# Patient Record
Sex: FEMALE | Race: WHITE | NOT HISPANIC OR LATINO | Employment: OTHER | URBAN - METROPOLITAN AREA
[De-identification: names, ages, dates, MRNs, and addresses within clinical notes are randomized per-mention and may not be internally consistent; named-entity substitution may affect disease eponyms.]

---

## 2017-05-26 RX ORDER — LEVOTHYROXINE SODIUM 50 UG/1
50 TABLET ORAL DAILY
Qty: 30 TABLET | Refills: 11 | Status: SHIPPED | OUTPATIENT
Start: 2017-05-26 | End: 2018-05-17 | Stop reason: SDUPTHER

## 2017-06-04 RX ORDER — GABAPENTIN 300 MG/1
CAPSULE ORAL
Qty: 30 CAPSULE | Refills: 0 | Status: SHIPPED | OUTPATIENT
Start: 2017-06-04 | End: 2017-07-07 | Stop reason: SDUPTHER

## 2017-06-29 ENCOUNTER — OFFICE VISIT (OUTPATIENT)
Dept: FAMILY MEDICINE | Facility: CLINIC | Age: 67
End: 2017-06-29
Payer: MEDICARE

## 2017-06-29 VITALS
HEIGHT: 61 IN | HEART RATE: 80 BPM | BODY MASS INDEX: 33.04 KG/M2 | WEIGHT: 175 LBS | DIASTOLIC BLOOD PRESSURE: 70 MMHG | RESPIRATION RATE: 20 BRPM | TEMPERATURE: 98 F | SYSTOLIC BLOOD PRESSURE: 112 MMHG

## 2017-06-29 DIAGNOSIS — E11.43 DIABETIC AUTONOMIC NEUROPATHY ASSOCIATED WITH TYPE 2 DIABETES MELLITUS: Primary | ICD-10-CM

## 2017-06-29 DIAGNOSIS — E11.8 TYPE 2 DIABETES MELLITUS WITH COMPLICATION, WITH LONG-TERM CURRENT USE OF INSULIN: ICD-10-CM

## 2017-06-29 DIAGNOSIS — Z79.4 TYPE 2 DIABETES MELLITUS WITH COMPLICATION, WITH LONG-TERM CURRENT USE OF INSULIN: ICD-10-CM

## 2017-06-29 DIAGNOSIS — B35.6 TINEA CRURIS: ICD-10-CM

## 2017-06-29 PROBLEM — E11.9 DIABETES MELLITUS, TYPE 2: Status: ACTIVE | Noted: 2017-06-29

## 2017-06-29 PROCEDURE — 4010F ACE/ARB THERAPY RXD/TAKEN: CPT | Mod: ,,, | Performed by: FAMILY MEDICINE

## 2017-06-29 PROCEDURE — 1159F MED LIST DOCD IN RCRD: CPT | Mod: ,,, | Performed by: FAMILY MEDICINE

## 2017-06-29 PROCEDURE — 99203 OFFICE O/P NEW LOW 30 MIN: CPT | Mod: ,,, | Performed by: FAMILY MEDICINE

## 2017-06-29 RX ORDER — GABAPENTIN 100 MG/1
CAPSULE ORAL
COMMUNITY
Start: 2017-04-26 | End: 2017-07-07 | Stop reason: DRUGHIGH

## 2017-06-29 RX ORDER — METFORMIN HYDROCHLORIDE 750 MG/1
TABLET, EXTENDED RELEASE ORAL
COMMUNITY
Start: 2017-06-04 | End: 2018-05-17 | Stop reason: SDUPTHER

## 2017-06-29 RX ORDER — FAMOTIDINE 20 MG/1
TABLET, FILM COATED ORAL
COMMUNITY
Start: 2017-05-31 | End: 2017-08-08 | Stop reason: SDUPTHER

## 2017-06-29 RX ORDER — LISINOPRIL 5 MG/1
TABLET ORAL
COMMUNITY
Start: 2017-06-04 | End: 2018-05-07 | Stop reason: SDUPTHER

## 2017-06-29 RX ORDER — MELOXICAM 7.5 MG/1
TABLET ORAL
COMMUNITY
Start: 2017-04-28 | End: 2018-05-17 | Stop reason: SDUPTHER

## 2017-06-29 RX ORDER — METOPROLOL TARTRATE 25 MG/1
TABLET, FILM COATED ORAL
COMMUNITY
Start: 2017-06-01 | End: 2017-08-04 | Stop reason: SDUPTHER

## 2017-06-29 RX ORDER — TRAMADOL HYDROCHLORIDE 50 MG/1
50 TABLET ORAL NIGHTLY
Qty: 20 TABLET | Refills: 0 | Status: SHIPPED | OUTPATIENT
Start: 2017-06-29 | End: 2017-08-15 | Stop reason: SDUPTHER

## 2017-06-29 RX ORDER — NYSTATIN AND TRIAMCINOLONE ACETONIDE 100000; 1 [USP'U]/G; MG/G
CREAM TOPICAL 4 TIMES DAILY
Qty: 60 TUBE | Refills: 1 | Status: SHIPPED | OUTPATIENT
Start: 2017-06-29 | End: 2017-11-16 | Stop reason: ALTCHOICE

## 2017-06-29 NOTE — PROGRESS NOTES
Subjective:       Patient ID: Hoa Wilde is a 67 y.o. female.    Chief Complaint: Rash (under arms x 1 week)    Rash   This is a new problem. The current episode started in the past 7 days. The problem has been gradually worsening since onset. The affected locations include the left axilla and right axilla. The rash is characterized by itchiness, redness and pain. Associated symptoms include diarrhea. (Malodorous) Past treatments include nothing. The treatment provided no relief. Her past medical history is significant for eczema.     Review of Systems   Gastrointestinal: Positive for diarrhea.   Skin: Positive for rash.   Psychiatric/Behavioral: Positive for sleep disturbance (stays up b/c neuropathy).       Objective:      Physical Exam    Assessment:       1. Diabetic autonomic neuropathy associated with type 2 diabetes mellitus    2. Type 2 diabetes mellitus with complication, with long-term current use of insulin    3. Tinea cruris        Plan:       Hoa was seen today for rash.    Diagnoses and all orders for this visit:    Diabetic autonomic neuropathy associated with type 2 diabetes mellitus  -     nystatin-triamcinolone (MYCOLOG II) cream; Apply topically 4 (four) times daily.    Type 2 diabetes mellitus with complication, with long-term current use of insulin    Tinea cruris  -     tramadol (ULTRAM) 50 mg tablet; Take 1 tablet (50 mg total) by mouth every evening.         Return in about 3 months (around 9/29/2017).

## 2017-07-07 RX ORDER — GABAPENTIN 300 MG/1
CAPSULE ORAL
Qty: 30 CAPSULE | Refills: 0 | Status: SHIPPED | OUTPATIENT
Start: 2017-07-07 | End: 2017-09-20 | Stop reason: SDUPTHER

## 2017-07-13 ENCOUNTER — TELEPHONE (OUTPATIENT)
Dept: FAMILY MEDICINE | Facility: CLINIC | Age: 67
End: 2017-07-13

## 2017-07-13 DIAGNOSIS — Z79.4 TYPE 2 DIABETES MELLITUS WITH DIABETIC POLYNEUROPATHY, WITH LONG-TERM CURRENT USE OF INSULIN: Primary | ICD-10-CM

## 2017-07-13 DIAGNOSIS — E11.42 TYPE 2 DIABETES MELLITUS WITH DIABETIC POLYNEUROPATHY, WITH LONG-TERM CURRENT USE OF INSULIN: Primary | ICD-10-CM

## 2017-07-13 NOTE — TELEPHONE ENCOUNTER
Darby at [facility] called in regards to a referral for A dietician consult for Mrs. Wilde.  Please sign order

## 2017-08-04 RX ORDER — METOPROLOL TARTRATE 25 MG/1
25 TABLET, FILM COATED ORAL EVERY 12 HOURS
Qty: 60 TABLET | Refills: 11 | Status: SHIPPED | OUTPATIENT
Start: 2017-08-04 | End: 2017-11-16 | Stop reason: ALTCHOICE

## 2017-08-08 DIAGNOSIS — K21.9 GASTROESOPHAGEAL REFLUX DISEASE WITHOUT ESOPHAGITIS: Primary | ICD-10-CM

## 2017-08-09 RX ORDER — FAMOTIDINE 20 MG/1
20 TABLET, FILM COATED ORAL DAILY
Qty: 30 TABLET | Refills: 11 | Status: SHIPPED | OUTPATIENT
Start: 2017-08-09 | End: 2018-05-17 | Stop reason: SDUPTHER

## 2017-08-15 DIAGNOSIS — B35.6 TINEA CRURIS: ICD-10-CM

## 2017-08-16 RX ORDER — TRAMADOL HYDROCHLORIDE 50 MG/1
TABLET ORAL
Qty: 20 TABLET | Refills: 0 | Status: SHIPPED | OUTPATIENT
Start: 2017-08-16 | End: 2017-11-16 | Stop reason: ALTCHOICE

## 2017-08-17 ENCOUNTER — TELEPHONE (OUTPATIENT)
Dept: FAMILY MEDICINE | Facility: CLINIC | Age: 67
End: 2017-08-17

## 2017-09-20 RX ORDER — GABAPENTIN 300 MG/1
CAPSULE ORAL
Qty: 30 CAPSULE | Refills: 0 | Status: SHIPPED | OUTPATIENT
Start: 2017-09-20 | End: 2017-10-19 | Stop reason: SDUPTHER

## 2017-10-19 RX ORDER — GABAPENTIN 300 MG/1
CAPSULE ORAL
Qty: 30 CAPSULE | Refills: 0 | Status: SHIPPED | OUTPATIENT
Start: 2017-10-19 | End: 2017-11-16 | Stop reason: SDUPTHER

## 2017-11-16 ENCOUNTER — OFFICE VISIT (OUTPATIENT)
Dept: FAMILY MEDICINE | Facility: CLINIC | Age: 67
End: 2017-11-16
Payer: MEDICARE

## 2017-11-16 VITALS
WEIGHT: 176 LBS | DIASTOLIC BLOOD PRESSURE: 86 MMHG | TEMPERATURE: 99 F | RESPIRATION RATE: 20 BRPM | HEART RATE: 72 BPM | SYSTOLIC BLOOD PRESSURE: 132 MMHG | BODY MASS INDEX: 33.25 KG/M2

## 2017-11-16 DIAGNOSIS — E13.40 NEUROPATHY DUE TO SECONDARY DIABETES: ICD-10-CM

## 2017-11-16 DIAGNOSIS — Z00.00 WELL ADULT EXAM: ICD-10-CM

## 2017-11-16 DIAGNOSIS — Z23 IMMUNIZATION DUE: ICD-10-CM

## 2017-11-16 DIAGNOSIS — E11.43 DIABETIC AUTONOMIC NEUROPATHY ASSOCIATED WITH TYPE 2 DIABETES MELLITUS: Primary | ICD-10-CM

## 2017-11-16 PROCEDURE — G0008 ADMIN INFLUENZA VIRUS VAC: HCPCS | Mod: ,,, | Performed by: FAMILY MEDICINE

## 2017-11-16 PROCEDURE — 90686 IIV4 VACC NO PRSV 0.5 ML IM: CPT | Mod: ,,, | Performed by: FAMILY MEDICINE

## 2017-11-16 PROCEDURE — 99213 OFFICE O/P EST LOW 20 MIN: CPT | Mod: 25,,, | Performed by: FAMILY MEDICINE

## 2017-11-16 RX ORDER — GABAPENTIN 100 MG/1
200 CAPSULE ORAL 2 TIMES DAILY
Qty: 120 CAPSULE | Refills: 11 | Status: SHIPPED | OUTPATIENT
Start: 2017-11-16 | End: 2018-05-17 | Stop reason: SDUPTHER

## 2017-11-16 RX ORDER — GABAPENTIN 300 MG/1
300 CAPSULE ORAL NIGHTLY
Qty: 30 CAPSULE | Refills: 11 | Status: SHIPPED | OUTPATIENT
Start: 2017-11-16 | End: 2018-11-20 | Stop reason: SDUPTHER

## 2017-11-16 NOTE — PROGRESS NOTES
Subjective:       Patient ID: Hoa Wilde is a 67 y.o. female.    Chief Complaint: Foot Swelling (neuropathy )    Patient reports bad neuropathy in both feet getting worse she has had this for some time but but getting worse over the last month. Has neuropathic ulcer on the right foot which on the lateral side and the bottom of the great toe followed by Dr Perez and  it is healing. More symptoms on left foot.   on gabapentin 100mg am and noon and 300 hs.      Diabetes   She has type 2 diabetes mellitus. Her disease course has been stable. She is following a diabetic diet. She has not had a previous visit with a dietitian. She rarely participates in exercise. Her breakfast blood glucose range is generally 140-180 mg/dl. Her highest blood glucose is >200 mg/dl. (Eats through the night and does not check fasting sugars) An ACE inhibitor/angiotensin II receptor blocker is being taken. She sees a podiatrist.Eye exam is not current (last year).   No results found for: LABA1C, HGBA1C last Last hemoglobin A1c was in April of last year was 10.6. Patient has diabetic shoes but lost them.    Review of Systems    Objective:      Physical Exam   Constitutional: She appears well-developed and well-nourished. No distress.   HENT:   Head: Normocephalic and atraumatic.   Eyes: Conjunctivae and EOM are normal. Pupils are equal, round, and reactive to light.   Neck: Normal range of motion. Neck supple.   Cardiovascular: Normal rate, regular rhythm, normal heart sounds and intact distal pulses.    Pulses:       Dorsalis pedis pulses are 0 on the right side, and 0 on the left side.        Posterior tibial pulses are 0 on the right side, and 0 on the left side.   Pulmonary/Chest: Effort normal and breath sounds normal.   Musculoskeletal:        Right foot: There is normal range of motion and no deformity.        Left foot: There is normal range of motion and no deformity.   Feet:   Right Foot:   Protective Sensation: 4 sites  tested. 1 site sensed.  Skin Integrity: Positive for ulcer (right great toe healing ulcer on the late), skin breakdown, callus and dry skin. Negative for erythema.   Left Foot:   Protective Sensation: 4 sites tested. 1 site sensed.  Skin Integrity: Positive for callus and dry skin. Negative for ulcer, blister, skin breakdown, erythema or warmth.   Skin: She is not diaphoretic.   Nursing note and vitals reviewed.      Assessment:       1. Diabetic autonomic neuropathy associated with type 2 diabetes mellitus    2. Neuropathy due to secondary diabetes    3. Well adult exam    4. Immunization due        Plan:       Hoa was seen today for foot swelling.    Diagnoses and all orders for this visit:    Diabetic autonomic neuropathy associated with type 2 diabetes mellitus  -     gabapentin (NEURONTIN) 100 MG capsule; Take 2 capsules (200 mg total) by mouth 2 (two) times daily. Am and noon. Coninue 300mg q hs  -     gabapentin (NEURONTIN) 300 MG capsule; Take 1 capsule (300 mg total) by mouth nightly.    Neuropathy due to secondary diabetes  -     gabapentin (NEURONTIN) 100 MG capsule; Take 2 capsules (200 mg total) by mouth 2 (two) times daily. Am and noon. Coninue 300mg q hs  -     gabapentin (NEURONTIN) 300 MG capsule; Take 1 capsule (300 mg total) by mouth nightly.  Follow-up with  for diabetic shoes .  Follow-up Dr Perez for care of foot ulcer  -     Hemoglobin A1c; Future  -     Hepatic function panel; Future  -     Hepatitis C antibody; Future  -     Lipid panel; Future  -     Comprehensive metabolic panel; Future  -     Hemoglobin A1c  -     Hepatic function panel  -     Hepatitis C antibody  -     Lipid panel  -     Comprehensive metabolic panel    Immunization due  -     Influenza - Quadrivalent (3 years & older) (PF); Future         Return for annual.

## 2017-11-20 LAB
ALBUMIN SERPL-MCNC: 4.1 G/DL (ref 3.1–4.7)
ALP SERPL-CCNC: 94 IU/L (ref 40–104)
ALT (SGPT): 19 IU/L (ref 3–33)
AST SERPL-CCNC: 20 IU/L (ref 10–40)
BILIRUB SERPL-MCNC: 0.7 MG/DL (ref 0.3–1)
BILIRUBIN DIRECT+TOT PNL SERPL-MCNC: <0.1 MG/DL (ref 0–0.2)
BUN SERPL-MCNC: 10 MG/DL (ref 8–20)
CALCIUM SERPL-MCNC: 9.1 MG/DL (ref 7.7–10.4)
CHLORIDE: 101 MMOL/L (ref 98–110)
CO2 SERPL-SCNC: 25.6 MMOL/L (ref 22.8–31.6)
CREATININE: 0.49 MG/DL (ref 0.6–1.4)
GLUCOSE: 287 MG/DL (ref 70–99)
HBA1C MFR BLD: 10.5 % (ref 3.1–6.5)
POTASSIUM SERPL-SCNC: 4.1 MMOL/L (ref 3.5–5)
PROT SERPL-MCNC: 7.6 G/DL (ref 6–8.2)
SODIUM: 136 MMOL/L (ref 134–144)

## 2017-11-21 LAB — HCV AB SERPL QL IA: <0.1 S/CO RATIO (ref 0–0.9)

## 2017-11-22 ENCOUNTER — TELEPHONE (OUTPATIENT)
Dept: FAMILY MEDICINE | Facility: CLINIC | Age: 67
End: 2017-11-22

## 2017-12-29 ENCOUNTER — TELEPHONE (OUTPATIENT)
Dept: FAMILY MEDICINE | Facility: CLINIC | Age: 67
End: 2017-12-29

## 2017-12-29 NOTE — TELEPHONE ENCOUNTER
[facility] is calling regarding Statin therapy for Mrs Wilde.  Does Mrs. Wilde have a contraindication for statin therapy.  If no contraindications please consider starting patient on statin. Please advise

## 2018-02-22 DIAGNOSIS — E11.9 DIABETES MELLITUS WITHOUT COMPLICATION: Primary | ICD-10-CM

## 2018-04-09 ENCOUNTER — TELEPHONE (OUTPATIENT)
Dept: FAMILY MEDICINE | Facility: CLINIC | Age: 68
End: 2018-04-09

## 2018-04-09 NOTE — TELEPHONE ENCOUNTER
Left message on voicemail for patient to call and schedule an appointment with Dr. Pope for a hospital follow-up

## 2018-04-09 NOTE — TELEPHONE ENCOUNTER
----- Message from Zehra Morgan sent at 4/9/2018 10:33 AM CDT -----  Patient was discharged from [facility] on 4/7/18 . Please schedule f/u ov with patient before 5/7/18. We need to document discharged medicines with current medicine list and bill the 1111F code on claim.

## 2018-05-07 RX ORDER — LISINOPRIL 5 MG/1
5 TABLET ORAL DAILY
Qty: 30 TABLET | Refills: 6 | Status: SHIPPED | OUTPATIENT
Start: 2018-05-07 | End: 2018-05-09 | Stop reason: SDUPTHER

## 2018-05-09 DIAGNOSIS — I10 ESSENTIAL HYPERTENSION, BENIGN: Primary | ICD-10-CM

## 2018-05-09 RX ORDER — LISINOPRIL 5 MG/1
5 TABLET ORAL DAILY
Qty: 30 TABLET | Refills: 11 | Status: SHIPPED | OUTPATIENT
Start: 2018-05-09 | End: 2018-05-17 | Stop reason: SDUPTHER

## 2018-05-16 ENCOUNTER — OFFICE VISIT (OUTPATIENT)
Dept: FAMILY MEDICINE | Facility: CLINIC | Age: 68
End: 2018-05-16
Payer: MEDICARE

## 2018-05-16 VITALS
TEMPERATURE: 99 F | SYSTOLIC BLOOD PRESSURE: 118 MMHG | DIASTOLIC BLOOD PRESSURE: 60 MMHG | WEIGHT: 173 LBS | OXYGEN SATURATION: 98 % | HEART RATE: 86 BPM | HEIGHT: 61 IN | RESPIRATION RATE: 16 BRPM | BODY MASS INDEX: 32.66 KG/M2

## 2018-05-16 DIAGNOSIS — E11.8 TYPE 2 DIABETES MELLITUS WITH COMPLICATION, WITH LONG-TERM CURRENT USE OF INSULIN: Primary | ICD-10-CM

## 2018-05-16 DIAGNOSIS — E13.40 NEUROPATHY DUE TO SECONDARY DIABETES: ICD-10-CM

## 2018-05-16 DIAGNOSIS — Z79.4 TYPE 2 DIABETES MELLITUS WITH COMPLICATION, WITH LONG-TERM CURRENT USE OF INSULIN: Primary | ICD-10-CM

## 2018-05-16 PROCEDURE — 3074F SYST BP LT 130 MM HG: CPT | Mod: ,,, | Performed by: FAMILY MEDICINE

## 2018-05-16 PROCEDURE — 99214 OFFICE O/P EST MOD 30 MIN: CPT | Mod: ,,, | Performed by: FAMILY MEDICINE

## 2018-05-16 PROCEDURE — 1111F DSCHRG MED/CURRENT MED MERGE: CPT | Mod: ,,, | Performed by: FAMILY MEDICINE

## 2018-05-16 PROCEDURE — 3078F DIAST BP <80 MM HG: CPT | Mod: ,,, | Performed by: FAMILY MEDICINE

## 2018-05-16 PROCEDURE — 3046F HEMOGLOBIN A1C LEVEL >9.0%: CPT | Mod: ,,, | Performed by: FAMILY MEDICINE

## 2018-05-16 RX ORDER — ASPIRIN 81 MG/1
81 TABLET ORAL DAILY
COMMUNITY
End: 2021-06-28 | Stop reason: SDUPTHER

## 2018-05-16 RX ORDER — TIZANIDINE HYDROCHLORIDE 4 MG/1
1 CAPSULE, GELATIN COATED ORAL 4 TIMES DAILY PRN
Qty: 180 CAPSULE | Refills: 3 | Status: SHIPPED | OUTPATIENT
Start: 2018-05-16 | End: 2018-06-15

## 2018-05-16 RX ORDER — ATORVASTATIN CALCIUM 40 MG/1
40 TABLET, FILM COATED ORAL DAILY
Qty: 90 TABLET | Refills: 3 | Status: SHIPPED | OUTPATIENT
Start: 2018-05-16 | End: 2020-04-30

## 2018-05-16 NOTE — PROGRESS NOTES
Subjective:       Patient ID: Hoa Wilde is a 68 y.o. female.    Chief Complaint: Hospital Follow Up (medication reconcile bill [id number]) and Medication Refill      Patient is here as a follow-up from the hospital she was seen because of diabetic ketoacidosis had sugar 501 she was placed in ICU had insulin drip and fluid replacement and was discharged about a month ago. Discharged on 40 units of Levemir daily at bedtime. Has poor diet habits.      Diabetes   She presents for her follow-up diabetic visit. She has type 2 diabetes mellitus. Her disease course has been worsening. She has not had a previous visit with a dietitian. She participates in exercise daily. Her home blood glucose trend is increasing steadily. Her breakfast blood glucose range is generally >200 mg/dl. Her dinner blood glucose range is generally >200 mg/dl. Her highest blood glucose is >200 mg/dl. An ACE inhibitor/angiotensin II receptor blocker is being taken. She does not see a podiatrist.Eye exam is current.       Allergies and Medications:   Review of patient's allergies indicates:  No Known Allergies  Current Outpatient Prescriptions   Medication Sig Dispense Refill    aspirin (ECOTRIN) 81 MG EC tablet Take 81 mg by mouth once daily.      famotidine (PEPCID) 20 MG tablet Take 1 tablet (20 mg total) by mouth once daily. 30 tablet 11    gabapentin (NEURONTIN) 100 MG capsule Take 2 capsules (200 mg total) by mouth 2 (two) times daily. Am and noon. Coninue 300mg q hs 120 capsule 11    gabapentin (NEURONTIN) 300 MG capsule Take 1 capsule (300 mg total) by mouth nightly. 30 capsule 11    insulin detemir (LEVEMIR FLEXTOUCH) 100 unit/mL (3 mL) SubQ InPn pen Inject 50 Units into the skin every evening. 45 mL 3    levothyroxine (SYNTHROID) 50 MCG tablet Take 1 tablet (50 mcg total) by mouth once daily. 30 tablet 11    lisinopril (PRINIVIL,ZESTRIL) 5 MG tablet Take 1 tablet (5 mg total) by mouth once daily. 30 tablet 11    meloxicam (MOBIC) 7.5 MG  "tablet       metformin (GLUCOPHAGE-XR) 750 MG 24 hr tablet       metoprolol tartrate (LOPRESSOR) 25 MG tablet       pen needle, diabetic 30 gauge x 1/3" Ndle 1 pen by Misc.(Non-Drug; Combo Route) route once daily. To use with levimir flex pen 100 each 3    atorvastatin (LIPITOR) 40 MG tablet Take 1 tablet (40 mg total) by mouth once daily. 90 tablet 3    tiZANidine 4 mg Cap Take 1 capsule by mouth 4 (four) times daily as needed. 180 capsule 3     No current facility-administered medications for this visit.        Family History:   Family History   Problem Relation Age of Onset    Hyperlipidemia Mother     Heart disease Mother        Social History:   Social History     Social History    Marital status:      Spouse name: N/A    Number of children: N/A    Years of education: N/A     Occupational History    Not on file.     Social History Main Topics    Smoking status: Never Smoker    Smokeless tobacco: Never Used    Alcohol use No    Drug use: No    Sexual activity: Not on file     Other Topics Concern    Not on file     Social History Narrative    No narrative on file       Review of Systems    Objective:     Vitals:    05/16/18 1426   BP: 118/60   Pulse: 86   Resp: 16   Temp: 99.1 °F (37.3 °C)        Physical Exam   Constitutional: She appears well-developed and well-nourished.   HENT:   Head: Normocephalic.   Eyes: Conjunctivae and EOM are normal. Pupils are equal, round, and reactive to light. Right eye exhibits no discharge. Left eye exhibits no discharge.   Neck: Normal range of motion. Neck supple.   Cardiovascular: Normal rate and regular rhythm.    Psychiatric: She has a normal mood and affect. Her behavior is normal. Judgment and thought content normal.   Nursing note and vitals reviewed.      Assessment:       1. Type 2 diabetes mellitus with complication, with long-term current use of insulin    2. Neuropathy due to secondary diabetes -Patient has significant pain and wanted a " refill on Ultram because of potential side effects and control substance we will do Parafon Forte take at nighttime she was also instructed to take her insulin up to 50 units and secured at 8 PM instead of the wee hours of the morning.        Plan:     The patient is asked to make an attempt to improve diet and exercise patterns to aid in medical management of this problem.  Hoa was seen today for hospital follow up and medication refill.  Discharge and current medications have been reviewed and reconciled with the chart.    Diagnoses and all orders for this visit:    Type 2 diabetes mellitus with complication, with long-term current use of insulin    Neuropathy due to secondary diabetes    Other orders  -     atorvastatin (LIPITOR) 40 MG tablet; Take 1 tablet (40 mg total) by mouth once daily.  -     tiZANidine 4 mg Cap; Take 1 capsule by mouth 4 (four) times daily as needed.         Follow-up in about 3 months (around 8/16/2018), or if symptoms worsen or fail to improve.

## 2018-05-17 DIAGNOSIS — E11.9 DIABETES MELLITUS WITHOUT COMPLICATION: Primary | ICD-10-CM

## 2018-05-17 DIAGNOSIS — E13.40 NEUROPATHY DUE TO SECONDARY DIABETES: ICD-10-CM

## 2018-05-17 DIAGNOSIS — K21.9 GASTROESOPHAGEAL REFLUX DISEASE WITHOUT ESOPHAGITIS: ICD-10-CM

## 2018-05-17 DIAGNOSIS — E11.43 DIABETIC AUTONOMIC NEUROPATHY ASSOCIATED WITH TYPE 2 DIABETES MELLITUS: ICD-10-CM

## 2018-05-17 DIAGNOSIS — I10 ESSENTIAL HYPERTENSION, BENIGN: ICD-10-CM

## 2018-05-17 RX ORDER — METFORMIN HYDROCHLORIDE 750 MG/1
750 TABLET, EXTENDED RELEASE ORAL
Qty: 30 TABLET | Refills: 11 | Status: SHIPPED | OUTPATIENT
Start: 2018-05-17 | End: 2018-05-24 | Stop reason: SDUPTHER

## 2018-05-17 RX ORDER — GABAPENTIN 100 MG/1
200 CAPSULE ORAL 2 TIMES DAILY
Qty: 120 CAPSULE | Refills: 11 | Status: SHIPPED | OUTPATIENT
Start: 2018-05-17 | End: 2019-09-23 | Stop reason: SDUPTHER

## 2018-05-17 RX ORDER — FAMOTIDINE 20 MG/1
20 TABLET, FILM COATED ORAL DAILY
Qty: 30 TABLET | Refills: 11 | Status: SHIPPED | OUTPATIENT
Start: 2018-05-17 | End: 2019-05-18 | Stop reason: SDUPTHER

## 2018-05-17 RX ORDER — METOPROLOL TARTRATE 25 MG/1
25 TABLET, FILM COATED ORAL DAILY
Qty: 30 TABLET | Refills: 11 | Status: SHIPPED | OUTPATIENT
Start: 2018-05-17 | End: 2018-06-07 | Stop reason: SDUPTHER

## 2018-05-17 RX ORDER — MELOXICAM 7.5 MG/1
7.5 TABLET ORAL DAILY
Qty: 30 TABLET | Refills: 11 | Status: SHIPPED | OUTPATIENT
Start: 2018-05-17 | End: 2019-06-02 | Stop reason: SDUPTHER

## 2018-05-17 RX ORDER — LISINOPRIL 5 MG/1
5 TABLET ORAL DAILY
Qty: 30 TABLET | Refills: 11 | Status: SHIPPED | OUTPATIENT
Start: 2018-05-17 | End: 2019-05-17

## 2018-05-17 RX ORDER — LEVOTHYROXINE SODIUM 50 UG/1
50 TABLET ORAL DAILY
Qty: 30 TABLET | Refills: 11 | Status: SHIPPED | OUTPATIENT
Start: 2018-05-17 | End: 2019-09-23 | Stop reason: SDUPTHER

## 2018-05-24 ENCOUNTER — TELEPHONE (OUTPATIENT)
Dept: FAMILY MEDICINE | Facility: CLINIC | Age: 68
End: 2018-05-24

## 2018-05-24 DIAGNOSIS — E11.9 DIABETES MELLITUS WITHOUT COMPLICATION: ICD-10-CM

## 2018-05-24 DIAGNOSIS — I10 ESSENTIAL HYPERTENSION, BENIGN: ICD-10-CM

## 2018-05-24 RX ORDER — METFORMIN HYDROCHLORIDE 750 MG/1
1500 TABLET, EXTENDED RELEASE ORAL
Qty: 60 TABLET | Refills: 11 | Status: SHIPPED | OUTPATIENT
Start: 2018-05-24 | End: 2019-10-06 | Stop reason: SDUPTHER

## 2018-05-24 NOTE — TELEPHONE ENCOUNTER
Patient called stating she takes Metformin sr 750mg twice a day and the new rx states once a day.  Did you decrease her dosage

## 2018-06-07 DIAGNOSIS — I10 ESSENTIAL HYPERTENSION, BENIGN: ICD-10-CM

## 2018-06-07 RX ORDER — METOPROLOL TARTRATE 25 MG/1
25 TABLET, FILM COATED ORAL 2 TIMES DAILY
Qty: 60 TABLET | Refills: 11 | Status: SHIPPED | OUTPATIENT
Start: 2018-06-07 | End: 2019-06-29 | Stop reason: SDUPTHER

## 2018-06-07 NOTE — TELEPHONE ENCOUNTER
Refill Metoprolol Tart 25 mg and change dosage it was entered wrong at last appointment patient takes med bid

## 2018-11-20 DIAGNOSIS — E13.40 NEUROPATHY DUE TO SECONDARY DIABETES: ICD-10-CM

## 2018-11-20 DIAGNOSIS — E11.43 DIABETIC AUTONOMIC NEUROPATHY ASSOCIATED WITH TYPE 2 DIABETES MELLITUS: ICD-10-CM

## 2018-11-20 RX ORDER — GABAPENTIN 300 MG/1
CAPSULE ORAL
Qty: 30 CAPSULE | Refills: 1 | Status: SHIPPED | OUTPATIENT
Start: 2018-11-20 | End: 2019-09-23 | Stop reason: SDUPTHER

## 2018-12-03 ENCOUNTER — OFFICE VISIT (OUTPATIENT)
Dept: FAMILY MEDICINE | Facility: CLINIC | Age: 68
End: 2018-12-03
Payer: MEDICARE

## 2018-12-03 VITALS
SYSTOLIC BLOOD PRESSURE: 126 MMHG | OXYGEN SATURATION: 97 % | DIASTOLIC BLOOD PRESSURE: 70 MMHG | BODY MASS INDEX: 32.47 KG/M2 | WEIGHT: 172 LBS | HEART RATE: 64 BPM | HEIGHT: 61 IN

## 2018-12-03 DIAGNOSIS — Z23 IMMUNIZATION DUE: ICD-10-CM

## 2018-12-03 DIAGNOSIS — E11.43 DIABETIC AUTONOMIC NEUROPATHY ASSOCIATED WITH TYPE 2 DIABETES MELLITUS: ICD-10-CM

## 2018-12-03 DIAGNOSIS — I10 ESSENTIAL HYPERTENSION: ICD-10-CM

## 2018-12-03 DIAGNOSIS — M25.551 PAIN OF RIGHT HIP JOINT: ICD-10-CM

## 2018-12-03 DIAGNOSIS — E13.40 NEUROPATHY DUE TO SECONDARY DIABETES: Primary | ICD-10-CM

## 2018-12-03 DIAGNOSIS — E78.2 MIXED HYPERLIPIDEMIA: ICD-10-CM

## 2018-12-03 PROBLEM — E78.5 HYPERLIPIDEMIA: Status: ACTIVE | Noted: 2018-12-03

## 2018-12-03 LAB
GLUCOSE SERPL-MCNC: 249 MG/DL (ref 70–110)
HBA1C MFR BLD: 11.4 %
POC CHOLESTEROL, HDL: 40
POC CHOLESTEROL, LDL: 76
POC CHOLESTEROL, TOTAL: 143 MG/DL
POC GLUCOSE FASTING: ABNORMAL
POC TOTAL CHOLESTEROL / HDL RATIO: 3.6
POC TRIGLYCERIDES: 134

## 2018-12-03 PROCEDURE — 3288F FALL RISK ASSESSMENT DOCD: CPT | Mod: ,,, | Performed by: FAMILY MEDICINE

## 2018-12-03 PROCEDURE — 1100F PTFALLS ASSESS-DOCD GE2>/YR: CPT | Mod: ,,, | Performed by: FAMILY MEDICINE

## 2018-12-03 PROCEDURE — 3074F SYST BP LT 130 MM HG: CPT | Mod: ,,, | Performed by: FAMILY MEDICINE

## 2018-12-03 PROCEDURE — 3078F DIAST BP <80 MM HG: CPT | Mod: ,,, | Performed by: FAMILY MEDICINE

## 2018-12-03 PROCEDURE — 99214 OFFICE O/P EST MOD 30 MIN: CPT | Mod: 25,,, | Performed by: FAMILY MEDICINE

## 2018-12-03 PROCEDURE — 82947 ASSAY GLUCOSE BLOOD QUANT: CPT | Mod: QW,,, | Performed by: FAMILY MEDICINE

## 2018-12-03 PROCEDURE — G0009 ADMIN PNEUMOCOCCAL VACCINE: HCPCS | Mod: ,,, | Performed by: FAMILY MEDICINE

## 2018-12-03 PROCEDURE — 90732 PPSV23 VACC 2 YRS+ SUBQ/IM: CPT | Mod: ,,, | Performed by: FAMILY MEDICINE

## 2018-12-03 PROCEDURE — 83036 HEMOGLOBIN GLYCOSYLATED A1C: CPT | Mod: QW,,, | Performed by: FAMILY MEDICINE

## 2018-12-03 PROCEDURE — 3046F HEMOGLOBIN A1C LEVEL >9.0%: CPT | Mod: ,,, | Performed by: FAMILY MEDICINE

## 2018-12-03 PROCEDURE — 80061 LIPID PANEL: CPT | Mod: QW,,, | Performed by: FAMILY MEDICINE

## 2018-12-03 NOTE — PROGRESS NOTES
Subjective:       Patient ID: Hoa Wilde is a 68 y.o. female.    Chief Complaint: Hip Pain; leg paini (right ); and rx for diabetic shoes      He needs an evaluation for diabetic shoes. 3 years because of diabetic neuropathy. She has a history of bad neuropathy in both feet had the ulcerations treated aggressively with wound care.        Hip Pain    Incident onset: 2 weeks. There was no injury mechanism. The pain is present in the right hip, right ankle and right thigh. The quality of the pain is described as aching, burning, cramping, shooting and stabbing. The pain is at a severity of 9/10. The pain is moderate. Pertinent negatives include no muscle weakness. She has tried elevation and rest for the symptoms. The treatment provided mild relief.   Diabetes   She presents for her follow-up diabetic visit. She has type 2 diabetes mellitus. Her disease course has been stable. Pertinent negatives for hypoglycemia include no confusion. There are no hypoglycemic complications. Pertinent negatives for hypoglycemia complications include no blackouts. Symptoms are worsening. Diabetic complications include PVD and retinopathy. She is following a diabetic diet. When asked about meal planning, she reported none. She has not had a previous visit with a dietitian. She rarely participates in exercise. She does not see a podiatrist (As seen  in the past).Eye exam current: Is seeing Dr. Era Peter in January.       Allergies and Medications:   Review of patient's allergies indicates:  No Known Allergies  Current Outpatient Medications   Medication Sig Dispense Refill    aspirin (ECOTRIN) 81 MG EC tablet Take 81 mg by mouth once daily.      atorvastatin (LIPITOR) 40 MG tablet Take 1 tablet (40 mg total) by mouth once daily. 90 tablet 3    famotidine (PEPCID) 20 MG tablet Take 1 tablet (20 mg total) by mouth once daily. 30 tablet 11    gabapentin (NEURONTIN) 300 MG capsule TAKE ONE CAPSULE BY MOUTH NIGHTLY 30  "capsule 1    insulin detemir U-100 (LEVEMIR FLEXTOUCH U-100 INSULN) 100 unit/mL (3 mL) SubQ InPn pen Inject 60 Units into the skin every evening. 54 mL 3    levothyroxine (SYNTHROID) 50 MCG tablet Take 1 tablet (50 mcg total) by mouth once daily. 30 tablet 11    lisinopril (PRINIVIL,ZESTRIL) 5 MG tablet Take 1 tablet (5 mg total) by mouth once daily. 30 tablet 11    meloxicam (MOBIC) 7.5 MG tablet Take 1 tablet (7.5 mg total) by mouth once daily. 30 tablet 11    metFORMIN (GLUCOPHAGE-XR) 750 MG 24 hr tablet Take 2 tablets (1,500 mg total) by mouth daily with breakfast. 60 tablet 11    metoprolol tartrate (LOPRESSOR) 25 MG tablet Take 1 tablet (25 mg total) by mouth 2 (two) times daily. 60 tablet 11    pen needle, diabetic 30 gauge x 1/3" Ndle 1 pen by Misc.(Non-Drug; Combo Route) route once daily. To use with levimir flex pen 100 each 3    dulaglutide (TRULICITY) 0.75 mg/0.5 mL PnIj Inject 0.5 mLs (0.75 mg total) into the skin every 7 days for 28 days, THEN 1 mL (1.5 mg total) every 7 days. 14 mL 0    gabapentin (NEURONTIN) 100 MG capsule Take 2 capsules (200 mg total) by mouth 2 (two) times daily. Am and noon. Coninue 300mg q hs 120 capsule 11     No current facility-administered medications for this visit.        Family History:   Family History   Problem Relation Age of Onset    Hyperlipidemia Mother     Heart disease Mother        Social History:   Social History     Socioeconomic History    Marital status:      Spouse name: Not on file    Number of children: Not on file    Years of education: Not on file    Highest education level: Not on file   Social Needs    Financial resource strain: Not on file    Food insecurity - worry: Not on file    Food insecurity - inability: Not on file    Transportation needs - medical: Not on file    Transportation needs - non-medical: Not on file   Occupational History    Occupation: retired    Tobacco Use    Smoking status: Never Smoker    Smokeless " tobacco: Never Used   Substance and Sexual Activity    Alcohol use: No    Drug use: No    Sexual activity: Not on file   Other Topics Concern    Not on file   Social History Narrative    Not on file       Review of Systems   Psychiatric/Behavioral: Negative for confusion.       Objective:     Vitals:    12/03/18 1457   BP: 126/70   Pulse: 64        Physical Exam   Cardiovascular:   Pulses:       Dorsalis pedis pulses are 1+ on the right side, and 1+ on the left side.        Posterior tibial pulses are 0 on the right side, and 0 on the left side.   Musculoskeletal:        Legs:       Right foot: There is normal range of motion and no deformity.        Left foot: There is normal range of motion and no deformity.   Feet:   Right Foot:   Protective Sensation: 4 sites tested. 0 sites sensed.   Skin Integrity: Positive for skin breakdown, erythema, warmth, callus and dry skin.   Left Foot:   Protective Sensation: 4 sites tested. 0 sites sensed.   Skin Integrity: Positive for skin breakdown, erythema, warmth, callus and dry skin.       Assessment:       1. Neuropathy due to secondary diabetes she definitely needs diabetic shoes, however she is to see her podiatrist before then as she will need a special prescription.    2. Diabetic autonomic neuropathy associated with type 2 diabetes mellitus chronic and uncontrolled    3. Mixed hyperlipidemia chronic and uncontrolled    4. Essential hypertension    5. Pain of right hip joint        Plan:       Hoa was seen today for hip pain, leg paini and rx for diabetic shoes.    Diagnoses and all orders for this visit:    Neuropathy due to secondary diabetes  -     Ambulatory consult to Podiatry    Diabetic autonomic neuropathy associated with type 2 diabetes mellitus  -     dulaglutide (TRULICITY) 0.75 mg/0.5 mL PnIj; Inject 0.5 mLs (0.75 mg total) into the skin every 7 days for 28 days, THEN 1 mL (1.5 mg total) every 7 days.  -     insulin detemir U-100 (LEVEMIR FLEXTOUCH U-100  INSULN) 100 unit/mL (3 mL) SubQ InPn pen; Inject 60 Units into the skin every evening.    Mixed hyperlipidemia  -     Ambulatory consult to Podiatry  -     POCT Lipid Profile with Glucose  -     Hemoglobin A1C, POCT    Essential hypertension  -     Ambulatory consult to Podiatry    Pain of right hip joint  -     Ambulatory consult to Orthopedics    Other orders  -     Influenza - Quadrivalent (3 years & older)         Follow-up in about 2 weeks (around 12/17/2018) for Return to clinic in 2 weeks with blood sugar diary-recheck diabetes.

## 2018-12-05 ENCOUNTER — TELEPHONE (OUTPATIENT)
Dept: FAMILY MEDICINE | Facility: CLINIC | Age: 68
End: 2018-12-05

## 2018-12-05 PROCEDURE — 90662 IIV NO PRSV INCREASED AG IM: CPT | Mod: ,,, | Performed by: FAMILY MEDICINE

## 2018-12-05 PROCEDURE — G0008 ADMIN INFLUENZA VIRUS VAC: HCPCS | Mod: ,,, | Performed by: FAMILY MEDICINE

## 2018-12-06 NOTE — TELEPHONE ENCOUNTER
Patient was recently evaluated for hip pain has a referral to the orthopedist pending patient is taking Neurontin for chronic pain and has mobile for inflammation and breakthrough pain. Opiates are inappropriate secondary to multiple risk factors would recommend she take her myopic daily may increase to twice a day on bad days and Tylenol 5 mg every 6 when necessary breakthrough pain.

## 2018-12-07 DIAGNOSIS — Z79.4 TYPE 2 DIABETES MELLITUS WITH COMPLICATION, WITH LONG-TERM CURRENT USE OF INSULIN: Primary | ICD-10-CM

## 2018-12-07 DIAGNOSIS — E11.8 TYPE 2 DIABETES MELLITUS WITH COMPLICATION, WITH LONG-TERM CURRENT USE OF INSULIN: Primary | ICD-10-CM

## 2019-02-21 ENCOUNTER — TELEPHONE (OUTPATIENT)
Dept: FAMILY MEDICINE | Facility: CLINIC | Age: 69
End: 2019-02-21

## 2019-02-21 NOTE — TELEPHONE ENCOUNTER
I submitted a PA on the patient's Levemir.  I am still awaiting a reply.  In the meantime, the patient reports she is out of insulin.  Is there anything you want to do in the interim for coverage in addition to the Trulicity and Metformin?

## 2019-03-19 ENCOUNTER — TELEPHONE (OUTPATIENT)
Dept: FAMILY MEDICINE | Facility: CLINIC | Age: 69
End: 2019-03-19

## 2019-03-19 NOTE — TELEPHONE ENCOUNTER
Called patient to schedule visit per Dr. Pope.  Per [facility], patient should be evaluated for Obstructive Sleep Apnea.  HTN, Obesity, BMI 33.0-33.9.  LM on VM.

## 2019-05-18 DIAGNOSIS — K21.9 GASTROESOPHAGEAL REFLUX DISEASE WITHOUT ESOPHAGITIS: ICD-10-CM

## 2019-05-20 DIAGNOSIS — K21.9 GASTROESOPHAGEAL REFLUX DISEASE WITHOUT ESOPHAGITIS: ICD-10-CM

## 2019-05-20 RX ORDER — FAMOTIDINE 20 MG/1
TABLET, FILM COATED ORAL
Qty: 30 TABLET | Refills: 11 | Status: SHIPPED | OUTPATIENT
Start: 2019-05-20 | End: 2019-05-20 | Stop reason: SDUPTHER

## 2019-05-20 RX ORDER — FAMOTIDINE 20 MG/1
20 TABLET, FILM COATED ORAL DAILY
Qty: 30 TABLET | Refills: 5 | Status: SHIPPED | OUTPATIENT
Start: 2019-05-20 | End: 2020-07-08

## 2019-06-02 DIAGNOSIS — E13.40 NEUROPATHY DUE TO SECONDARY DIABETES: ICD-10-CM

## 2019-06-03 RX ORDER — MELOXICAM 7.5 MG/1
TABLET ORAL
Qty: 30 TABLET | Refills: 1 | Status: SHIPPED | OUTPATIENT
Start: 2019-06-03 | End: 2019-10-06 | Stop reason: SDUPTHER

## 2019-06-12 ENCOUNTER — TELEPHONE (OUTPATIENT)
Dept: FAMILY MEDICINE | Facility: CLINIC | Age: 69
End: 2019-06-12

## 2019-06-12 DIAGNOSIS — I10 ESSENTIAL HYPERTENSION: ICD-10-CM

## 2019-06-12 DIAGNOSIS — Z79.4 TYPE 2 DIABETES MELLITUS WITH COMPLICATION, WITH LONG-TERM CURRENT USE OF INSULIN: ICD-10-CM

## 2019-06-12 DIAGNOSIS — E11.8 TYPE 2 DIABETES MELLITUS WITH COMPLICATION, WITH LONG-TERM CURRENT USE OF INSULIN: ICD-10-CM

## 2019-06-12 DIAGNOSIS — E78.2 MIXED HYPERLIPIDEMIA: Primary | ICD-10-CM

## 2019-06-14 ENCOUNTER — TELEPHONE (OUTPATIENT)
Dept: FAMILY MEDICINE | Facility: CLINIC | Age: 69
End: 2019-06-14

## 2019-06-14 NOTE — TELEPHONE ENCOUNTER
Spoke with patient she wanted to know how long she should fast for lab.  The tech at the lab told patient 12 hours.   Advised patient to fast for 8 hours before lab.

## 2019-06-25 ENCOUNTER — TELEPHONE (OUTPATIENT)
Dept: FAMILY MEDICINE | Facility: CLINIC | Age: 69
End: 2019-06-25

## 2019-06-25 DIAGNOSIS — I10 ESSENTIAL HYPERTENSION, BENIGN: ICD-10-CM

## 2019-06-25 NOTE — TELEPHONE ENCOUNTER
Left message on voicemail in regards to patient having blood work done before her appointment tomorrow.

## 2019-06-26 RX ORDER — LISINOPRIL 5 MG/1
TABLET ORAL
Qty: 90 TABLET | Refills: 0 | Status: SHIPPED | OUTPATIENT
Start: 2019-06-26 | End: 2020-02-02

## 2019-06-27 DIAGNOSIS — I10 ESSENTIAL HYPERTENSION, BENIGN: ICD-10-CM

## 2019-06-28 RX ORDER — LISINOPRIL 5 MG/1
TABLET ORAL
Qty: 90 TABLET | Refills: 3 | Status: SHIPPED | OUTPATIENT
Start: 2019-06-28 | End: 2019-09-18 | Stop reason: SDUPTHER

## 2019-06-29 DIAGNOSIS — I10 ESSENTIAL HYPERTENSION, BENIGN: ICD-10-CM

## 2019-07-01 DIAGNOSIS — I10 ESSENTIAL HYPERTENSION, BENIGN: ICD-10-CM

## 2019-07-01 RX ORDER — METOPROLOL TARTRATE 25 MG/1
TABLET, FILM COATED ORAL
Qty: 180 TABLET | Refills: 3 | Status: SHIPPED | OUTPATIENT
Start: 2019-07-01 | End: 2020-10-07

## 2019-07-01 RX ORDER — METOPROLOL TARTRATE 25 MG/1
TABLET, FILM COATED ORAL
Qty: 180 TABLET | Refills: 0 | Status: SHIPPED | OUTPATIENT
Start: 2019-07-01 | End: 2019-09-18 | Stop reason: SDUPTHER

## 2019-09-17 ENCOUNTER — LAB VISIT (OUTPATIENT)
Dept: LAB | Facility: HOSPITAL | Age: 69
End: 2019-09-17
Attending: FAMILY MEDICINE
Payer: MEDICARE

## 2019-09-17 DIAGNOSIS — E78.2 MIXED HYPERLIPIDEMIA: ICD-10-CM

## 2019-09-17 DIAGNOSIS — I10 ESSENTIAL HYPERTENSION, MALIGNANT: Primary | ICD-10-CM

## 2019-09-17 DIAGNOSIS — E11.8 DIABETIC COMPLICATION: ICD-10-CM

## 2019-09-17 DIAGNOSIS — Z79.4 ENCOUNTER FOR LONG-TERM (CURRENT) USE OF INSULIN: ICD-10-CM

## 2019-09-17 LAB
ALBUMIN SERPL BCP-MCNC: 3.8 G/DL (ref 3.5–5.2)
ALBUMIN SERPL BCP-MCNC: 3.8 G/DL (ref 3.5–5.2)
ALP SERPL-CCNC: 87 U/L (ref 55–135)
ALP SERPL-CCNC: 87 U/L (ref 55–135)
ALT SERPL W/O P-5'-P-CCNC: 15 U/L (ref 10–44)
ALT SERPL W/O P-5'-P-CCNC: 15 U/L (ref 10–44)
ANION GAP SERPL CALC-SCNC: 8 MMOL/L (ref 8–16)
AST SERPL-CCNC: 14 U/L (ref 10–40)
AST SERPL-CCNC: 14 U/L (ref 10–40)
BILIRUB DIRECT SERPL-MCNC: <0.1 MG/DL (ref 0.1–0.3)
BILIRUB SERPL-MCNC: 0.5 MG/DL (ref 0.1–1)
BILIRUB SERPL-MCNC: 0.5 MG/DL (ref 0.1–1)
BUN SERPL-MCNC: 12 MG/DL (ref 8–23)
CALCIUM SERPL-MCNC: 8.8 MG/DL (ref 8.7–10.5)
CHLORIDE SERPL-SCNC: 101 MMOL/L (ref 95–110)
CHOLEST SERPL-MCNC: 238 MG/DL (ref 120–199)
CHOLEST/HDLC SERPL: 6.8 {RATIO} (ref 2–5)
CO2 SERPL-SCNC: 26 MMOL/L (ref 23–29)
CREAT SERPL-MCNC: 0.6 MG/DL (ref 0.5–1.4)
EST. GFR  (AFRICAN AMERICAN): >60 ML/MIN/1.73 M^2
EST. GFR  (NON AFRICAN AMERICAN): >60 ML/MIN/1.73 M^2
ESTIMATED AVG GLUCOSE: 309 MG/DL (ref 68–131)
GLUCOSE SERPL-MCNC: 295 MG/DL (ref 70–110)
HBA1C MFR BLD HPLC: 12.4 % (ref 4.5–6.2)
HDLC SERPL-MCNC: 35 MG/DL (ref 40–75)
HDLC SERPL: 14.7 % (ref 20–50)
LDLC SERPL CALC-MCNC: ABNORMAL MG/DL (ref 63–159)
NONHDLC SERPL-MCNC: 203 MG/DL
POTASSIUM SERPL-SCNC: 4 MMOL/L (ref 3.5–5.1)
PROT SERPL-MCNC: 7.2 G/DL (ref 6–8.4)
PROT SERPL-MCNC: 7.2 G/DL (ref 6–8.4)
SODIUM SERPL-SCNC: 135 MMOL/L (ref 136–145)
TRIGL SERPL-MCNC: 409 MG/DL (ref 30–150)

## 2019-09-17 PROCEDURE — 80053 COMPREHEN METABOLIC PANEL: CPT

## 2019-09-17 PROCEDURE — 83036 HEMOGLOBIN GLYCOSYLATED A1C: CPT

## 2019-09-17 PROCEDURE — 80061 LIPID PANEL: CPT

## 2019-09-17 PROCEDURE — 36415 COLL VENOUS BLD VENIPUNCTURE: CPT

## 2019-09-18 ENCOUNTER — OFFICE VISIT (OUTPATIENT)
Dept: FAMILY MEDICINE | Facility: CLINIC | Age: 69
End: 2019-09-18
Payer: MEDICARE

## 2019-09-18 VITALS
BODY MASS INDEX: 31.63 KG/M2 | TEMPERATURE: 98 F | SYSTOLIC BLOOD PRESSURE: 134 MMHG | DIASTOLIC BLOOD PRESSURE: 68 MMHG | HEIGHT: 61 IN | OXYGEN SATURATION: 96 % | HEART RATE: 97 BPM | WEIGHT: 167.5 LBS

## 2019-09-18 DIAGNOSIS — E13.40 NEUROPATHY DUE TO SECONDARY DIABETES: ICD-10-CM

## 2019-09-18 DIAGNOSIS — Z23 IMMUNIZATION DUE: ICD-10-CM

## 2019-09-18 DIAGNOSIS — Z12.11 SCREENING FOR COLON CANCER: ICD-10-CM

## 2019-09-18 DIAGNOSIS — E11.43 DIABETIC AUTONOMIC NEUROPATHY ASSOCIATED WITH TYPE 2 DIABETES MELLITUS: Primary | ICD-10-CM

## 2019-09-18 DIAGNOSIS — F41.9 ANXIETY: ICD-10-CM

## 2019-09-18 DIAGNOSIS — M25.551 PAIN OF RIGHT HIP JOINT: ICD-10-CM

## 2019-09-18 DIAGNOSIS — E78.2 MIXED HYPERLIPIDEMIA: ICD-10-CM

## 2019-09-18 DIAGNOSIS — E11.65 UNCONTROLLED TYPE 2 DIABETES MELLITUS WITH HYPERGLYCEMIA: ICD-10-CM

## 2019-09-18 PROCEDURE — 3075F SYST BP GE 130 - 139MM HG: CPT | Mod: S$GLB,,, | Performed by: FAMILY MEDICINE

## 2019-09-18 PROCEDURE — 99214 PR OFFICE/OUTPT VISIT, EST, LEVL IV, 30-39 MIN: ICD-10-PCS | Mod: 25,S$GLB,, | Performed by: FAMILY MEDICINE

## 2019-09-18 PROCEDURE — 99999 PR PBB SHADOW E&M-EST. PATIENT-LVL V: ICD-10-PCS | Mod: PBBFAC,,, | Performed by: FAMILY MEDICINE

## 2019-09-18 PROCEDURE — 3075F PR MOST RECENT SYSTOLIC BLOOD PRESS GE 130-139MM HG: ICD-10-PCS | Mod: S$GLB,,, | Performed by: FAMILY MEDICINE

## 2019-09-18 PROCEDURE — G0008 FLU VACCINE - HIGH DOSE (65+) PRESERVATIVE FREE IM: ICD-10-PCS | Mod: S$GLB,,, | Performed by: FAMILY MEDICINE

## 2019-09-18 PROCEDURE — 3046F HEMOGLOBIN A1C LEVEL >9.0%: CPT | Mod: S$GLB,,, | Performed by: FAMILY MEDICINE

## 2019-09-18 PROCEDURE — 3046F PR MOST RECENT HEMOGLOBIN A1C LEVEL > 9.0%: ICD-10-PCS | Mod: S$GLB,,, | Performed by: FAMILY MEDICINE

## 2019-09-18 PROCEDURE — G0008 ADMIN INFLUENZA VIRUS VAC: HCPCS | Mod: S$GLB,,, | Performed by: FAMILY MEDICINE

## 2019-09-18 PROCEDURE — 99214 OFFICE O/P EST MOD 30 MIN: CPT | Mod: 25,S$GLB,, | Performed by: FAMILY MEDICINE

## 2019-09-18 PROCEDURE — 3078F PR MOST RECENT DIASTOLIC BLOOD PRESSURE < 80 MM HG: ICD-10-PCS | Mod: S$GLB,,, | Performed by: FAMILY MEDICINE

## 2019-09-18 PROCEDURE — 1101F PT FALLS ASSESS-DOCD LE1/YR: CPT | Mod: S$GLB,,, | Performed by: FAMILY MEDICINE

## 2019-09-18 PROCEDURE — 90662 IIV NO PRSV INCREASED AG IM: CPT | Mod: S$GLB,,, | Performed by: FAMILY MEDICINE

## 2019-09-18 PROCEDURE — 99999 PR PBB SHADOW E&M-EST. PATIENT-LVL V: CPT | Mod: PBBFAC,,, | Performed by: FAMILY MEDICINE

## 2019-09-18 PROCEDURE — 1101F PR PT FALLS ASSESS DOC 0-1 FALLS W/OUT INJ PAST YR: ICD-10-PCS | Mod: S$GLB,,, | Performed by: FAMILY MEDICINE

## 2019-09-18 PROCEDURE — 3078F DIAST BP <80 MM HG: CPT | Mod: S$GLB,,, | Performed by: FAMILY MEDICINE

## 2019-09-18 PROCEDURE — 90662 FLU VACCINE - HIGH DOSE (65+) PRESERVATIVE FREE IM: ICD-10-PCS | Mod: S$GLB,,, | Performed by: FAMILY MEDICINE

## 2019-09-18 RX ORDER — ROSUVASTATIN CALCIUM 20 MG/1
20 TABLET, COATED ORAL DAILY
Qty: 90 TABLET | Refills: 3 | Status: SHIPPED | OUTPATIENT
Start: 2019-09-18 | End: 2020-04-30

## 2019-09-18 RX ORDER — GABAPENTIN 100 MG/1
100 CAPSULE ORAL
COMMUNITY
End: 2020-02-02 | Stop reason: SDUPTHER

## 2019-09-18 NOTE — PATIENT INSTRUCTIONS
Began at basal insulin daily dosage as recommended. Increased by 4 units every 7 days until sugars consistently below 200.

## 2019-09-18 NOTE — PROGRESS NOTES
Subjective:       Patient ID: Hoa Wilde is a 69 y.o. female.    Chief Complaint: Diabetes (follow up  and refills )      Patient is here to follow-up on her diabetes.  Patient reports swelling of the right calf started about a week and a half ago.  Not painful. More swelling with activity.  Patient did have coronary bypass surgery with vein stripping from both legs in 2016.  Patient is on 60 units of Levemir at night still running sugars in the 200s.  Lab Results       Component                Value               Date                       HGBA1C                   12.4 (H)            09/17/2019    Lab Results       Component                Value               Date                       CHOL                     238 (H)             09/17/2019                 TRIG                     409 (H)             09/17/2019                 HDL                      35 (L)              09/17/2019                 ALT                      15                  09/17/2019                 ALT                      15                  09/17/2019                 AST                      14                  09/17/2019                 AST                      14                  09/17/2019                 NA                       135 (L)             09/17/2019                 K                        4.0                 09/17/2019                 CL                       101                 09/17/2019                 CREATININE               0.6                 09/17/2019                 BUN                      12                  09/17/2019                 CO2                      26                  09/17/2019                 HGBA1C                   12.4 (H)            09/17/2019            Has been out of her cholesterol medicine for 2 weeks              Diabetes   She presents for her follow-up diabetic visit. She has type 2 diabetes mellitus. Symptoms are stable. Her breakfast blood glucose range is generally >200 mg/dl. Her dinner  "blood glucose range is generally >200 mg/dl. Eye exam is current (Sees Dr. Era Peter for retinopathy changes.).       Allergies and Medications:   Review of patient's allergies indicates:  No Known Allergies  Current Outpatient Medications   Medication Sig Dispense Refill    aspirin (ECOTRIN) 81 MG EC tablet Take 81 mg by mouth once daily.      atorvastatin (LIPITOR) 40 MG tablet Take 1 tablet (40 mg total) by mouth once daily. 90 tablet 3    famotidine (PEPCID) 20 MG tablet Take 1 tablet (20 mg total) by mouth once daily. 30 tablet 5    gabapentin (NEURONTIN) 100 MG capsule Take 100 mg by mouth. 2 in the am and 2 at noon      gabapentin (NEURONTIN) 300 MG capsule TAKE ONE CAPSULE BY MOUTH NIGHTLY 30 capsule 1    insulin detemir U-100 (LEVEMIR FLEXTOUCH U-100 INSULN) 100 unit/mL (3 mL) SubQ InPn pen Inject 60 Units into the skin every evening. 54 mL 3    levothyroxine (SYNTHROID) 50 MCG tablet Take 1 tablet (50 mcg total) by mouth once daily. 30 tablet 11    lisinopril (PRINIVIL,ZESTRIL) 5 MG tablet TAKE 1 TABLET BY MOUTH ONCE DAILY 90 tablet 0    meloxicam (MOBIC) 7.5 MG tablet TAKE 1 TABLET BY MOUTH ONCE DAILY 30 tablet 1    metFORMIN (GLUCOPHAGE-XR) 750 MG 24 hr tablet Take 2 tablets (1,500 mg total) by mouth daily with breakfast. 60 tablet 11    metoprolol tartrate (LOPRESSOR) 25 MG tablet TAKE 1 TABLET BY MOUTH TWICE DAILY 180 tablet 3    pen needle, diabetic 30 gauge x 1/3" Ndle 1 pen by Misc.(Non-Drug; Combo Route) route once daily. To use with levimir flex pen 100 each 3    empagliflozin (JARDIANCE) 10 mg Tab Take 10 mg by mouth once daily. 30 tablet 11    rosuvastatin (CRESTOR) 20 MG tablet Take 1 tablet (20 mg total) by mouth once daily. 90 tablet 3    varicella-zoster gE-AS01B, PF, (SHINGRIX, PF,) 50 mcg/0.5 mL injection Inject 0.5 mLs into the muscle once. for 1 dose 0.5 mL 0     No current facility-administered medications for this visit.        Family History:   Family History "   Problem Relation Age of Onset    Hyperlipidemia Mother     Heart disease Mother        Social History:   Social History     Socioeconomic History    Marital status:      Spouse name: Not on file    Number of children: Not on file    Years of education: Not on file    Highest education level: Not on file   Occupational History    Occupation: retired    Social Needs    Financial resource strain: Not on file    Food insecurity:     Worry: Not on file     Inability: Not on file    Transportation needs:     Medical: Not on file     Non-medical: Not on file   Tobacco Use    Smoking status: Never Smoker    Smokeless tobacco: Never Used   Substance and Sexual Activity    Alcohol use: No    Drug use: No    Sexual activity: Not on file   Lifestyle    Physical activity:     Days per week: Not on file     Minutes per session: Not on file    Stress: Very much   Relationships    Social connections:     Talks on phone: Not on file     Gets together: Not on file     Attends Alevism service: Not on file     Active member of club or organization: Not on file     Attends meetings of clubs or organizations: Not on file     Relationship status: Not on file   Other Topics Concern    Not on file   Social History Narrative    Not on file       Review of Systems    Objective:     Vitals:    09/18/19 1326   BP: 134/68   Pulse: 97   Temp: 97.9 °F (36.6 °C)        Physical Exam   Constitutional: She appears well-developed and well-nourished.   HENT:   Head: Normocephalic and atraumatic.   Cardiovascular: Normal rate, regular rhythm, normal heart sounds and intact distal pulses. Exam reveals no gallop and no friction rub.   No murmur heard.  Musculoskeletal:   Both lower extremities measures 14.5 in at the calf there is trace peripheral edema bilaterally but no gross swelling. Negative straight leg raising negative Iliana sign.       Assessment:       1. Diabetic autonomic neuropathy associated with type 2 diabetes  mellitus    2. Uncontrolled type 2 diabetes mellitus with hyperglycemia    3. Neuropathy due to secondary diabetes    4. Mixed hyperlipidemia    5. Immunization due    6. Screening for colon cancer    7. Pain of right hip joint    8. Anxiety        Plan:       Hoa was seen today for diabetes.    Diagnoses and all orders for this visit:    Diabetic autonomic neuropathy associated with type 2 diabetes mellitus  -     empagliflozin (JARDIANCE) 10 mg Tab; Take 10 mg by mouth once daily.  -     D dimer, quantitative; Future    Uncontrolled type 2 diabetes mellitus with hyperglycemia  -     empagliflozin (JARDIANCE) 10 mg Tab; Take 10 mg by mouth once daily.    Neuropathy due to secondary diabetes    Mixed hyperlipidemia  -     rosuvastatin (CRESTOR) 20 MG tablet; Take 1 tablet (20 mg total) by mouth once daily.    Immunization due  -     Influenza - High Dose (65+) (PF) (IM)  -     varicella-zoster gE-AS01B, PF, (SHINGRIX, PF,) 50 mcg/0.5 mL injection; Inject 0.5 mLs into the muscle once. for 1 dose    Screening for colon cancer  -     Ambulatory referral to Gastroenterology    Pain of right hip joint    Anxiety  -     Ambulatory referral to Psychology         Follow up in about 1 month (around 10/18/2019).

## 2019-09-23 ENCOUNTER — TELEPHONE (OUTPATIENT)
Dept: FAMILY MEDICINE | Facility: CLINIC | Age: 69
End: 2019-09-23

## 2019-09-23 DIAGNOSIS — E13.40 NEUROPATHY DUE TO SECONDARY DIABETES: ICD-10-CM

## 2019-09-23 DIAGNOSIS — E11.43 DIABETIC AUTONOMIC NEUROPATHY ASSOCIATED WITH TYPE 2 DIABETES MELLITUS: ICD-10-CM

## 2019-09-23 RX ORDER — GABAPENTIN 100 MG/1
CAPSULE ORAL
Qty: 120 CAPSULE | Refills: 5 | Status: SHIPPED | OUTPATIENT
Start: 2019-09-23 | End: 2019-10-23

## 2019-09-23 RX ORDER — GABAPENTIN 300 MG/1
CAPSULE ORAL
Qty: 30 CAPSULE | Refills: 5 | Status: SHIPPED | OUTPATIENT
Start: 2019-09-23 | End: 2020-02-02

## 2019-09-23 RX ORDER — LEVOTHYROXINE SODIUM 50 UG/1
TABLET ORAL
Qty: 90 TABLET | Refills: 1 | Status: SHIPPED | OUTPATIENT
Start: 2019-09-23 | End: 2020-02-02

## 2019-09-23 NOTE — TELEPHONE ENCOUNTER
Patient was seen 9/18. Patient states that she was supposed to receive a referral for leg pain, and she did not receive it.

## 2019-09-23 NOTE — TELEPHONE ENCOUNTER
I do not think this is a vascular problem think it is autonomic neuropathy , she is already taking gabapentin she can double up to 600 mg at bedtime if this works also send in a prescription.

## 2019-09-24 ENCOUNTER — TELEPHONE (OUTPATIENT)
Dept: PSYCHIATRY | Facility: CLINIC | Age: 69
End: 2019-09-24

## 2019-09-24 NOTE — TELEPHONE ENCOUNTER
Patient called an left message yesterday @ 1059 am stating she needs to schedule with Dr Mcfarlane she has a referral from her doctor.  Please call when available

## 2019-09-24 NOTE — TELEPHONE ENCOUNTER
Called pt to offer Np appt per referral from Dr Pope, provided office number and extension for pt to call back for scheduling.

## 2019-10-06 DIAGNOSIS — I10 ESSENTIAL HYPERTENSION, BENIGN: ICD-10-CM

## 2019-10-06 DIAGNOSIS — E11.9 DIABETES MELLITUS WITHOUT COMPLICATION: ICD-10-CM

## 2019-10-06 DIAGNOSIS — E13.40 NEUROPATHY DUE TO SECONDARY DIABETES: ICD-10-CM

## 2019-10-07 RX ORDER — METFORMIN HYDROCHLORIDE 750 MG/1
TABLET, EXTENDED RELEASE ORAL
Qty: 180 TABLET | Refills: 3 | Status: SHIPPED | OUTPATIENT
Start: 2019-10-07 | End: 2020-02-02

## 2019-10-07 RX ORDER — MELOXICAM 7.5 MG/1
TABLET ORAL
Qty: 30 TABLET | Refills: 1 | Status: SHIPPED | OUTPATIENT
Start: 2019-10-07 | End: 2020-01-22

## 2019-11-11 RX ORDER — INSULIN DETEMIR 100 [IU]/ML
INJECTION, SOLUTION SUBCUTANEOUS
Qty: 2 BOX | Refills: 5 | Status: SHIPPED | OUTPATIENT
Start: 2019-11-11 | End: 2020-04-14 | Stop reason: SDUPTHER

## 2019-12-13 ENCOUNTER — TELEPHONE (OUTPATIENT)
Dept: FAMILY MEDICINE | Facility: CLINIC | Age: 69
End: 2019-12-13

## 2019-12-13 NOTE — TELEPHONE ENCOUNTER
Pt's insurance called and stated that the pt cannot afford her insulin and is experiencing issues with her blood sugar. The insurance has inquired if there is a different  Insulin that you can order for this pt that is more affordable. Pt is currently on Levemir.

## 2020-01-22 DIAGNOSIS — E13.40 NEUROPATHY DUE TO SECONDARY DIABETES: ICD-10-CM

## 2020-01-22 RX ORDER — MELOXICAM 7.5 MG/1
TABLET ORAL
Qty: 90 TABLET | Refills: 1 | Status: SHIPPED | OUTPATIENT
Start: 2020-01-22 | End: 2020-02-02

## 2020-02-02 ENCOUNTER — HOSPITAL ENCOUNTER (EMERGENCY)
Facility: HOSPITAL | Age: 70
Discharge: HOME | End: 2020-02-02
Attending: EMERGENCY MEDICINE
Payer: MEDICARE

## 2020-02-02 VITALS
WEIGHT: 165 LBS | BODY MASS INDEX: 32.39 KG/M2 | OXYGEN SATURATION: 98 % | DIASTOLIC BLOOD PRESSURE: 73 MMHG | SYSTOLIC BLOOD PRESSURE: 131 MMHG | TEMPERATURE: 98 F | HEART RATE: 87 BPM | RESPIRATION RATE: 18 BRPM | HEIGHT: 60 IN

## 2020-02-02 DIAGNOSIS — L03.116 CELLULITIS OF LEFT LOWER EXTREMITY: Primary | ICD-10-CM

## 2020-02-02 DIAGNOSIS — M79.672 FOOT PAIN, LEFT: ICD-10-CM

## 2020-02-02 LAB
ALBUMIN SERPL BCP-MCNC: 4 G/DL (ref 3.5–5.2)
ALP SERPL-CCNC: 81 U/L (ref 55–135)
ALT SERPL W/O P-5'-P-CCNC: 17 U/L (ref 10–44)
ANION GAP SERPL CALC-SCNC: 11 MMOL/L (ref 8–16)
AST SERPL-CCNC: 21 U/L (ref 10–40)
BASOPHILS # BLD AUTO: 0.06 K/UL (ref 0–0.2)
BASOPHILS NFR BLD: 0.7 % (ref 0–1.9)
BILIRUB SERPL-MCNC: 0.6 MG/DL (ref 0.1–1)
BUN SERPL-MCNC: 14 MG/DL (ref 8–23)
CALCIUM SERPL-MCNC: 8.8 MG/DL (ref 8.7–10.5)
CHLORIDE SERPL-SCNC: 99 MMOL/L (ref 95–110)
CO2 SERPL-SCNC: 23 MMOL/L (ref 23–29)
CREAT SERPL-MCNC: 0.7 MG/DL (ref 0.5–1.4)
CRP SERPL-MCNC: 0.18 MG/DL (ref 0–0.75)
DIFFERENTIAL METHOD: NORMAL
EOSINOPHIL # BLD AUTO: 0.1 K/UL (ref 0–0.5)
EOSINOPHIL NFR BLD: 1.5 % (ref 0–8)
ERYTHROCYTE [DISTWIDTH] IN BLOOD BY AUTOMATED COUNT: 12.7 % (ref 11.5–14.5)
ERYTHROCYTE [SEDIMENTATION RATE] IN BLOOD BY WESTERGREN METHOD: 22 MM/HR (ref 0–20)
EST. GFR  (AFRICAN AMERICAN): >60 ML/MIN/1.73 M^2
EST. GFR  (NON AFRICAN AMERICAN): >60 ML/MIN/1.73 M^2
GLUCOSE SERPL-MCNC: 372 MG/DL (ref 70–110)
HCT VFR BLD AUTO: 38.6 % (ref 37–48.5)
HGB BLD-MCNC: 12.9 G/DL (ref 12–16)
IMM GRANULOCYTES # BLD AUTO: 0.02 K/UL (ref 0–0.04)
IMM GRANULOCYTES NFR BLD AUTO: 0.2 % (ref 0–0.5)
LYMPHOCYTES # BLD AUTO: 3.1 K/UL (ref 1–4.8)
LYMPHOCYTES NFR BLD: 36.6 % (ref 18–48)
MCH RBC QN AUTO: 28.4 PG (ref 27–31)
MCHC RBC AUTO-ENTMCNC: 33.4 G/DL (ref 32–36)
MCV RBC AUTO: 85 FL (ref 82–98)
MONOCYTES # BLD AUTO: 0.4 K/UL (ref 0.3–1)
MONOCYTES NFR BLD: 4.9 % (ref 4–15)
NEUTROPHILS # BLD AUTO: 4.8 K/UL (ref 1.8–7.7)
NEUTROPHILS NFR BLD: 56.1 % (ref 38–73)
NRBC BLD-RTO: 0 /100 WBC
PLATELET # BLD AUTO: 296 K/UL (ref 150–350)
PMV BLD AUTO: 10.1 FL (ref 9.2–12.9)
POTASSIUM SERPL-SCNC: 4.6 MMOL/L (ref 3.5–5.1)
PROT SERPL-MCNC: 7.3 G/DL (ref 6–8.4)
RBC # BLD AUTO: 4.54 M/UL (ref 4–5.4)
SODIUM SERPL-SCNC: 133 MMOL/L (ref 136–145)
TSH SERPL DL<=0.005 MIU/L-ACNC: 2.31 UIU/ML (ref 0.34–5.6)
WBC # BLD AUTO: 8.58 K/UL (ref 3.9–12.7)

## 2020-02-02 PROCEDURE — 25000003 PHARM REV CODE 250: Performed by: EMERGENCY MEDICINE

## 2020-02-02 PROCEDURE — 84443 ASSAY THYROID STIM HORMONE: CPT

## 2020-02-02 PROCEDURE — 80053 COMPREHEN METABOLIC PANEL: CPT

## 2020-02-02 PROCEDURE — 85651 RBC SED RATE NONAUTOMATED: CPT

## 2020-02-02 PROCEDURE — 99284 EMERGENCY DEPT VISIT MOD MDM: CPT | Mod: 25

## 2020-02-02 PROCEDURE — 86140 C-REACTIVE PROTEIN: CPT

## 2020-02-02 PROCEDURE — 85025 COMPLETE CBC W/AUTO DIFF WBC: CPT

## 2020-02-02 RX ORDER — CEPHALEXIN 500 MG/1
500 CAPSULE ORAL EVERY 6 HOURS
Qty: 56 CAPSULE | Refills: 0 | Status: SHIPPED | OUTPATIENT
Start: 2020-02-02 | End: 2020-02-16

## 2020-02-02 RX ORDER — MELOXICAM 7.5 MG/1
7.5 TABLET ORAL DAILY
COMMUNITY
End: 2020-05-27

## 2020-02-02 RX ORDER — CEPHALEXIN 250 MG/1
500 CAPSULE ORAL
Status: COMPLETED | OUTPATIENT
Start: 2020-02-02 | End: 2020-02-02

## 2020-02-02 RX ORDER — LISINOPRIL 5 MG/1
5 TABLET ORAL DAILY
COMMUNITY
End: 2020-07-08

## 2020-02-02 RX ORDER — METFORMIN HYDROCHLORIDE 750 MG/1
750 TABLET, EXTENDED RELEASE ORAL 2 TIMES DAILY WITH MEALS
COMMUNITY
End: 2020-11-30

## 2020-02-02 RX ORDER — GABAPENTIN 300 MG/1
300 CAPSULE ORAL 3 TIMES DAILY
COMMUNITY
End: 2020-04-14

## 2020-02-02 RX ORDER — LEVOTHYROXINE SODIUM 50 UG/1
50 TABLET ORAL
COMMUNITY
End: 2020-07-10 | Stop reason: SDUPTHER

## 2020-02-02 RX ADMIN — CEPHALEXIN 500 MG: 250 CAPSULE ORAL at 10:02

## 2020-02-03 NOTE — ED PROVIDER NOTES
Encounter Date: 2/2/2020       History     Chief Complaint   Patient presents with    Foot Injury     pt c/o left foot pain and swelling x 2 weeks.      70-year-old female with history of CAD status post CABG, DM, HLD, HTN, hypothyroidism presents to the ER with left foot pain.  Pt states that she has severe diabetic neuropathy, and normally her feet feel numb. For the past couple days, she has had generalized pain to her left foot.  Describes it as throbbing.  Has also noticed that it is more swollen, worse when she has been up on her feet.  Denies fever, nausea or vomiting.  Denies any trauma or inciting event.  She has a chronic ulcer to her right foot that she sees Dr. Ramirez for.         Review of patient's allergies indicates:  No Known Allergies  Past Medical History:   Diagnosis Date    Coronary artery disease     Diabetes mellitus, type 2     Hyperlipidemia     Hypertension     Hypothyroidism     Neuropathy      Past Surgical History:   Procedure Laterality Date    CHOLECYSTECTOMY      CORONARY ARTERY BYPASS GRAFT      HYSTERECTOMY       Family History   Problem Relation Age of Onset    Hyperlipidemia Mother     Heart disease Mother      Social History     Tobacco Use    Smoking status: Never Smoker    Smokeless tobacco: Never Used   Substance Use Topics    Alcohol use: No    Drug use: No     Review of Systems   Constitutional: Negative for fever.   HENT: Negative for sore throat.    Respiratory: Negative for shortness of breath.    Cardiovascular: Negative for chest pain.   Gastrointestinal: Negative for abdominal pain, nausea and vomiting.   Genitourinary: Negative for dysuria.   Musculoskeletal: Negative for back pain.   Skin: Negative for rash.   Neurological: Negative for weakness.   Hematological: Does not bruise/bleed easily.   All other systems reviewed and are negative.      Physical Exam     Initial Vitals [02/02/20 1849]   BP Pulse Resp Temp SpO2   (!) 177/80 78 16 98 °F (36.7  °C) 98 %      MAP       --         Physical Exam    Constitutional: She appears well-developed and well-nourished. No distress.   HENT:   Head: Normocephalic and atraumatic.   Mouth/Throat: Oropharynx is clear and moist.   Eyes: Conjunctivae and EOM are normal. Pupils are equal, round, and reactive to light.   Neck: Normal range of motion.   Cardiovascular: Normal rate, regular rhythm, normal heart sounds and intact distal pulses.   No murmur heard.  Pulmonary/Chest: Breath sounds normal. No respiratory distress. She has no wheezes. She has no rhonchi. She has no rales.   Abdominal: Soft. Bowel sounds are normal. She exhibits no distension. There is no tenderness. There is no rebound and no guarding.   Musculoskeletal: Normal range of motion. She exhibits no edema or tenderness.        Feet:    Neurological: She is alert.   Skin: Skin is warm and dry.   Psychiatric: She has a normal mood and affect. Thought content normal.         ED Course   Procedures  Labs Reviewed   COMPREHENSIVE METABOLIC PANEL - Abnormal; Notable for the following components:       Result Value    Sodium 133 (*)     Glucose 372 (*)     All other components within normal limits   SEDIMENTATION RATE - Abnormal; Notable for the following components:    Sed Rate 22 (*)     All other components within normal limits   CBC W/ AUTO DIFFERENTIAL   C-REACTIVE PROTEIN   TSH          Imaging Results          X-Ray Foot Complete Left (In process)    Procedure changed from X-Ray Foot Complete Right                  Medical Decision Making:   Initial Assessment:   70-year-old female with history of CAD status post CABG, DM, HLD, HTN, hypothyroidism presents to the ER with left foot pain.   ED Management:  Plan:  Afebrile, vital signs stable. Labs, ESR and CRP.  X-ray of the foot.  Patient is high risk for diabetic foot ulcer and osteomyelitis.    Reassessed.  Patient lying in bed in no acute distress. No worsening in her erythema or swelling. Lab showed WBC  8.6.  BUN 14, creatinine 0.7.  ESR mildly elevated at 22, CRP normal at 0.18.  X-ray of the left foot shows no evidence of fracture or dislocation.  No evidence of bony erosion.  Hardware present in the 1st metatarsal.  Patient states this was done years ago, but she cannot remember what for.  Patient may have early or mild cellulitis.  She does have a callus, but there is a possibility of underlying ulcer.  Will treat with Keflex.  Recommend follow-up with her podiatrist Dr. Ramirez for further evaluation.  Given strict return precautions.  Patient understands the plan.                                 Clinical Impression:       ICD-10-CM ICD-9-CM   1. Cellulitis of left lower extremity L03.116 682.6   2. Foot pain, left M79.672 729.5                             Castillo Strickland MD  02/02/20 7762

## 2020-04-14 RX ORDER — GABAPENTIN 300 MG/1
CAPSULE ORAL
Qty: 30 CAPSULE | Refills: 0 | Status: SHIPPED | OUTPATIENT
Start: 2020-04-14 | End: 2020-05-27

## 2020-04-16 ENCOUNTER — TELEPHONE (OUTPATIENT)
Dept: FAMILY MEDICINE | Facility: CLINIC | Age: 70
End: 2020-04-16

## 2020-04-20 ENCOUNTER — TELEPHONE (OUTPATIENT)
Dept: FAMILY MEDICINE | Facility: CLINIC | Age: 70
End: 2020-04-20

## 2020-04-20 NOTE — TELEPHONE ENCOUNTER
Advised patient we are working on prior authorization waiting to hear back from insurance.  It can take 48 hours

## 2020-04-20 NOTE — TELEPHONE ENCOUNTER
----- Message from Aisha Villanuevacom sent at 4/20/2020  8:16 AM CDT -----  Patient came by the office stating she has been trying to get in touch with someone at the office stating her Levemir is not covered under her insurance. Her sugar is running in the 400's due to not having medication in a couple of days.    [facility]

## 2020-04-24 ENCOUNTER — TELEPHONE (OUTPATIENT)
Dept: FAMILY MEDICINE | Facility: CLINIC | Age: 70
End: 2020-04-24

## 2020-04-24 NOTE — TELEPHONE ENCOUNTER
Pt came into clinic requesting info on her levemir PA, our office was still waiting for approval. I contacted pt's insurance and was able to get approval. Pt was notified of approval as well.

## 2020-04-30 ENCOUNTER — OFFICE VISIT (OUTPATIENT)
Dept: FAMILY MEDICINE | Facility: CLINIC | Age: 70
End: 2020-04-30
Payer: MEDICARE

## 2020-04-30 ENCOUNTER — TELEPHONE (OUTPATIENT)
Dept: FAMILY MEDICINE | Facility: CLINIC | Age: 70
End: 2020-04-30

## 2020-04-30 VITALS
RESPIRATION RATE: 16 BRPM | WEIGHT: 171.5 LBS | SYSTOLIC BLOOD PRESSURE: 124 MMHG | TEMPERATURE: 98 F | HEART RATE: 72 BPM | BODY MASS INDEX: 33.67 KG/M2 | DIASTOLIC BLOOD PRESSURE: 70 MMHG | OXYGEN SATURATION: 98 % | HEIGHT: 60 IN

## 2020-04-30 DIAGNOSIS — Z78.0 ASYMPTOMATIC MENOPAUSAL STATE: ICD-10-CM

## 2020-04-30 DIAGNOSIS — E11.42 TYPE 2 DIABETES MELLITUS WITH DIABETIC POLYNEUROPATHY, WITH LONG-TERM CURRENT USE OF INSULIN: ICD-10-CM

## 2020-04-30 DIAGNOSIS — E11.65 UNCONTROLLED TYPE 2 DIABETES MELLITUS WITH HYPERGLYCEMIA: ICD-10-CM

## 2020-04-30 DIAGNOSIS — Z23 IMMUNIZATION DUE: ICD-10-CM

## 2020-04-30 DIAGNOSIS — E11.621 DIABETIC ULCER OF LEFT MIDFOOT ASSOCIATED WITH TYPE 2 DIABETES MELLITUS, UNSPECIFIED ULCER STAGE: Primary | ICD-10-CM

## 2020-04-30 DIAGNOSIS — I10 ESSENTIAL HYPERTENSION: ICD-10-CM

## 2020-04-30 DIAGNOSIS — L97.429 DIABETIC ULCER OF LEFT MIDFOOT ASSOCIATED WITH TYPE 2 DIABETES MELLITUS, UNSPECIFIED ULCER STAGE: Primary | ICD-10-CM

## 2020-04-30 DIAGNOSIS — Z12.31 ENCOUNTER FOR SCREENING MAMMOGRAM FOR BREAST CANCER: ICD-10-CM

## 2020-04-30 DIAGNOSIS — I25.10 CORONARY ARTERY DISEASE, ANGINA PRESENCE UNSPECIFIED, UNSPECIFIED VESSEL OR LESION TYPE, UNSPECIFIED WHETHER NATIVE OR TRANSPLANTED HEART: ICD-10-CM

## 2020-04-30 DIAGNOSIS — E78.2 MIXED HYPERLIPIDEMIA: ICD-10-CM

## 2020-04-30 DIAGNOSIS — Z00.00 PREVENTATIVE HEALTH CARE: ICD-10-CM

## 2020-04-30 DIAGNOSIS — Z79.4 TYPE 2 DIABETES MELLITUS WITH DIABETIC POLYNEUROPATHY, WITH LONG-TERM CURRENT USE OF INSULIN: ICD-10-CM

## 2020-04-30 DIAGNOSIS — Z13.820 SCREENING FOR OSTEOPOROSIS: ICD-10-CM

## 2020-04-30 DIAGNOSIS — Z12.11 SCREENING FOR COLON CANCER: ICD-10-CM

## 2020-04-30 PROCEDURE — 3046F HEMOGLOBIN A1C LEVEL >9.0%: CPT | Mod: S$GLB,,, | Performed by: FAMILY MEDICINE

## 2020-04-30 PROCEDURE — 1125F PR PAIN SEVERITY QUANTIFIED, PAIN PRESENT: ICD-10-PCS | Mod: S$GLB,,, | Performed by: FAMILY MEDICINE

## 2020-04-30 PROCEDURE — G0009 ADMIN PNEUMOCOCCAL VACCINE: HCPCS | Mod: S$GLB,,, | Performed by: FAMILY MEDICINE

## 2020-04-30 PROCEDURE — 1159F PR MEDICATION LIST DOCUMENTED IN MEDICAL RECORD: ICD-10-PCS | Mod: S$GLB,,, | Performed by: FAMILY MEDICINE

## 2020-04-30 PROCEDURE — 1159F MED LIST DOCD IN RCRD: CPT | Mod: S$GLB,,, | Performed by: FAMILY MEDICINE

## 2020-04-30 PROCEDURE — 90670 PNEUMOCOCCAL CONJUGATE VACCINE 13-VALENT LESS THAN 5YO & GREATER THAN: ICD-10-PCS | Mod: S$GLB,,, | Performed by: FAMILY MEDICINE

## 2020-04-30 PROCEDURE — 3074F SYST BP LT 130 MM HG: CPT | Mod: S$GLB,,, | Performed by: FAMILY MEDICINE

## 2020-04-30 PROCEDURE — 99214 PR OFFICE/OUTPT VISIT, EST, LEVL IV, 30-39 MIN: ICD-10-PCS | Mod: 25,S$GLB,, | Performed by: FAMILY MEDICINE

## 2020-04-30 PROCEDURE — 3078F PR MOST RECENT DIASTOLIC BLOOD PRESSURE < 80 MM HG: ICD-10-PCS | Mod: S$GLB,,, | Performed by: FAMILY MEDICINE

## 2020-04-30 PROCEDURE — 3046F PR MOST RECENT HEMOGLOBIN A1C LEVEL > 9.0%: ICD-10-PCS | Mod: S$GLB,,, | Performed by: FAMILY MEDICINE

## 2020-04-30 PROCEDURE — 90670 PCV13 VACCINE IM: CPT | Mod: S$GLB,,, | Performed by: FAMILY MEDICINE

## 2020-04-30 PROCEDURE — 99214 OFFICE O/P EST MOD 30 MIN: CPT | Mod: 25,S$GLB,, | Performed by: FAMILY MEDICINE

## 2020-04-30 PROCEDURE — G0009 PNEUMOCOCCAL CONJUGATE VACCINE 13-VALENT LESS THAN 5YO & GREATER THAN: ICD-10-PCS | Mod: S$GLB,,, | Performed by: FAMILY MEDICINE

## 2020-04-30 PROCEDURE — 3074F PR MOST RECENT SYSTOLIC BLOOD PRESSURE < 130 MM HG: ICD-10-PCS | Mod: S$GLB,,, | Performed by: FAMILY MEDICINE

## 2020-04-30 PROCEDURE — 1101F PR PT FALLS ASSESS DOC 0-1 FALLS W/OUT INJ PAST YR: ICD-10-PCS | Mod: S$GLB,,, | Performed by: FAMILY MEDICINE

## 2020-04-30 PROCEDURE — 1101F PT FALLS ASSESS-DOCD LE1/YR: CPT | Mod: S$GLB,,, | Performed by: FAMILY MEDICINE

## 2020-04-30 PROCEDURE — 1125F AMNT PAIN NOTED PAIN PRSNT: CPT | Mod: S$GLB,,, | Performed by: FAMILY MEDICINE

## 2020-04-30 PROCEDURE — 3078F DIAST BP <80 MM HG: CPT | Mod: S$GLB,,, | Performed by: FAMILY MEDICINE

## 2020-04-30 RX ORDER — CEFUROXIME AXETIL 250 MG/1
250 TABLET ORAL 2 TIMES DAILY
Qty: 20 TABLET | Refills: 0 | Status: SHIPPED | OUTPATIENT
Start: 2020-04-30 | End: 2020-12-21 | Stop reason: ALTCHOICE

## 2020-04-30 RX ORDER — ROSUVASTATIN CALCIUM 20 MG/1
20 TABLET, COATED ORAL DAILY
COMMUNITY
End: 2020-11-11

## 2020-04-30 NOTE — TELEPHONE ENCOUNTER
[facility] pharmacy has called and requested the maximum daily dose of this pt's humulin R.

## 2020-04-30 NOTE — PROGRESS NOTES
Subjective:       Patient ID: Hoa Wilde is a 70 y.o. female.    Chief Complaint: Wound Infection (left foot x 1-2 weeks )      Patient comes in because of an infection on the bottom of the left foot she is a diabetic, she does have diabetic neuropathy in both feet she noticed an odor approximately 2 weeks ago and then subsequently found to be infected.  She does not know how sugars have been doing she has been out of her insulin for 2 weeks.  He is on Levemir 66 units per day he had a regular sliding scale as well but she lost her instructions.  Review of the record reveals she had an ER visit on February 20th for cellulitis in the same area    Diabetes   She presents for her follow-up diabetic visit. She has type 2 diabetes mellitus. No MedicAlert identification noted. Pertinent negatives for diabetes include no blurred vision and no chest pain. She is following a diabetic and high fat/cholesterol diet. Meal planning includes avoidance of concentrated sweets. She has not had a previous visit with a dietitian. She participates in exercise intermittently. Her breakfast blood glucose range is generally >200 mg/dl. Her lunch blood glucose range is generally >200 mg/dl. Her bedtime blood glucose range is generally >200 mg/dl. (Is checking blood sugars every other day fasting in the mornings.  Has not been checking postprandial) An ACE inhibitor/angiotensin II receptor blocker is being taken. She sees a podiatrist.Eye exam is current (Has appointment with ophthalmologist in May.).     She also notices that she is forgetful cannot use the gas stove because of this she was unable to come in with her  because of COVID-19 precautions.  Allergies and Medications:   Review of patient's allergies indicates:  No Known Allergies  Current Outpatient Medications   Medication Sig Dispense Refill    aspirin (ECOTRIN) 81 MG EC tablet Take 81 mg by mouth once daily.      famotidine (PEPCID) 20 MG tablet Take 1 tablet (20 mg  "total) by mouth once daily. 30 tablet 5    gabapentin (NEURONTIN) 300 MG capsule TAKE 1 CAPSULE BY MOUTH NIGHTLY 30 capsule 0    insulin detemir U-100 (LEVEMIR FLEXTOUCH U-100 INSULN) 100 unit/mL (3 mL) SubQ InPn pen INJECT 50 UNITS SUBCUTANEOUSLY IN THE EVENING 2 Box 5    levothyroxine (SYNTHROID) 50 MCG tablet Take 50 mcg by mouth before breakfast.      lisinopril (PRINIVIL,ZESTRIL) 5 MG tablet Take 5 mg by mouth once daily.      meloxicam (MOBIC) 7.5 MG tablet Take 7.5 mg by mouth once daily.      metFORMIN (GLUCOPHAGE-XR) 750 MG 24 hr tablet Take 750 mg by mouth 2 (two) times daily with meals.      metoprolol tartrate (LOPRESSOR) 25 MG tablet TAKE 1 TABLET BY MOUTH TWICE DAILY 180 tablet 3    pen needle, diabetic 30 gauge x 1/3" Ndle 1 pen by Misc.(Non-Drug; Combo Route) route once daily. To use with levimir flex pen 100 each 3    rosuvastatin (CRESTOR) 20 MG tablet Take 20 mg by mouth once daily.      cefUROXime (CEFTIN) 250 MG tablet Take 1 tablet (250 mg total) by mouth 2 (two) times daily. 20 tablet 0    empagliflozin (JARDIANCE) 10 mg Tab Take 10 mg by mouth once daily. (Patient not taking: Reported on 4/30/2020) 30 tablet 11    insulin regular (HUMULIN R REGULAR U-100 INSULN) 100 unit/mL Inj injection Follow sliding scale printed out.  Check blood sugars 4 times a day 20 mL 11    tetanus and diphther. tox, PF, (TENIVAC, PF,) 5-2 Lf unit/0.5 mL Syrg Inject 0.5 mLs into the muscle once. for 1 dose 0.5 mL 0     No current facility-administered medications for this visit.        Family History:   Family History   Problem Relation Age of Onset    Hyperlipidemia Mother     Heart disease Mother        Social History:   Social History     Socioeconomic History    Marital status:      Spouse name: Not on file    Number of children: Not on file    Years of education: Not on file    Highest education level: Not on file   Occupational History    Occupation: retired    Social Needs    " Financial resource strain: Not on file    Food insecurity:     Worry: Not on file     Inability: Not on file    Transportation needs:     Medical: Not on file     Non-medical: Not on file   Tobacco Use    Smoking status: Never Smoker    Smokeless tobacco: Never Used   Substance and Sexual Activity    Alcohol use: No    Drug use: No    Sexual activity: Not on file   Lifestyle    Physical activity:     Days per week: Not on file     Minutes per session: Not on file    Stress: Very much   Relationships    Social connections:     Talks on phone: Not on file     Gets together: Not on file     Attends Orthodox service: Not on file     Active member of club or organization: Not on file     Attends meetings of clubs or organizations: Not on file     Relationship status: Not on file   Other Topics Concern    Not on file   Social History Narrative    Not on file       Review of Systems   Eyes: Negative for blurred vision.   Cardiovascular: Negative for chest pain.       Objective:     Vitals:    04/30/20 1404   BP: 124/70   Pulse: 72   Resp: 16   Temp: 97.7 °F (36.5 °C)        Physical Exam   Constitutional: She appears well-developed and well-nourished.   HENT:   Head: Normocephalic and atraumatic.   Eyes: Pupils are equal, round, and reactive to light.   Cardiovascular: Normal rate, regular rhythm, normal heart sounds and intact distal pulses. Exam reveals no gallop and no friction rub.   No murmur heard.  Pulmonary/Chest: Effort normal and breath sounds normal. No stridor. No respiratory distress. She has no wheezes. She has no rales. She exhibits no tenderness.   Musculoskeletal:        Feet:    Feet:   Right Foot:   Protective Sensation: 4 sites tested. 0 sites sensed.   Skin Integrity: Positive for erythema, callus and dry skin.   Left Foot:   Protective Sensation: 4 sites tested. 0 sites sensed.   Skin Integrity: Positive for erythema, callus and dry skin.   Psychiatric: She has a normal mood and affect.  Her behavior is normal. Judgment and thought content normal.   Patient gives a history and is witnessed to be quite forgetful has to avoid using the stove as well.   Nursing note and vitals reviewed.      Assessment:       1. Diabetic ulcer of left midfoot associated with type 2 diabetes mellitus, unspecified ulcer stage    2. Type 2 diabetes mellitus with diabetic polyneuropathy, with long-term current use of insulin    3. Coronary artery disease, angina presence unspecified, unspecified vessel or lesion type, unspecified whether native or transplanted heart    4. Mixed hyperlipidemia    5. Essential hypertension    6. Uncontrolled type 2 diabetes mellitus with hyperglycemia    7. Screening for colon cancer    8. Immunization due    9. Screening for osteoporosis    10. Asymptomatic menopausal state    11. Encounter for screening mammogram for breast cancer    12. Preventative health care        Plan:       Hoa was seen today for wound infection.    Diagnoses and all orders for this visit:    Diabetic ulcer of left midfoot associated with type 2 diabetes mellitus, unspecified ulcer stage  -     Ambulatory referral/consult to Podiatry; Future  -     X-Ray Foot 2 View Left; Future  -     cefUROXime (CEFTIN) 250 MG tablet; Take 1 tablet (250 mg total) by mouth 2 (two) times daily.    Type 2 diabetes mellitus with diabetic polyneuropathy, with long-term current use of insulin  -     insulin regular (HUMULIN R REGULAR U-100 INSULN) 100 unit/mL Inj injection; Follow sliding scale printed out.  Check blood sugars 4 times a day  -     Ambulatory referral/consult to Podiatry; Future  -     Ambulatory referral/consult to Cardiology; Future  -     X-Ray Foot 2 View Left; Future  -     Lipid panel; Future    Coronary artery disease, angina presence unspecified, unspecified vessel or lesion type, unspecified whether native or transplanted heart  -     Ambulatory referral/consult to Cardiology; Future    Mixed hyperlipidemia  -      Ambulatory referral/consult to Cardiology; Future    Essential hypertension    Uncontrolled type 2 diabetes mellitus with hyperglycemia    Screening for colon cancer  -     Cologuard Screening (Multitarget Stool DNA); Future  -     Cologuard Screening (Multitarget Stool DNA)    Immunization due  -     tetanus and diphther. tox, PF, (TENIVAC, PF,) 5-2 Lf unit/0.5 mL Syrg; Inject 0.5 mLs into the muscle once. for 1 dose  -     Pneumococcal Conjugate Vaccine (13 Valent) (IM)    Screening for osteoporosis  -     DXA Bone Density Spine And Hip    Asymptomatic menopausal state  -     DXA Bone Density Spine And Hip    Encounter for screening mammogram for breast cancer  -     Mammo Digital Screening Bilat without CA; Future    Preventative health care  -     Comprehensive metabolic panel; Future  -     Hepatitis C Antibody; Future  -     Lipid panel; Future         Follow up in about 1 month (around 5/30/2020) for annual, follow up DM, follow up HTN, follow up cholesterol.  Her memory issues will be addressed along with her follow-up on her other items as well on her follow-up visit.

## 2020-04-30 NOTE — PATIENT INSTRUCTIONS
Andry sliding scale:  Check blood sugar 4 times a day.  If blood sugar is 0-80 take sugar or glucose tablets.  If blood sugar  take 0 units.  If blood sugar 161-200 take 2 units.  If blood sugar 201-250 take 4 units.  If blood sugar 251-300 take 6 units.  If blood sugar greater than 300 take 8 units.

## 2020-05-01 ENCOUNTER — TELEPHONE (OUTPATIENT)
Dept: FAMILY MEDICINE | Facility: CLINIC | Age: 70
End: 2020-05-01

## 2020-05-01 ENCOUNTER — HOSPITAL ENCOUNTER (OUTPATIENT)
Dept: RADIOLOGY | Facility: HOSPITAL | Age: 70
Discharge: HOME | End: 2020-05-01
Attending: FAMILY MEDICINE
Payer: MEDICARE

## 2020-05-01 DIAGNOSIS — L97.429 DIABETIC ULCER OF LEFT MIDFOOT ASSOCIATED WITH TYPE 2 DIABETES MELLITUS, UNSPECIFIED ULCER STAGE: ICD-10-CM

## 2020-05-01 DIAGNOSIS — E11.621 DIABETIC ULCER OF LEFT MIDFOOT ASSOCIATED WITH TYPE 2 DIABETES MELLITUS, UNSPECIFIED ULCER STAGE: ICD-10-CM

## 2020-05-01 DIAGNOSIS — E11.42 TYPE 2 DIABETES MELLITUS WITH DIABETIC POLYNEUROPATHY, WITH LONG-TERM CURRENT USE OF INSULIN: ICD-10-CM

## 2020-05-01 DIAGNOSIS — Z79.4 TYPE 2 DIABETES MELLITUS WITH DIABETIC POLYNEUROPATHY, WITH LONG-TERM CURRENT USE OF INSULIN: ICD-10-CM

## 2020-05-01 PROCEDURE — 73630 X-RAY EXAM OF FOOT: CPT | Mod: TC,PO,LT

## 2020-05-01 NOTE — TELEPHONE ENCOUNTER
----- Message from Carlos Pope MD sent at 5/1/2020  2:55 PM CDT -----  Ft x-rays negative no erosion into the bone

## 2020-05-05 ENCOUNTER — LAB VISIT (OUTPATIENT)
Dept: LAB | Facility: HOSPITAL | Age: 70
End: 2020-05-05
Attending: FAMILY MEDICINE
Payer: MEDICARE

## 2020-05-05 DIAGNOSIS — E11.42 TYPE 2 DIABETES MELLITUS WITH DIABETIC POLYNEUROPATHY, WITH LONG-TERM CURRENT USE OF INSULIN: ICD-10-CM

## 2020-05-05 DIAGNOSIS — Z00.00 PREVENTATIVE HEALTH CARE: ICD-10-CM

## 2020-05-05 DIAGNOSIS — Z79.4 TYPE 2 DIABETES MELLITUS WITH DIABETIC POLYNEUROPATHY, WITH LONG-TERM CURRENT USE OF INSULIN: ICD-10-CM

## 2020-05-05 LAB
ALBUMIN SERPL BCP-MCNC: 3.8 G/DL (ref 3.5–5.2)
ALP SERPL-CCNC: 90 U/L (ref 55–135)
ALT SERPL W/O P-5'-P-CCNC: 25 U/L (ref 10–44)
ANION GAP SERPL CALC-SCNC: 9 MMOL/L (ref 8–16)
AST SERPL-CCNC: 21 U/L (ref 10–40)
BILIRUB SERPL-MCNC: 0.3 MG/DL (ref 0.1–1)
BUN SERPL-MCNC: 10 MG/DL (ref 8–23)
CALCIUM SERPL-MCNC: 8.9 MG/DL (ref 8.7–10.5)
CHLORIDE SERPL-SCNC: 103 MMOL/L (ref 95–110)
CHOLEST SERPL-MCNC: 138 MG/DL (ref 120–199)
CHOLEST/HDLC SERPL: 3.5 {RATIO} (ref 2–5)
CO2 SERPL-SCNC: 27 MMOL/L (ref 23–29)
CREAT SERPL-MCNC: 0.5 MG/DL (ref 0.5–1.4)
EST. GFR  (AFRICAN AMERICAN): >60 ML/MIN/1.73 M^2
EST. GFR  (NON AFRICAN AMERICAN): >60 ML/MIN/1.73 M^2
GLUCOSE SERPL-MCNC: 245 MG/DL (ref 70–110)
HDLC SERPL-MCNC: 40 MG/DL (ref 40–75)
HDLC SERPL: 29 % (ref 20–50)
LDLC SERPL CALC-MCNC: 47.2 MG/DL (ref 63–159)
NONHDLC SERPL-MCNC: 98 MG/DL
POTASSIUM SERPL-SCNC: 4.1 MMOL/L (ref 3.5–5.1)
PROT SERPL-MCNC: 7.2 G/DL (ref 6–8.4)
SODIUM SERPL-SCNC: 139 MMOL/L (ref 136–145)
TRIGL SERPL-MCNC: 254 MG/DL (ref 30–150)

## 2020-05-05 PROCEDURE — 80061 LIPID PANEL: CPT

## 2020-05-05 PROCEDURE — 86803 HEPATITIS C AB TEST: CPT

## 2020-05-05 PROCEDURE — 80053 COMPREHEN METABOLIC PANEL: CPT

## 2020-05-05 PROCEDURE — 36415 COLL VENOUS BLD VENIPUNCTURE: CPT

## 2020-05-06 ENCOUNTER — TELEPHONE (OUTPATIENT)
Dept: FAMILY MEDICINE | Facility: CLINIC | Age: 70
End: 2020-05-06

## 2020-05-06 LAB — HCV AB S/CO SERPL IA: <0.1 S/CO RATIO (ref 0–0.9)

## 2020-05-06 NOTE — TELEPHONE ENCOUNTER
Discussed lab results with patient and she gave verbal understanding of orders.  She is waiting to hear back from Dr. Ramirez office and has a 2 week follow up here on 05/28/2020

## 2020-05-06 NOTE — TELEPHONE ENCOUNTER
----- Message from Carlos Pope MD sent at 5/6/2020  8:13 AM CDT -----  Fasting blood sugars still high at 245.  Probably contributing to and cause by foot wound continue antibiotics follow-up with Dr. Ramirez and return to clinic in 2 weeks with blood sugar diary.  Increase in insulin as previously recommended

## 2020-05-12 ENCOUNTER — TELEPHONE (OUTPATIENT)
Dept: FAMILY MEDICINE | Facility: CLINIC | Age: 70
End: 2020-05-12

## 2020-05-12 ENCOUNTER — TELEPHONE (OUTPATIENT)
Dept: PODIATRY | Facility: CLINIC | Age: 70
End: 2020-05-12

## 2020-05-12 NOTE — TELEPHONE ENCOUNTER
----- Message from Carlos Pope MD sent at 11/21/2017 12:36 PM CST -----  Hemoglobin A1c was 10.5 indicating poor control of her diabetes. This means the blood sugar is averaging near 300. Increase her bedtime Levemir insulin to 60 units per night and return to clinic in 2 weeks with her blood sugar diary.   No complaints

## 2020-05-12 NOTE — TELEPHONE ENCOUNTER
Attempted to reach pt in regards to message left with . Unable to reach pt at this time, left VM to call back.

## 2020-05-13 DIAGNOSIS — E11.621 DIABETIC ULCER OF LEFT MIDFOOT ASSOCIATED WITH TYPE 2 DIABETES MELLITUS, UNSPECIFIED ULCER STAGE: ICD-10-CM

## 2020-05-13 DIAGNOSIS — L97.429 DIABETIC ULCER OF LEFT MIDFOOT ASSOCIATED WITH TYPE 2 DIABETES MELLITUS, UNSPECIFIED ULCER STAGE: ICD-10-CM

## 2020-05-13 RX ORDER — CEFUROXIME AXETIL 250 MG/1
250 TABLET ORAL 2 TIMES DAILY
Qty: 20 TABLET | Refills: 0 | OUTPATIENT
Start: 2020-05-13

## 2020-05-13 NOTE — TELEPHONE ENCOUNTER
Refill of the prescription will only to back to resistance over time it needs more aggressive wound care locally.  If she seeing the podiatrist as recommended?

## 2020-05-13 NOTE — TELEPHONE ENCOUNTER
Pt requesting another refill of her antibiotic she was prescribed for her wound. Pt will not see you again until 5/28 and is worried about keeping the infection down until then.

## 2020-05-21 ENCOUNTER — OFFICE VISIT (OUTPATIENT)
Dept: PODIATRY | Facility: CLINIC | Age: 70
End: 2020-05-21
Payer: MEDICARE

## 2020-05-21 VITALS
HEART RATE: 78 BPM | TEMPERATURE: 98 F | BODY MASS INDEX: 33.49 KG/M2 | SYSTOLIC BLOOD PRESSURE: 148 MMHG | HEIGHT: 60 IN | DIASTOLIC BLOOD PRESSURE: 89 MMHG

## 2020-05-21 DIAGNOSIS — Z79.4 TYPE 2 DIABETES MELLITUS WITH DIABETIC POLYNEUROPATHY, WITH LONG-TERM CURRENT USE OF INSULIN: ICD-10-CM

## 2020-05-21 DIAGNOSIS — L97.521 SKIN ULCER OF LEFT FOOT, LIMITED TO BREAKDOWN OF SKIN: Primary | ICD-10-CM

## 2020-05-21 DIAGNOSIS — M79.672 LEFT FOOT PAIN: ICD-10-CM

## 2020-05-21 DIAGNOSIS — E13.40 NEUROPATHY DUE TO SECONDARY DIABETES: ICD-10-CM

## 2020-05-21 DIAGNOSIS — E11.42 TYPE 2 DIABETES MELLITUS WITH DIABETIC POLYNEUROPATHY, WITH LONG-TERM CURRENT USE OF INSULIN: ICD-10-CM

## 2020-05-21 PROBLEM — L97.514 SKIN ULCER OF RIGHT FOOT WITH NECROSIS OF BONE: Status: ACTIVE | Noted: 2020-05-21

## 2020-05-21 PROCEDURE — 99213 PR OFFICE/OUTPT VISIT, EST, LEVL III, 20-29 MIN: ICD-10-PCS | Mod: 25,S$GLB,, | Performed by: PODIATRIST

## 2020-05-21 PROCEDURE — 3079F PR MOST RECENT DIASTOLIC BLOOD PRESSURE 80-89 MM HG: ICD-10-PCS | Mod: S$GLB,,, | Performed by: PODIATRIST

## 2020-05-21 PROCEDURE — 1125F AMNT PAIN NOTED PAIN PRSNT: CPT | Mod: S$GLB,,, | Performed by: PODIATRIST

## 2020-05-21 PROCEDURE — 3079F DIAST BP 80-89 MM HG: CPT | Mod: S$GLB,,, | Performed by: PODIATRIST

## 2020-05-21 PROCEDURE — 1159F MED LIST DOCD IN RCRD: CPT | Mod: S$GLB,,, | Performed by: PODIATRIST

## 2020-05-21 PROCEDURE — 3077F SYST BP >= 140 MM HG: CPT | Mod: S$GLB,,, | Performed by: PODIATRIST

## 2020-05-21 PROCEDURE — 1101F PR PT FALLS ASSESS DOC 0-1 FALLS W/OUT INJ PAST YR: ICD-10-PCS | Mod: S$GLB,,, | Performed by: PODIATRIST

## 2020-05-21 PROCEDURE — 1101F PT FALLS ASSESS-DOCD LE1/YR: CPT | Mod: S$GLB,,, | Performed by: PODIATRIST

## 2020-05-21 PROCEDURE — 11042 WOUND DEBRIDEMENT: ICD-10-PCS | Mod: S$GLB,,, | Performed by: PODIATRIST

## 2020-05-21 PROCEDURE — 3077F PR MOST RECENT SYSTOLIC BLOOD PRESSURE >= 140 MM HG: ICD-10-PCS | Mod: S$GLB,,, | Performed by: PODIATRIST

## 2020-05-21 PROCEDURE — 1159F PR MEDICATION LIST DOCUMENTED IN MEDICAL RECORD: ICD-10-PCS | Mod: S$GLB,,, | Performed by: PODIATRIST

## 2020-05-21 PROCEDURE — 1125F PR PAIN SEVERITY QUANTIFIED, PAIN PRESENT: ICD-10-PCS | Mod: S$GLB,,, | Performed by: PODIATRIST

## 2020-05-21 PROCEDURE — 3046F HEMOGLOBIN A1C LEVEL >9.0%: CPT | Mod: S$GLB,,, | Performed by: PODIATRIST

## 2020-05-21 PROCEDURE — 3046F PR MOST RECENT HEMOGLOBIN A1C LEVEL > 9.0%: ICD-10-PCS | Mod: S$GLB,,, | Performed by: PODIATRIST

## 2020-05-21 PROCEDURE — 11042 DBRDMT SUBQ TIS 1ST 20SQCM/<: CPT | Mod: S$GLB,,, | Performed by: PODIATRIST

## 2020-05-21 PROCEDURE — 99213 OFFICE O/P EST LOW 20 MIN: CPT | Mod: 25,S$GLB,, | Performed by: PODIATRIST

## 2020-05-21 NOTE — PROGRESS NOTES
[address] [facility] [address]  [place] [address]  Phone: [phone]   [phone]    Patient's PCP:Carlos Pope MD  Referring Provider: No ref. provider found    Subjective:      Chief Complaint:: Foot Ulcer (left 1st MPJ)    HPI  Hoa Wilde is a 70 y.o. female who presents with a complaint of left foot ulcer 1st MPJ lasting since February. Onset of the symptoms was in February stepped on something and did not know it until a couple of weeks later.  Current symptoms include odor, painful.  Aggravating factors are prolonged walking/standing. Treatment to date have included went to ER and was Rx medicine (Keflex and Ceftin), ER cleaned wound, pt cleaning with saline and applying neosporin every other day, had xray done on 05/01/20.  Patients rates pain 8/10 on pain scale.    Systemic Doctor: Dr. Carlos Pope MD  Date Last Seen: 04/30/20  Blood Sugar: unknown  Hemoglobin A1c: 12.4 (09/17/19)    Vitals:    05/21/20 1508   BP: (!) 148/89   Pulse: 78   Temp: 97.7 °F (36.5 °C)     Shoe Size: 5.5 / 6    Past Surgical History:   Procedure Laterality Date    CHOLECYSTECTOMY      CORONARY ARTERY BYPASS GRAFT      HYSTERECTOMY       Past Medical History:   Diagnosis Date    Coronary artery disease     Diabetes mellitus, type 2     Hyperlipidemia     Hypertension     Hypothyroidism     Neuropathy      Family History   Problem Relation Age of Onset    Hyperlipidemia Mother     Heart disease Mother         Social History:   Marital Status:   Alcohol History:  reports that she does not drink alcohol.  Tobacco History:  reports that she has never smoked. She has never used smokeless tobacco.  Drug History:  reports that she does not use drugs.    Review of patient's allergies indicates:  No Known Allergies    Current Outpatient Medications   Medication Sig Dispense Refill    aspirin (ECOTRIN) 81 MG EC tablet Take 81 mg by mouth once daily.      empagliflozin (JARDIANCE) 10 mg Tab Take 10 mg by mouth  "once daily. 30 tablet 11    famotidine (PEPCID) 20 MG tablet Take 1 tablet (20 mg total) by mouth once daily. 30 tablet 5    insulin detemir U-100 (LEVEMIR FLEXTOUCH U-100 INSULN) 100 unit/mL (3 mL) SubQ InPn pen INJECT 50 UNITS SUBCUTANEOUSLY IN THE EVENING 2 Box 5    insulin regular (HUMULIN R REGULAR U-100 INSULN) 100 unit/mL Inj injection Follow sliding scale printed out.  Check blood sugars 4 times a day 20 mL 11    levothyroxine (SYNTHROID) 50 MCG tablet Take 50 mcg by mouth before breakfast.      lisinopril (PRINIVIL,ZESTRIL) 5 MG tablet Take 5 mg by mouth once daily.      meloxicam (MOBIC) 7.5 MG tablet Take 7.5 mg by mouth once daily.      metFORMIN (GLUCOPHAGE-XR) 750 MG 24 hr tablet Take 750 mg by mouth 2 (two) times daily with meals.      metoprolol tartrate (LOPRESSOR) 25 MG tablet TAKE 1 TABLET BY MOUTH TWICE DAILY 180 tablet 3    pen needle, diabetic 30 gauge x 1/3" Ndle 1 pen by Misc.(Non-Drug; Combo Route) route once daily. To use with levimir flex pen 100 each 3    rosuvastatin (CRESTOR) 20 MG tablet Take 20 mg by mouth once daily.      cefUROXime (CEFTIN) 250 MG tablet Take 1 tablet (250 mg total) by mouth 2 (two) times daily. (Patient not taking: Reported on 5/21/2020) 20 tablet 0    gabapentin (NEURONTIN) 300 MG capsule TAKE 1 CAPSULE BY MOUTH NIGHTLY 30 capsule 0     No current facility-administered medications for this visit.        Review of Systems      Objective:        Physical Exam:   Foot Exam    General  General Appearance: appears stated age and healthy   Orientation: alert and oriented to person, place, and time   Affect: appropriate   Gait: antalgic       Right Foot/Ankle     Inspection and Palpation  Ecchymosis: none  Swelling: (Fourth toe DIPJ)  Arch: pes planus  Hammertoes: fourth toe  Skin Exam: ulcer (Grade 2 ulcer dorsal DIPJ 4th toe approximately 2 mm in diameter probes down to bone.  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        Posterior tibial pulses are 1+ on the right side, and 1+ on the left side.   Musculoskeletal:        Feet:    Feet:   Right Foot:   Skin Integrity: Positive for ulcer (Grade 2 ulcer dorsal DIPJ 4th toe approximately 2 mm in diameter probes down to bone.  Erythema present but no fluctuance or purulent drainage.).   Left Foot:   Skin Integrity: Positive for ulcer (Grade 0 ulcer dorsal IPJ 1st toe with no signs of infection but slough is present).               Imaging:            Assessment:       1. Skin ulcer of left foot, limited to breakdown of skin    2. Type 2 diabetes mellitus with diabetic polyneuropathy, with long-term current use of insulin    3. Left foot pain    4. Neuropathy due to secondary diabetes      Plan:   1.  Today evaluate the patient updated records in did exam value a shin of the circulation neurological status of the patient.  2.  I recommend accommodative padding on her diabetic insert early pressure on the 1st  "metatarsal ulcer plantar left foot.  3.  She will perform local wound care on a daily basis applying topical antibiotic no oral antibiotics needed at this time.  4.  She will return to see me in 4 weeks or as needed.  Wound Debridement  Date/Time: 5/21/2020 3:20 PM  Performed by: Tal Ramirez DPM  Authorized by: Tal Ramirez DPM     Time out: Immediately prior to procedure a "time out" was called to verify the correct patient, procedure, equipment, support staff and site/side marked as required.    Consent Done?:  Yes (Verbal)    Preparation: Patient was prepped and draped in usual sterile fashion    Local anesthesia used?: No      Wound Details:    Location:  Left foot    Location:  Left 1st Metatarsal Head    Type of Debridement:  Excisional       Length (cm):  2       Area (sq cm):  4       Width (cm):  2       Percent Debrided (%):  100       Depth (cm):  0.2       Total Area Debrided (sq cm):  4    Depth of debridement:  Subcutaneous tissue    Tissue debrided:  Dermis, Epidermis and Subcutaneous    Devitalized tissue debrided:  Biofilm, Callus and Slough    Instruments:  Forceps, Blade and Nippers    Bleeding:  Minimal  Hemostasis Achieved: Yes    Method Used:  Pressure and Alginate  Patient tolerance:  Patient tolerated the procedure well with no immediate complications     Pace will dress the area with topical antibiotic and continue accommodative pads were placed and shoe on top of her diabetic orthotic to relieve pressure.     -     Counseling:     I provided patient education verbally regarding:   Patient diagnosis, treatment options, as well as alternatives, risks, and benefits.     This note was created using Dragon voice recognition software that occasionally misinterpreted phrases or words.               "

## 2020-05-27 RX ORDER — MELOXICAM 7.5 MG/1
TABLET ORAL
Qty: 90 TABLET | Refills: 0 | Status: SHIPPED | OUTPATIENT
Start: 2020-05-27 | End: 2020-10-28

## 2020-05-27 RX ORDER — GABAPENTIN 300 MG/1
CAPSULE ORAL
Qty: 30 CAPSULE | Refills: 0 | Status: SHIPPED | OUTPATIENT
Start: 2020-05-27 | End: 2020-06-26

## 2020-06-04 ENCOUNTER — HOSPITAL ENCOUNTER (OUTPATIENT)
Dept: RADIOLOGY | Facility: HOSPITAL | Age: 70
Discharge: HOME | End: 2020-06-04
Attending: FAMILY MEDICINE
Payer: MEDICARE

## 2020-06-04 DIAGNOSIS — M85.89 OSTEOPENIA OF MULTIPLE SITES: Primary | ICD-10-CM

## 2020-06-04 PROCEDURE — 77080 DXA BONE DENSITY AXIAL: CPT | Mod: TC,PO

## 2020-06-04 RX ORDER — ALENDRONATE SODIUM 70 MG/1
70 TABLET ORAL
Qty: 4 TABLET | Refills: 11 | Status: SHIPPED | OUTPATIENT
Start: 2020-06-04 | End: 2021-06-10

## 2020-06-04 NOTE — PROGRESS NOTES
The bone density scan reveals mild osteopenia at the lumbar spine and hip.  We need to initiate calcium vitamin-D and a bisphosphonate I will send these prescriptions in.  Recheck as scheduled the bone density will need to be repeated in 2 years.

## 2020-06-10 ENCOUNTER — TELEPHONE (OUTPATIENT)
Dept: FAMILY MEDICINE | Facility: CLINIC | Age: 70
End: 2020-06-10

## 2020-06-10 NOTE — TELEPHONE ENCOUNTER
Unable to reach pt with results at this time    ----- Message from Carlos Pope MD sent at 6/4/2020  4:25 PM CDT -----  The bone density scan reveals mild osteopenia at the lumbar spine and hip.  We need to initiate calcium vitamin-D and a bisphosphonate I will send these prescriptions in.  Recheck as scheduled the bone density will need to be repeated in 2 years.

## 2020-06-16 ENCOUNTER — TELEPHONE (OUTPATIENT)
Dept: FAMILY MEDICINE | Facility: CLINIC | Age: 70
End: 2020-06-16

## 2020-06-18 ENCOUNTER — OFFICE VISIT (OUTPATIENT)
Dept: PODIATRY | Facility: CLINIC | Age: 70
End: 2020-06-18
Payer: MEDICARE

## 2020-06-18 VITALS
WEIGHT: 165 LBS | HEIGHT: 60 IN | BODY MASS INDEX: 32.39 KG/M2 | HEART RATE: 80 BPM | RESPIRATION RATE: 16 BRPM | SYSTOLIC BLOOD PRESSURE: 138 MMHG | DIASTOLIC BLOOD PRESSURE: 84 MMHG | TEMPERATURE: 98 F

## 2020-06-18 DIAGNOSIS — E11.42 TYPE 2 DIABETES MELLITUS WITH DIABETIC POLYNEUROPATHY, WITH LONG-TERM CURRENT USE OF INSULIN: ICD-10-CM

## 2020-06-18 DIAGNOSIS — Z79.4 TYPE 2 DIABETES MELLITUS WITH DIABETIC POLYNEUROPATHY, WITH LONG-TERM CURRENT USE OF INSULIN: ICD-10-CM

## 2020-06-18 DIAGNOSIS — L97.521 SKIN ULCER OF LEFT FOOT, LIMITED TO BREAKDOWN OF SKIN: Primary | ICD-10-CM

## 2020-06-18 PROCEDURE — 97597 DBRDMT OPN WND 1ST 20 CM/<: CPT | Mod: S$GLB,,, | Performed by: PODIATRIST

## 2020-06-18 PROCEDURE — 97597 WOUND DEBRIDEMENT: ICD-10-PCS | Mod: S$GLB,,, | Performed by: PODIATRIST

## 2020-06-18 RX ORDER — CLOTRIMAZOLE AND BETAMETHASONE DIPROPIONATE 10; .64 MG/G; MG/G
CREAM TOPICAL 2 TIMES DAILY
Qty: 1 TUBE | Refills: 3 | Status: SHIPPED | OUTPATIENT
Start: 2020-06-18 | End: 2020-12-21

## 2020-06-18 NOTE — PROGRESS NOTES
[address] [facility] [address]  [place] [address]  Phone: [phone]   [phone]    Patient's PCP:Carlos Pope MD  Referring Provider: No ref. provider found    Subjective:      Chief Complaint:: Follow-up (diabetic Skin ulcer of left foot)    HPI  Hoa Wilde is a 70 y.o. female who presents for follow up on complaint of diabetic skin ulcer of left foot 1st MPJ lasting since February. Current symptoms include pain, odor, hard skin formation.  Aggravating factors are prolonged walking and standing.Treatment to date have included debridement, antibiotics, dressing changes daily with neosporin  Patients rates pain 6/10 on pain scale.    Systemic Doctor: Carlos Pope MD  Date Last Seen: 04/30/20  Blood Sugar: 189 yesterday  Hemoglobin A1c: 12.4 (09/17/19)     Vitals:    06/18/20 1346   Resp: 16   Temp: 98.2 °F (36.8 °C)     Shoe Size: 5.5-6    Past Surgical History:   Procedure Laterality Date    CHOLECYSTECTOMY      CORONARY ARTERY BYPASS GRAFT      HYSTERECTOMY       Past Medical History:   Diagnosis Date    Coronary artery disease     Diabetes mellitus, type 2     Hyperlipidemia     Hypertension     Hypothyroidism     Neuropathy      Family History   Problem Relation Age of Onset    Hyperlipidemia Mother     Heart disease Mother         Social History:   Marital Status:   Alcohol History:  reports no history of alcohol use.  Tobacco History:  reports that she has never smoked. She has never used smokeless tobacco.  Drug History:  reports no history of drug use.    Review of patient's allergies indicates:  No Known Allergies    Current Outpatient Medications   Medication Sig Dispense Refill    alendronate (FOSAMAX) 70 MG tablet Take 1 tablet (70 mg total) by mouth every 7 days. 4 tablet 11    aspirin (ECOTRIN) 81 MG EC tablet Take 81 mg by mouth once daily.      calcium carbonate-vitamin D3 (LIQUID CALCIUM WITH VITAMIN D) 600 mg calcium- 200 unit Cap Take 1 capsule by mouth 2  "(two) times daily. for 365 doses 60 capsule 11    cefUROXime (CEFTIN) 250 MG tablet Take 1 tablet (250 mg total) by mouth 2 (two) times daily. (Patient not taking: Reported on 5/21/2020) 20 tablet 0    empagliflozin (JARDIANCE) 10 mg Tab Take 10 mg by mouth once daily. 30 tablet 11    famotidine (PEPCID) 20 MG tablet Take 1 tablet (20 mg total) by mouth once daily. 30 tablet 5    gabapentin (NEURONTIN) 300 MG capsule TAKE 1 CAPSULE BY MOUTH NIGHTLY 30 capsule 0    insulin detemir U-100 (LEVEMIR FLEXTOUCH U-100 INSULN) 100 unit/mL (3 mL) SubQ InPn pen INJECT 50 UNITS SUBCUTANEOUSLY IN THE EVENING 2 Box 5    insulin regular (HUMULIN R REGULAR U-100 INSULN) 100 unit/mL Inj injection Follow sliding scale printed out.  Check blood sugars 4 times a day 20 mL 11    levothyroxine (SYNTHROID) 50 MCG tablet Take 50 mcg by mouth before breakfast.      lisinopril (PRINIVIL,ZESTRIL) 5 MG tablet Take 5 mg by mouth once daily.      meloxicam (MOBIC) 7.5 MG tablet Take 1 tablet by mouth once daily 90 tablet 0    metFORMIN (GLUCOPHAGE-XR) 750 MG 24 hr tablet Take 750 mg by mouth 2 (two) times daily with meals.      metoprolol tartrate (LOPRESSOR) 25 MG tablet TAKE 1 TABLET BY MOUTH TWICE DAILY 180 tablet 3    pen needle, diabetic 30 gauge x 1/3" Ndle 1 pen by Misc.(Non-Drug; Combo Route) route once daily. To use with levimir flex pen 100 each 3    rosuvastatin (CRESTOR) 20 MG tablet Take 20 mg by mouth once daily.       No current facility-administered medications for this visit.        Review of Systems      Objective:        Physical Exam:   Foot Exam    General  General Appearance: appears stated age and healthy   Orientation: alert and oriented to person, place, and time   Affect: appropriate   Gait: unimpaired       Left Foot/Ankle      Inspection and Palpation  Skin Exam: ulcer (Grade 2 ulcer plantar 1st MPJ approximately 1 cm diameter with perimeter of callus tissue and slough with no signs of infection.); " "    Neurovascular  Dorsalis pedis: 1+  Posterior tibial: 1+  Saphenous nerve sensation: diminished  Tibial nerve sensation: diminished  Superficial peroneal nerve sensation: diminished  Deep peroneal nerve sensation: diminished  Sural nerve sensation: diminished          Physical Exam   Cardiovascular:   Pulses:       Dorsalis pedis pulses are 1+ on the left side.        Posterior tibial pulses are 1+ on the left side.   Musculoskeletal:        Feet:    Feet:   Left Foot:   Skin Integrity: Positive for ulcer (Grade 2 ulcer plantar 1st MPJ approximately 1 cm diameter with perimeter of callus tissue and slough with no signs of infection.).       Imaging:            Assessment:       1. Skin ulcer of left foot, limited to breakdown of skin    2. Type 2 diabetes mellitus with diabetic polyneuropathy, with long-term current use of insulin      Plan:   Skin ulcer of left foot, limited to breakdown of skin    Type 2 diabetes mellitus with diabetic polyneuropathy, with long-term current use of insulin     1.  today evaluate the patient in evaluated the ulcer on the plantar surface of the left foot.  She will continue using offloading inserts and shoes in treating with topical antibiotics.  2.  Return to see me in 6 weeks or as needed.  Patient is requesting diabetic shoes and I agree that she would benefit from them at we will give her a prescription to get fitted for diabetic shoes.  No follow-ups on file.    Wound Debridement    Date/Time: 6/18/2020 1:40 PM  Performed by: Tal Ramirez DPM  Authorized by: Tal Ramirez DPM     Time out: Immediately prior to procedure a "time out" was called to verify the correct patient, procedure, equipment, support staff and site/side marked as required.    Consent Done?:  Yes (Verbal)    Preparation: Patient was prepped and draped in usual sterile fashion    Local anesthesia used?: No      Wound Details:    Location:  Left foot    Location:  Left 1st Metatarsal Head    Type " of Debridement:  Excisional       Length (cm):  1       Area (sq cm):  1       Width (cm):  1       Percent Debrided (%):  100       Depth (cm):  0.1       Total Area Debrided (sq cm):  1    Depth of debridement:  Epidermis/Dermis    Tissue debrided:  Dermis and Epidermis    Devitalized tissue debrided:  Callus, Biofilm, Necrotic/Eschar and Slough    Instruments:  Forceps, Blade and Nippers    Bleeding:  Minimal  Hemostasis Achieved: Yes    Method Used:  Pressure  Patient tolerance:  Patient tolerated the procedure well with no immediate complications     Patient will continue daily dressing changes applying topical antibiotic use accommodative padding and accommodative inserts in her shoes.  Return in 6 weeks or as needed.     -     Counseling:     I provided patient education verbally regarding:   Patient diagnosis, treatment options, as well as alternatives, risks, and benefits.     This note was created using Dragon voice recognition software that occasionally misinterpreted phrases or words.

## 2020-07-10 RX ORDER — LEVOTHYROXINE SODIUM 50 UG/1
50 TABLET ORAL
Qty: 30 TABLET | Refills: 5 | Status: SHIPPED | OUTPATIENT
Start: 2020-07-10 | End: 2021-03-31

## 2020-08-03 PROBLEM — Z00.00 PREVENTATIVE HEALTH CARE: Status: RESOLVED | Noted: 2019-09-18 | Resolved: 2020-08-03

## 2020-08-14 ENCOUNTER — OFFICE VISIT (OUTPATIENT)
Dept: FAMILY MEDICINE | Facility: CLINIC | Age: 70
End: 2020-08-14
Payer: MEDICARE

## 2020-08-14 VITALS
HEIGHT: 60 IN | SYSTOLIC BLOOD PRESSURE: 134 MMHG | RESPIRATION RATE: 18 BRPM | HEART RATE: 77 BPM | DIASTOLIC BLOOD PRESSURE: 72 MMHG | TEMPERATURE: 97 F | BODY MASS INDEX: 32.79 KG/M2 | OXYGEN SATURATION: 99 % | WEIGHT: 167 LBS

## 2020-08-14 DIAGNOSIS — E78.2 MIXED HYPERLIPIDEMIA: ICD-10-CM

## 2020-08-14 DIAGNOSIS — R26.81 UNSTEADY GAIT: ICD-10-CM

## 2020-08-14 DIAGNOSIS — M85.89 OSTEOPENIA OF MULTIPLE SITES: ICD-10-CM

## 2020-08-14 DIAGNOSIS — E11.43 DIABETIC AUTONOMIC NEUROPATHY ASSOCIATED WITH TYPE 2 DIABETES MELLITUS: ICD-10-CM

## 2020-08-14 DIAGNOSIS — R26.89 NEED FOR ASSISTANCE DUE TO UNSTEADY GAIT: ICD-10-CM

## 2020-08-14 DIAGNOSIS — E13.40 NEUROPATHY DUE TO SECONDARY DIABETES: ICD-10-CM

## 2020-08-14 DIAGNOSIS — E55.9 VITAMIN D DEFICIENCY: Primary | ICD-10-CM

## 2020-08-14 DIAGNOSIS — R27.0 ATAXIA, UNSPECIFIED: ICD-10-CM

## 2020-08-14 PROCEDURE — 3075F SYST BP GE 130 - 139MM HG: CPT | Mod: S$GLB,,, | Performed by: FAMILY MEDICINE

## 2020-08-14 PROCEDURE — 3008F PR BODY MASS INDEX (BMI) DOCUMENTED: ICD-10-PCS | Mod: S$GLB,,, | Performed by: FAMILY MEDICINE

## 2020-08-14 PROCEDURE — 1159F PR MEDICATION LIST DOCUMENTED IN MEDICAL RECORD: ICD-10-PCS | Mod: S$GLB,,, | Performed by: FAMILY MEDICINE

## 2020-08-14 PROCEDURE — 99214 PR OFFICE/OUTPT VISIT, EST, LEVL IV, 30-39 MIN: ICD-10-PCS | Mod: S$GLB,,, | Performed by: FAMILY MEDICINE

## 2020-08-14 PROCEDURE — 3078F PR MOST RECENT DIASTOLIC BLOOD PRESSURE < 80 MM HG: ICD-10-PCS | Mod: S$GLB,,, | Performed by: FAMILY MEDICINE

## 2020-08-14 PROCEDURE — 1101F PT FALLS ASSESS-DOCD LE1/YR: CPT | Mod: S$GLB,,, | Performed by: FAMILY MEDICINE

## 2020-08-14 PROCEDURE — 3078F DIAST BP <80 MM HG: CPT | Mod: S$GLB,,, | Performed by: FAMILY MEDICINE

## 2020-08-14 PROCEDURE — 3046F HEMOGLOBIN A1C LEVEL >9.0%: CPT | Mod: S$GLB,,, | Performed by: FAMILY MEDICINE

## 2020-08-14 PROCEDURE — 3046F PR MOST RECENT HEMOGLOBIN A1C LEVEL > 9.0%: ICD-10-PCS | Mod: S$GLB,,, | Performed by: FAMILY MEDICINE

## 2020-08-14 PROCEDURE — 1159F MED LIST DOCD IN RCRD: CPT | Mod: S$GLB,,, | Performed by: FAMILY MEDICINE

## 2020-08-14 PROCEDURE — 3075F PR MOST RECENT SYSTOLIC BLOOD PRESS GE 130-139MM HG: ICD-10-PCS | Mod: S$GLB,,, | Performed by: FAMILY MEDICINE

## 2020-08-14 PROCEDURE — 3008F BODY MASS INDEX DOCD: CPT | Mod: S$GLB,,, | Performed by: FAMILY MEDICINE

## 2020-08-14 PROCEDURE — 99214 OFFICE O/P EST MOD 30 MIN: CPT | Mod: S$GLB,,, | Performed by: FAMILY MEDICINE

## 2020-08-14 PROCEDURE — 1101F PR PT FALLS ASSESS DOC 0-1 FALLS W/OUT INJ PAST YR: ICD-10-PCS | Mod: S$GLB,,, | Performed by: FAMILY MEDICINE

## 2020-08-14 RX ORDER — CALCIUM CARBONATE 500(1250)
1 TABLET ORAL 2 TIMES DAILY
Qty: 200 TABLET | Refills: 3 | COMMUNITY
Start: 2020-08-14 | End: 2021-06-28 | Stop reason: SDUPTHER

## 2020-08-14 RX ORDER — INSULIN DETEMIR 100 [IU]/ML
70 INJECTION, SOLUTION SUBCUTANEOUS NIGHTLY
Qty: 63 ML | Refills: 1 | Status: SHIPPED | OUTPATIENT
Start: 2020-08-14 | End: 2021-06-13

## 2020-08-14 NOTE — PROGRESS NOTES
Subjective:       Patient ID: Hoa Wilde is a 70 y.o. female.    Chief Complaint: Diabetes (balance issues )      Patient had appointment follow-up on diabetes.  Has some other issues.  Is running out of insulin to early as she went up to 66 units per day.  Lab Results       Component                Value               Date                       WBC                      8.58                02/02/2020                 HGB                      12.9                02/02/2020                 HCT                      38.6                02/02/2020                 PLT                      296                 02/02/2020                 CHOL                     138                 05/05/2020                 TRIG                     254 (H)             05/05/2020                 HDL                      40                  05/05/2020                 ALT                      25                  05/05/2020                 AST                      21                  05/05/2020                 NA                       139                 05/05/2020                 K                        4.1                 05/05/2020                 CL                       103                 05/05/2020                 CREATININE               0.5                 05/05/2020                 BUN                      10                  05/05/2020                 CO2                      27                  05/05/2020                 TSH                      2.310               02/02/2020                 HGBA1C                   12.4 (H)            09/17/2019            Patient has been having some equilibrium issues    Diabetes  She presents for her follow-up diabetic visit. She has type 2 diabetes mellitus. Associated symptoms include blurred vision, foot paresthesias (neuropathy), foot ulcerations and visual change. Pertinent negatives for diabetes include no chest pain and no fatigue. She is following a diabetic diet. Her breakfast blood  "glucose range is generally 140-180 mg/dl. Her dinner blood glucose range is generally >200 mg/dl. (Left her diary at home) An ACE inhibitor/angiotensin II receptor blocker is being taken. She sees a podiatrist (Sees Dr. Ramirez quarterly).Eye exam is current (Has appt in September [facility]).       Allergies and Medications:   Review of patient's allergies indicates:  No Known Allergies  Current Outpatient Medications   Medication Sig Dispense Refill    alendronate (FOSAMAX) 70 MG tablet Take 1 tablet (70 mg total) by mouth every 7 days. 4 tablet 11    aspirin (ECOTRIN) 81 MG EC tablet Take 81 mg by mouth once daily.      clotrimazole-betamethasone 1-0.05% (LOTRISONE) cream Apply topically 2 (two) times daily. 1 Tube 3    famotidine (PEPCID) 20 MG tablet Take 1 tablet by mouth once daily 90 tablet 3    gabapentin (NEURONTIN) 300 MG capsule TAKE 1 CAPSULE BY MOUTH NIGHTLY 30 capsule 5    insulin detemir U-100 (LEVEMIR FLEXTOUCH U-100 INSULN) 100 unit/mL (3 mL) InPn pen Inject 70 Units into the skin every evening. INJECT 50 UNITS SUBCUTANEOUSLY IN THE EVENING 63 mL 1    levothyroxine (SYNTHROID) 50 MCG tablet Take 1 tablet (50 mcg total) by mouth before breakfast. 30 tablet 5    lisinopriL (PRINIVIL,ZESTRIL) 5 MG tablet Take 1 tablet by mouth once daily 90 tablet 1    meloxicam (MOBIC) 7.5 MG tablet Take 1 tablet by mouth once daily 90 tablet 0    metFORMIN (GLUCOPHAGE-XR) 750 MG 24 hr tablet Take 750 mg by mouth 2 (two) times daily with meals.      metoprolol tartrate (LOPRESSOR) 25 MG tablet TAKE 1 TABLET BY MOUTH TWICE DAILY 180 tablet 3    pen needle, diabetic 30 gauge x 1/3" Ndle 1 pen by Misc.(Non-Drug; Combo Route) route once daily. To use with levimir flex pen 100 each 3    rosuvastatin (CRESTOR) 20 MG tablet Take 20 mg by mouth once daily.      calcium carbonate (OS-GEORGINA) 500 mg calcium (1,250 mg) tablet Take 1 tablet (500 mg total) by mouth 2 (two) times daily. 200 tablet 3    calcium " carbonate-vitamin D3 (LIQUID CALCIUM WITH VITAMIN D) 600 mg calcium- 200 unit Cap Take 1 capsule by mouth 2 (two) times daily. for 365 doses (Patient not taking: Reported on 8/14/2020) 60 capsule 11    cefUROXime (CEFTIN) 250 MG tablet Take 1 tablet (250 mg total) by mouth 2 (two) times daily. (Patient not taking: Reported on 5/21/2020) 20 tablet 0    empagliflozin (JARDIANCE) 10 mg Tab Take 10 mg by mouth once daily. (Patient not taking: Reported on 8/14/2020) 30 tablet 11    insulin regular (HUMULIN R REGULAR U-100 INSULN) 100 unit/mL Inj injection Follow sliding scale printed out.  Check blood sugars 4 times a day (Patient not taking: Reported on 8/14/2020) 20 mL 11     No current facility-administered medications for this visit.        Family History:   Family History   Problem Relation Age of Onset    Hyperlipidemia Mother     Heart disease Mother        Social History:   Social History     Socioeconomic History    Marital status:      Spouse name: Not on file    Number of children: Not on file    Years of education: Not on file    Highest education level: Not on file   Occupational History    Occupation: retired    Social Needs    Financial resource strain: Not on file    Food insecurity     Worry: Not on file     Inability: Not on file    Transportation needs     Medical: Not on file     Non-medical: Not on file   Tobacco Use    Smoking status: Never Smoker    Smokeless tobacco: Never Used   Substance and Sexual Activity    Alcohol use: No    Drug use: No    Sexual activity: Not on file   Lifestyle    Physical activity     Days per week: Not on file     Minutes per session: Not on file    Stress: Very much   Relationships    Social connections     Talks on phone: Not on file     Gets together: Not on file     Attends Mu-ism service: Not on file     Active member of club or organization: Not on file     Attends meetings of clubs or organizations: Not on file     Relationship  status: Not on file   Other Topics Concern    Not on file   Social History Narrative    Not on file       Review of Systems   Constitutional: Negative for fatigue.   Eyes: Positive for blurred vision.   Cardiovascular: Negative for chest pain.       Objective:     Vitals:    08/14/20 1428   BP: 134/72   Pulse: 77   Resp: 18   Temp: 97.2 °F (36.2 °C)        Physical Exam  Vitals signs and nursing note reviewed.   Constitutional:       Appearance: She is well-developed.   HENT:      Head: Normocephalic and atraumatic.   Eyes:      General: No visual field deficit.     Pupils: Pupils are equal, round, and reactive to light.   Cardiovascular:      Rate and Rhythm: Normal rate and regular rhythm.      Heart sounds: Normal heart sounds. No murmur. No friction rub. No gallop.    Pulmonary:      Effort: Pulmonary effort is normal. No respiratory distress.      Breath sounds: Normal breath sounds. No stridor. No wheezing or rales.   Chest:      Chest wall: No tenderness.   Neurological:      Mental Status: She is alert and oriented to person, place, and time.      GCS: GCS eye subscore is 4. GCS verbal subscore is 5. GCS motor subscore is 6.      Cranial Nerves: Cranial nerves are intact. No cranial nerve deficit, dysarthria or facial asymmetry.      Sensory: Sensation is intact. No sensory deficit.      Motor: Weakness and tremor present.      Coordination: Romberg sign negative. Coordination normal. Finger-Nose-Finger Test and Heel to Shin Test normal. Rapid alternating movements normal.      Gait: Gait abnormal and tandem walk abnormal.      Deep Tendon Reflexes:      Reflex Scores:       Tricep reflexes are 2+ on the right side and 2+ on the left side.       Bicep reflexes are 2+ on the right side and 2+ on the left side.       Brachioradialis reflexes are 2+ on the right side and 2+ on the left side.       Patellar reflexes are 2+ on the right side and 2+ on the left side.       Achilles reflexes are 2+ on the right  side and 2+ on the left side.     Comments: Patient is a very unsteady, negative Romberg but unsteady to walk.   Psychiatric:         Behavior: Behavior normal.         Thought Content: Thought content normal.         Judgment: Judgment normal.         Assessment:       1. Vitamin D deficiency    2. Diabetic autonomic neuropathy associated with type 2 diabetes mellitus    3. Mixed hyperlipidemia    4. Osteopenia of multiple sites    5. Need for assistance due to unsteady gait    6. Neuropathy due to secondary diabetes    7. Unsteady gait        Plan:       Hoa was seen today for diabetes.    Diagnoses and all orders for this visit:    Vitamin D deficiency  -     Vitamin D; Future    Diabetic autonomic neuropathy associated with type 2 diabetes mellitus  -     insulin detemir U-100 (LEVEMIR FLEXTOUCH U-100 INSULN) 100 unit/mL (3 mL) InPn pen; Inject 70 Units into the skin every evening. INJECT 50 UNITS SUBCUTANEOUSLY IN THE EVENING  -     Lipid Panel; Future  -     Comprehensive metabolic panel; Future  -     Hemoglobin A1C; Future    Mixed hyperlipidemia  -     Lipid Panel; Future    Osteopenia of multiple sites  -     Vitamin D; Future  -     calcium carbonate (OS-GEORGINA) 500 mg calcium (1,250 mg) tablet; Take 1 tablet (500 mg total) by mouth 2 (two) times daily.    Need for assistance due to unsteady gait  -     Ambulatory referral/consult to Physical/Occupational Therapy; Future    Neuropathy due to secondary diabetes  -     Ambulatory referral/consult to Physical/Occupational Therapy; Future    Unsteady gait  -     Ambulatory referral/consult to Physical/Occupational Therapy; Future         Follow up in about 3 months (around 11/14/2020) for follow up DM, follow up HTN.

## 2020-09-21 ENCOUNTER — OFFICE VISIT (OUTPATIENT)
Dept: PODIATRY | Facility: CLINIC | Age: 70
End: 2020-09-21
Payer: MEDICARE

## 2020-09-21 VITALS
SYSTOLIC BLOOD PRESSURE: 138 MMHG | HEART RATE: 78 BPM | HEIGHT: 60 IN | DIASTOLIC BLOOD PRESSURE: 86 MMHG | BODY MASS INDEX: 32.79 KG/M2 | WEIGHT: 167 LBS | TEMPERATURE: 97 F

## 2020-09-21 DIAGNOSIS — Z79.4 TYPE 2 DIABETES MELLITUS WITH DIABETIC POLYNEUROPATHY, WITH LONG-TERM CURRENT USE OF INSULIN: ICD-10-CM

## 2020-09-21 DIAGNOSIS — D23.71 BENIGN NEOPLASM OF SKIN OF RIGHT LOWER EXTREMITY, INCLUDING HIP: ICD-10-CM

## 2020-09-21 DIAGNOSIS — E11.42 TYPE 2 DIABETES MELLITUS WITH DIABETIC POLYNEUROPATHY, WITH LONG-TERM CURRENT USE OF INSULIN: ICD-10-CM

## 2020-09-21 DIAGNOSIS — L60.2 ONYCHOGRYPHOSIS: ICD-10-CM

## 2020-09-21 DIAGNOSIS — L97.521 SKIN ULCER OF LEFT FOOT, LIMITED TO BREAKDOWN OF SKIN: Primary | ICD-10-CM

## 2020-09-21 PROCEDURE — 11721 DEBRIDE NAIL 6 OR MORE: CPT | Mod: 59,Q8,S$GLB, | Performed by: PODIATRIST

## 2020-09-21 PROCEDURE — 97597 DBRDMT OPN WND 1ST 20 CM/<: CPT | Mod: 59,S$GLB,, | Performed by: PODIATRIST

## 2020-09-21 PROCEDURE — 11055 PARING/CUTG B9 HYPRKER LES 1: CPT | Mod: Q8,S$GLB,, | Performed by: PODIATRIST

## 2020-09-21 PROCEDURE — 11055 PR TRIM HYPERKERATOTIC SKIN LESION, ONE: ICD-10-PCS | Mod: Q8,S$GLB,, | Performed by: PODIATRIST

## 2020-09-21 PROCEDURE — 97597 WOUND DEBRIDEMENT: ICD-10-PCS | Mod: 59,S$GLB,, | Performed by: PODIATRIST

## 2020-09-21 PROCEDURE — 11721 PR DEBRIDEMENT OF NAILS, 6 OR MORE: ICD-10-PCS | Mod: 59,Q8,S$GLB, | Performed by: PODIATRIST

## 2020-09-21 NOTE — PROGRESS NOTES
[address] [facility] [address]  [place] [address]  Phone: [phone]   [phone]    Patient's PCP:Carlos Pope MD  Referring Provider: No ref. provider found    Subjective:      Chief Complaint:: No chief complaint on file.    HPI  Hoa Wilde is a 70 y.o. female who presents with a follow up ulcer first [facility], been doing dressings every other day. The bunion pads were taking my skin of so I stopped using them. When I took the dressing off the patient, noticed area was blistered looking and patient wa unaware.  Systemic Doctor:Carlos Pope Md  Date Last Seen: 8-14-20  Blood Sugar: didn't check today  Hemoglobin A1c: 10    Vitals:    09/21/20 1452   BP: 138/86   Pulse: 78   Temp: 97.3 °F (36.3 °C)     Shoe Size:     Past Surgical History:   Procedure Laterality Date    CHOLECYSTECTOMY      CORONARY ARTERY BYPASS GRAFT      HYSTERECTOMY       Past Medical History:   Diagnosis Date    Coronary artery disease     Diabetes mellitus, type 2     Hyperlipidemia     Hypertension     Hypothyroidism     Neuropathy      Family History   Problem Relation Age of Onset    Hyperlipidemia Mother     Heart disease Mother         Social History:   Marital Status:   Alcohol History:  reports no history of alcohol use.  Tobacco History:  reports that she has never smoked. She has never used smokeless tobacco.  Drug History:  reports no history of drug use.    Review of patient's allergies indicates:  No Known Allergies    Current Outpatient Medications   Medication Sig Dispense Refill    alendronate (FOSAMAX) 70 MG tablet Take 1 tablet (70 mg total) by mouth every 7 days. 4 tablet 11    aspirin (ECOTRIN) 81 MG EC tablet Take 81 mg by mouth once daily.      calcium carbonate (OS-GEORGINA) 500 mg calcium (1,250 mg) tablet Take 1 tablet (500 mg total) by mouth 2 (two) times daily. 200 tablet 3    calcium carbonate-vitamin D3 (LIQUID CALCIUM WITH VITAMIN D) 600 mg calcium- 200 unit Cap Take 1 capsule by mouth  "2 (two) times daily. for 365 doses (Patient not taking: Reported on 8/14/2020) 60 capsule 11    cefUROXime (CEFTIN) 250 MG tablet Take 1 tablet (250 mg total) by mouth 2 (two) times daily. (Patient not taking: Reported on 5/21/2020) 20 tablet 0    clotrimazole-betamethasone 1-0.05% (LOTRISONE) cream Apply topically 2 (two) times daily. 1 Tube 3    empagliflozin (JARDIANCE) 10 mg Tab Take 10 mg by mouth once daily. (Patient not taking: Reported on 8/14/2020) 30 tablet 11    famotidine (PEPCID) 20 MG tablet Take 1 tablet by mouth once daily 90 tablet 3    gabapentin (NEURONTIN) 300 MG capsule TAKE 1 CAPSULE BY MOUTH NIGHTLY 30 capsule 5    insulin detemir U-100 (LEVEMIR FLEXTOUCH U-100 INSULN) 100 unit/mL (3 mL) InPn pen Inject 70 Units into the skin every evening. INJECT 50 UNITS SUBCUTANEOUSLY IN THE EVENING 63 mL 1    insulin regular (HUMULIN R REGULAR U-100 INSULN) 100 unit/mL Inj injection Follow sliding scale printed out.  Check blood sugars 4 times a day (Patient not taking: Reported on 8/14/2020) 20 mL 11    levothyroxine (SYNTHROID) 50 MCG tablet Take 1 tablet (50 mcg total) by mouth before breakfast. 30 tablet 5    lisinopriL (PRINIVIL,ZESTRIL) 5 MG tablet Take 1 tablet by mouth once daily 90 tablet 1    meloxicam (MOBIC) 7.5 MG tablet Take 1 tablet by mouth once daily 90 tablet 0    metFORMIN (GLUCOPHAGE-XR) 750 MG 24 hr tablet Take 750 mg by mouth 2 (two) times daily with meals.      metoprolol tartrate (LOPRESSOR) 25 MG tablet TAKE 1 TABLET BY MOUTH TWICE DAILY 180 tablet 3    pen needle, diabetic 30 gauge x 1/3" Ndle 1 pen by Misc.(Non-Drug; Combo Route) route once daily. To use with levimir flex pen 100 each 3    rosuvastatin (CRESTOR) 20 MG tablet Take 20 mg by mouth once daily.       No current facility-administered medications for this visit.        Review of Systems      Objective:        Physical Exam:   Foot Exam    General  General Appearance: appears stated age and healthy "   Orientation: alert and oriented to person, place, and time   Affect: appropriate   Gait: antalgic       Right Foot/Ankle     Inspection and Palpation  Skin Exam: (Grade 0 ulcer plantar IPJ 1st toe.  Fungal toenails all toes)    Neurovascular  Dorsalis pedis: 1+  Posterior tibial: 1+  Saphenous nerve sensation: absent  Tibial nerve sensation: absent  Superficial peroneal nerve sensation: absent  Deep peroneal nerve sensation: absent  Sural nerve sensation: absent      Left Foot/Ankle      Inspection and Palpation  Skin Exam: ulcer (Grade 2 ulcer plantar 1st MPJ with bulla formation extending distal between 1st and 2nd toes that is superficial with hemorrhagic fluid.  No signs of infection.); (Fungal toenails all toes)    Neurovascular  Dorsalis pedis: 1+  Posterior tibial: 1+  Saphenous nerve sensation: absent  Tibial nerve sensation: absent  Superficial peroneal nerve sensation: absent  Deep peroneal nerve sensation: absent  Sural nerve sensation: absent          Physical Exam   Cardiovascular:   Pulses:       Dorsalis pedis pulses are 1+ on the right side and 1+ on the left side.        Posterior tibial pulses are 1+ on the right side and 1+ on the left side.   Musculoskeletal:        Feet:    Feet:   Left Foot:   Skin Integrity: Positive for ulcer (Grade 2 ulcer plantar 1st MPJ with bulla formation extending distal between 1st and 2nd toes that is superficial with hemorrhagic fluid.  No signs of infection.).                       Imaging:            Assessment:       1. Type 2 diabetes mellitus with diabetic polyneuropathy, with long-term current use of insulin    2. Skin ulcer of left foot, limited to breakdown of skin    3. Benign neoplasm of skin of right lower extremity, including hip    4. Onychogryphosis      Plan:   Type 2 diabetes mellitus with diabetic polyneuropathy, with long-term current use of insulin    Skin ulcer of left foot, limited to breakdown of skin    Benign neoplasm of skin of right lower  "extremity, including hip    Onychogryphosis     1.  Today evaluate the patient discussed my clinical findings of the callus formation and sub keratotic ulceration with bulla formation plantar 2nd MPJ.  She has had problems with this ulcer off and on for years.  On the foot she has a pre ulcerative keratotic lesion plantar IPJ.  She also has thick deformed fungal toenails.  She is considered to be high risk because of ulcerations and neuropathy.  2.Utilizing sterile nail nippers and electric , I aggressively debrided nails 1-5 bilaterally down to nail beds to thin the nail plates. Pt. tolerated well. No blood was drawn.  3.  With patient's verbal consent the hyperkeratotic lesions  plantar IPJ 1st toe right foot  were debrided to healthy appearing skin using a scalpel. Patient relates relief of pain following the procedure. Patient tolerated the procedure well without complications. No anesthesia or hemostasis required. No blood loss.  4.  Daily dressing changes to ulcers on left foot and return to see me in 4 weeks or as needed.  No follow-ups on file.    Wound Debridement    Date/Time: 9/21/2020 2:40 PM  Performed by: Tal Ramirez DPM  Authorized by: Tal Ramirez DPM     Time out: Immediately prior to procedure a "time out" was called to verify the correct patient, procedure, equipment, support staff and site/side marked as required.    Consent Done?:  Yes (Verbal)    Preparation: Patient was prepped and draped in usual sterile fashion    Local anesthesia used?: No      Wound Details:    Location:  Left foot    Location:  Left 1st Metatarsal Head    Type of Debridement:  Excisional       Length (cm):  2       Area (sq cm):  4       Width (cm):  2       Percent Debrided (%):  100       Depth (cm):  0.2       Total Area Debrided (sq cm):  4    Depth of debridement:  Epidermis/Dermis    Tissue debrided:  Dermis, Epidermis and Subcutaneous    Devitalized tissue debrided:  Callus, Biofilm, Slough " and Fibrin    Instruments:  Forceps, Blade and Nippers    Bleeding:  Minimal  Hemostasis Achieved: Yes    Method Used:  Pressure  Patient tolerance:  Patient tolerated the procedure well with no immediate complications     Daily dressing changes to all ulcers the topical antibiotic continue accommodative shoe gear to relieve pressure on ulcers return in 4 weeks.     -     Counseling:     I provided patient education verbally regarding:   Patient diagnosis, treatment options, as well as alternatives, risks, and benefits.     This note was created using Dragon voice recognition software that occasionally misinterpreted phrases or words.

## 2020-09-30 ENCOUNTER — OFFICE VISIT (OUTPATIENT)
Dept: FAMILY MEDICINE | Facility: CLINIC | Age: 70
End: 2020-09-30
Payer: MEDICARE

## 2020-09-30 VITALS
RESPIRATION RATE: 16 BRPM | BODY MASS INDEX: 32.47 KG/M2 | DIASTOLIC BLOOD PRESSURE: 60 MMHG | HEIGHT: 60 IN | SYSTOLIC BLOOD PRESSURE: 128 MMHG | OXYGEN SATURATION: 99 % | TEMPERATURE: 97 F | HEART RATE: 93 BPM | WEIGHT: 165.38 LBS

## 2020-09-30 DIAGNOSIS — I70.90 ARTERIAL VASCULAR DISEASE: ICD-10-CM

## 2020-09-30 DIAGNOSIS — E11.42 TYPE 2 DIABETES MELLITUS WITH DIABETIC POLYNEUROPATHY, WITH LONG-TERM CURRENT USE OF INSULIN: Primary | ICD-10-CM

## 2020-09-30 DIAGNOSIS — Z79.4 TYPE 2 DIABETES MELLITUS WITH DIABETIC POLYNEUROPATHY, WITH LONG-TERM CURRENT USE OF INSULIN: Primary | ICD-10-CM

## 2020-09-30 DIAGNOSIS — E13.621: ICD-10-CM

## 2020-09-30 DIAGNOSIS — Z23 IMMUNIZATION DUE: ICD-10-CM

## 2020-09-30 DIAGNOSIS — L97.528: ICD-10-CM

## 2020-09-30 PROCEDURE — G0008 ADMIN INFLUENZA VIRUS VAC: HCPCS | Mod: S$GLB,,, | Performed by: FAMILY MEDICINE

## 2020-09-30 PROCEDURE — 90662 FLU VACCINE - QUADRIVALENT - HIGH DOSE (65+) PRESERVATIVE FREE IM: ICD-10-PCS | Mod: S$GLB,,, | Performed by: FAMILY MEDICINE

## 2020-09-30 PROCEDURE — G0008 FLU VACCINE - QUADRIVALENT - HIGH DOSE (65+) PRESERVATIVE FREE IM: ICD-10-PCS | Mod: S$GLB,,, | Performed by: FAMILY MEDICINE

## 2020-09-30 PROCEDURE — 99213 OFFICE O/P EST LOW 20 MIN: CPT | Mod: 25,S$GLB,, | Performed by: FAMILY MEDICINE

## 2020-09-30 PROCEDURE — 90662 IIV NO PRSV INCREASED AG IM: CPT | Mod: S$GLB,,, | Performed by: FAMILY MEDICINE

## 2020-09-30 PROCEDURE — 99213 PR OFFICE/OUTPT VISIT, EST, LEVL III, 20-29 MIN: ICD-10-PCS | Mod: 25,S$GLB,, | Performed by: FAMILY MEDICINE

## 2020-09-30 RX ORDER — GABAPENTIN 300 MG/1
300 CAPSULE ORAL 3 TIMES DAILY
Qty: 90 CAPSULE | Refills: 11 | Status: SHIPPED | OUTPATIENT
Start: 2020-09-30 | End: 2021-06-28 | Stop reason: SDUPTHER

## 2020-09-30 RX ORDER — SEMAGLUTIDE 1.34 MG/ML
0.5 INJECTION, SOLUTION SUBCUTANEOUS
Qty: 1 SYRINGE | Refills: 11 | Status: SHIPPED | OUTPATIENT
Start: 2020-09-30 | End: 2020-12-21

## 2020-09-30 NOTE — PROGRESS NOTES
Subjective:       Patient ID: Hoa Wilde is a 70 y.o. female.    Chief Complaint: Foot Pain (L foot pain x 6 months )      Patient is here because of having worsening problems with her left foot she has had trouble with diabetic polyneuropathy- she did see Dr. Ramirez for a ulcer on that foot and he did treated with curettage.  He is continuing to have increasing pain in the left foot, seemingly worse since last visit.  Dr. Ramirez had recommended that I evaluate the patient .  Dr. Ramirez notes reviewed including pictures done before debridement.  Review of her medicine showed the she is not taking Jardiance because insurance would not pay for it.  Patient has not checked her sugar and we because of a lot of other things but she said last week it was running in the mid 100s  Lab Results       Component                Value               Date                       WBC                      8.58                02/02/2020                 HGB                      12.9                02/02/2020                 HCT                      38.6                02/02/2020                 PLT                      296                 02/02/2020                 CHOL                     138                 05/05/2020                 TRIG                     254 (H)             05/05/2020                 HDL                      40                  05/05/2020                 ALT                      25                  05/05/2020                 AST                      21                  05/05/2020                 NA                       139                 05/05/2020                 K                        4.1                 05/05/2020                 CL                       103                 05/05/2020                 CREATININE               0.5                 05/05/2020                 BUN                      10                  05/05/2020                 CO2                      27                  05/05/2020                  TSH                      2.310               02/02/2020                 HGBA1C                   12.4 (H)            09/17/2019                Foot Pain  This is a chronic problem. The problem occurs constantly. The problem has been gradually worsening. Pertinent negatives include no chest pain, fatigue, headaches, visual change or weakness.   Diabetes  She presents for her follow-up diabetic visit. She has type 2 diabetes mellitus. Pertinent negatives for hypoglycemia include no confusion, dizziness, headaches, hunger, mood changes or nervousness/anxiousness. Associated symptoms include foot paresthesias. Pertinent negatives for diabetes include no blurred vision, no chest pain, no fatigue, no foot ulcerations, no polydipsia, no polyphagia, no polyuria, no visual change, no weakness and no weight loss. Symptoms are stable. Diabetic complications include PVD. Pertinent negatives for diabetic complications include no autonomic neuropathy.       Allergies and Medications:   Review of patient's allergies indicates:  No Known Allergies  Current Outpatient Medications   Medication Sig Dispense Refill    alendronate (FOSAMAX) 70 MG tablet Take 1 tablet (70 mg total) by mouth every 7 days. 4 tablet 11    aspirin (ECOTRIN) 81 MG EC tablet Take 81 mg by mouth once daily.      calcium carbonate (OS-GEORGINA) 500 mg calcium (1,250 mg) tablet Take 1 tablet (500 mg total) by mouth 2 (two) times daily. 200 tablet 3    calcium carbonate-vitamin D3 (LIQUID CALCIUM WITH VITAMIN D) 600 mg calcium- 200 unit Cap Take 1 capsule by mouth 2 (two) times daily. for 365 doses 60 capsule 11    clotrimazole-betamethasone 1-0.05% (LOTRISONE) cream Apply topically 2 (two) times daily. 1 Tube 3    famotidine (PEPCID) 20 MG tablet Take 1 tablet by mouth once daily 90 tablet 3    insulin detemir U-100 (LEVEMIR FLEXTOUCH U-100 INSULN) 100 unit/mL (3 mL) InPn pen Inject 70 Units into the skin every evening. INJECT 50 UNITS SUBCUTANEOUSLY IN  "THE EVENING 63 mL 1    insulin regular (HUMULIN R REGULAR U-100 INSULN) 100 unit/mL Inj injection Follow sliding scale printed out.  Check blood sugars 4 times a day 20 mL 11    levothyroxine (SYNTHROID) 50 MCG tablet Take 1 tablet (50 mcg total) by mouth before breakfast. 30 tablet 5    lisinopriL (PRINIVIL,ZESTRIL) 5 MG tablet Take 1 tablet by mouth once daily 90 tablet 1    meloxicam (MOBIC) 7.5 MG tablet Take 1 tablet by mouth once daily 90 tablet 0    metFORMIN (GLUCOPHAGE-XR) 750 MG 24 hr tablet Take 750 mg by mouth 2 (two) times daily with meals.      metoprolol tartrate (LOPRESSOR) 25 MG tablet TAKE 1 TABLET BY MOUTH TWICE DAILY 180 tablet 3    pen needle, diabetic 30 gauge x 1/3" Ndle 1 pen by Misc.(Non-Drug; Combo Route) route once daily. To use with levimir flex pen 100 each 3    rosuvastatin (CRESTOR) 20 MG tablet Take 20 mg by mouth once daily.      cefUROXime (CEFTIN) 250 MG tablet Take 1 tablet (250 mg total) by mouth 2 (two) times daily. (Patient not taking: Reported on 5/21/2020) 20 tablet 0    empagliflozin (JARDIANCE) 10 mg Tab Take 10 mg by mouth once daily. (Patient not taking: Reported on 8/14/2020) 30 tablet 11    gabapentin (NEURONTIN) 300 MG capsule Take 1 capsule (300 mg total) by mouth 3 (three) times daily. Take 1 in the morning and 2 at night. 90 capsule 11    semaglutide (OZEMPIC) 0.25 mg or 0.5 mg(2 mg/1.5 mL) PnIj Inject 0.5 mg into the skin every 7 days. 1 Syringe 11     No current facility-administered medications for this visit.        Family History:   Family History   Problem Relation Age of Onset    Hyperlipidemia Mother     Heart disease Mother        Social History:   Social History     Socioeconomic History    Marital status:      Spouse name: Not on file    Number of children: Not on file    Years of education: Not on file    Highest education level: Not on file   Occupational History    Occupation: retired    Social Needs    Financial resource " strain: Not on file    Food insecurity     Worry: Not on file     Inability: Not on file    Transportation needs     Medical: Not on file     Non-medical: Not on file   Tobacco Use    Smoking status: Never Smoker    Smokeless tobacco: Never Used   Substance and Sexual Activity    Alcohol use: No    Drug use: No    Sexual activity: Not on file   Lifestyle    Physical activity     Days per week: Not on file     Minutes per session: Not on file    Stress: Very much   Relationships    Social connections     Talks on phone: Not on file     Gets together: Not on file     Attends Congregation service: Not on file     Active member of club or organization: Not on file     Attends meetings of clubs or organizations: Not on file     Relationship status: Not on file   Other Topics Concern    Not on file   Social History Narrative    Not on file       Review of Systems   Constitutional: Negative for fatigue and weight loss.   Eyes: Negative for blurred vision.   Cardiovascular: Negative for chest pain.   Endocrine: Negative for polydipsia, polyphagia and polyuria.   Neurological: Negative for dizziness, weakness and headaches.   Psychiatric/Behavioral: Negative for confusion. The patient is not nervous/anxious.        Objective:     Vitals:    09/30/20 1431   BP: 128/60   Pulse: 93   Resp: 16   Temp: 96.6 °F (35.9 °C)        Physical Exam  Musculoskeletal:        Feet:          Assessment:       1. Type 2 diabetes mellitus with diabetic polyneuropathy, with long-term current use of insulin    2. Arterial vascular disease    3. Diabetic ulcer of left foot associated with diabetes mellitus of other type, with other ulcer severity, unspecified part of foot    4. Immunization due        Plan:       Hoa was seen today for foot pain.    Diagnoses and all orders for this visit:    Type 2 diabetes mellitus with diabetic polyneuropathy, with long-term current use of insulin  -     VAS US Arterial Legs Bilateral; Future  -      gabapentin (NEURONTIN) 300 MG capsule; Take 1 capsule (300 mg total) by mouth 3 (three) times daily. Take 1 in the morning and 2 at night.  -     semaglutide (OZEMPIC) 0.25 mg or 0.5 mg(2 mg/1.5 mL) PnIj; Inject 0.5 mg into the skin every 7 days.    Arterial vascular disease  -     VAS US Arterial Legs Bilateral; Future    Diabetic ulcer of left foot associated with diabetes mellitus of other type, with other ulcer severity, unspecified part of foot  -     VAS US Arterial Legs Bilateral; Future  -     gabapentin (NEURONTIN) 300 MG capsule; Take 1 capsule (300 mg total) by mouth 3 (three) times daily. Take 1 in the morning and 2 at night.  -     semaglutide (OZEMPIC) 0.25 mg or 0.5 mg(2 mg/1.5 mL) PnIj; Inject 0.5 mg into the skin every 7 days.    Immunization due  -     Influenza - Quadrivalent - High Dose (65+) (PF) (IM)         Follow up in about 1 month (around 10/30/2020) for follow up DM return with blood sugar diary.

## 2020-10-05 ENCOUNTER — TELEPHONE (OUTPATIENT)
Dept: FAMILY MEDICINE | Facility: CLINIC | Age: 70
End: 2020-10-05

## 2020-10-05 DIAGNOSIS — I70.90 ARTERIAL VASCULAR DISEASE: Primary | ICD-10-CM

## 2020-10-05 DIAGNOSIS — E11.42 TYPE 2 DIABETES MELLITUS WITH DIABETIC POLYNEUROPATHY, WITH LONG-TERM CURRENT USE OF INSULIN: ICD-10-CM

## 2020-10-05 DIAGNOSIS — Z79.4 TYPE 2 DIABETES MELLITUS WITH DIABETIC POLYNEUROPATHY, WITH LONG-TERM CURRENT USE OF INSULIN: ICD-10-CM

## 2020-10-07 DIAGNOSIS — I10 ESSENTIAL HYPERTENSION, BENIGN: ICD-10-CM

## 2020-10-07 RX ORDER — METOPROLOL TARTRATE 25 MG/1
TABLET, FILM COATED ORAL
Qty: 180 TABLET | Refills: 1 | Status: SHIPPED | OUTPATIENT
Start: 2020-10-07 | End: 2021-06-10

## 2020-10-12 ENCOUNTER — HOSPITAL ENCOUNTER (OUTPATIENT)
Dept: RADIOLOGY | Facility: HOSPITAL | Age: 70
Discharge: HOME | End: 2020-10-12
Attending: FAMILY MEDICINE
Payer: MEDICARE

## 2020-10-12 DIAGNOSIS — Z79.4 TYPE 2 DIABETES MELLITUS WITH DIABETIC POLYNEUROPATHY, WITH LONG-TERM CURRENT USE OF INSULIN: ICD-10-CM

## 2020-10-12 DIAGNOSIS — E11.42 TYPE 2 DIABETES MELLITUS WITH DIABETIC POLYNEUROPATHY, WITH LONG-TERM CURRENT USE OF INSULIN: ICD-10-CM

## 2020-10-12 DIAGNOSIS — I70.90 ARTERIAL VASCULAR DISEASE: ICD-10-CM

## 2020-10-12 DIAGNOSIS — I70.90 ARTERIAL VASCULAR DISEASE: Primary | ICD-10-CM

## 2020-10-12 PROCEDURE — 93925 LOWER EXTREMITY STUDY: CPT | Mod: TC,PO

## 2020-10-12 NOTE — PROGRESS NOTES
Left a message on patient's answering machine regarding this test and the results will need set up a referral with Dr. White for evaluation.  He does have capillary refill in the leg is not ischemic at this time.

## 2020-10-14 ENCOUNTER — TELEPHONE (OUTPATIENT)
Dept: FAMILY MEDICINE | Facility: CLINIC | Age: 70
End: 2020-10-14

## 2020-10-14 NOTE — TELEPHONE ENCOUNTER
Spoke with patient in regards to USs lower Extremity bilateral advised will need set up a referral with Dr. White for evaluation. She does have capillary refill in the leg is not ischemic at this time.  Gave patient phone number for Dr. White and referral information with notes sent to Dr. White 10/13/20.  Patient gave verbal understanding of recommendations .

## 2020-10-14 NOTE — TELEPHONE ENCOUNTER
----- Message from Carlos Pope MD sent at 10/12/2020  5:19 PM CDT -----  Left a message on patient's answering machine regarding this test and the results will need set up a referral with Dr. White for evaluation.  He does have capillary refill in the leg is not ischemic at this time.

## 2020-10-30 RX ORDER — MELOXICAM 7.5 MG/1
7.5 TABLET ORAL DAILY
Qty: 30 TABLET | Refills: 0 | Status: SHIPPED | OUTPATIENT
Start: 2020-10-30 | End: 2021-06-28 | Stop reason: SDUPTHER

## 2020-12-07 DIAGNOSIS — I70.222 ATHEROSCLEROSIS OF NATIVE ARTERY OF LEFT LOWER EXTREMITY WITH REST PAIN: Primary | ICD-10-CM

## 2020-12-09 ENCOUNTER — HOSPITAL ENCOUNTER (OUTPATIENT)
Dept: RADIOLOGY | Facility: HOSPITAL | Age: 70
Discharge: HOME | End: 2020-12-09
Attending: SURGERY
Payer: MEDICARE

## 2020-12-09 ENCOUNTER — HOSPITAL ENCOUNTER (OUTPATIENT)
Dept: PREADMISSION TESTING | Facility: HOSPITAL | Age: 70
Discharge: HOME | End: 2020-12-09
Attending: SURGERY
Payer: MEDICARE

## 2020-12-09 VITALS — HEIGHT: 60 IN | WEIGHT: 168 LBS | BODY MASS INDEX: 32.98 KG/M2

## 2020-12-09 DIAGNOSIS — I70.222 ATHEROSCLEROSIS OF NATIVE ARTERY OF LEFT LOWER EXTREMITY WITH REST PAIN: ICD-10-CM

## 2020-12-09 DIAGNOSIS — Z01.818 PRE-PROCEDURAL EXAMINATION: Primary | ICD-10-CM

## 2020-12-09 LAB
ANION GAP SERPL CALC-SCNC: 10 MMOL/L (ref 8–16)
APTT PPP: 26.3 SEC (ref 23.6–33.3)
BASOPHILS # BLD AUTO: 0.03 K/UL (ref 0–0.2)
BASOPHILS NFR BLD: 0.4 % (ref 0–1.9)
BUN SERPL-MCNC: 10 MG/DL (ref 8–23)
CALCIUM SERPL-MCNC: 8.5 MG/DL (ref 8.7–10.5)
CHLORIDE SERPL-SCNC: 101 MMOL/L (ref 95–110)
CO2 SERPL-SCNC: 24 MMOL/L (ref 23–29)
CREAT SERPL-MCNC: 0.5 MG/DL (ref 0.5–1.4)
DIFFERENTIAL METHOD: ABNORMAL
EOSINOPHIL # BLD AUTO: 0.1 K/UL (ref 0–0.5)
EOSINOPHIL NFR BLD: 1.5 % (ref 0–8)
ERYTHROCYTE [DISTWIDTH] IN BLOOD BY AUTOMATED COUNT: 13.2 % (ref 11.5–14.5)
EST. GFR  (AFRICAN AMERICAN): >60 ML/MIN/1.73 M^2
EST. GFR  (NON AFRICAN AMERICAN): >60 ML/MIN/1.73 M^2
GLUCOSE SERPL-MCNC: 333 MG/DL (ref 70–110)
HCT VFR BLD AUTO: 39.3 % (ref 37–48.5)
HGB BLD-MCNC: 12.9 G/DL (ref 12–16)
IMM GRANULOCYTES # BLD AUTO: 0.02 K/UL (ref 0–0.04)
IMM GRANULOCYTES NFR BLD AUTO: 0.2 % (ref 0–0.5)
INR PPP: 1.1
LYMPHOCYTES # BLD AUTO: 4.1 K/UL (ref 1–4.8)
LYMPHOCYTES NFR BLD: 48.1 % (ref 18–48)
MCH RBC QN AUTO: 27.5 PG (ref 27–31)
MCHC RBC AUTO-ENTMCNC: 32.8 G/DL (ref 32–36)
MCV RBC AUTO: 84 FL (ref 82–98)
MONOCYTES # BLD AUTO: 0.5 K/UL (ref 0.3–1)
MONOCYTES NFR BLD: 5.3 % (ref 4–15)
NEUTROPHILS # BLD AUTO: 3.7 K/UL (ref 1.8–7.7)
NEUTROPHILS NFR BLD: 44.5 % (ref 38–73)
NRBC BLD-RTO: 0 /100 WBC
PLATELET # BLD AUTO: 306 K/UL (ref 150–350)
PMV BLD AUTO: 9.7 FL (ref 9.2–12.9)
POTASSIUM SERPL-SCNC: 4.1 MMOL/L (ref 3.5–5.1)
PROTHROMBIN TIME: 13.5 SEC (ref 10.6–14.8)
RBC # BLD AUTO: 4.69 M/UL (ref 4–5.4)
SODIUM SERPL-SCNC: 135 MMOL/L (ref 136–145)
WBC # BLD AUTO: 8.42 K/UL (ref 3.9–12.7)

## 2020-12-09 PROCEDURE — 93005 ELECTROCARDIOGRAM TRACING: CPT | Performed by: INTERNAL MEDICINE

## 2020-12-09 PROCEDURE — 93010 EKG 12-LEAD: ICD-10-PCS | Mod: ,,, | Performed by: INTERNAL MEDICINE

## 2020-12-09 PROCEDURE — 85610 PROTHROMBIN TIME: CPT

## 2020-12-09 PROCEDURE — 71046 X-RAY EXAM CHEST 2 VIEWS: CPT | Mod: TC

## 2020-12-09 PROCEDURE — 36415 COLL VENOUS BLD VENIPUNCTURE: CPT

## 2020-12-09 PROCEDURE — 85025 COMPLETE CBC W/AUTO DIFF WBC: CPT

## 2020-12-09 PROCEDURE — 93010 ELECTROCARDIOGRAM REPORT: CPT | Mod: ,,, | Performed by: INTERNAL MEDICINE

## 2020-12-09 PROCEDURE — 85730 THROMBOPLASTIN TIME PARTIAL: CPT

## 2020-12-09 PROCEDURE — 80048 BASIC METABOLIC PNL TOTAL CA: CPT

## 2020-12-09 NOTE — DISCHARGE INSTRUCTIONS
 Nothing to eat or drink after midnight the night before your procedure.   Do not take any medications the morning of your procedure   Bring all your medications with you in the original pill bottles from pharmacy.   If you take blood thinners, ask your doctor if you should stop taking them.   Do not take metformin 24 hours prior to your procedure.   Do your antibacterial wash the night before and morning of your procedure.   Make arrangements for someone you know to drive you home after your procedure. Taxi and Uber are not acceptable.    Only one visitor allowed with you day of procedure.   Arrive to heart center 12/16/2020 at 800 am.

## 2020-12-13 ENCOUNTER — LAB VISIT (OUTPATIENT)
Dept: PRIMARY CARE CLINIC | Facility: CLINIC | Age: 70
End: 2020-12-13
Payer: MEDICARE

## 2020-12-13 DIAGNOSIS — I70.222 ATHEROSCLEROSIS OF NATIVE ARTERY OF LEFT LOWER EXTREMITY WITH REST PAIN: ICD-10-CM

## 2020-12-13 PROCEDURE — U0003 INFECTIOUS AGENT DETECTION BY NUCLEIC ACID (DNA OR RNA); SEVERE ACUTE RESPIRATORY SYNDROME CORONAVIRUS 2 (SARS-COV-2) (CORONAVIRUS DISEASE [COVID-19]), AMPLIFIED PROBE TECHNIQUE, MAKING USE OF HIGH THROUGHPUT TECHNOLOGIES AS DESCRIBED BY CMS-2020-01-R: HCPCS

## 2020-12-14 LAB — SARS-COV-2 RNA RESP QL NAA+PROBE: NOT DETECTED

## 2020-12-16 ENCOUNTER — HOSPITAL ENCOUNTER (OUTPATIENT)
Facility: HOSPITAL | Age: 70
Discharge: HOME | End: 2020-12-16
Attending: SURGERY | Admitting: SURGERY
Payer: MEDICARE

## 2020-12-16 DIAGNOSIS — I70.222 ATHEROSCLEROSIS OF NATIVE ARTERY OF LEFT LOWER EXTREMITY WITH REST PAIN: Primary | ICD-10-CM

## 2020-12-16 PROCEDURE — 25500020 PHARM REV CODE 255: Performed by: SURGERY

## 2020-12-16 PROCEDURE — 27201423 OPTIME MED/SURG SUP & DEVICES STERILE SUPPLY: Performed by: SURGERY

## 2020-12-16 PROCEDURE — 36200 PLACE CATHETER IN AORTA: CPT | Performed by: SURGERY

## 2020-12-16 PROCEDURE — 25000003 PHARM REV CODE 250: Performed by: SURGERY

## 2020-12-16 PROCEDURE — C1769 GUIDE WIRE: HCPCS | Performed by: SURGERY

## 2020-12-16 PROCEDURE — 75716 ARTERY X-RAYS ARMS/LEGS: CPT | Performed by: SURGERY

## 2020-12-16 PROCEDURE — 63600175 PHARM REV CODE 636 W HCPCS: Performed by: SURGERY

## 2020-12-16 PROCEDURE — C1894 INTRO/SHEATH, NON-LASER: HCPCS | Performed by: SURGERY

## 2020-12-16 PROCEDURE — 99152 MOD SED SAME PHYS/QHP 5/>YRS: CPT | Performed by: SURGERY

## 2020-12-16 PROCEDURE — 75625 CONTRAST EXAM ABDOMINL AORTA: CPT | Performed by: SURGERY

## 2020-12-16 RX ORDER — MIDAZOLAM HYDROCHLORIDE 1 MG/ML
INJECTION INTRAMUSCULAR; INTRAVENOUS
Status: DISCONTINUED | OUTPATIENT
Start: 2020-12-16 | End: 2020-12-16 | Stop reason: HOSPADM

## 2020-12-16 RX ORDER — ONDANSETRON 2 MG/ML
INJECTION INTRAMUSCULAR; INTRAVENOUS
Status: DISCONTINUED | OUTPATIENT
Start: 2020-12-16 | End: 2020-12-16 | Stop reason: HOSPADM

## 2020-12-16 RX ORDER — ACETAMINOPHEN 325 MG/1
650 TABLET ORAL EVERY 4 HOURS PRN
Status: DISCONTINUED | OUTPATIENT
Start: 2020-12-16 | End: 2020-12-16 | Stop reason: HOSPADM

## 2020-12-16 RX ORDER — FENTANYL CITRATE 50 UG/ML
INJECTION, SOLUTION INTRAMUSCULAR; INTRAVENOUS
Status: DISCONTINUED | OUTPATIENT
Start: 2020-12-16 | End: 2020-12-16 | Stop reason: HOSPADM

## 2020-12-16 RX ORDER — SODIUM CHLORIDE 9 MG/ML
INJECTION, SOLUTION INTRAVENOUS CONTINUOUS
Status: DISCONTINUED | OUTPATIENT
Start: 2020-12-16 | End: 2020-12-16 | Stop reason: HOSPADM

## 2020-12-16 RX ORDER — ONDANSETRON 4 MG/1
8 TABLET, ORALLY DISINTEGRATING ORAL EVERY 8 HOURS PRN
Status: DISCONTINUED | OUTPATIENT
Start: 2020-12-16 | End: 2020-12-16 | Stop reason: HOSPADM

## 2020-12-16 RX ORDER — IODIXANOL 320 MG/ML
INJECTION, SOLUTION INTRAVASCULAR
Status: DISCONTINUED | OUTPATIENT
Start: 2020-12-16 | End: 2020-12-16 | Stop reason: HOSPADM

## 2020-12-16 RX ORDER — LIDOCAINE HYDROCHLORIDE 10 MG/ML
INJECTION, SOLUTION EPIDURAL; INFILTRATION; INTRACAUDAL; PERINEURAL
Status: DISCONTINUED | OUTPATIENT
Start: 2020-12-16 | End: 2020-12-16 | Stop reason: HOSPADM

## 2020-12-16 RX ORDER — SODIUM CHLORIDE 9 MG/ML
INJECTION, SOLUTION INTRAVENOUS CONTINUOUS
Status: ACTIVE | OUTPATIENT
Start: 2020-12-16

## 2020-12-16 RX ADMIN — SODIUM CHLORIDE 50 ML/HR: 0.9 INJECTION, SOLUTION INTRAVENOUS at 08:12

## 2020-12-16 NOTE — INTERVAL H&P NOTE
The patient has been examined and the H&P has been reviewed:    I concur with the findings and changes have been noted since the H&P was written: no changes    Surgery risks, benefits and alternative options discussed and understood by patient/family.          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

## 2020-12-16 NOTE — DISCHARGE INSTRUCTIONS
Post angiogram discharge care   You have a puncture site in your right groin   You can remove your current dressing in 24 hours and take a shower. Showers only for the next week. Do not sit in a bathtub or pool of water 7 days until the puncture site is healed.    Gently clean the puncture site daily using soap and water while showering, dry thoroughly and cover with a Band-Aid. Make sure the Band-Aid and puncture site stay clean and dry.   Inspect the site daily for tenderness, discharge or signs of infection.    Observe the puncture site for signs of bleeding, obvious oozing, formation on knot under skin, or bruising. If any of these were to occur you should immediately lie down flat and apply firm pressure at the insertion site for 10 minutes. If bleeding or swelling does not stop, call 911! Do NOT try to drive yourself to the hospital.    Drink at least 6-8 glasses of clear liquids durin the next 24 hours to flush out the dye.   You must not be alone for the next 24 hours.    You may experience soreness or tenderness that my last for 1 week.    You may have mild oozing from the puncture site and/or possible bruising that could last up to 2 weeks.   You may also have a small lump form that may last up to 6 weeks.        Activity   Do not drive or operate an automobile or hazardous machinery for 24 hours   Do note engage in sports for 1 week.   Limit lifting over 10 pounds for the nest week, or until puncture site heals.   Limit you activities for the next 48 hours. Avoid excessive bending or squatting.   You may resume normal activity in 2-3 days, let pain be your guide.    Call your Doctor immediatly if you experience any of the following:   Chest pain, palpitations, shortness of breath, excessive dizziness, fainting, nausea or vomiting.   Dariel signs of infection: redness, warmth, swelling, pain, drainage (other than a little blood on bandaide), chills, poorly healing puncture site, fever  greater than 101.0 F   Change in color, coolness, swelling, numbness, or pain to the involved extremity   Significant bleeding from the puncture site.

## 2020-12-16 NOTE — OP NOTE
[facility]  Surgery Department  Operative Note    SUMMARY     Date of Procedure: 12/16/2020     Procedure: Procedure(s) (LRB):  ANGIOGRAM, LOWER EXTREMITY (Left)     Surgeon(s) and Role:     * Juancho White MD - Primary    Assisting Surgeon: None    Pre-Operative Diagnosis: Atherosclerosis of native artery of left lower extremity with rest pain [I70.222]    Post-Operative Diagnosis: Post-Op Diagnosis Codes:     * Atherosclerosis of native artery of left lower extremity with rest pain [I70.222]    Anesthesia: RN IV Sedation    Technical Procedures Used:  Aortogram with left lower extremity runoff and selective catheterization of the left SFA from the right common femoral.  Right femoral artery    Description of the Findings of the Procedure:  Right femoral artery accessed over the femoral head.  Guidewire was passed up to the abdominal aorta abdominal aortogram was performed the catheter was pulled on the bifurcation and selected the left iliac and catheter was advanced down of the left SFA left lower extremity angiogram was performed findings are patent abdominal aorta common internal external iliac arteries are patent bilaterally.  The right common femoral is patent profunda is patent the right SFA is patent the right popliteal is patent the anterior tibial arteries patent all the way down into the foot with no occlusive disease the posterior tibial artery is diminutive and occludes just above the ankle and goes off collateralization into the foot.  The peroneal artery occludes just distal to its origin.  Catheter was removed and sheath was pulled and pressure was held    Significant Surgical Tasks Conducted by the Assistant(s), if Applicable:     Complications: No    Estimated Blood Loss (EBL): * No values recorded between 12/16/2020 10:31 AM and 12/16/2020 10:51 AM *           Implants: * No implants in log *    Specimens:   Specimen (12h ago, onward)    None                  Condition:  Good    Disposition: PACU - hemodynamically stable.    Attestation: I was present and scrubbed for the entire procedure.

## 2020-12-16 NOTE — Clinical Note
40 ml injected throughout the case. 40 mL total wasted during the case. 80 mL total used in the case.

## 2020-12-17 VITALS
SYSTOLIC BLOOD PRESSURE: 130 MMHG | OXYGEN SATURATION: 98 % | RESPIRATION RATE: 20 BRPM | HEART RATE: 91 BPM | DIASTOLIC BLOOD PRESSURE: 61 MMHG

## 2020-12-21 ENCOUNTER — OFFICE VISIT (OUTPATIENT)
Dept: FAMILY MEDICINE | Facility: CLINIC | Age: 70
End: 2020-12-21
Payer: MEDICARE

## 2020-12-21 VITALS
BODY MASS INDEX: 33.47 KG/M2 | HEART RATE: 86 BPM | HEIGHT: 60 IN | TEMPERATURE: 98 F | DIASTOLIC BLOOD PRESSURE: 73 MMHG | OXYGEN SATURATION: 98 % | WEIGHT: 170.5 LBS | SYSTOLIC BLOOD PRESSURE: 135 MMHG

## 2020-12-21 DIAGNOSIS — Z79.4 TYPE 2 DIABETES MELLITUS WITH DIABETIC POLYNEUROPATHY, WITH LONG-TERM CURRENT USE OF INSULIN: Primary | ICD-10-CM

## 2020-12-21 DIAGNOSIS — E78.2 MIXED HYPERLIPIDEMIA: ICD-10-CM

## 2020-12-21 DIAGNOSIS — I10 ESSENTIAL HYPERTENSION: ICD-10-CM

## 2020-12-21 DIAGNOSIS — E08.621 DIABETIC ULCER OF MIDFOOT ASSOCIATED WITH DIABETES MELLITUS DUE TO UNDERLYING CONDITION, LIMITED TO BREAKDOWN OF SKIN, UNSPECIFIED LATERALITY: ICD-10-CM

## 2020-12-21 DIAGNOSIS — I25.10 CORONARY ARTERY DISEASE, ANGINA PRESENCE UNSPECIFIED, UNSPECIFIED VESSEL OR LESION TYPE, UNSPECIFIED WHETHER NATIVE OR TRANSPLANTED HEART: ICD-10-CM

## 2020-12-21 DIAGNOSIS — L97.401 DIABETIC ULCER OF MIDFOOT ASSOCIATED WITH DIABETES MELLITUS DUE TO UNDERLYING CONDITION, LIMITED TO BREAKDOWN OF SKIN, UNSPECIFIED LATERALITY: ICD-10-CM

## 2020-12-21 DIAGNOSIS — E11.65 UNCONTROLLED TYPE 2 DIABETES MELLITUS WITH HYPERGLYCEMIA: ICD-10-CM

## 2020-12-21 DIAGNOSIS — E11.42 TYPE 2 DIABETES MELLITUS WITH DIABETIC POLYNEUROPATHY, WITH LONG-TERM CURRENT USE OF INSULIN: Primary | ICD-10-CM

## 2020-12-21 PROCEDURE — 99214 PR OFFICE/OUTPT VISIT, EST, LEVL IV, 30-39 MIN: ICD-10-PCS | Mod: S$GLB,,, | Performed by: FAMILY MEDICINE

## 2020-12-21 PROCEDURE — 3078F PR MOST RECENT DIASTOLIC BLOOD PRESSURE < 80 MM HG: ICD-10-PCS | Mod: S$GLB,,, | Performed by: FAMILY MEDICINE

## 2020-12-21 PROCEDURE — 3078F DIAST BP <80 MM HG: CPT | Mod: S$GLB,,, | Performed by: FAMILY MEDICINE

## 2020-12-21 PROCEDURE — 3008F PR BODY MASS INDEX (BMI) DOCUMENTED: ICD-10-PCS | Mod: S$GLB,,, | Performed by: FAMILY MEDICINE

## 2020-12-21 PROCEDURE — 1126F PR PAIN SEVERITY QUANTIFIED, NO PAIN PRESENT: ICD-10-PCS | Mod: S$GLB,,, | Performed by: FAMILY MEDICINE

## 2020-12-21 PROCEDURE — 1126F AMNT PAIN NOTED NONE PRSNT: CPT | Mod: S$GLB,,, | Performed by: FAMILY MEDICINE

## 2020-12-21 PROCEDURE — 99214 OFFICE O/P EST MOD 30 MIN: CPT | Mod: S$GLB,,, | Performed by: FAMILY MEDICINE

## 2020-12-21 PROCEDURE — 3008F BODY MASS INDEX DOCD: CPT | Mod: S$GLB,,, | Performed by: FAMILY MEDICINE

## 2020-12-21 PROCEDURE — 1159F PR MEDICATION LIST DOCUMENTED IN MEDICAL RECORD: ICD-10-PCS | Mod: S$GLB,,, | Performed by: FAMILY MEDICINE

## 2020-12-21 PROCEDURE — 1159F MED LIST DOCD IN RCRD: CPT | Mod: S$GLB,,, | Performed by: FAMILY MEDICINE

## 2020-12-21 PROCEDURE — 3075F PR MOST RECENT SYSTOLIC BLOOD PRESS GE 130-139MM HG: ICD-10-PCS | Mod: S$GLB,,, | Performed by: FAMILY MEDICINE

## 2020-12-21 PROCEDURE — 3288F FALL RISK ASSESSMENT DOCD: CPT | Mod: S$GLB,,, | Performed by: FAMILY MEDICINE

## 2020-12-21 PROCEDURE — 1101F PT FALLS ASSESS-DOCD LE1/YR: CPT | Mod: S$GLB,,, | Performed by: FAMILY MEDICINE

## 2020-12-21 PROCEDURE — 1101F PR PT FALLS ASSESS DOC 0-1 FALLS W/OUT INJ PAST YR: ICD-10-PCS | Mod: S$GLB,,, | Performed by: FAMILY MEDICINE

## 2020-12-21 PROCEDURE — 3288F PR FALLS RISK ASSESSMENT DOCUMENTED: ICD-10-PCS | Mod: S$GLB,,, | Performed by: FAMILY MEDICINE

## 2020-12-21 PROCEDURE — 3075F SYST BP GE 130 - 139MM HG: CPT | Mod: S$GLB,,, | Performed by: FAMILY MEDICINE

## 2020-12-21 NOTE — PATIENT INSTRUCTIONS
Blood sugar testing:  Test BS daily 11am and 4pm, for 2 weeks, and then on Monday at 11:00 a.m. and 4:00 p.m. and Thursday 11am and 4pm. Keep in a blood sugar log or calendar book.

## 2020-12-21 NOTE — PROGRESS NOTES
Subjective:       Patient ID: Hoa Wilde is a 70 y.o. female.    Chief Complaint: Diabetes      Patient is here to follow-up on diabetes and needs refills on medication..    Levemir in the evening once a day.  Uses the sliding scale periodiccally    Lab Results       Component                Value               Date                       WBC                      8.42                12/09/2020                 HGB                      12.9                12/09/2020                 HCT                      39.3                12/09/2020                 PLT                      306                 12/09/2020                 CHOL                     138                 05/05/2020                 TRIG                     254 (H)             05/05/2020                 HDL                      40                  05/05/2020                 ALT                      25                  05/05/2020                 AST                      21                  05/05/2020                 NA                       135 (L)             12/09/2020                 K                        4.1                 12/09/2020                 CL                       101                 12/09/2020                 CREATININE               0.5                 12/09/2020                 BUN                      10                  12/09/2020                 CO2                      24                  12/09/2020                 TSH                      2.310               02/02/2020                 INR                      1.1                 12/09/2020                 HGBA1C                   12.4 (H)            09/17/2019            Wt Readings from Last 3 Encounters:  12/21/20 : 77.3 kg (170 lb 8 oz)  12/09/20 : 76.2 kg (168 lb)  09/30/20 : 75 kg (165 lb 6.4 oz)        Diabetes  She presents for her follow-up diabetic visit. She has type 2 diabetes mellitus. Her disease course has been stable (not known). Hypoglycemia symptoms include  dizziness. Associated symptoms include blurred vision, foot paresthesias, foot ulcerations and visual change. Diabetic complications include peripheral neuropathy and retinopathy. Risk factors for coronary artery disease include hypertension, dyslipidemia, diabetes mellitus and obesity. She has had a previous visit with a dietitian. She rarely participates in exercise. Her lunch blood glucose range is generally 140-180 mg/dl. Her bedtime blood glucose range is generally 140-180 mg/dl. (Does not check fasting blood sugar) An ACE inhibitor/angiotensin II receptor blocker is being taken. She does not see a podiatrist.Eye exam is not current.       Allergies and Medications:   Review of patient's allergies indicates:  No Known Allergies  Current Outpatient Medications   Medication Sig Dispense Refill    alendronate (FOSAMAX) 70 MG tablet Take 1 tablet (70 mg total) by mouth every 7 days. 4 tablet 11    aspirin (ECOTRIN) 81 MG EC tablet Take 81 mg by mouth once daily.      calcium carbonate (OS-GEORGINA) 500 mg calcium (1,250 mg) tablet Take 1 tablet (500 mg total) by mouth 2 (two) times daily. 200 tablet 3    famotidine (PEPCID) 20 MG tablet Take 1 tablet by mouth once daily 90 tablet 3    gabapentin (NEURONTIN) 300 MG capsule Take 1 capsule (300 mg total) by mouth 3 (three) times daily. Take 1 in the morning and 2 at night. (Patient taking differently: Take 300 mg by mouth 2 (two) times daily. Take 1 in the morning and 1 at night.) 90 capsule 11    insulin detemir U-100 (LEVEMIR FLEXTOUCH U-100 INSULN) 100 unit/mL (3 mL) InPn pen Inject 70 Units into the skin every evening. INJECT 50 UNITS SUBCUTANEOUSLY IN THE EVENING (Patient taking differently: Inject 66 Units into the skin every evening. INJECT 50 UNITS SUBCUTANEOUSLY IN THE EVENING) 63 mL 1    insulin regular (HUMULIN R REGULAR U-100 INSULN) 100 unit/mL Inj injection Follow sliding scale printed out.  Check blood sugars 4 times a day 20 mL 11    levothyroxine  "(SYNTHROID) 50 MCG tablet Take 1 tablet (50 mcg total) by mouth before breakfast. 30 tablet 5    lisinopriL (PRINIVIL,ZESTRIL) 5 MG tablet Take 1 tablet by mouth once daily 90 tablet 0    meloxicam (MOBIC) 7.5 MG tablet Take 1 tablet (7.5 mg total) by mouth once daily. 30 tablet 0    metFORMIN (GLUCOPHAGE-XR) 750 MG ER 24hr tablet TAKE 2 TABLETS BY MOUTH ONCE DAILY WITH  BREAKFAST 180 tablet 0    metoprolol tartrate (LOPRESSOR) 25 MG tablet Take 1 tablet by mouth twice daily 180 tablet 1    pen needle, diabetic 30 gauge x 1/3" Ndle 1 pen by Misc.(Non-Drug; Combo Route) route once daily. To use with levimir flex pen 100 each 3    rosuvastatin (CRESTOR) 20 MG tablet Take 1 tablet by mouth once daily 90 tablet 1    calcium carbonate-vitamin D3 (LIQUID CALCIUM WITH VITAMIN D) 600 mg calcium- 200 unit Cap Take 1 capsule by mouth 2 (two) times daily. for 365 doses (Patient not taking: Reported on 12/21/2020) 60 capsule 11     No current facility-administered medications for this visit.      Facility-Administered Medications Ordered in Other Visits   Medication Dose Route Frequency Provider Last Rate Last Dose    0.9%  NaCl infusion   Intravenous Continuous Juancho White MD   Stopped at 12/16/20 1500       Family History:   Family History   Problem Relation Age of Onset    Hyperlipidemia Mother     Heart disease Mother        Social History:   Social History     Socioeconomic History    Marital status:      Spouse name: Not on file    Number of children: Not on file    Years of education: Not on file    Highest education level: Not on file   Occupational History    Occupation: retired    Social Needs    Financial resource strain: Not on file    Food insecurity     Worry: Not on file     Inability: Not on file    Transportation needs     Medical: Not on file     Non-medical: Not on file   Tobacco Use    Smoking status: Never Smoker    Smokeless tobacco: Never Used   Substance and Sexual " Activity    Alcohol use: No    Drug use: No    Sexual activity: Not on file   Lifestyle    Physical activity     Days per week: Not on file     Minutes per session: Not on file    Stress: Only a little   Relationships    Social connections     Talks on phone: Not on file     Gets together: Not on file     Attends Rastafarian service: Not on file     Active member of club or organization: Not on file     Attends meetings of clubs or organizations: Not on file     Relationship status: Not on file   Other Topics Concern    Not on file   Social History Narrative    Not on file       Review of Systems   Eyes: Positive for blurred vision.   Neurological: Positive for dizziness.       Objective:     Vitals:    12/21/20 1426   BP: 135/73   Pulse: 86   Temp: 98.1 °F (36.7 °C)        Physical Exam  Vitals signs and nursing note reviewed.   Constitutional:       Appearance: She is well-developed.   HENT:      Head: Normocephalic and atraumatic.   Eyes:      Pupils: Pupils are equal, round, and reactive to light.   Neck:      Musculoskeletal: No neck rigidity or muscular tenderness.      Vascular: No carotid bruit.   Cardiovascular:      Rate and Rhythm: Normal rate and regular rhythm.      Pulses:           Dorsalis pedis pulses are 2+ on the right side and 2+ on the left side.        Posterior tibial pulses are 2+ on the right side and 2+ on the left side.      Heart sounds: Normal heart sounds. No murmur. No friction rub. No gallop.    Pulmonary:      Effort: Pulmonary effort is normal. No respiratory distress.      Breath sounds: Normal breath sounds. No stridor. No wheezing, rhonchi or rales.   Chest:      Chest wall: No tenderness.   Musculoskeletal:      Right foot: Normal range of motion. Deformity present. No bunion, Charcot foot, foot drop or prominent metatarsal heads.      Left foot: Normal range of motion. Deformity present. No bunion, Charcot foot, foot drop or prominent metatarsal heads.         Feet:    Feet:      Right foot:      Protective Sensation: 3 sites tested. 0 sites sensed.      Skin integrity: Ulcer, skin breakdown, erythema, warmth, dry skin and fissure present. No blister or callus.      Left foot:      Protective Sensation: 3 sites tested. 0 sites sensed.      Skin integrity: Ulcer, skin breakdown, erythema, warmth, dry skin and fissure present. No blister or callus.   Lymphadenopathy:      Cervical: No cervical adenopathy.   Psychiatric:         Behavior: Behavior normal.         Thought Content: Thought content normal.         Judgment: Judgment normal.         Assessment:       1. Type 2 diabetes mellitus with diabetic polyneuropathy, with long-term current use of insulin patient follows up with Dr. White on Monday for vascular evaluation of the lower extremities   2. Uncontrolled type 2 diabetes mellitus with hyperglycemia    3. Mixed hyperlipidemia    4. Essential hypertension    5. Coronary artery disease, angina presence unspecified, unspecified vessel or lesion type, unspecified whether native or transplanted heart    6. Diabetic ulcer of midfoot associated with diabetes mellitus due to underlying condition, limited to breakdown of skin, unspecified laterality        Plan:       Hoa was seen today for diabetes.    Diagnoses and all orders for this visit:    Type 2 diabetes mellitus with diabetic polyneuropathy, with long-term current use of insulin  -     Lipid Panel; Future  -     Comprehensive Metabolic Panel; Future  -     Hemoglobin A1C; Future  -     Microalbumin/Creatinine Ratio, Urine; Future  -     Ambulatory referral/consult to Podiatry; Future    Uncontrolled type 2 diabetes mellitus with hyperglycemia    Mixed hyperlipidemia  -     Lipid Panel; Future    Essential hypertension  -     Ambulatory referral/consult to Cardiology; Future    Coronary artery disease, angina presence unspecified, unspecified vessel or lesion type, unspecified whether native or transplanted  heart  -     Ambulatory referral/consult to Cardiology; Future    Diabetic ulcer of midfoot associated with diabetes mellitus due to underlying condition, limited to breakdown of skin, unspecified laterality         Follow up in about 1 month (around 1/21/2021) for  , follow up DM.

## 2021-01-22 ENCOUNTER — OFFICE VISIT (OUTPATIENT)
Dept: PODIATRY | Facility: CLINIC | Age: 71
End: 2021-01-22
Payer: MEDICARE

## 2021-01-22 VITALS — WEIGHT: 170 LBS | HEIGHT: 60 IN | BODY MASS INDEX: 33.38 KG/M2

## 2021-01-22 DIAGNOSIS — Z79.4 TYPE 2 DIABETES MELLITUS WITH DIABETIC POLYNEUROPATHY, WITH LONG-TERM CURRENT USE OF INSULIN: ICD-10-CM

## 2021-01-22 DIAGNOSIS — L84 PRE-ULCERATIVE CALLUSES: ICD-10-CM

## 2021-01-22 DIAGNOSIS — E11.42 TYPE 2 DIABETES MELLITUS WITH DIABETIC POLYNEUROPATHY, WITH LONG-TERM CURRENT USE OF INSULIN: ICD-10-CM

## 2021-01-22 DIAGNOSIS — M20.42 HAMMER TOES, BILATERAL: ICD-10-CM

## 2021-01-22 DIAGNOSIS — L97.521 SKIN ULCER OF LEFT FOOT, LIMITED TO BREAKDOWN OF SKIN: Primary | ICD-10-CM

## 2021-01-22 DIAGNOSIS — D23.71 BENIGN NEOPLASM OF SKIN OF RIGHT LOWER EXTREMITY, INCLUDING HIP: ICD-10-CM

## 2021-01-22 DIAGNOSIS — M20.41 HAMMER TOES, BILATERAL: ICD-10-CM

## 2021-01-22 PROCEDURE — 97597 DBRDMT OPN WND 1ST 20 CM/<: CPT | Mod: S$GLB,,, | Performed by: PODIATRIST

## 2021-01-22 PROCEDURE — 1126F AMNT PAIN NOTED NONE PRSNT: CPT | Mod: S$GLB,,, | Performed by: PODIATRIST

## 2021-01-22 PROCEDURE — 99499 NO LOS: ICD-10-PCS | Mod: S$GLB,,, | Performed by: PODIATRIST

## 2021-01-22 PROCEDURE — 3008F BODY MASS INDEX DOCD: CPT | Mod: S$GLB,,, | Performed by: PODIATRIST

## 2021-01-22 PROCEDURE — 97597 WOUND DEBRIDEMENT: ICD-10-PCS | Mod: S$GLB,,, | Performed by: PODIATRIST

## 2021-01-22 PROCEDURE — 3008F PR BODY MASS INDEX (BMI) DOCUMENTED: ICD-10-PCS | Mod: S$GLB,,, | Performed by: PODIATRIST

## 2021-01-22 PROCEDURE — 1126F PR PAIN SEVERITY QUANTIFIED, NO PAIN PRESENT: ICD-10-PCS | Mod: S$GLB,,, | Performed by: PODIATRIST

## 2021-01-22 PROCEDURE — 99499 UNLISTED E&M SERVICE: CPT | Mod: S$GLB,,, | Performed by: PODIATRIST

## 2021-01-27 ENCOUNTER — TELEPHONE (OUTPATIENT)
Dept: FAMILY MEDICINE | Facility: CLINIC | Age: 71
End: 2021-01-27

## 2021-01-27 DIAGNOSIS — Z79.4 TYPE 2 DIABETES MELLITUS WITH DIABETIC POLYNEUROPATHY, WITH LONG-TERM CURRENT USE OF INSULIN: Primary | ICD-10-CM

## 2021-01-27 DIAGNOSIS — E11.42 TYPE 2 DIABETES MELLITUS WITH DIABETIC POLYNEUROPATHY, WITH LONG-TERM CURRENT USE OF INSULIN: Primary | ICD-10-CM

## 2021-01-27 RX ORDER — METFORMIN HYDROCHLORIDE 750 MG/1
750 TABLET, EXTENDED RELEASE ORAL
Qty: 180 TABLET | Refills: 1 | Status: SHIPPED | OUTPATIENT
Start: 2021-01-27 | End: 2021-06-28 | Stop reason: SDUPTHER

## 2021-01-27 RX ORDER — METFORMIN HYDROCHLORIDE 750 MG/1
TABLET, EXTENDED RELEASE ORAL
Qty: 180 TABLET | Refills: 0 | Status: SHIPPED | OUTPATIENT
Start: 2021-01-27 | End: 2021-01-27 | Stop reason: SDUPTHER

## 2021-06-15 ENCOUNTER — TELEPHONE (OUTPATIENT)
Dept: FAMILY MEDICINE | Facility: CLINIC | Age: 71
End: 2021-06-15

## 2021-06-17 RX ORDER — LISINOPRIL 5 MG/1
TABLET ORAL
Qty: 90 TABLET | Refills: 0 | Status: SHIPPED | OUTPATIENT
Start: 2021-06-17 | End: 2021-06-28 | Stop reason: SDUPTHER

## 2021-06-22 ENCOUNTER — OFFICE VISIT (OUTPATIENT)
Dept: PODIATRY | Facility: CLINIC | Age: 71
End: 2021-06-22
Payer: MEDICARE

## 2021-06-22 VITALS
SYSTOLIC BLOOD PRESSURE: 127 MMHG | DIASTOLIC BLOOD PRESSURE: 77 MMHG | HEART RATE: 80 BPM | HEIGHT: 60 IN | BODY MASS INDEX: 33.38 KG/M2 | WEIGHT: 170 LBS

## 2021-06-22 DIAGNOSIS — Z79.4 TYPE 2 DIABETES MELLITUS WITH DIABETIC POLYNEUROPATHY, WITH LONG-TERM CURRENT USE OF INSULIN: ICD-10-CM

## 2021-06-22 DIAGNOSIS — L84 PRE-ULCERATIVE CALLUSES: Primary | ICD-10-CM

## 2021-06-22 DIAGNOSIS — E11.42 TYPE 2 DIABETES MELLITUS WITH DIABETIC POLYNEUROPATHY, WITH LONG-TERM CURRENT USE OF INSULIN: ICD-10-CM

## 2021-06-22 PROCEDURE — 3008F PR BODY MASS INDEX (BMI) DOCUMENTED: ICD-10-PCS | Mod: S$GLB,,, | Performed by: PODIATRIST

## 2021-06-22 PROCEDURE — 3008F BODY MASS INDEX DOCD: CPT | Mod: S$GLB,,, | Performed by: PODIATRIST

## 2021-06-22 PROCEDURE — 1125F AMNT PAIN NOTED PAIN PRSNT: CPT | Mod: S$GLB,,, | Performed by: PODIATRIST

## 2021-06-22 PROCEDURE — 99499 NO LOS: ICD-10-PCS | Mod: S$GLB,,, | Performed by: PODIATRIST

## 2021-06-22 PROCEDURE — 97597 WOUND DEBRIDEMENT: ICD-10-PCS | Mod: S$GLB,,, | Performed by: PODIATRIST

## 2021-06-22 PROCEDURE — 3288F FALL RISK ASSESSMENT DOCD: CPT | Mod: S$GLB,,, | Performed by: PODIATRIST

## 2021-06-22 PROCEDURE — 1101F PT FALLS ASSESS-DOCD LE1/YR: CPT | Mod: S$GLB,,, | Performed by: PODIATRIST

## 2021-06-22 PROCEDURE — 97597 DBRDMT OPN WND 1ST 20 CM/<: CPT | Mod: S$GLB,,, | Performed by: PODIATRIST

## 2021-06-22 PROCEDURE — 99499 UNLISTED E&M SERVICE: CPT | Mod: S$GLB,,, | Performed by: PODIATRIST

## 2021-06-22 PROCEDURE — 1101F PR PT FALLS ASSESS DOC 0-1 FALLS W/OUT INJ PAST YR: ICD-10-PCS | Mod: S$GLB,,, | Performed by: PODIATRIST

## 2021-06-22 PROCEDURE — 3288F PR FALLS RISK ASSESSMENT DOCUMENTED: ICD-10-PCS | Mod: S$GLB,,, | Performed by: PODIATRIST

## 2021-06-22 PROCEDURE — 1125F PR PAIN SEVERITY QUANTIFIED, PAIN PRESENT: ICD-10-PCS | Mod: S$GLB,,, | Performed by: PODIATRIST

## 2021-06-22 RX ORDER — CLOTRIMAZOLE AND BETAMETHASONE DIPROPIONATE 10; .64 MG/G; MG/G
CREAM TOPICAL 2 TIMES DAILY
Qty: 45 G | Refills: 5 | Status: SHIPPED | OUTPATIENT
Start: 2021-06-22 | End: 2021-06-28 | Stop reason: SDUPTHER

## 2021-06-28 ENCOUNTER — OFFICE VISIT (OUTPATIENT)
Dept: FAMILY MEDICINE | Facility: CLINIC | Age: 71
End: 2021-06-28
Payer: MEDICARE

## 2021-06-28 VITALS
SYSTOLIC BLOOD PRESSURE: 108 MMHG | WEIGHT: 166.19 LBS | BODY MASS INDEX: 32.63 KG/M2 | OXYGEN SATURATION: 97 % | HEART RATE: 83 BPM | TEMPERATURE: 98 F | RESPIRATION RATE: 18 BRPM | DIASTOLIC BLOOD PRESSURE: 60 MMHG | HEIGHT: 60 IN

## 2021-06-28 DIAGNOSIS — M85.89 OSTEOPENIA OF MULTIPLE SITES: ICD-10-CM

## 2021-06-28 DIAGNOSIS — Z12.11 COLON CANCER SCREENING: ICD-10-CM

## 2021-06-28 DIAGNOSIS — E11.42 TYPE 2 DIABETES MELLITUS WITH DIABETIC POLYNEUROPATHY, WITH LONG-TERM CURRENT USE OF INSULIN: ICD-10-CM

## 2021-06-28 DIAGNOSIS — E66.9 OBESITY (BMI 30.0-34.9): Primary | ICD-10-CM

## 2021-06-28 DIAGNOSIS — B35.3 TINEA PEDIS, UNSPECIFIED LATERALITY: ICD-10-CM

## 2021-06-28 DIAGNOSIS — E03.9 HYPOTHYROIDISM, UNSPECIFIED TYPE: ICD-10-CM

## 2021-06-28 DIAGNOSIS — I10 ESSENTIAL HYPERTENSION: ICD-10-CM

## 2021-06-28 DIAGNOSIS — L97.528: ICD-10-CM

## 2021-06-28 DIAGNOSIS — K21.9 GASTROESOPHAGEAL REFLUX DISEASE WITHOUT ESOPHAGITIS: ICD-10-CM

## 2021-06-28 DIAGNOSIS — Z79.4 TYPE 2 DIABETES MELLITUS WITH DIABETIC POLYNEUROPATHY, WITH LONG-TERM CURRENT USE OF INSULIN: ICD-10-CM

## 2021-06-28 DIAGNOSIS — M25.551 PAIN OF RIGHT HIP JOINT: ICD-10-CM

## 2021-06-28 DIAGNOSIS — Z12.31 ENCOUNTER FOR SCREENING MAMMOGRAM FOR BREAST CANCER: ICD-10-CM

## 2021-06-28 DIAGNOSIS — E13.621: ICD-10-CM

## 2021-06-28 DIAGNOSIS — E78.2 MIXED HYPERLIPIDEMIA: ICD-10-CM

## 2021-06-28 PROCEDURE — 1159F MED LIST DOCD IN RCRD: CPT | Mod: S$GLB,,, | Performed by: FAMILY MEDICINE

## 2021-06-28 PROCEDURE — 99214 OFFICE O/P EST MOD 30 MIN: CPT | Mod: S$GLB,,, | Performed by: FAMILY MEDICINE

## 2021-06-28 PROCEDURE — 3008F BODY MASS INDEX DOCD: CPT | Mod: S$GLB,,, | Performed by: FAMILY MEDICINE

## 2021-06-28 PROCEDURE — 3008F PR BODY MASS INDEX (BMI) DOCUMENTED: ICD-10-PCS | Mod: S$GLB,,, | Performed by: FAMILY MEDICINE

## 2021-06-28 PROCEDURE — 1101F PT FALLS ASSESS-DOCD LE1/YR: CPT | Mod: S$GLB,,, | Performed by: FAMILY MEDICINE

## 2021-06-28 PROCEDURE — 1126F PR PAIN SEVERITY QUANTIFIED, NO PAIN PRESENT: ICD-10-PCS | Mod: S$GLB,,, | Performed by: FAMILY MEDICINE

## 2021-06-28 PROCEDURE — 1126F AMNT PAIN NOTED NONE PRSNT: CPT | Mod: S$GLB,,, | Performed by: FAMILY MEDICINE

## 2021-06-28 PROCEDURE — 3078F DIAST BP <80 MM HG: CPT | Mod: S$GLB,,, | Performed by: FAMILY MEDICINE

## 2021-06-28 PROCEDURE — 3078F PR MOST RECENT DIASTOLIC BLOOD PRESSURE < 80 MM HG: ICD-10-PCS | Mod: S$GLB,,, | Performed by: FAMILY MEDICINE

## 2021-06-28 PROCEDURE — 3288F FALL RISK ASSESSMENT DOCD: CPT | Mod: S$GLB,,, | Performed by: FAMILY MEDICINE

## 2021-06-28 PROCEDURE — 99214 PR OFFICE/OUTPT VISIT, EST, LEVL IV, 30-39 MIN: ICD-10-PCS | Mod: S$GLB,,, | Performed by: FAMILY MEDICINE

## 2021-06-28 PROCEDURE — 3288F PR FALLS RISK ASSESSMENT DOCUMENTED: ICD-10-PCS | Mod: S$GLB,,, | Performed by: FAMILY MEDICINE

## 2021-06-28 PROCEDURE — 3074F SYST BP LT 130 MM HG: CPT | Mod: S$GLB,,, | Performed by: FAMILY MEDICINE

## 2021-06-28 PROCEDURE — 1101F PR PT FALLS ASSESS DOC 0-1 FALLS W/OUT INJ PAST YR: ICD-10-PCS | Mod: S$GLB,,, | Performed by: FAMILY MEDICINE

## 2021-06-28 PROCEDURE — 1159F PR MEDICATION LIST DOCUMENTED IN MEDICAL RECORD: ICD-10-PCS | Mod: S$GLB,,, | Performed by: FAMILY MEDICINE

## 2021-06-28 PROCEDURE — 3074F PR MOST RECENT SYSTOLIC BLOOD PRESSURE < 130 MM HG: ICD-10-PCS | Mod: S$GLB,,, | Performed by: FAMILY MEDICINE

## 2021-06-28 RX ORDER — ALENDRONATE SODIUM 70 MG/1
70 TABLET ORAL
Qty: 8 TABLET | Refills: 0 | Status: SHIPPED | OUTPATIENT
Start: 2021-06-28 | End: 2021-11-17

## 2021-06-28 RX ORDER — ROSUVASTATIN CALCIUM 20 MG/1
20 TABLET, COATED ORAL DAILY
Qty: 90 TABLET | Refills: 1 | Status: SHIPPED | OUTPATIENT
Start: 2021-06-28 | End: 2022-05-09

## 2021-06-28 RX ORDER — FAMOTIDINE 20 MG/1
20 TABLET, FILM COATED ORAL DAILY
Qty: 90 TABLET | Refills: 3 | Status: SHIPPED | OUTPATIENT
Start: 2021-06-28 | End: 2022-08-18

## 2021-06-28 RX ORDER — METFORMIN HYDROCHLORIDE 750 MG/1
750 TABLET, EXTENDED RELEASE ORAL
Qty: 180 TABLET | Refills: 1 | Status: SHIPPED | OUTPATIENT
Start: 2021-06-28 | End: 2022-07-06

## 2021-06-28 RX ORDER — GABAPENTIN 300 MG/1
300 CAPSULE ORAL 3 TIMES DAILY
Qty: 90 CAPSULE | Refills: 11 | Status: SHIPPED | OUTPATIENT
Start: 2021-06-28 | End: 2022-08-18

## 2021-06-28 RX ORDER — CALCIUM CARBONATE 500(1250)
1 TABLET ORAL 2 TIMES DAILY
Qty: 200 TABLET | Refills: 3 | COMMUNITY
Start: 2021-06-28 | End: 2022-06-28

## 2021-06-28 RX ORDER — LISINOPRIL 5 MG/1
5 TABLET ORAL DAILY
Qty: 90 TABLET | Refills: 0 | Status: SHIPPED | OUTPATIENT
Start: 2021-06-28 | End: 2021-12-21

## 2021-06-28 RX ORDER — CLOTRIMAZOLE AND BETAMETHASONE DIPROPIONATE 10; .64 MG/G; MG/G
CREAM TOPICAL 2 TIMES DAILY
Qty: 45 G | Refills: 5 | Status: SHIPPED | OUTPATIENT
Start: 2021-06-28 | End: 2021-07-28

## 2021-06-28 RX ORDER — LEVOTHYROXINE SODIUM 50 UG/1
50 TABLET ORAL
Qty: 90 TABLET | Refills: 0 | Status: SHIPPED | OUTPATIENT
Start: 2021-06-28 | End: 2021-09-07

## 2021-06-28 RX ORDER — ASPIRIN 81 MG/1
81 TABLET ORAL DAILY
Qty: 100 TABLET | Refills: 3 | Status: SHIPPED | OUTPATIENT
Start: 2021-06-28 | End: 2024-02-06

## 2021-06-28 RX ORDER — MELOXICAM 7.5 MG/1
7.5 TABLET ORAL DAILY
Qty: 30 TABLET | Refills: 0 | Status: SHIPPED | OUTPATIENT
Start: 2021-06-28 | End: 2022-08-18

## 2021-06-30 ENCOUNTER — TELEPHONE (OUTPATIENT)
Dept: FAMILY MEDICINE | Facility: CLINIC | Age: 71
End: 2021-06-30

## 2021-06-30 DIAGNOSIS — Z79.4 TYPE 2 DIABETES MELLITUS WITH DIABETIC POLYNEUROPATHY, WITH LONG-TERM CURRENT USE OF INSULIN: Primary | ICD-10-CM

## 2021-06-30 DIAGNOSIS — E11.9 DIABETES MELLITUS WITHOUT COMPLICATION: ICD-10-CM

## 2021-06-30 DIAGNOSIS — E11.42 TYPE 2 DIABETES MELLITUS WITH DIABETIC POLYNEUROPATHY, WITH LONG-TERM CURRENT USE OF INSULIN: Primary | ICD-10-CM

## 2021-06-30 RX ORDER — INSULIN PUMP SYRINGE, 3 ML
EACH MISCELLANEOUS
Qty: 1 EACH | Refills: 0 | Status: SHIPPED | OUTPATIENT
Start: 2021-06-30

## 2021-09-07 DIAGNOSIS — E03.9 HYPOTHYROIDISM, UNSPECIFIED TYPE: ICD-10-CM

## 2021-09-07 DIAGNOSIS — E11.42 TYPE 2 DIABETES MELLITUS WITH DIABETIC POLYNEUROPATHY, WITH LONG-TERM CURRENT USE OF INSULIN: Primary | ICD-10-CM

## 2021-09-07 DIAGNOSIS — Z79.4 TYPE 2 DIABETES MELLITUS WITH DIABETIC POLYNEUROPATHY, WITH LONG-TERM CURRENT USE OF INSULIN: Primary | ICD-10-CM

## 2021-09-07 RX ORDER — LANCETS 33 GAUGE
EACH MISCELLANEOUS
Qty: 100 EACH | Refills: 3 | Status: SHIPPED | OUTPATIENT
Start: 2021-09-07

## 2021-09-07 RX ORDER — LEVOTHYROXINE SODIUM 50 UG/1
TABLET ORAL
Qty: 90 TABLET | Refills: 3 | Status: SHIPPED | OUTPATIENT
Start: 2021-09-07

## 2021-09-07 RX ORDER — BLOOD SUGAR DIAGNOSTIC
STRIP MISCELLANEOUS
Qty: 100 EACH | Refills: 3 | Status: SHIPPED | OUTPATIENT
Start: 2021-09-07

## 2021-12-31 NOTE — TELEPHONE ENCOUNTER
The following medication needs a prior authorization:     Medication Name:   Levemir FlexTouch 100unit/mL Pen injectors  Dosage:   15 per 30 days    Frequency:   15 per 30 days  Directions for use:   Inject subcutaneously    Diagnosis:   Type 2 diabetes    Is the request for a reauthorization?   No    Is the patient currently stable on therapy?     yes  Please list all therapeutic alternatives previously used with start/end dates and outcome:    Trulicity current therapy  Levemir current therapy  Metformin (current therapy)               DC instructions

## 2022-01-12 ENCOUNTER — TELEPHONE (OUTPATIENT)
Dept: FAMILY MEDICINE | Facility: CLINIC | Age: 72
End: 2022-01-12
Payer: MEDICARE

## 2022-01-12 ENCOUNTER — OFFICE VISIT (OUTPATIENT)
Dept: FAMILY MEDICINE | Facility: CLINIC | Age: 72
End: 2022-01-12
Payer: MEDICARE

## 2022-01-12 VITALS
SYSTOLIC BLOOD PRESSURE: 136 MMHG | TEMPERATURE: 98 F | DIASTOLIC BLOOD PRESSURE: 76 MMHG | OXYGEN SATURATION: 99 % | WEIGHT: 154.81 LBS | HEART RATE: 92 BPM | HEIGHT: 60 IN | RESPIRATION RATE: 17 BRPM | BODY MASS INDEX: 30.39 KG/M2

## 2022-01-12 DIAGNOSIS — E11.65 TYPE 2 DIABETES MELLITUS WITH HYPERGLYCEMIA, WITH LONG-TERM CURRENT USE OF INSULIN: ICD-10-CM

## 2022-01-12 DIAGNOSIS — H43.11 VITREOUS HEMORRHAGE, RIGHT EYE: ICD-10-CM

## 2022-01-12 DIAGNOSIS — Z79.4 TYPE 2 DIABETES MELLITUS WITH DIABETIC POLYNEUROPATHY, WITH LONG-TERM CURRENT USE OF INSULIN: Primary | ICD-10-CM

## 2022-01-12 DIAGNOSIS — S91.312A LACERATION WITHOUT FOREIGN BODY, LEFT FOOT, INITIAL ENCOUNTER: ICD-10-CM

## 2022-01-12 DIAGNOSIS — Z79.4 TYPE 2 DIABETES MELLITUS WITH HYPERGLYCEMIA, WITH LONG-TERM CURRENT USE OF INSULIN: ICD-10-CM

## 2022-01-12 DIAGNOSIS — E11.43 DIABETIC AUTONOMIC NEUROPATHY ASSOCIATED WITH TYPE 2 DIABETES MELLITUS: ICD-10-CM

## 2022-01-12 DIAGNOSIS — E78.2 MIXED HYPERLIPIDEMIA: ICD-10-CM

## 2022-01-12 DIAGNOSIS — I10 PRIMARY HYPERTENSION: ICD-10-CM

## 2022-01-12 DIAGNOSIS — E66.9 OBESITY, UNSPECIFIED CLASSIFICATION, UNSPECIFIED OBESITY TYPE, UNSPECIFIED WHETHER SERIOUS COMORBIDITY PRESENT: ICD-10-CM

## 2022-01-12 DIAGNOSIS — I70.222 ATHEROSCLEROSIS OF NATIVE ARTERIES OF EXTREMITIES WITH REST PAIN, LEFT LEG: ICD-10-CM

## 2022-01-12 DIAGNOSIS — E11.42 TYPE 2 DIABETES MELLITUS WITH DIABETIC POLYNEUROPATHY, WITH LONG-TERM CURRENT USE OF INSULIN: Primary | ICD-10-CM

## 2022-01-12 DIAGNOSIS — Z23 NEEDS FLU SHOT: ICD-10-CM

## 2022-01-12 DIAGNOSIS — L97.529 ULCER OF LEFT FOOT, UNSPECIFIED ULCER STAGE: ICD-10-CM

## 2022-01-12 DIAGNOSIS — F32.9 REACTIVE DEPRESSION: ICD-10-CM

## 2022-01-12 DIAGNOSIS — Z23 IMMUNIZATION DUE: ICD-10-CM

## 2022-01-12 PROCEDURE — G0008 FLU VACCINE - QUADRIVALENT - HIGH DOSE (65+) PRESERVATIVE FREE IM: ICD-10-PCS | Mod: 59,S$GLB,, | Performed by: FAMILY MEDICINE

## 2022-01-12 PROCEDURE — 99214 OFFICE O/P EST MOD 30 MIN: CPT | Mod: 25,S$GLB,, | Performed by: FAMILY MEDICINE

## 2022-01-12 PROCEDURE — 3008F PR BODY MASS INDEX (BMI) DOCUMENTED: ICD-10-PCS | Mod: S$GLB,,, | Performed by: FAMILY MEDICINE

## 2022-01-12 PROCEDURE — 3078F PR MOST RECENT DIASTOLIC BLOOD PRESSURE < 80 MM HG: ICD-10-PCS | Mod: S$GLB,,, | Performed by: FAMILY MEDICINE

## 2022-01-12 PROCEDURE — 90662 IIV NO PRSV INCREASED AG IM: CPT | Mod: S$GLB,,, | Performed by: FAMILY MEDICINE

## 2022-01-12 PROCEDURE — 1101F PT FALLS ASSESS-DOCD LE1/YR: CPT | Mod: S$GLB,,, | Performed by: FAMILY MEDICINE

## 2022-01-12 PROCEDURE — 1159F PR MEDICATION LIST DOCUMENTED IN MEDICAL RECORD: ICD-10-PCS | Mod: S$GLB,,, | Performed by: FAMILY MEDICINE

## 2022-01-12 PROCEDURE — 90471 IMMUNIZATION ADMIN: CPT | Mod: S$GLB,,, | Performed by: FAMILY MEDICINE

## 2022-01-12 PROCEDURE — 3288F FALL RISK ASSESSMENT DOCD: CPT | Mod: S$GLB,,, | Performed by: FAMILY MEDICINE

## 2022-01-12 PROCEDURE — 3288F PR FALLS RISK ASSESSMENT DOCUMENTED: ICD-10-PCS | Mod: S$GLB,,, | Performed by: FAMILY MEDICINE

## 2022-01-12 PROCEDURE — 3008F BODY MASS INDEX DOCD: CPT | Mod: S$GLB,,, | Performed by: FAMILY MEDICINE

## 2022-01-12 PROCEDURE — 3075F PR MOST RECENT SYSTOLIC BLOOD PRESS GE 130-139MM HG: ICD-10-PCS | Mod: S$GLB,,, | Performed by: FAMILY MEDICINE

## 2022-01-12 PROCEDURE — 1101F PR PT FALLS ASSESS DOC 0-1 FALLS W/OUT INJ PAST YR: ICD-10-PCS | Mod: S$GLB,,, | Performed by: FAMILY MEDICINE

## 2022-01-12 PROCEDURE — 90714 TD VACC NO PRESV 7 YRS+ IM: CPT | Mod: S$GLB,,, | Performed by: FAMILY MEDICINE

## 2022-01-12 PROCEDURE — 3078F DIAST BP <80 MM HG: CPT | Mod: S$GLB,,, | Performed by: FAMILY MEDICINE

## 2022-01-12 PROCEDURE — G0008 ADMIN INFLUENZA VIRUS VAC: HCPCS | Mod: 59,S$GLB,, | Performed by: FAMILY MEDICINE

## 2022-01-12 PROCEDURE — 3075F SYST BP GE 130 - 139MM HG: CPT | Mod: S$GLB,,, | Performed by: FAMILY MEDICINE

## 2022-01-12 PROCEDURE — 90714 TD VACCINE GREATER THAN OR EQUAL TO 7YO PRESERVATIVE FREE IM: ICD-10-PCS | Mod: S$GLB,,, | Performed by: FAMILY MEDICINE

## 2022-01-12 PROCEDURE — 99214 PR OFFICE/OUTPT VISIT, EST, LEVL IV, 30-39 MIN: ICD-10-PCS | Mod: 25,S$GLB,, | Performed by: FAMILY MEDICINE

## 2022-01-12 PROCEDURE — 90471 TD VACCINE GREATER THAN OR EQUAL TO 7YO PRESERVATIVE FREE IM: ICD-10-PCS | Mod: S$GLB,,, | Performed by: FAMILY MEDICINE

## 2022-01-12 PROCEDURE — 1159F MED LIST DOCD IN RCRD: CPT | Mod: S$GLB,,, | Performed by: FAMILY MEDICINE

## 2022-01-12 PROCEDURE — 90662 FLU VACCINE - QUADRIVALENT - HIGH DOSE (65+) PRESERVATIVE FREE IM: ICD-10-PCS | Mod: S$GLB,,, | Performed by: FAMILY MEDICINE

## 2022-01-12 RX ORDER — FLUCONAZOLE 150 MG/1
150 TABLET ORAL DAILY
Qty: 1 TABLET | Refills: 3 | Status: SHIPPED | OUTPATIENT
Start: 2022-01-12 | End: 2022-01-13

## 2022-01-12 RX ORDER — SERTRALINE HYDROCHLORIDE 50 MG/1
50 TABLET, FILM COATED ORAL DAILY
Qty: 30 TABLET | Refills: 11 | Status: SHIPPED | OUTPATIENT
Start: 2022-01-12 | End: 2023-01-24

## 2022-01-12 NOTE — TELEPHONE ENCOUNTER
Pt had complaints of a possible yeast infection (vaginal and under fold of stomach) and is requesting a Rx for this issue. Pt states she discussed this at her appointment but was told to notify the nurse for the provider to place orders. Please advise.

## 2022-01-12 NOTE — PROGRESS NOTES
"Subjective:       Patient ID: Hoa Wilde is a 71 y.o. female.    Chief Complaint: Diabetes, Medication Refill, Vaginitis, and Rash (Pt c/o rash (possible yeast infection) under "stomach fold" )      Patient is here for follow-up on diabetes she reports that she has given up on life.  This is been going on for several months she checked her blood sugars the 1st time in several months today it.  Blood sugar today was 462 this morning.  Wt Readings from Last 3 Encounters:  01/12/22 : 70.2 kg (154 lb 12.8 oz)  06/28/21 : 75.4 kg (166 lb 3.2 oz)  06/22/21 : 77.1 kg (170 lb) has had a stressful several months with medical issues but also her granddaughter was in detention.  Lab Results       Component                Value               Date                       WBC                      8.42                12/09/2020                 HGB                      12.9                12/09/2020                 HCT                      39.3                12/09/2020                 PLT                      306                 12/09/2020                 CHOL                     138                 05/05/2020                 TRIG                     254 (H)             05/05/2020                 HDL                      40                  05/05/2020                 ALT                      25                  05/05/2020                 AST                      21                  05/05/2020                 NA                       135 (L)             12/09/2020                 K                        4.1                 12/09/2020                 CL                       101                 12/09/2020                 CREATININE               0.5                 12/09/2020                 BUN                      10                  12/09/2020                 CO2                      24                  12/09/2020                 TSH                      2.310               02/02/2020                 INR                      1.1     "             12/09/2020                 HGBA1C                   12.4 (H)            09/17/2019            Patient has severe diabetic polyneuropathy and foot breakdowns and she is scheduled to see Dr. Ramirez been morning.  Patient has been lapsed and taking her insulin daily.      Diabetes  She presents for her follow-up diabetic visit. She has type 2 diabetes mellitus. Her disease course has been stable. Pertinent negatives for hypoglycemia include no confusion, dizziness, headaches or hunger. Pertinent negatives for diabetes include no blurred vision, no chest pain, no fatigue, no foot paresthesias, no foot ulcerations, no polydipsia, no polyphagia, no polyuria, no visual change, no weakness and no weight loss. Pertinent negatives for hypoglycemia complications include no blackouts, no hospitalization and no nocturnal hypoglycemia. Symptoms are stable. Pertinent negatives for diabetic complications include no autonomic neuropathy, CVA, heart disease or impotence.   Medication Refill  Associated symptoms include a rash. Pertinent negatives include no chest pain, fatigue, headaches, visual change or weakness.   Rash  Pertinent negatives include no fatigue.       Allergies and Medications:   Review of patient's allergies indicates:  No Known Allergies  Current Outpatient Medications   Medication Sig Dispense Refill    alendronate (FOSAMAX) 70 MG tablet Take 1 tablet by mouth once a week 8 tablet 0    aspirin (ECOTRIN) 81 MG EC tablet Take 1 tablet (81 mg total) by mouth once daily. 100 tablet 3    blood-glucose meter kit To check BG 2 times daily, to use with insurance preferred meter 1 each 0    calcium carbonate (OS-GEORGINA) 500 mg calcium (1,250 mg) tablet Take 1 tablet (500 mg total) by mouth 2 (two) times daily. 200 tablet 3    calcium carbonate-vitamin D3 (LIQUID CALCIUM WITH VITAMIN D) 600 mg calcium- 200 unit Cap Take 1 capsule by mouth 2 (two) times daily. for 365 doses 60 capsule 11    EUTHYROX 50 mcg  "tablet TAKE 1 TABLET BY MOUTH BEFORE BREAKFAST 90 tablet 3    famotidine (PEPCID) 20 MG tablet Take 1 tablet (20 mg total) by mouth once daily. 90 tablet 3    gabapentin (NEURONTIN) 300 MG capsule Take 1 capsule (300 mg total) by mouth 3 (three) times daily. Take 1 in the morning and 2 at night. 90 capsule 11    LEVEMIR FLEXTOUCH U-100 INSULN 100 unit/mL (3 mL) InPn pen INJECT 50 UNITS SUBCUTANEOUSLY IN THE EVENING 15 mL 0    lisinopriL (PRINIVIL,ZESTRIL) 5 MG tablet Take 1 tablet by mouth once daily 90 tablet 0    meloxicam (MOBIC) 7.5 MG tablet Take 1 tablet (7.5 mg total) by mouth once daily. 30 tablet 0    metFORMIN (GLUCOPHAGE-XR) 750 MG ER 24hr tablet Take 1 tablet (750 mg total) by mouth daily with breakfast. 180 tablet 1    metoprolol tartrate (LOPRESSOR) 25 MG tablet Take 1 tablet by mouth twice daily 180 tablet 0    ONETOUCH DELICA PLUS LANCET 33 gauge Misc USE   TO CHECK GLUCOSE TWICE DAILY 100 each 3    ONETOUCH ULTRA TEST Strp USE  STRIP TO CHECK GLUCOSE TWICE DAILY 100 each 3    pen needle, diabetic 30 gauge x 1/3" Ndle 1 pen by Misc.(Non-Drug; Combo Route) route once daily. To use with levimir flex pen 100 each 3    rosuvastatin (CRESTOR) 20 MG tablet Take 1 tablet (20 mg total) by mouth once daily. 90 tablet 1    insulin regular (HUMULIN R REGULAR U-100 INSULN) 100 unit/mL Inj injection Follow sliding scale printed out.  Check blood sugars 4 times a day (Patient not taking: Reported on 1/12/2022) 20 mL 11    sertraline (ZOLOFT) 50 MG tablet Take 1 tablet (50 mg total) by mouth once daily. 30 tablet 11     No current facility-administered medications for this visit.     Facility-Administered Medications Ordered in Other Visits   Medication Dose Route Frequency Provider Last Rate Last Admin    0.9%  NaCl infusion   Intravenous Continuous Juancho White MD   Stopped at 12/16/20 1500       Family History:   Family History   Problem Relation Age of Onset    Hyperlipidemia Mother     " Heart disease Mother        Social History:   Social History     Socioeconomic History    Marital status:    Occupational History    Occupation: retired    Tobacco Use    Smoking status: Never Smoker    Smokeless tobacco: Never Used   Substance and Sexual Activity    Alcohol use: No    Drug use: No       Review of Systems   Constitutional: Negative for fatigue and weight loss.   Eyes: Negative for blurred vision.   Cardiovascular: Negative for chest pain.   Endocrine: Negative for polydipsia, polyphagia and polyuria.   Genitourinary: Negative for impotence.   Skin: Positive for rash.   Neurological: Negative for dizziness, weakness and headaches.   Psychiatric/Behavioral: Negative for confusion.       Objective:     Vitals:    01/12/22 1335   BP: 136/76   Pulse: 92   Resp: 17   Temp: 97.8 °F (36.6 °C)        Physical Exam  Vitals and nursing note reviewed.   Constitutional:       Appearance: She is well-developed and well-nourished.   HENT:      Head: Normocephalic and atraumatic.   Eyes:      Pupils: Pupils are equal, round, and reactive to light.   Cardiovascular:      Rate and Rhythm: Normal rate and regular rhythm.      Pulses: Intact distal pulses.      Heart sounds: Normal heart sounds. No murmur heard.  No friction rub. No gallop.    Pulmonary:      Effort: Pulmonary effort is normal. No respiratory distress.      Breath sounds: Normal breath sounds. No stridor. No wheezing, rhonchi or rales.   Chest:      Chest wall: No tenderness.   Musculoskeletal:      Right lower leg: Edema present.      Left lower leg: Edema present.      Comments: Patient reports breakdowns on the left foot she refuses an exam today code to do not have the dressing materials to dress the wound she says she has an upcoming appointment with Dr. Ramirez this week   Psychiatric:         Mood and Affect: Mood and affect normal.         Behavior: Behavior normal.         Thought Content: Thought content normal.         Judgment:  Judgment normal.         Assessment:       1. Type 2 diabetes mellitus with diabetic polyneuropathy, with long-term current use of insulin    2. Primary hypertension    3. Mixed hyperlipidemia    4. Diabetic autonomic neuropathy associated with type 2 diabetes mellitus    5. Type 2 diabetes mellitus with hyperglycemia, with long-term current use of insulin    6. Vitreous hemorrhage, right eye    7. Atherosclerosis of native arteries of extremities with rest pain, left leg    8. Reactive depression    9. Ulcer of left foot, unspecified ulcer stage    10. Laceration without foreign body, left foot, initial encounter     11. Immunization due        Plan:       Hoa was seen today for diabetes, medication refill, vaginitis and rash.    Diagnoses and all orders for this visit:    Type 2 diabetes mellitus with diabetic polyneuropathy, with long-term current use of insulin  -     Referral to Digital Medicine; Future    Primary hypertension    Mixed hyperlipidemia    Diabetic autonomic neuropathy associated with type 2 diabetes mellitus    Type 2 diabetes mellitus with hyperglycemia, with long-term current use of insulin  -     Referral to Digital Medicine; Future    Vitreous hemorrhage, right eye    Atherosclerosis of native arteries of extremities with rest pain, left leg    Reactive depression  -     sertraline (ZOLOFT) 50 MG tablet; Take 1 tablet (50 mg total) by mouth once daily.  -     Ambulatory referral/consult to Psychology; Future    Ulcer of left foot, unspecified ulcer stage  -     Referral to Digital Medicine; Future  -     Td Vaccine (Adult) - Preservative Free    Laceration without foreign body, left foot, initial encounter   -     Td Vaccine (Adult) - Preservative Free    Immunization due  -     Influenza - Quadrivalent (PF)         Follow up in about 2 weeks (around 1/26/2022) for follow up DM.

## 2022-01-12 NOTE — TELEPHONE ENCOUNTER
Patient was severe hyperglycemia suffering with vaginal yeast will send in Diflucan and patient is to come back in 2 weeks as recommended.

## 2022-01-31 NOTE — PATIENT INSTRUCTIONS
Your Diabetes Foot Care Program    Every day you depend on your feet to keep you moving. But when you have diabetes, your feet need special care. Even a small foot problem can become very serious. So dont take your feet for granted. By working with your diabetes healthcare team, you can learn how to protect your feet and keep them healthy.  Evaluating your feet  An evaluation helps your healthcare provider check the condition of your feet. The evaluation includes a review of your diabetes history and overall health. It may also include a foot exam, X-rays, or other tests. These can help show problems beneath the skin that you cant see or feel.  Medical history  You will be asked about your overall health and any history of foot problems. Youll also discuss your diabetes history, such as whether your blood sugar level has changed over time. It also includes questions about sensations of pain, tingling, pins and needles, or numbness. Your healthcare provider will also want to know if you have high blood pressure and heart disease, or if you smoke. Be sure to mention any medicines (including over-the-counter), supplements, or herbal remedies you take.  Foot exam  A foot exam checks the condition of different parts of your foot. First, your skin and nails are examined for any signs of infection. Blood flow is checked by feeling for the pulses in each foot. You may also have tests to study the nerves in the foot. These include using a small filament (wire) to see how sensitive your feet are. In certain cases, you will be asked to walk a short distance to check for bone, joint, and muscle problems.  Diagnostic tests  If needed, your healthcare provider will suggest certain tests to learn more about your feet. These include:  · Doppler tests to measure blood flow in the feet and lower leg.  · X-rays, which can show bone or joint problems.  · Other imaging tests, such as an MRI (magnetic resonance imaging), bone scan,  and CT (computed tomography) scan. These can help show bone infections.  · Other tests, such as vascular tests, which study the blood flow in your feet and legs. You may also have nerve studies to learn how sensitive your feet are.  Creating a foot care program  Based on the evaluation, your healthcare provider will create a foot care program for you. Your program may be as simple as starting a daily self-care routine and changing the types of shoes your wear. It may also involve treating minor foot problems, such as a corn or blister. In some cases, surgery will be needed to treat an infection or mechanical problems, such as hammer toes.  Preventing problems  When you have diabetes, its easier to prevent problems than to treat them later on. So see your healthcare team for regular checkups and foot care. Your healthcare team can also help you learn more about caring for your feet at home. For example, you may be told to avoid walking barefoot. Or you may be told that special footwear is needed to protect your feet.  Have regular checkups  Foot problems can develop quickly. So be sure to follow your healthcare teams schedule for regular checkups. During office visits, take off your shoes and socks as soon as you get in the exam room. Ask your healthcare provider to examine your feet for problems. This will make it easier to find and treat small skin irritations before they get worse. Regular checkups can also help keep track of the blood flow and feeling in your feet. If you have neuropathy (lack of feeling in your feet), you will need to have checkups more often.  Learn about self-care  The more you know about diabetes and your feet, the easier it will be to prevent problems. Members of your healthcare team can teach you how to inspect your feet and teach you to look for warning signs. They can also give you other foot care tips. During office visits, be sure to ask any questions you have.  Date Last Reviewed:  7/1/2016  © 5964-2912 [employer] [address] [place] [address], [place] [address]. All rights reserved. This information is not intended as a substitute for professional medical care. Always follow your healthcare professional's instructions.

## 2022-02-01 ENCOUNTER — OFFICE VISIT (OUTPATIENT)
Dept: PODIATRY | Facility: CLINIC | Age: 72
End: 2022-02-01
Payer: MEDICARE

## 2022-02-01 VITALS
HEIGHT: 60 IN | WEIGHT: 154.81 LBS | OXYGEN SATURATION: 98 % | RESPIRATION RATE: 18 BRPM | HEART RATE: 90 BPM | BODY MASS INDEX: 30.39 KG/M2

## 2022-02-01 DIAGNOSIS — Z79.4 TYPE 2 DIABETES MELLITUS WITH DIABETIC POLYNEUROPATHY, WITH LONG-TERM CURRENT USE OF INSULIN: Primary | ICD-10-CM

## 2022-02-01 DIAGNOSIS — E13.40 NEUROPATHY DUE TO SECONDARY DIABETES: ICD-10-CM

## 2022-02-01 DIAGNOSIS — M20.42 HAMMER TOES, BILATERAL: ICD-10-CM

## 2022-02-01 DIAGNOSIS — L84 PRE-ULCERATIVE CALLUSES: ICD-10-CM

## 2022-02-01 DIAGNOSIS — E11.42 TYPE 2 DIABETES MELLITUS WITH DIABETIC POLYNEUROPATHY, WITH LONG-TERM CURRENT USE OF INSULIN: Primary | ICD-10-CM

## 2022-02-01 DIAGNOSIS — M20.41 HAMMER TOES, BILATERAL: ICD-10-CM

## 2022-02-01 DIAGNOSIS — L60.2 ONYCHOGRYPHOSIS: ICD-10-CM

## 2022-02-01 PROCEDURE — 99499 NO LOS: ICD-10-PCS | Mod: S$GLB,,, | Performed by: PODIATRIST

## 2022-02-01 PROCEDURE — 11056 PARNG/CUTG B9 HYPRKR LES 2-4: CPT | Mod: Q8,S$GLB,, | Performed by: PODIATRIST

## 2022-02-01 PROCEDURE — 99499 UNLISTED E&M SERVICE: CPT | Mod: S$GLB,,, | Performed by: PODIATRIST

## 2022-02-01 PROCEDURE — 1101F PR PT FALLS ASSESS DOC 0-1 FALLS W/OUT INJ PAST YR: ICD-10-PCS | Mod: CPTII,S$GLB,, | Performed by: PODIATRIST

## 2022-02-01 PROCEDURE — 1160F PR REVIEW ALL MEDS BY PRESCRIBER/CLIN PHARMACIST DOCUMENTED: ICD-10-PCS | Mod: CPTII,S$GLB,, | Performed by: PODIATRIST

## 2022-02-01 PROCEDURE — 3288F PR FALLS RISK ASSESSMENT DOCUMENTED: ICD-10-PCS | Mod: CPTII,S$GLB,, | Performed by: PODIATRIST

## 2022-02-01 PROCEDURE — 1160F RVW MEDS BY RX/DR IN RCRD: CPT | Mod: CPTII,S$GLB,, | Performed by: PODIATRIST

## 2022-02-01 PROCEDURE — 3288F FALL RISK ASSESSMENT DOCD: CPT | Mod: CPTII,S$GLB,, | Performed by: PODIATRIST

## 2022-02-01 PROCEDURE — 3008F PR BODY MASS INDEX (BMI) DOCUMENTED: ICD-10-PCS | Mod: CPTII,S$GLB,, | Performed by: PODIATRIST

## 2022-02-01 PROCEDURE — 1126F AMNT PAIN NOTED NONE PRSNT: CPT | Mod: CPTII,S$GLB,, | Performed by: PODIATRIST

## 2022-02-01 PROCEDURE — 11056 PR TRIM BENIGN HYPERKERATOTIC SKIN LESION,2-4: ICD-10-PCS | Mod: Q8,S$GLB,, | Performed by: PODIATRIST

## 2022-02-01 PROCEDURE — 11721 PR DEBRIDEMENT OF NAILS, 6 OR MORE: ICD-10-PCS | Mod: 59,Q8,S$GLB, | Performed by: PODIATRIST

## 2022-02-01 PROCEDURE — 1159F MED LIST DOCD IN RCRD: CPT | Mod: CPTII,S$GLB,, | Performed by: PODIATRIST

## 2022-02-01 PROCEDURE — 1159F PR MEDICATION LIST DOCUMENTED IN MEDICAL RECORD: ICD-10-PCS | Mod: CPTII,S$GLB,, | Performed by: PODIATRIST

## 2022-02-01 PROCEDURE — 3008F BODY MASS INDEX DOCD: CPT | Mod: CPTII,S$GLB,, | Performed by: PODIATRIST

## 2022-02-01 PROCEDURE — 1101F PT FALLS ASSESS-DOCD LE1/YR: CPT | Mod: CPTII,S$GLB,, | Performed by: PODIATRIST

## 2022-02-01 PROCEDURE — 1126F PR PAIN SEVERITY QUANTIFIED, NO PAIN PRESENT: ICD-10-PCS | Mod: CPTII,S$GLB,, | Performed by: PODIATRIST

## 2022-02-01 PROCEDURE — 11721 DEBRIDE NAIL 6 OR MORE: CPT | Mod: 59,Q8,S$GLB, | Performed by: PODIATRIST

## 2022-02-01 NOTE — PROGRESS NOTES
[address] [facility] [address]  [place] [address]  Phone: [phone]   [phone]    Patient's PCP:Carlos Pope MD  Referring Provider: No ref. provider found    Subjective:      Chief Complaint:: No chief complaint on file.    HPI  Hoa Wilde is a 72 y.o. female who presents to clinic with a complaint of bilateral fungal nails and calluses needing to be trimmed lasting for years. Onset of symptoms elongated thickened nail and hard skin build up and reports no trauma.  Current symptoms include elongated thickened fungal nails and hard skin build up.  Aggravating factors are walking, weightbearing and prolonged use. Symptoms have progressed. Treatment to date have included home trimming.    Systemic Doctor: Carlos Pope MD  Date Last Seen: 06/28/2021  Blood Sugar: 300's a few days ago  Hemoglobin A1c: 12.1 listed in epic from 2019    Vitals:    02/01/22 1114   Pulse: 90   Resp: 18   SpO2: 98%   Weight: 70.2 kg (154 lb 12.8 oz)   Height: 5' (1.524 m)   PainSc: 0-No pain      Shoe Size:     Past Surgical History:   Procedure Laterality Date    ANGIOGRAPHY OF LOWER EXTREMITY Left 12/16/2020    Procedure: ANGIOGRAM, LOWER EXTREMITY;  Surgeon: Juancho White MD;  Location: [facility] CATH[facility]EP LAB;  Service: General;  Laterality: Left;    CHOLECYSTECTOMY      CORONARY ARTERY BYPASS GRAFT      FOOT SURGERY Left     HYSTERECTOMY       Past Medical History:   Diagnosis Date    Coronary artery disease     Diabetes mellitus, type 2     Hyperlipidemia     Hypertension     Hypothyroidism     Neuropathy      Family History   Problem Relation Age of Onset    Hyperlipidemia Mother     Heart disease Mother         Social History:   Marital Status:   Alcohol History:  reports no history of alcohol use.  Tobacco History:  reports that she has never smoked. She has never used smokeless tobacco.  Drug History:  reports no history of drug use.    Review of patient's allergies indicates:  No Known Allergies   "  Current Outpatient Medications   Medication Sig Dispense Refill    alendronate (FOSAMAX) 70 MG tablet Take 1 tablet by mouth once a week 8 tablet 11    aspirin (ECOTRIN) 81 MG EC tablet Take 1 tablet (81 mg total) by mouth once daily. 100 tablet 3    blood-glucose meter kit To check BG 2 times daily, to use with insurance preferred meter 1 each 0    calcium carbonate (OS-GEORGINA) 500 mg calcium (1,250 mg) tablet Take 1 tablet (500 mg total) by mouth 2 (two) times daily. 200 tablet 3    calcium carbonate-vitamin D3 (LIQUID CALCIUM WITH VITAMIN D) 600 mg calcium- 200 unit Cap Take 1 capsule by mouth 2 (two) times daily. for 365 doses 60 capsule 11    EUTHYROX 50 mcg tablet TAKE 1 TABLET BY MOUTH BEFORE BREAKFAST 90 tablet 3    famotidine (PEPCID) 20 MG tablet Take 1 tablet (20 mg total) by mouth once daily. 90 tablet 3    gabapentin (NEURONTIN) 300 MG capsule Take 1 capsule (300 mg total) by mouth 3 (three) times daily. Take 1 in the morning and 2 at night. 90 capsule 11    insulin regular (HUMULIN R REGULAR U-100 INSULN) 100 unit/mL Inj injection Follow sliding scale printed out.  Check blood sugars 4 times a day (Patient not taking: Reported on 1/12/2022) 20 mL 11    LEVEMIR FLEXTOUCH U-100 INSULN 100 unit/mL (3 mL) InPn pen INJECT 50 UNITS SUBCUTANEOUSLY IN THE EVENING 15 mL 0    lisinopriL (PRINIVIL,ZESTRIL) 5 MG tablet Take 1 tablet by mouth once daily 90 tablet 0    meloxicam (MOBIC) 7.5 MG tablet Take 1 tablet (7.5 mg total) by mouth once daily. 30 tablet 0    metFORMIN (GLUCOPHAGE-XR) 750 MG ER 24hr tablet Take 1 tablet (750 mg total) by mouth daily with breakfast. 180 tablet 1    metoprolol tartrate (LOPRESSOR) 25 MG tablet Take 1 tablet by mouth twice daily 180 tablet 0    ONETOUCH DELICA PLUS LANCET 33 gauge Misc USE   TO CHECK GLUCOSE TWICE DAILY 100 each 3    ONETOUCH ULTRA TEST Strp USE  STRIP TO CHECK GLUCOSE TWICE DAILY 100 each 3    pen needle, diabetic 30 gauge x 1/3" Ndle 1 pen by " Misc.(Non-Drug; Combo Route) route once daily. To use with levimir flex pen 100 each 3    rosuvastatin (CRESTOR) 20 MG tablet Take 1 tablet (20 mg total) by mouth once daily. 90 tablet 1    sertraline (ZOLOFT) 50 MG tablet Take 1 tablet (50 mg total) by mouth once daily. 30 tablet 11     No current facility-administered medications for this visit.     Facility-Administered Medications Ordered in Other Visits   Medication Dose Route Frequency Provider Last Rate Last Admin    0.9%  NaCl infusion   Intravenous Continuous Juancho White MD   Stopped at 12/16/20 1500       Review of Systems      Objective:        Physical Exam:   Foot Exam    General  General Appearance: appears stated age and healthy   Orientation: alert and oriented to person, place, and time   Affect: appropriate   Gait: antalgic       Right Foot/Ankle     Inspection and Palpation  Arch: pes planus  Skin Exam: callus (Plantar 1st toe);   Fungus Toenails: present    Neurovascular  Dorsalis pedis: absent  Posterior tibial: 1+  Capillary Refill: 3+  Saphenous nerve sensation: diminished  Tibial nerve sensation: diminished  Superficial peroneal nerve sensation: diminished  Deep peroneal nerve sensation: diminished  Sural nerve sensation: diminished      Left Foot/Ankle      Inspection and Palpation  Arch: pes planus  Skin Exam: ulcer (Superficial grade 1 ulcer and callus plantar 1st metatarsal head without infection);   Fungus Toenails: present    Neurovascular  Dorsalis pedis: absent  Posterior tibial: 1+  Capillary refill: 3+  Saphenous nerve sensation: diminished  Tibial nerve sensation: diminished  Superficial peroneal nerve sensation: diminished  Deep peroneal nerve sensation: diminished  Sural nerve sensation: diminished          Physical Exam  Cardiovascular:      Pulses:           Dorsalis pedis pulses are absent on the right side and absent on the left side.        Posterior tibial pulses are 1+ on the right side and 1+ on the left side.    Feet:      Right foot:      Skin integrity: Callus (Plantar 1st toe) present.      Toenail Condition: Fungal disease present.     Left foot:      Skin integrity: Ulcer (Superficial grade 1 ulcer and callus plantar 1st metatarsal head without infection) present.      Toenail Condition: Fungal disease present.        Imaging:            Assessment:       1. Type 2 diabetes mellitus with diabetic polyneuropathy, with long-term current use of insulin    2. Pre-ulcerative calluses    3. Onychogryphosis    4. Hammer toes, bilateral    5. Neuropathy due to secondary diabetes      Plan:   Type 2 diabetes mellitus with diabetic polyneuropathy, with long-term current use of insulin    Pre-ulcerative calluses    Onychogryphosis    Hammer toes, bilateral    Neuropathy due to secondary diabetes    Utilizing sterile nail nippers and electric , I aggressively debrided nails 1-5 bilaterally down to nail beds to thin the nail plates. Pt. tolerated well. No blood was drawn.    With patient's verbal consent the hyperkeratotic lesions  plantar 1st toe right foot and plantar 1st MPJ left foot  were debrided to healthy appearing skin using a scalpel. Patient relates relief of pain following the procedure. Patient tolerated the procedure well without complications. No anesthesia or hemostasis required. No blood loss.    Ready more accommodation to the diabetic inserts relieve pressure on the callus pre ulcerative areas of both feet.  She will continue skin softeners monitor for any ulcerations return in 3 months or as needed.    Procedures          Counseling:     I provided patient education verbally regarding:   Patient diagnosis, treatment options, as well as alternatives, risks, and benefits.     This note was created using Dragon voice recognition software that occasionally misinterpreted phrases or words.

## 2022-02-03 RX ORDER — FLUCONAZOLE 150 MG/1
150 TABLET ORAL DAILY
Qty: 3 TABLET | Refills: 0 | Status: SHIPPED | OUTPATIENT
Start: 2022-02-03 | End: 2022-02-06

## 2022-02-09 RX ORDER — FLUCONAZOLE 150 MG/1
TABLET ORAL
Qty: 3 TABLET | Refills: 0 | Status: SHIPPED | OUTPATIENT
Start: 2022-02-09 | End: 2022-03-06

## 2022-02-09 RX ORDER — FLUCONAZOLE 150 MG/1
TABLET ORAL
COMMUNITY
Start: 2022-02-06 | End: 2022-02-09 | Stop reason: SDUPTHER

## 2022-03-24 ENCOUNTER — OFFICE VISIT (OUTPATIENT)
Dept: PODIATRY | Facility: CLINIC | Age: 72
End: 2022-03-24
Payer: MEDICARE

## 2022-03-24 VITALS — BODY MASS INDEX: 29.84 KG/M2 | RESPIRATION RATE: 16 BRPM | WEIGHT: 152 LBS | HEIGHT: 60 IN

## 2022-03-24 DIAGNOSIS — M79.672 LEFT FOOT PAIN: ICD-10-CM

## 2022-03-24 DIAGNOSIS — E11.42 TYPE 2 DIABETES MELLITUS WITH DIABETIC POLYNEUROPATHY, WITH LONG-TERM CURRENT USE OF INSULIN: Primary | ICD-10-CM

## 2022-03-24 DIAGNOSIS — Z79.4 TYPE 2 DIABETES MELLITUS WITH FOOT ULCER, WITH LONG-TERM CURRENT USE OF INSULIN: ICD-10-CM

## 2022-03-24 DIAGNOSIS — E11.621 TYPE 2 DIABETES MELLITUS WITH FOOT ULCER, WITH LONG-TERM CURRENT USE OF INSULIN: ICD-10-CM

## 2022-03-24 DIAGNOSIS — L97.509 TYPE 2 DIABETES MELLITUS WITH FOOT ULCER, WITH LONG-TERM CURRENT USE OF INSULIN: ICD-10-CM

## 2022-03-24 DIAGNOSIS — Z79.4 TYPE 2 DIABETES MELLITUS WITH DIABETIC POLYNEUROPATHY, WITH LONG-TERM CURRENT USE OF INSULIN: Primary | ICD-10-CM

## 2022-03-24 DIAGNOSIS — L97.522 ULCER OF LEFT FOOT WITH FAT LAYER EXPOSED: ICD-10-CM

## 2022-03-24 PROCEDURE — 11042 DBRDMT SUBQ TIS 1ST 20SQCM/<: CPT | Mod: S$GLB,,, | Performed by: PODIATRIST

## 2022-03-24 PROCEDURE — 1159F PR MEDICATION LIST DOCUMENTED IN MEDICAL RECORD: ICD-10-PCS | Mod: CPTII,S$GLB,, | Performed by: PODIATRIST

## 2022-03-24 PROCEDURE — 3008F BODY MASS INDEX DOCD: CPT | Mod: CPTII,S$GLB,, | Performed by: PODIATRIST

## 2022-03-24 PROCEDURE — 1101F PR PT FALLS ASSESS DOC 0-1 FALLS W/OUT INJ PAST YR: ICD-10-PCS | Mod: CPTII,S$GLB,, | Performed by: PODIATRIST

## 2022-03-24 PROCEDURE — 1160F PR REVIEW ALL MEDS BY PRESCRIBER/CLIN PHARMACIST DOCUMENTED: ICD-10-PCS | Mod: CPTII,S$GLB,, | Performed by: PODIATRIST

## 2022-03-24 PROCEDURE — 11042 WOUND DEBRIDEMENT: ICD-10-PCS | Mod: S$GLB,,, | Performed by: PODIATRIST

## 2022-03-24 PROCEDURE — 3008F PR BODY MASS INDEX (BMI) DOCUMENTED: ICD-10-PCS | Mod: CPTII,S$GLB,, | Performed by: PODIATRIST

## 2022-03-24 PROCEDURE — 99214 PR OFFICE/OUTPT VISIT, EST, LEVL IV, 30-39 MIN: ICD-10-PCS | Mod: 25,S$GLB,, | Performed by: PODIATRIST

## 2022-03-24 PROCEDURE — 99214 OFFICE O/P EST MOD 30 MIN: CPT | Mod: 25,S$GLB,, | Performed by: PODIATRIST

## 2022-03-24 PROCEDURE — 1159F MED LIST DOCD IN RCRD: CPT | Mod: CPTII,S$GLB,, | Performed by: PODIATRIST

## 2022-03-24 PROCEDURE — 3288F FALL RISK ASSESSMENT DOCD: CPT | Mod: CPTII,S$GLB,, | Performed by: PODIATRIST

## 2022-03-24 PROCEDURE — 1126F PR PAIN SEVERITY QUANTIFIED, NO PAIN PRESENT: ICD-10-PCS | Mod: CPTII,S$GLB,, | Performed by: PODIATRIST

## 2022-03-24 PROCEDURE — 1101F PT FALLS ASSESS-DOCD LE1/YR: CPT | Mod: CPTII,S$GLB,, | Performed by: PODIATRIST

## 2022-03-24 PROCEDURE — 1160F RVW MEDS BY RX/DR IN RCRD: CPT | Mod: CPTII,S$GLB,, | Performed by: PODIATRIST

## 2022-03-24 PROCEDURE — 3288F PR FALLS RISK ASSESSMENT DOCUMENTED: ICD-10-PCS | Mod: CPTII,S$GLB,, | Performed by: PODIATRIST

## 2022-03-24 PROCEDURE — 1126F AMNT PAIN NOTED NONE PRSNT: CPT | Mod: CPTII,S$GLB,, | Performed by: PODIATRIST

## 2022-03-24 NOTE — PATIENT INSTRUCTIONS
Simple Skin Ulcer  A skin ulcer is a sore on the skin. Skin ulcers often form when blood circulation is impaired. Being bed- or wheelchair-bound can cause pressure that may lead to skin ulcers. Ulcers are generally round areas of red, swollen, thickened skin around a crater-like depression. They are often very slow to heal. If a skin ulcer isn't properly treated, it may become infected. If the infection spreads, it can cause serious health issues.    Symptoms of a skin ulcer include:  Reddish area on the skin  Skin color and texture changes  Swelling  Wound that isn't healing  Crater in the skin  Pain  Drainage or pus    Causes  There are many causes of skin ulcers. Some of these include:  Decreased blood flow to a part of the skin, vascular insufficiency  Trauma  Lack of movement of a part of the body for long periods of time  Infection  Poor hygiene  Varicose veins  Vitamin deficiency    Pressure ulcers  Pressure ulcers are a type of ulcer most commonly seen in people who are confined to bed or a wheelchair. They are caused by prolonged pressure to a spot on the skin. Pressure ulcers usually occur on the back, buttocks, or backs or sides of the legs, arms, or feet (especially the heels).    Home care  You may be prescribed antibiotics to prevent infection. If this is the case, be sure to take all of the medicine, even if your symptoms get better. You may also be given medicines to help relieve pain. Follow the healthcare providers instructions when using these medicines.    General care  Care for the skin ulcer as instructed. Always wash your hands with soap and warm water before and after caring for your wound.  Cover the ulcer with a clean, dry bandage. Remove and change the bandage as instructed. If the bandage becomes wet or dirty, change it as soon as possible.  Follow the doctors instructions about washing. You can shower, but do not soak the healing ulcer until the doctor says its OK.  Do not scratch,  rub, or pick at the healing skin.  Check the area every day for signs of infection, such as increasing pain, redness, warmth, red streaking, swelling, or pus draining from the ulcer.  When resting, raise the area where the ulcer is above the level of the heart.  Avoid smoking or drinking alcohol, as these can delay wound healing.  If you are able, try to walk regularly. This can help with circulation.  Avoid prolonged standing or sitting in one position.  The following tips can help prevent future skin ulcers:  Know your risks for skin ulcers.  Keep the skin clean and dry.  Reposition frequently.  Use protective devices such as pillows, foam wedges, and heel protectors for the knees, ankles, and heels.  Avoid immobilization.    Follow-up care  Follow up with your healthcare provider, or as advised.    When to seek medical advice  Call your healthcare provider right away if any of these occur:  Fever of 100.4°F (38°C) or higher, or as advised by your healthcare provider  Signs of infection. These include increasing pain, warmth, redness, or pus draining from the skin ulcer.  Bleeding from the skin ulcer  Pain in or around the ulcer that doesn't get better even with medicines  Increased swelling  Changes in skin color    Date Last Reviewed: 9/1/2016  © 7990-1533 [employer], [employer]. [address] [place] [address], [place] [address]. All rights reserved. This information is not intended as a substitute for professional medical care. Always follow your healthcare professional's instructions.

## 2022-03-24 NOTE — PROGRESS NOTES
[address]  [place] [address]  Phone: [phone]   [phone]    Patient's PCP:Carlos Pope MD  Referring Provider: No ref. provider found    Subjective:      Chief Complaint:: Callouses (left)    HPI  Hoa Wilde is a 72 y.o. female who presents today with a complaint of left foot callus/blister lasting  Since March 20, 2022. Onset of symptoms she report she was standing in the bathroom and felt the spot pop.  Current symptoms include pain,bleeding.  Aggravating factors are walking/ weightbearing. Symptoms have stayed the same. Treatment to date have included periodic cleaning with saline and neosporin .    Systemic Doctor: Carlos Pope MD  Date Last Seen: 1/21/2022  Blood Sugar: 210  Hemoglobin A1c: not in epic    Vitals:    03/24/22 1404   Resp: 16   Weight: 68.9 kg (152 lb)   Height: 5' (1.524 m)   PainSc: 0-No pain      Shoe Size: 5.5-6    Past Surgical History:   Procedure Laterality Date    ANGIOGRAPHY OF LOWER EXTREMITY Left 12/16/2020    Procedure: ANGIOGRAM, LOWER EXTREMITY;  Surgeon: Juancho White MD;  Location: [facility] CATH/EP LAB;  Service: General;  Laterality: Left;    CHOLECYSTECTOMY      CORONARY ARTERY BYPASS GRAFT      FOOT SURGERY Left     HYSTERECTOMY       Past Medical History:   Diagnosis Date    Coronary artery disease     Diabetes mellitus, type 2     Hyperlipidemia     Hypertension     Hypothyroidism     Neuropathy      Family History   Problem Relation Age of Onset    Hyperlipidemia Mother     Heart disease Mother         Social History:   Marital Status:   Alcohol History:  reports no history of alcohol use.  Tobacco History:  reports that she has never smoked. She has never used smokeless tobacco.  Drug History:  reports no history of drug use.    Review of patient's allergies indicates:  No Known Allergies    Current Outpatient Medications   Medication Sig Dispense Refill    alendronate (FOSAMAX) 70 MG tablet Take 1 tablet by mouth  "once a week 8 tablet 11    aspirin (ECOTRIN) 81 MG EC tablet Take 1 tablet (81 mg total) by mouth once daily. 100 tablet 3    blood-glucose meter kit To check BG 2 times daily, to use with insurance preferred meter 1 each 0    calcium carbonate (OS-GEORGINA) 500 mg calcium (1,250 mg) tablet Take 1 tablet (500 mg total) by mouth 2 (two) times daily. 200 tablet 3    EUTHYROX 50 mcg tablet TAKE 1 TABLET BY MOUTH BEFORE BREAKFAST 90 tablet 3    famotidine (PEPCID) 20 MG tablet Take 1 tablet (20 mg total) by mouth once daily. 90 tablet 3    fluconazole (DIFLUCAN) 150 MG Tab TAKE 1 TABLET BY MOUTH ONCE DAILY FOR 1 DAY 1 tablet 0    gabapentin (NEURONTIN) 300 MG capsule Take 1 capsule (300 mg total) by mouth 3 (three) times daily. Take 1 in the morning and 2 at night. 90 capsule 11    insulin regular (HUMULIN R REGULAR U-100 INSULN) 100 unit/mL Inj injection Follow sliding scale printed out.  Check blood sugars 4 times a day (Patient taking differently: Follow sliding scale printed out.  Check blood sugars 4 times a day) 20 mL 11    LEVEMIR FLEXTOUCH U-100 INSULN 100 unit/mL (3 mL) InPn pen INJECT 50 UNITS SUBCUTANEOUSLY IN THE EVENING 15 mL 0    lisinopriL (PRINIVIL,ZESTRIL) 5 MG tablet Take 1 tablet by mouth once daily 90 tablet 0    meloxicam (MOBIC) 7.5 MG tablet Take 1 tablet (7.5 mg total) by mouth once daily. 30 tablet 0    metFORMIN (GLUCOPHAGE-XR) 750 MG ER 24hr tablet Take 1 tablet (750 mg total) by mouth daily with breakfast. 180 tablet 1    metoprolol tartrate (LOPRESSOR) 25 MG tablet Take 1 tablet by mouth twice daily 180 tablet 0    ONETOUCH DELICA PLUS LANCET 33 gauge Misc USE   TO CHECK GLUCOSE TWICE DAILY 100 each 3    ONETOUCH ULTRA TEST Strp USE  STRIP TO CHECK GLUCOSE TWICE DAILY 100 each 3    pen needle, diabetic 30 gauge x 1/3" Ndle 1 pen by Misc.(Non-Drug; Combo Route) route once daily. To use with levimir flex pen 100 each 3    rosuvastatin (CRESTOR) 20 MG tablet Take 1 tablet (20 mg " total) by mouth once daily. 90 tablet 1    sertraline (ZOLOFT) 50 MG tablet Take 1 tablet (50 mg total) by mouth once daily. 30 tablet 11    calcium carbonate-vitamin D3 (LIQUID CALCIUM WITH VITAMIN D) 600 mg calcium- 200 unit Cap Take 1 capsule by mouth 2 (two) times daily. for 365 doses 60 capsule 11    doxycycline (MONODOX) 100 MG capsule Take 1 capsule (100 mg total) by mouth 2 (two) times daily. for 7 days 14 capsule 0     No current facility-administered medications for this visit.     Facility-Administered Medications Ordered in Other Visits   Medication Dose Route Frequency Provider Last Rate Last Admin    0.9%  NaCl infusion   Intravenous Continuous Juancho White MD   Stopped at 12/16/20 1500       Review of Systems   Constitutional: Negative for chills, fatigue, fever and unexpected weight change.   HENT: Negative for hearing loss and trouble swallowing.    Eyes: Negative for photophobia and visual disturbance.   Respiratory: Negative for cough, shortness of breath and wheezing.    Cardiovascular: Negative for chest pain, palpitations and leg swelling.   Gastrointestinal: Negative for abdominal pain and nausea.   Genitourinary: Negative for dysuria and frequency.   Musculoskeletal: Negative for arthralgias, back pain, gait problem, joint swelling and myalgias.   Skin: Positive for color change and wound. Negative for rash.   Neurological: Positive for numbness. Negative for tremors, seizures, weakness and headaches.   Hematological: Does not bruise/bleed easily.         Objective:        Physical Exam:   Foot Exam    General  Orientation: alert and oriented to person, place, and time   Affect: appropriate   Gait: antalgic       Left Foot/Ankle      Inspection and Palpation  Ecchymosis: none  Tenderness: great toe metatarsophalangeal joint   Swelling: great toe metatarsophalangeal joint and lesser metatarsophalangeal joints   Arch: normal  Skin Exam: drainage, ulcer and erythema;  "  Neurovascular  Dorsalis pedis: 1+  Posterior tibial: 1+  Capillary refill: 3+  Saphenous nerve sensation: diminished  Tibial nerve sensation: diminished  Superficial peroneal nerve sensation: diminished  Deep peroneal nerve sensation: diminished  Sural nerve sensation: diminished    Edema  Type of edema: non-pitting    Muscle Strength  Ankle dorsiflexion: 5  Ankle plantar flexion: 5  Ankle inversion: 5  Ankle eversion: 5  Great toe extension: 5  Great toe flexion: 5          Physical Exam  Cardiovascular:      Pulses:           Dorsalis pedis pulses are 1+ on the left side.        Posterior tibial pulses are 1+ on the left side.   Musculoskeletal:        Feet:    Feet:      Left foot:      Skin integrity: Ulcer and erythema present.         Imaging: none            Assessment:       1. Type 2 diabetes mellitus with diabetic polyneuropathy, with long-term current use of insulin    2. Type 2 diabetes mellitus with foot ulcer, with long-term current use of insulin    3. Ulcer of left foot with fat layer exposed    4. Left foot pain      Plan:   Type 2 diabetes mellitus with diabetic polyneuropathy, with long-term current use of insulin  -     doxycycline (MONODOX) 100 MG capsule; Take 1 capsule (100 mg total) by mouth 2 (two) times daily. for 7 days  Dispense: 14 capsule; Refill: 0    Type 2 diabetes mellitus with foot ulcer, with long-term current use of insulin    Ulcer of left foot with fat layer exposed  -     doxycycline (MONODOX) 100 MG capsule; Take 1 capsule (100 mg total) by mouth 2 (two) times daily. for 7 days  Dispense: 14 capsule; Refill: 0    Left foot pain      Follow up in about 1 week (around 3/31/2022), or if symptoms worsen or fail to improve.    Wound Debridement    Date/Time: 3/24/2022 1:40 PM  Performed by: Jordin Turner DPM  Authorized by: Jordin Turner DPM     Time out: Immediately prior to procedure a "time out" was called to verify the correct patient, procedure, equipment, support staff and " site/side marked as required.    Consent Done?:  Yes (Verbal)    Preparation: Patient was prepped and draped in usual sterile fashion    Local anesthesia used?: No      Wound Details:    Location:  Left foot    Location:  Left 1st Metatarsal Head    Type of Debridement:  Excisional       Length (cm):  4       Area (sq cm):  16       Width (cm):  4       Percent Debrided (%):  100       Depth (cm):  0.1       Total Area Debrided (sq cm):  16    Depth of debridement:  Subcutaneous tissue    Tissue debrided:  Subcutaneous    Devitalized tissue debrided:  Biofilm, Slough, Necrotic/Eschar and Callus    Instruments:  Blade and Nippers    Bleeding:  Minimal  Hemostasis Achieved: Yes    Patient tolerance:  Patient tolerated the procedure well with no immediate complications            Patient was instructed on daily dressing changes.  I want her to apply Betadine to the wound to help dry out the wound bed.  I am also sending in all doxycycline for her.  Patient is to weightbear in shoe gear with accommodative padding.  She will follow up with Dr. Ramriez in 1 week.    Counseling:     I provided patient education verbally regarding:   Patient diagnosis, treatment options, as well as alternatives, risks, and benefits.     Ulcers    Ulcers, which are open sores in the skin, occur when the outer layers of the skin are injured and the deeper tissues become exposed. They can be caused by excess pressure due to ill-fitting shoes, long periods in bed or after an injury that breaks the skin. Ulcers are commonly seen in patients living with diabetes, neuropathy or vascular disease. Open wounds can put patients at increased risk of developing infection in the skin and bone.    The signs and symptoms of ulcers may include drainage, odor or red, inflamed, thickened tissue. Pain may or may not be present.    Diagnosis may include x-rays to evaluate possible bone involvement. Other advanced imaging studies may also be ordered to evaluate  for vascular disease, which may affect a patients ability to heal the wound.    Ulcers are treated by removing the unhealthy tissue and performing local wound care to assist in healing. Special shoes or padding may be used to remove excess pressure on the area. If infection is present, antibiotics will be necessary. In severe cases that involve extensive infection or are slow to heal, surgery or other advanced wound care treatments may be necessary.      This note was created using Dragon voice recognition software that occasionally misinterpreted phrases or words.

## 2022-03-25 RX ORDER — DOXYCYCLINE 100 MG/1
100 CAPSULE ORAL 2 TIMES DAILY
Qty: 14 CAPSULE | Refills: 0 | Status: SHIPPED | OUTPATIENT
Start: 2022-03-25 | End: 2022-04-01

## 2022-05-03 ENCOUNTER — OFFICE VISIT (OUTPATIENT)
Dept: FAMILY MEDICINE | Facility: CLINIC | Age: 72
End: 2022-05-03
Payer: MEDICARE

## 2022-05-03 VITALS
SYSTOLIC BLOOD PRESSURE: 128 MMHG | HEIGHT: 60 IN | BODY MASS INDEX: 29.62 KG/M2 | HEART RATE: 103 BPM | WEIGHT: 150.88 LBS | DIASTOLIC BLOOD PRESSURE: 70 MMHG | OXYGEN SATURATION: 97 % | TEMPERATURE: 98 F

## 2022-05-03 DIAGNOSIS — L29.9 ITCHING: ICD-10-CM

## 2022-05-03 DIAGNOSIS — L30.9 DERMATITIS: Primary | ICD-10-CM

## 2022-05-03 PROCEDURE — 99214 OFFICE O/P EST MOD 30 MIN: CPT | Mod: S$GLB,,, | Performed by: NURSE PRACTITIONER

## 2022-05-03 PROCEDURE — 99214 PR OFFICE/OUTPT VISIT, EST, LEVL IV, 30-39 MIN: ICD-10-PCS | Mod: S$GLB,,, | Performed by: NURSE PRACTITIONER

## 2022-05-03 PROCEDURE — 3288F FALL RISK ASSESSMENT DOCD: CPT | Mod: CPTII,S$GLB,, | Performed by: NURSE PRACTITIONER

## 2022-05-03 PROCEDURE — 3074F SYST BP LT 130 MM HG: CPT | Mod: CPTII,S$GLB,, | Performed by: NURSE PRACTITIONER

## 2022-05-03 PROCEDURE — 1159F PR MEDICATION LIST DOCUMENTED IN MEDICAL RECORD: ICD-10-PCS | Mod: CPTII,S$GLB,, | Performed by: NURSE PRACTITIONER

## 2022-05-03 PROCEDURE — 1101F PR PT FALLS ASSESS DOC 0-1 FALLS W/OUT INJ PAST YR: ICD-10-PCS | Mod: CPTII,S$GLB,, | Performed by: NURSE PRACTITIONER

## 2022-05-03 PROCEDURE — 3078F PR MOST RECENT DIASTOLIC BLOOD PRESSURE < 80 MM HG: ICD-10-PCS | Mod: CPTII,S$GLB,, | Performed by: NURSE PRACTITIONER

## 2022-05-03 PROCEDURE — 1126F AMNT PAIN NOTED NONE PRSNT: CPT | Mod: CPTII,S$GLB,, | Performed by: NURSE PRACTITIONER

## 2022-05-03 PROCEDURE — 4010F PR ACE/ARB THEARPY RXD/TAKEN: ICD-10-PCS | Mod: CPTII,S$GLB,, | Performed by: NURSE PRACTITIONER

## 2022-05-03 PROCEDURE — 3074F PR MOST RECENT SYSTOLIC BLOOD PRESSURE < 130 MM HG: ICD-10-PCS | Mod: CPTII,S$GLB,, | Performed by: NURSE PRACTITIONER

## 2022-05-03 PROCEDURE — 4010F ACE/ARB THERAPY RXD/TAKEN: CPT | Mod: CPTII,S$GLB,, | Performed by: NURSE PRACTITIONER

## 2022-05-03 PROCEDURE — 3288F PR FALLS RISK ASSESSMENT DOCUMENTED: ICD-10-PCS | Mod: CPTII,S$GLB,, | Performed by: NURSE PRACTITIONER

## 2022-05-03 PROCEDURE — 3008F BODY MASS INDEX DOCD: CPT | Mod: CPTII,S$GLB,, | Performed by: NURSE PRACTITIONER

## 2022-05-03 PROCEDURE — 1159F MED LIST DOCD IN RCRD: CPT | Mod: CPTII,S$GLB,, | Performed by: NURSE PRACTITIONER

## 2022-05-03 PROCEDURE — 1101F PT FALLS ASSESS-DOCD LE1/YR: CPT | Mod: CPTII,S$GLB,, | Performed by: NURSE PRACTITIONER

## 2022-05-03 PROCEDURE — 1126F PR PAIN SEVERITY QUANTIFIED, NO PAIN PRESENT: ICD-10-PCS | Mod: CPTII,S$GLB,, | Performed by: NURSE PRACTITIONER

## 2022-05-03 PROCEDURE — 3008F PR BODY MASS INDEX (BMI) DOCUMENTED: ICD-10-PCS | Mod: CPTII,S$GLB,, | Performed by: NURSE PRACTITIONER

## 2022-05-03 PROCEDURE — 3078F DIAST BP <80 MM HG: CPT | Mod: CPTII,S$GLB,, | Performed by: NURSE PRACTITIONER

## 2022-05-03 RX ORDER — PREDNISONE 10 MG/1
TABLET ORAL
Qty: 30 TABLET | Refills: 0 | Status: SHIPPED | OUTPATIENT
Start: 2022-05-03 | End: 2022-05-15

## 2022-05-03 RX ORDER — BETAMETHASONE VALERATE 1.2 MG/G
AEROSOL, FOAM TOPICAL 2 TIMES DAILY
Qty: 100 G | Refills: 0 | Status: SHIPPED | OUTPATIENT
Start: 2022-05-03

## 2022-05-03 RX ORDER — HYDROXYZINE HYDROCHLORIDE 25 MG/1
25 TABLET, FILM COATED ORAL 3 TIMES DAILY PRN
Qty: 21 TABLET | Refills: 0 | Status: SHIPPED | OUTPATIENT
Start: 2022-05-03 | End: 2022-08-18

## 2022-05-03 NOTE — PROGRESS NOTES
SUBJECTIVE:      Patient ID: Hoa Wilde is a 72 y.o. female.    Chief Complaint: Rash (All over her body x 1 week. Red spots all over. Started on her arms. Patient denies pain but she has some intermittent itching. Patient states that it's worse on her back.)    72-year-old female presents to the clinic with complaints of a rash.  The rash started 1 week ago on her arms.  Now the rash is generalized to her whole body.  Reports multiple erythematous lesions that will occasionally itch. She has been using Lubriderm lotion with some relief.  Denies recent illness.  Denies new detergent or soaps, ill contacts, new medications, plant contacts, new chemicals, or animals.     Rash  This is a new problem. The current episode started in the past 7 days. The rash is diffuse. The rash is characterized by itchiness and redness. Pertinent negatives include no anorexia, congestion, cough, diarrhea, eye pain, facial edema, fatigue, fever, joint pain, nail changes, rhinorrhea, shortness of breath, sore throat or vomiting. The treatment provided no relief. There is no history of allergies.             Family History   Problem Relation Age of Onset    Hyperlipidemia Mother     Heart disease Mother       Social History     Socioeconomic History    Marital status:    Occupational History    Occupation: retired    Tobacco Use    Smoking status: Never Smoker    Smokeless tobacco: Never Used   Substance and Sexual Activity    Alcohol use: No    Drug use: No     Current Outpatient Medications   Medication Sig Dispense Refill    alendronate (FOSAMAX) 70 MG tablet Take 1 tablet by mouth once a week 8 tablet 11    aspirin (ECOTRIN) 81 MG EC tablet Take 1 tablet (81 mg total) by mouth once daily. 100 tablet 3    blood-glucose meter kit To check BG 2 times daily, to use with insurance preferred meter 1 each 0    calcium carbonate (OS-GEORGINA) 500 mg calcium (1,250 mg) tablet Take 1 tablet (500 mg total) by mouth 2 (two)  "times daily. 200 tablet 3    EUTHYROX 50 mcg tablet TAKE 1 TABLET BY MOUTH BEFORE BREAKFAST 90 tablet 3    famotidine (PEPCID) 20 MG tablet Take 1 tablet (20 mg total) by mouth once daily. 90 tablet 3    fluconazole (DIFLUCAN) 150 MG Tab TAKE 1 TABLET BY MOUTH ONCE DAILY FOR 1 DAY 1 tablet 0    gabapentin (NEURONTIN) 300 MG capsule Take 1 capsule (300 mg total) by mouth 3 (three) times daily. Take 1 in the morning and 2 at night. 90 capsule 11    insulin regular (HUMULIN R REGULAR U-100 INSULN) 100 unit/mL Inj injection Follow sliding scale printed out.  Check blood sugars 4 times a day (Patient taking differently: Follow sliding scale printed out.  Check blood sugars 4 times a day) 20 mL 11    LEVEMIR FLEXTOUCH U-100 INSULN 100 unit/mL (3 mL) InPn pen INJECT 50 UNITS SUBCUTANEOUSLY IN THE EVENING 15 mL 0    lisinopriL (PRINIVIL,ZESTRIL) 5 MG tablet Take 1 tablet by mouth once daily 90 tablet 1    meloxicam (MOBIC) 7.5 MG tablet Take 1 tablet (7.5 mg total) by mouth once daily. 30 tablet 0    metFORMIN (GLUCOPHAGE-XR) 750 MG ER 24hr tablet Take 1 tablet (750 mg total) by mouth daily with breakfast. 180 tablet 1    metoprolol tartrate (LOPRESSOR) 25 MG tablet Take 1 tablet by mouth twice daily 180 tablet 0    ONETOUCH DELICA PLUS LANCET 33 gauge Misc USE   TO CHECK GLUCOSE TWICE DAILY 100 each 3    ONETOUCH ULTRA TEST Strp USE  STRIP TO CHECK GLUCOSE TWICE DAILY 100 each 3    pen needle, diabetic 30 gauge x 1/3" Ndle 1 pen by Misc.(Non-Drug; Combo Route) route once daily. To use with levimir flex pen 100 each 3    rosuvastatin (CRESTOR) 20 MG tablet Take 1 tablet (20 mg total) by mouth once daily. 90 tablet 1    sertraline (ZOLOFT) 50 MG tablet Take 1 tablet (50 mg total) by mouth once daily. 30 tablet 11    betamethasone valerate (LUXIQ) 0.12 % foam Apply topically 2 (two) times daily. 100 g 0    calcium carbonate-vitamin D3 (LIQUID CALCIUM WITH VITAMIN D) 600 mg calcium- 200 unit Cap Take 1 capsule " by mouth 2 (two) times daily. for 365 doses (Patient not taking: Reported on 5/3/2022) 60 capsule 11    hydrOXYzine HCL (ATARAX) 25 MG tablet Take 1 tablet (25 mg total) by mouth 3 (three) times daily as needed for Itching. 21 tablet 0    predniSONE (DELTASONE) 10 MG tablet Take 4 tablets (40 mg total) by mouth once daily for 3 days, THEN 3 tablets (30 mg total) once daily for 3 days, THEN 2 tablets (20 mg total) once daily for 3 days, THEN 1 tablet (10 mg total) once daily for 3 days. 30 tablet 0     No current facility-administered medications for this visit.     Facility-Administered Medications Ordered in Other Visits   Medication Dose Route Frequency Provider Last Rate Last Admin    0.9%  NaCl infusion   Intravenous Continuous Juancho White MD   Stopped at 12/16/20 1500     Review of patient's allergies indicates:  No Known Allergies   Past Medical History:   Diagnosis Date    Coronary artery disease     Diabetes mellitus, type 2     Hyperlipidemia     Hypertension     Hypothyroidism     Neuropathy      Past Surgical History:   Procedure Laterality Date    ANGIOGRAPHY OF LOWER EXTREMITY Left 12/16/2020    Procedure: ANGIOGRAM, LOWER EXTREMITY;  Surgeon: Juancho White MD;  Location: [facility] CATH/EP LAB;  Service: General;  Laterality: Left;    CHOLECYSTECTOMY      CORONARY ARTERY BYPASS GRAFT      FOOT SURGERY Left     HYSTERECTOMY         Review of Systems   Constitutional: Negative for activity change, appetite change, chills, diaphoresis, fatigue, fever and unexpected weight change.   HENT: Negative for congestion, ear pain, rhinorrhea, sinus pressure, sore throat, trouble swallowing and voice change.    Eyes: Negative for pain, discharge and visual disturbance.   Respiratory: Negative for cough, chest tightness, shortness of breath and wheezing.    Cardiovascular: Negative for chest pain and palpitations.   Gastrointestinal: Negative for abdominal pain, anorexia, constipation, diarrhea,  nausea and vomiting.   Genitourinary: Negative for difficulty urinating, flank pain, frequency and urgency.   Musculoskeletal: Negative for back pain, joint pain, joint swelling and neck pain.   Skin: Positive for rash. Negative for color change and nail changes.   Neurological: Negative for dizziness, seizures, syncope, weakness, numbness and headaches.   Hematological: Negative for adenopathy.   Psychiatric/Behavioral: Negative for dysphoric mood and sleep disturbance. The patient is not nervous/anxious.       OBJECTIVE:      Vitals:    05/03/22 1440   BP: 128/70   Pulse: 103   Temp: 98.4 °F (36.9 °C)   TempSrc: Oral   SpO2: 97%   Weight: 68.4 kg (150 lb 14.4 oz)   Height: 5' (1.524 m)     Physical Exam  Vitals and nursing note reviewed.   Constitutional:       General: She is awake. She is not in acute distress.     Appearance: Normal appearance. She is overweight. She is not ill-appearing, toxic-appearing or diaphoretic.   HENT:      Head: Normocephalic and atraumatic.      Nose: Nose normal.   Eyes:      General: Lids are normal. Gaze aligned appropriately.      Conjunctiva/sclera: Conjunctivae normal.      Right eye: Right conjunctiva is not injected.      Left eye: Left conjunctiva is not injected.      Pupils: Pupils are equal, round, and reactive to light.   Cardiovascular:      Rate and Rhythm: Normal rate and regular rhythm.      Pulses: Normal pulses.      Heart sounds: S1 normal and S2 normal. Murmur heard.    Systolic murmur is present with a grade of 2/6.    No friction rub. No gallop.   Pulmonary:      Effort: Pulmonary effort is normal. No respiratory distress.      Breath sounds: Normal breath sounds. No stridor. No decreased breath sounds, wheezing, rhonchi or rales.   Chest:      Chest wall: No tenderness.   Musculoskeletal:      Cervical back: Neck supple.      Right lower leg: No edema.      Left lower leg: No edema.   Lymphadenopathy:      Cervical: No cervical adenopathy.   Skin:     General:  Skin is warm and dry.      Capillary Refill: Capillary refill takes less than 2 seconds.      Findings: Rash present. No erythema. Rash is macular, papular and scaling.      Comments: Erythematous maculopapular lesions with scaling noted to chest, back, and extremities. See imaging.    Neurological:      Mental Status: She is alert and oriented to person, place, and time. Mental status is at baseline.   Psychiatric:         Attention and Perception: Attention normal.         Mood and Affect: Mood normal.         Speech: Speech normal.         Behavior: Behavior normal. Behavior is cooperative.         Thought Content: Thought content normal.         Judgment: Judgment normal.        Assessment:       1. Dermatitis    2. Itching        Plan:       Dermatitis  Unsure the cause, possibly eczema, psoriasis, or fungal? The lesions are diffuse.  Will start patient on oral steroids.  Patient informed to monitor her glucose levels.  Patient can apply betamethasone cream to the lesions as well.  Dermatology referral provided if there is no improvement.  -     Ambulatory referral/consult to Dermatology; Future; Expected date: 05/10/2022  -     predniSONE (DELTASONE) 10 MG tablet; Take 4 tablets (40 mg total) by mouth once daily for 3 days, THEN 3 tablets (30 mg total) once daily for 3 days, THEN 2 tablets (20 mg total) once daily for 3 days, THEN 1 tablet (10 mg total) once daily for 3 days.  Dispense: 30 tablet; Refill: 0  -     betamethasone valerate (LUXIQ) 0.12 % foam; Apply topically 2 (two) times daily.  Dispense: 100 g; Refill: 0    Itching  Use prn itching.   -     hydrOXYzine HCL (ATARAX) 25 MG tablet; Take 1 tablet (25 mg total) by mouth 3 (three) times daily as needed for Itching.  Dispense: 21 tablet; Refill: 0    This note was created using [employer] voice recognition software that occasionally misinterprets phrases or words.     Follow up if symptoms worsen or fail to improve.      5/3/2022 Angus Gomez  APRN, FNP

## 2022-05-05 ENCOUNTER — TELEPHONE (OUTPATIENT)
Dept: FAMILY MEDICINE | Facility: CLINIC | Age: 72
End: 2022-05-05

## 2022-05-05 NOTE — TELEPHONE ENCOUNTER
The following medication needs a prior authorization:     Medication Name:  Hydroxyzine HCL    Dosage: 25 MG    Frequency: TID    Directions for use: 1 tab by mouth 3 times a day.    Diagnosis: itching L29.9                     Dermatitis L30.9    Is the request for a reauthorization?No     Is the patient currently stable on therapy? Hasn't tried    Please list all therapeutic alternatives previously used with start/end dates and outcome: None. Patient has a itchy rash that is a new problem.

## 2022-07-06 DIAGNOSIS — E11.42 TYPE 2 DIABETES MELLITUS WITH DIABETIC POLYNEUROPATHY, WITH LONG-TERM CURRENT USE OF INSULIN: ICD-10-CM

## 2022-07-06 DIAGNOSIS — Z79.4 TYPE 2 DIABETES MELLITUS WITH DIABETIC POLYNEUROPATHY, WITH LONG-TERM CURRENT USE OF INSULIN: ICD-10-CM

## 2022-07-06 RX ORDER — METFORMIN HYDROCHLORIDE 750 MG/1
TABLET, EXTENDED RELEASE ORAL
Qty: 90 TABLET | Refills: 0 | Status: ON HOLD | OUTPATIENT
Start: 2022-07-06 | End: 2023-01-22 | Stop reason: HOSPADM

## 2022-08-04 ENCOUNTER — OFFICE VISIT (OUTPATIENT)
Dept: FAMILY MEDICINE | Facility: CLINIC | Age: 72
End: 2022-08-04
Payer: MEDICARE

## 2022-08-04 VITALS
SYSTOLIC BLOOD PRESSURE: 126 MMHG | DIASTOLIC BLOOD PRESSURE: 84 MMHG | HEIGHT: 60 IN | HEART RATE: 88 BPM | BODY MASS INDEX: 28.86 KG/M2 | WEIGHT: 147 LBS | OXYGEN SATURATION: 98 %

## 2022-08-04 DIAGNOSIS — J01.90 ACUTE NON-RECURRENT SINUSITIS, UNSPECIFIED LOCATION: Primary | ICD-10-CM

## 2022-08-04 DIAGNOSIS — M17.11 PRIMARY OSTEOARTHRITIS OF RIGHT KNEE: ICD-10-CM

## 2022-08-04 DIAGNOSIS — U07.1 COVID-19 VIRUS DETECTED: ICD-10-CM

## 2022-08-04 DIAGNOSIS — U07.1 COVID: ICD-10-CM

## 2022-08-04 DIAGNOSIS — J01.40 ACUTE NON-RECURRENT PANSINUSITIS: ICD-10-CM

## 2022-08-04 LAB
CTP QC/QA: YES
SARS-COV-2 RDRP RESP QL NAA+PROBE: POSITIVE

## 2022-08-04 PROCEDURE — 1125F AMNT PAIN NOTED PAIN PRSNT: CPT | Mod: CPTII,S$GLB,, | Performed by: FAMILY MEDICINE

## 2022-08-04 PROCEDURE — 4010F PR ACE/ARB THEARPY RXD/TAKEN: ICD-10-PCS | Mod: CPTII,S$GLB,, | Performed by: FAMILY MEDICINE

## 2022-08-04 PROCEDURE — 1101F PT FALLS ASSESS-DOCD LE1/YR: CPT | Mod: CPTII,S$GLB,, | Performed by: FAMILY MEDICINE

## 2022-08-04 PROCEDURE — 3288F PR FALLS RISK ASSESSMENT DOCUMENTED: ICD-10-PCS | Mod: CPTII,S$GLB,, | Performed by: FAMILY MEDICINE

## 2022-08-04 PROCEDURE — U0002 COVID-19 LAB TEST NON-CDC: HCPCS | Mod: QW,S$GLB,, | Performed by: FAMILY MEDICINE

## 2022-08-04 PROCEDURE — 1101F PR PT FALLS ASSESS DOC 0-1 FALLS W/OUT INJ PAST YR: ICD-10-PCS | Mod: CPTII,S$GLB,, | Performed by: FAMILY MEDICINE

## 2022-08-04 PROCEDURE — 1125F PR PAIN SEVERITY QUANTIFIED, PAIN PRESENT: ICD-10-PCS | Mod: CPTII,S$GLB,, | Performed by: FAMILY MEDICINE

## 2022-08-04 PROCEDURE — 3008F BODY MASS INDEX DOCD: CPT | Mod: CPTII,S$GLB,, | Performed by: FAMILY MEDICINE

## 2022-08-04 PROCEDURE — 1159F MED LIST DOCD IN RCRD: CPT | Mod: CPTII,S$GLB,, | Performed by: FAMILY MEDICINE

## 2022-08-04 PROCEDURE — 99214 OFFICE O/P EST MOD 30 MIN: CPT | Mod: 25,S$GLB,, | Performed by: FAMILY MEDICINE

## 2022-08-04 PROCEDURE — U0002: ICD-10-PCS | Mod: QW,S$GLB,, | Performed by: FAMILY MEDICINE

## 2022-08-04 PROCEDURE — 1159F PR MEDICATION LIST DOCUMENTED IN MEDICAL RECORD: ICD-10-PCS | Mod: CPTII,S$GLB,, | Performed by: FAMILY MEDICINE

## 2022-08-04 PROCEDURE — 3008F PR BODY MASS INDEX (BMI) DOCUMENTED: ICD-10-PCS | Mod: CPTII,S$GLB,, | Performed by: FAMILY MEDICINE

## 2022-08-04 PROCEDURE — 99214 PR OFFICE/OUTPT VISIT, EST, LEVL IV, 30-39 MIN: ICD-10-PCS | Mod: 25,S$GLB,, | Performed by: FAMILY MEDICINE

## 2022-08-04 PROCEDURE — 3288F FALL RISK ASSESSMENT DOCD: CPT | Mod: CPTII,S$GLB,, | Performed by: FAMILY MEDICINE

## 2022-08-04 PROCEDURE — 4010F ACE/ARB THERAPY RXD/TAKEN: CPT | Mod: CPTII,S$GLB,, | Performed by: FAMILY MEDICINE

## 2022-08-04 RX ORDER — PROMETHAZINE HYDROCHLORIDE AND DEXTROMETHORPHAN HYDROBROMIDE 6.25; 15 MG/5ML; MG/5ML
5 SYRUP ORAL NIGHTLY
Qty: 180 ML | Refills: 2 | Status: SHIPPED | OUTPATIENT
Start: 2022-08-04 | End: 2022-08-14

## 2022-08-04 RX ORDER — PROPRANOLOL HYDROCHLORIDE 60 MG/1
60 CAPSULE, EXTENDED RELEASE ORAL DAILY
Status: ON HOLD | COMMUNITY
Start: 2022-07-08 | End: 2023-01-22 | Stop reason: HOSPADM

## 2022-08-04 RX ORDER — AZITHROMYCIN 250 MG/1
250 TABLET, FILM COATED ORAL DAILY
Qty: 6 TABLET | Refills: 0 | Status: SHIPPED | OUTPATIENT
Start: 2022-08-04 | End: 2022-08-10

## 2022-08-04 NOTE — PATIENT INSTRUCTIONS
COVID test is positive and you're symptomatic:  [place] for 5 days and mask for another 5 days  Vitamin-D 5000 units per day  Vitamin-C 500 mg per day  Zinc 50 mg per day

## 2022-08-04 NOTE — PROGRESS NOTES
Subjective:       Patient ID: Hoa Wilde is a 72 y.o. female.    Chief Complaint: Follow-up (DM f/u) and Leg Pain (Right leg pain )      Patient had a scheduled appointment for diabetes but she is having new symptoms.  She has been feeling ill for about 1/2 weeks.  Sinus pressure, cough subjective fever.  Lab Results       Component                Value               Date                       WBC                      8.42                12/09/2020                 HGB                      12.9                12/09/2020                 HCT                      39.3                12/09/2020                 PLT                      306                 12/09/2020                 CHOL                     138                 05/05/2020                 TRIG                     254 (H)             05/05/2020                 HDL                      40                  05/05/2020                 ALT                      25                  05/05/2020                 AST                      21                  05/05/2020                 NA                       135 (L)             12/09/2020                 K                        4.1                 12/09/2020                 CL                       101                 12/09/2020                 CREATININE               0.5                 12/09/2020                 BUN                      10                  12/09/2020                 CO2                      24                  12/09/2020                 TSH                      2.310               02/02/2020                 INR                      1.1                 12/09/2020                 HGBA1C                   12.4 (H)            09/17/2019            Wt Readings from Last 3 Encounters:  08/04/22 : 66.7 kg (147 lb)  05/03/22 : 68.4 kg (150 lb 14.4 oz)  03/24/22 : 68.9 kg (152 lb)          Leg Pain   Incident onset: one month. The pain is present in the right knee and right leg. The pain is at a severity of  6/10. The pain is moderate. The pain has been improving since onset.   Sinus Problem  This is a new problem. The current episode started 1 to 4 weeks ago. The problem is unchanged. There has been no fever. Associated symptoms include congestion and coughing. Pertinent negatives include no diaphoresis, ear pain or hoarse voice. Treatments tried: There are flu. The treatment provided mild relief.       Allergies and Medications:   Review of patient's allergies indicates:  No Known Allergies  Current Outpatient Medications   Medication Sig Dispense Refill    alendronate (FOSAMAX) 70 MG tablet Take 1 tablet by mouth once a week 8 tablet 11    betamethasone valerate (LUXIQ) 0.12 % foam Apply topically 2 (two) times daily. 100 g 0    blood-glucose meter kit To check BG 2 times daily, to use with insurance preferred meter 1 each 0    EUTHYROX 50 mcg tablet TAKE 1 TABLET BY MOUTH BEFORE BREAKFAST 90 tablet 3    famotidine (PEPCID) 20 MG tablet Take 1 tablet (20 mg total) by mouth once daily. 90 tablet 3    fluconazole (DIFLUCAN) 150 MG Tab TAKE 1 TABLET BY MOUTH ONCE DAILY FOR 1 DAY 1 tablet 0    hydrOXYzine HCL (ATARAX) 25 MG tablet Take 1 tablet (25 mg total) by mouth 3 (three) times daily as needed for Itching. 21 tablet 0    insulin regular (HUMULIN R REGULAR U-100 INSULN) 100 unit/mL Inj injection Follow sliding scale printed out.  Check blood sugars 4 times a day (Patient taking differently: Follow sliding scale printed out.  Check blood sugars 4 times a day) 20 mL 11    LEVEMIR FLEXTOUCH U-100 INSULN 100 unit/mL (3 mL) InPn pen INJECT 50 UNITS SUBCUTANEOUSLY IN THE EVENING 15 mL 0    lisinopriL (PRINIVIL,ZESTRIL) 5 MG tablet Take 1 tablet by mouth once daily 90 tablet 1    meloxicam (MOBIC) 7.5 MG tablet Take 1 tablet (7.5 mg total) by mouth once daily. 30 tablet 0    metFORMIN (GLUCOPHAGE-XR) 750 MG ER 24hr tablet Take 1 tablet by mouth once daily with breakfast 90 tablet 0    metoprolol tartrate  "(LOPRESSOR) 25 MG tablet Take 1 tablet by mouth twice daily 180 tablet 0    ONETOUCH DELICA PLUS LANCET 33 gauge Misc USE   TO CHECK GLUCOSE TWICE DAILY 100 each 3    ONETOUCH ULTRA TEST Strp USE  STRIP TO CHECK GLUCOSE TWICE DAILY 100 each 3    pen needle, diabetic 30 gauge x 1/3" Ndle 1 pen by Misc.(Non-Drug; Combo Route) route once daily. To use with levimir flex pen 100 each 3    propranoloL (INDERAL LA) 60 MG 24 hr capsule Take 60 mg by mouth once daily.      rosuvastatin (CRESTOR) 20 MG tablet Take 1 tablet by mouth once daily 90 tablet 0    sertraline (ZOLOFT) 50 MG tablet Take 1 tablet (50 mg total) by mouth once daily. 30 tablet 11    aspirin (ECOTRIN) 81 MG EC tablet Take 1 tablet (81 mg total) by mouth once daily. 100 tablet 3    azithromycin (Z-JP) 250 MG tablet Take 1 tablet (250 mg total) by mouth once daily. po on day 1 then 1 tab po on days 2-5 for 6 doses 6 tablet 0    calcium carbonate (OS-GEORGINA) 500 mg calcium (1,250 mg) tablet Take 1 tablet (500 mg total) by mouth 2 (two) times daily. 200 tablet 3    calcium carbonate-vitamin D3 (LIQUID CALCIUM WITH VITAMIN D) 600 mg calcium- 200 unit Cap Take 1 capsule by mouth 2 (two) times daily. for 365 doses (Patient not taking: Reported on 5/3/2022) 60 capsule 11    gabapentin (NEURONTIN) 300 MG capsule Take 1 capsule (300 mg total) by mouth 3 (three) times daily. Take 1 in the morning and 2 at night. 90 capsule 11    promethazine-dextromethorphan (PROMETHAZINE-DM) 6.25-15 mg/5 mL Syrp Take 5 mLs by mouth every evening. for 10 days 180 mL 2     No current facility-administered medications for this visit.     Facility-Administered Medications Ordered in Other Visits   Medication Dose Route Frequency Provider Last Rate Last Admin    0.9%  NaCl infusion   Intravenous Continuous Juancho White MD   Stopped at 12/16/20 1500       Family History:   Family History   Problem Relation Age of Onset    Hyperlipidemia Mother     Heart disease Mother  "       Social History:   Social History     Socioeconomic History    Marital status:    Occupational History    Occupation: retired    Tobacco Use    Smoking status: Never Smoker    Smokeless tobacco: Never Used   Substance and Sexual Activity    Alcohol use: No    Drug use: No       Review of Systems   Constitutional: Negative for diaphoresis.   HENT: Positive for congestion. Negative for ear pain and hoarse voice.    Respiratory: Positive for cough.      previous orders from March for lab work at not been done yet.  Objective:     Vitals:    08/04/22 1409   BP: 126/84   Pulse: 88   Pulse ox 98% on room air.     Physical Exam  Vitals and nursing note reviewed.   Constitutional:       General: She is not in acute distress.     Appearance: She is well-developed. She is not diaphoretic.   HENT:      Head: Normocephalic and atraumatic.      Right Ear: Hearing, tympanic membrane, ear canal and external ear normal. No decreased hearing noted. No drainage, swelling or tenderness. No middle ear effusion. No foreign body. No hemotympanum. Tympanic membrane is not injected, scarred, perforated, erythematous, retracted or bulging. Tympanic membrane has normal mobility.      Left Ear: Hearing, tympanic membrane, ear canal and external ear normal. No decreased hearing noted. No drainage, swelling or tenderness.  No middle ear effusion. No foreign body. No hemotympanum. Tympanic membrane is not injected, scarred, perforated, erythematous, retracted or bulging. Tympanic membrane has normal mobility.      Nose: Nose normal. No nasal deformity, septal deviation, laceration, mucosal edema or rhinorrhea.      Right Sinus: No maxillary sinus tenderness or frontal sinus tenderness.      Left Sinus: No maxillary sinus tenderness or frontal sinus tenderness.      Mouth/Throat:      Dentition: Normal dentition.      Pharynx: Uvula midline. No oropharyngeal exudate or posterior oropharyngeal erythema.      Tonsils: No tonsillar  abscesses.   Eyes:      General: No scleral icterus.        Right eye: No discharge.         Left eye: No discharge.      Conjunctiva/sclera: Conjunctivae normal.      Pupils: Pupils are equal, round, and reactive to light.   Neck:      Thyroid: No thyromegaly.   Cardiovascular:      Rate and Rhythm: Normal rate and regular rhythm.      Heart sounds: Normal heart sounds. No murmur heard.    No friction rub. No gallop.   Pulmonary:      Effort: Pulmonary effort is normal. No respiratory distress.      Breath sounds: Normal breath sounds. No stridor. No wheezing, rhonchi or rales.   Chest:      Chest wall: No tenderness.   Musculoskeletal:      Cervical back: Normal range of motion and neck supple. No rigidity.      Comments: There is minimal deformity in full range of motion to the right knee but there is some tenderness on the medial aspect which radiates to the tibial plateau into the calf.  Negative Iliana sign.   Lymphadenopathy:      Cervical: No cervical adenopathy.   Psychiatric:         Behavior: Behavior normal.         Thought Content: Thought content normal.         Judgment: Judgment normal.       p.o. CT COVID test is positive.  Assessment:       1. Acute non-recurrent sinusitis, unspecified location    2. Primary osteoarthritis of right knee    3. COVID -patient is be on the 5 day window for Paxlovid treatment. is now 1/2 weeks out but does need to continue 5 day quarantine and 5 days with the mask.   4. Acute non-recurrent pansinusitis        Plan:       Hoa was seen today for follow-up and leg pain.    Diagnoses and all orders for this visit:    Acute non-recurrent sinusitis, unspecified location  -     POCT COVID-19 Rapid Screening    Primary osteoarthritis of right knee  -     X-Ray Knee 3 View Right; Future    COVID    Acute non-recurrent pansinusitis  -     promethazine-dextromethorphan (PROMETHAZINE-DM) 6.25-15 mg/5 mL Syrp; Take 5 mLs by mouth every evening. for 10 days  -     azithromycin  (Z-JP) 250 MG tablet; Take 1 tablet (250 mg total) by mouth once daily. po on day 1 then 1 tab po on days 2-5 for 6 doses         Follow up in about 3 months (around 11/4/2022) for follow up DM, follow up HTN.

## 2023-01-19 ENCOUNTER — HOSPITAL ENCOUNTER (INPATIENT)
Facility: HOSPITAL | Age: 73
LOS: 2 days | Discharge: HOME HEALTH | DRG: 071 | End: 2023-01-22
Attending: EMERGENCY MEDICINE | Admitting: INTERNAL MEDICINE
Payer: COMMERCIAL

## 2023-01-19 ENCOUNTER — OFFICE VISIT (OUTPATIENT)
Dept: FAMILY MEDICINE | Facility: CLINIC | Age: 73
End: 2023-01-19
Payer: COMMERCIAL

## 2023-01-19 VITALS
HEART RATE: 110 BPM | SYSTOLIC BLOOD PRESSURE: 130 MMHG | BODY MASS INDEX: 28.27 KG/M2 | OXYGEN SATURATION: 99 % | WEIGHT: 144 LBS | RESPIRATION RATE: 18 BRPM | HEIGHT: 60 IN | DIASTOLIC BLOOD PRESSURE: 78 MMHG | TEMPERATURE: 98 F

## 2023-01-19 DIAGNOSIS — E11.42 TYPE 2 DIABETES MELLITUS WITH DIABETIC POLYNEUROPATHY, WITH LONG-TERM CURRENT USE OF INSULIN: Primary | ICD-10-CM

## 2023-01-19 DIAGNOSIS — E11.9 DIABETES MELLITUS, TYPE 2: ICD-10-CM

## 2023-01-19 DIAGNOSIS — E11.43 DIABETIC AUTONOMIC NEUROPATHY ASSOCIATED WITH TYPE 2 DIABETES MELLITUS: ICD-10-CM

## 2023-01-19 DIAGNOSIS — Z12.11 COLON CANCER SCREENING: ICD-10-CM

## 2023-01-19 DIAGNOSIS — Z79.4 TYPE 2 DIABETES MELLITUS WITH DIABETIC POLYNEUROPATHY, WITH LONG-TERM CURRENT USE OF INSULIN: Primary | ICD-10-CM

## 2023-01-19 DIAGNOSIS — M79.604 PAIN OF RIGHT LOWER EXTREMITY: ICD-10-CM

## 2023-01-19 DIAGNOSIS — A41.9 SEPSIS, DUE TO UNSPECIFIED ORGANISM, UNSPECIFIED WHETHER ACUTE ORGAN DYSFUNCTION PRESENT: ICD-10-CM

## 2023-01-19 DIAGNOSIS — N39.0 URINARY TRACT INFECTION WITHOUT HEMATURIA, SITE UNSPECIFIED: Primary | ICD-10-CM

## 2023-01-19 DIAGNOSIS — L84 CORN OF FOOT: ICD-10-CM

## 2023-01-19 DIAGNOSIS — E11.65 TYPE 2 DIABETES MELLITUS WITH HYPERGLYCEMIA, WITH LONG-TERM CURRENT USE OF INSULIN: ICD-10-CM

## 2023-01-19 DIAGNOSIS — Z79.4 TYPE 2 DIABETES MELLITUS WITH HYPERGLYCEMIA, WITH LONG-TERM CURRENT USE OF INSULIN: ICD-10-CM

## 2023-01-19 DIAGNOSIS — G93.41 ENCEPHALOPATHY, METABOLIC: ICD-10-CM

## 2023-01-19 DIAGNOSIS — R41.82 ALTERED MENTAL STATE: ICD-10-CM

## 2023-01-19 DIAGNOSIS — G93.41 METABOLIC ENCEPHALOPATHY: ICD-10-CM

## 2023-01-19 DIAGNOSIS — E78.2 MIXED HYPERLIPIDEMIA: ICD-10-CM

## 2023-01-19 PROBLEM — N30.00 ACUTE CYSTITIS WITHOUT HEMATURIA: Status: ACTIVE | Noted: 2023-01-19

## 2023-01-19 LAB
ALBUMIN SERPL BCP-MCNC: 3.6 G/DL (ref 3.5–5.2)
ALLENS TEST: ABNORMAL
ALP SERPL-CCNC: 80 U/L (ref 55–135)
ALT SERPL W/O P-5'-P-CCNC: 14 U/L (ref 10–44)
ANION GAP SERPL CALC-SCNC: 16 MMOL/L (ref 8–16)
APTT BLDCRRT: 22.2 SEC (ref 21–32)
AST SERPL-CCNC: 15 U/L (ref 10–40)
B-OH-BUTYR BLD STRIP-SCNC: 0.9 MMOL/L (ref 0–0.5)
BACTERIA #/AREA URNS HPF: NEGATIVE /HPF
BASOPHILS # BLD AUTO: 0.05 K/UL (ref 0–0.2)
BASOPHILS NFR BLD: 0.4 % (ref 0–1.9)
BILIRUB SERPL-MCNC: 1.1 MG/DL (ref 0.1–1)
BILIRUB UR QL STRIP: NEGATIVE
BNP SERPL-MCNC: 113 PG/ML (ref 0–99)
BUN SERPL-MCNC: 11 MG/DL (ref 8–23)
CALCIUM SERPL-MCNC: 8.4 MG/DL (ref 8.7–10.5)
CHLORIDE SERPL-SCNC: 95 MMOL/L (ref 95–110)
CLARITY UR: CLEAR
CO2 SERPL-SCNC: 19 MMOL/L (ref 23–29)
COLOR UR: YELLOW
CREAT SERPL-MCNC: 0.5 MG/DL (ref 0.5–1.4)
DIFFERENTIAL METHOD: ABNORMAL
EOSINOPHIL # BLD AUTO: 0 K/UL (ref 0–0.5)
EOSINOPHIL NFR BLD: 0.3 % (ref 0–8)
ERYTHROCYTE [DISTWIDTH] IN BLOOD BY AUTOMATED COUNT: 12.6 % (ref 11.5–14.5)
EST. GFR  (NO RACE VARIABLE): >60 ML/MIN/1.73 M^2
GLUCOSE SERPL-MCNC: 375 MG/DL (ref 70–110)
GLUCOSE SERPL-MCNC: 409 MG/DL (ref 70–110)
GLUCOSE UR QL STRIP: ABNORMAL
HCO3 UR-SCNC: 21.8 MMOL/L (ref 24–28)
HCT VFR BLD AUTO: 38.6 % (ref 37–48.5)
HGB BLD-MCNC: 13.5 G/DL (ref 12–16)
HGB UR QL STRIP: NEGATIVE
HYALINE CASTS #/AREA URNS LPF: 9 /LPF
IMM GRANULOCYTES # BLD AUTO: 0.06 K/UL (ref 0–0.04)
IMM GRANULOCYTES NFR BLD AUTO: 0.5 % (ref 0–0.5)
INFLUENZA A, MOLECULAR: NEGATIVE
INFLUENZA B, MOLECULAR: NEGATIVE
INR PPP: 1 (ref 0.8–1.2)
KETONES UR QL STRIP: ABNORMAL
LACTATE SERPL-SCNC: 1.4 MMOL/L (ref 0.5–1.9)
LACTATE SERPL-SCNC: 1.4 MMOL/L (ref 0.5–1.9)
LEUKOCYTE ESTERASE UR QL STRIP: ABNORMAL
LYMPHOCYTES # BLD AUTO: 1.6 K/UL (ref 1–4.8)
LYMPHOCYTES NFR BLD: 14.1 % (ref 18–48)
MAGNESIUM SERPL-MCNC: 1.5 MG/DL (ref 1.6–2.6)
MCH RBC QN AUTO: 29 PG (ref 27–31)
MCHC RBC AUTO-ENTMCNC: 35 G/DL (ref 32–36)
MCV RBC AUTO: 83 FL (ref 82–98)
MICROSCOPIC COMMENT: ABNORMAL
MONOCYTES # BLD AUTO: 0.4 K/UL (ref 0.3–1)
MONOCYTES NFR BLD: 3.2 % (ref 4–15)
NEUTROPHILS # BLD AUTO: 9.3 K/UL (ref 1.8–7.7)
NEUTROPHILS NFR BLD: 81.5 % (ref 38–73)
NITRITE UR QL STRIP: NEGATIVE
NRBC BLD-RTO: 0 /100 WBC
PCO2 BLDA: 37.5 MMHG (ref 35–45)
PH SMN: 7.37 [PH] (ref 7.35–7.45)
PH UR STRIP: 7 [PH] (ref 5–8)
PLATELET # BLD AUTO: 331 K/UL (ref 150–450)
PMV BLD AUTO: 10.3 FL (ref 9.2–12.9)
PO2 BLDA: 43 MMHG (ref 40–60)
POC BE: -3 MMOL/L
POC SATURATED O2: 77 % (ref 95–100)
POC TCO2: 23 MMOL/L (ref 24–29)
POTASSIUM SERPL-SCNC: 3.6 MMOL/L (ref 3.5–5.1)
PROCALCITONIN SERPL IA-MCNC: <0.05 NG/ML (ref 0–0.5)
PROT SERPL-MCNC: 6.8 G/DL (ref 6–8.4)
PROT UR QL STRIP: ABNORMAL
PROTHROMBIN TIME: 10.3 SEC (ref 9–12.5)
RBC # BLD AUTO: 4.65 M/UL (ref 4–5.4)
RBC #/AREA URNS HPF: 8 /HPF (ref 0–4)
SAMPLE: ABNORMAL
SARS-COV-2 RDRP RESP QL NAA+PROBE: NEGATIVE
SITE: ABNORMAL
SODIUM SERPL-SCNC: 130 MMOL/L (ref 136–145)
SP GR UR STRIP: >1.03 (ref 1–1.03)
SPECIMEN SOURCE: NORMAL
SQUAMOUS #/AREA URNS HPF: 11 /HPF
TROPONIN I SERPL HS-MCNC: 11.1 PG/ML (ref 0–14.9)
URN SPEC COLLECT METH UR: ABNORMAL
UROBILINOGEN UR STRIP-ACNC: NEGATIVE EU/DL
WBC # BLD AUTO: 11.42 K/UL (ref 3.9–12.7)
WBC #/AREA URNS HPF: 29 /HPF (ref 0–5)
YEAST URNS QL MICRO: ABNORMAL

## 2023-01-19 PROCEDURE — 25000003 PHARM REV CODE 250: Performed by: EMERGENCY MEDICINE

## 2023-01-19 PROCEDURE — 83880 ASSAY OF NATRIURETIC PEPTIDE: CPT | Performed by: EMERGENCY MEDICINE

## 2023-01-19 PROCEDURE — U0002 COVID-19 LAB TEST NON-CDC: HCPCS | Performed by: EMERGENCY MEDICINE

## 2023-01-19 PROCEDURE — 82803 BLOOD GASES ANY COMBINATION: CPT

## 2023-01-19 PROCEDURE — 99900031 HC PATIENT EDUCATION (STAT)

## 2023-01-19 PROCEDURE — 93010 EKG 12-LEAD: ICD-10-PCS | Mod: ,,, | Performed by: SPECIALIST

## 2023-01-19 PROCEDURE — 84145 PROCALCITONIN (PCT): CPT | Performed by: EMERGENCY MEDICINE

## 2023-01-19 PROCEDURE — 96361 HYDRATE IV INFUSION ADD-ON: CPT

## 2023-01-19 PROCEDURE — 3288F FALL RISK ASSESSMENT DOCD: CPT | Mod: CPTII,S$GLB,, | Performed by: FAMILY MEDICINE

## 2023-01-19 PROCEDURE — 83735 ASSAY OF MAGNESIUM: CPT | Performed by: EMERGENCY MEDICINE

## 2023-01-19 PROCEDURE — 87502 INFLUENZA DNA AMP PROBE: CPT | Performed by: EMERGENCY MEDICINE

## 2023-01-19 PROCEDURE — 93005 ELECTROCARDIOGRAM TRACING: CPT | Performed by: SPECIALIST

## 2023-01-19 PROCEDURE — 85730 THROMBOPLASTIN TIME PARTIAL: CPT | Performed by: EMERGENCY MEDICINE

## 2023-01-19 PROCEDURE — 81001 URINALYSIS AUTO W/SCOPE: CPT | Performed by: EMERGENCY MEDICINE

## 2023-01-19 PROCEDURE — 85025 COMPLETE CBC W/AUTO DIFF WBC: CPT | Performed by: EMERGENCY MEDICINE

## 2023-01-19 PROCEDURE — 87086 URINE CULTURE/COLONY COUNT: CPT | Performed by: EMERGENCY MEDICINE

## 2023-01-19 PROCEDURE — 1101F PR PT FALLS ASSESS DOC 0-1 FALLS W/OUT INJ PAST YR: ICD-10-PCS | Mod: CPTII,S$GLB,, | Performed by: FAMILY MEDICINE

## 2023-01-19 PROCEDURE — 36415 COLL VENOUS BLD VENIPUNCTURE: CPT | Performed by: EMERGENCY MEDICINE

## 2023-01-19 PROCEDURE — 80053 COMPREHEN METABOLIC PANEL: CPT | Performed by: EMERGENCY MEDICINE

## 2023-01-19 PROCEDURE — 85610 PROTHROMBIN TIME: CPT | Performed by: EMERGENCY MEDICINE

## 2023-01-19 PROCEDURE — 1125F PR PAIN SEVERITY QUANTIFIED, PAIN PRESENT: ICD-10-PCS | Mod: CPTII,S$GLB,, | Performed by: FAMILY MEDICINE

## 2023-01-19 PROCEDURE — 1125F AMNT PAIN NOTED PAIN PRSNT: CPT | Mod: CPTII,S$GLB,, | Performed by: FAMILY MEDICINE

## 2023-01-19 PROCEDURE — 3075F PR MOST RECENT SYSTOLIC BLOOD PRESS GE 130-139MM HG: ICD-10-PCS | Mod: CPTII,S$GLB,, | Performed by: FAMILY MEDICINE

## 2023-01-19 PROCEDURE — 3288F PR FALLS RISK ASSESSMENT DOCUMENTED: ICD-10-PCS | Mod: CPTII,S$GLB,, | Performed by: FAMILY MEDICINE

## 2023-01-19 PROCEDURE — 99214 PR OFFICE/OUTPT VISIT, EST, LEVL IV, 30-39 MIN: ICD-10-PCS | Mod: S$GLB,,, | Performed by: FAMILY MEDICINE

## 2023-01-19 PROCEDURE — 93010 ELECTROCARDIOGRAM REPORT: CPT | Mod: ,,, | Performed by: SPECIALIST

## 2023-01-19 PROCEDURE — 3078F PR MOST RECENT DIASTOLIC BLOOD PRESSURE < 80 MM HG: ICD-10-PCS | Mod: CPTII,S$GLB,, | Performed by: FAMILY MEDICINE

## 2023-01-19 PROCEDURE — 3075F SYST BP GE 130 - 139MM HG: CPT | Mod: CPTII,S$GLB,, | Performed by: FAMILY MEDICINE

## 2023-01-19 PROCEDURE — 96368 THER/DIAG CONCURRENT INF: CPT

## 2023-01-19 PROCEDURE — 1159F MED LIST DOCD IN RCRD: CPT | Mod: CPTII,S$GLB,, | Performed by: FAMILY MEDICINE

## 2023-01-19 PROCEDURE — 82010 KETONE BODYS QUAN: CPT | Performed by: EMERGENCY MEDICINE

## 2023-01-19 PROCEDURE — 1159F PR MEDICATION LIST DOCUMENTED IN MEDICAL RECORD: ICD-10-PCS | Mod: CPTII,S$GLB,, | Performed by: FAMILY MEDICINE

## 2023-01-19 PROCEDURE — 99291 CRITICAL CARE FIRST HOUR: CPT

## 2023-01-19 PROCEDURE — 63600175 PHARM REV CODE 636 W HCPCS: Performed by: EMERGENCY MEDICINE

## 2023-01-19 PROCEDURE — 99900035 HC TECH TIME PER 15 MIN (STAT)

## 2023-01-19 PROCEDURE — 3078F DIAST BP <80 MM HG: CPT | Mod: CPTII,S$GLB,, | Performed by: FAMILY MEDICINE

## 2023-01-19 PROCEDURE — 83605 ASSAY OF LACTIC ACID: CPT | Performed by: EMERGENCY MEDICINE

## 2023-01-19 PROCEDURE — 96365 THER/PROPH/DIAG IV INF INIT: CPT

## 2023-01-19 PROCEDURE — 84484 ASSAY OF TROPONIN QUANT: CPT | Performed by: EMERGENCY MEDICINE

## 2023-01-19 PROCEDURE — 3008F PR BODY MASS INDEX (BMI) DOCUMENTED: ICD-10-PCS | Mod: CPTII,S$GLB,, | Performed by: FAMILY MEDICINE

## 2023-01-19 PROCEDURE — 82962 GLUCOSE BLOOD TEST: CPT

## 2023-01-19 PROCEDURE — 3008F BODY MASS INDEX DOCD: CPT | Mod: CPTII,S$GLB,, | Performed by: FAMILY MEDICINE

## 2023-01-19 PROCEDURE — 99214 OFFICE O/P EST MOD 30 MIN: CPT | Mod: S$GLB,,, | Performed by: FAMILY MEDICINE

## 2023-01-19 PROCEDURE — 96366 THER/PROPH/DIAG IV INF ADDON: CPT

## 2023-01-19 PROCEDURE — 87040 BLOOD CULTURE FOR BACTERIA: CPT | Performed by: EMERGENCY MEDICINE

## 2023-01-19 PROCEDURE — 1101F PT FALLS ASSESS-DOCD LE1/YR: CPT | Mod: CPTII,S$GLB,, | Performed by: FAMILY MEDICINE

## 2023-01-19 RX ORDER — SODIUM CHLORIDE, SODIUM LACTATE, POTASSIUM CHLORIDE, CALCIUM CHLORIDE 600; 310; 30; 20 MG/100ML; MG/100ML; MG/100ML; MG/100ML
INJECTION, SOLUTION INTRAVENOUS CONTINUOUS
Status: ACTIVE | OUTPATIENT
Start: 2023-01-19 | End: 2023-01-20

## 2023-01-19 RX ORDER — SODIUM CHLORIDE, SODIUM LACTATE, POTASSIUM CHLORIDE, CALCIUM CHLORIDE 600; 310; 30; 20 MG/100ML; MG/100ML; MG/100ML; MG/100ML
INJECTION, SOLUTION INTRAVENOUS CONTINUOUS
Status: DISCONTINUED | OUTPATIENT
Start: 2023-01-19 | End: 2023-01-19

## 2023-01-19 RX ORDER — LANOLIN ALCOHOL/MO/W.PET/CERES
400 CREAM (GRAM) TOPICAL DAILY
Qty: 30 TABLET | Refills: 5 | Status: SHIPPED | OUTPATIENT
Start: 2023-01-19 | End: 2023-07-18

## 2023-01-19 RX ORDER — FLUCONAZOLE 50 MG/1
150 TABLET ORAL
Status: COMPLETED | OUTPATIENT
Start: 2023-01-19 | End: 2023-01-19

## 2023-01-19 RX ORDER — MAGNESIUM SULFATE HEPTAHYDRATE 40 MG/ML
2 INJECTION, SOLUTION INTRAVENOUS ONCE
Status: COMPLETED | OUTPATIENT
Start: 2023-01-19 | End: 2023-01-19

## 2023-01-19 RX ADMIN — SODIUM CHLORIDE, SODIUM LACTATE, POTASSIUM CHLORIDE, AND CALCIUM CHLORIDE: .6; .31; .03; .02 INJECTION, SOLUTION INTRAVENOUS at 08:01

## 2023-01-19 RX ADMIN — FLUCONAZOLE 150 MG: 50 TABLET ORAL at 11:01

## 2023-01-19 RX ADMIN — SODIUM CHLORIDE, SODIUM LACTATE, POTASSIUM CHLORIDE, AND CALCIUM CHLORIDE 1000 ML: .6; .31; .03; .02 INJECTION, SOLUTION INTRAVENOUS at 07:01

## 2023-01-19 RX ADMIN — MAGNESIUM SULFATE HEPTAHYDRATE 2 G: 40 INJECTION, SOLUTION INTRAVENOUS at 08:01

## 2023-01-19 RX ADMIN — PIPERACILLIN SODIUM AND TAZOBACTAM SODIUM 4.5 G: 4; .5 INJECTION, POWDER, LYOPHILIZED, FOR SOLUTION INTRAVENOUS at 07:01

## 2023-01-19 NOTE — PROGRESS NOTES
Subjective:       Patient ID: Hoa Wilde is a 72 y.o. female.    Chief Complaint: Leg Pain (Right leg;few months)      Patient here today because of severe right leg pain-she does have a history of diabetic neuropathy but had previous pain in the left leg now it has started in the right leg over the last 2 weeks.  Lab Results       Component                Value               Date                       WBC                      8.42                12/09/2020                 HGB                      12.9                12/09/2020                 HCT                      39.3                12/09/2020                 PLT                      306                 12/09/2020                 CHOL                     138                 05/05/2020                 TRIG                     254 (H)             05/05/2020                 HDL                      40                  05/05/2020                 ALT                      25                  05/05/2020                 AST                      21                  05/05/2020                 NA                       135 (L)             12/09/2020                 K                        4.1                 12/09/2020                 CL                       101                 12/09/2020                 CREATININE               0.5                 12/09/2020                 BUN                      10                  12/09/2020                 CO2                      24                  12/09/2020                 TSH                      2.310               02/02/2020                 INR                      1.1                 12/09/2020                 HGBA1C                   12.4 (H)            09/17/2019                Leg Pain       Allergies and Medications:   Review of patient's allergies indicates:  No Known Allergies  Current Outpatient Medications   Medication Sig Dispense Refill    alendronate (FOSAMAX) 70 MG tablet Take 1 tablet by mouth once a week 8  "tablet 11    betamethasone valerate (LUXIQ) 0.12 % foam Apply topically 2 (two) times daily. 100 g 0    blood-glucose meter kit To check BG 2 times daily, to use with insurance preferred meter 1 each 0    EUTHYROX 50 mcg tablet TAKE 1 TABLET BY MOUTH BEFORE BREAKFAST 90 tablet 3    famotidine (PEPCID) 20 MG tablet Take 1 tablet by mouth once daily 90 tablet 0    fluconazole (DIFLUCAN) 150 MG Tab TAKE 1 TABLET BY MOUTH ONCE DAILY FOR 1 DAY 1 tablet 0    gabapentin (NEURONTIN) 300 MG capsule TAKE 1 CAPSULE BY MOUTH ONCE DAILY IN THE MORNING AND 2 ONCE DAILY AT NIGHT 270 capsule 0    hydrOXYzine HCL (ATARAX) 25 MG tablet TAKE 1 TABLET BY MOUTH THREE TIMES DAILY AS NEEDED FOR ITCHING 21 tablet 0    insulin regular (HUMULIN R REGULAR U-100 INSULN) 100 unit/mL Inj injection Follow sliding scale printed out.  Check blood sugars 4 times a day (Patient taking differently: Follow sliding scale printed out.  Check blood sugars 4 times a day) 20 mL 11    LEVEMIR FLEXTOUCH U-100 INSULN 100 unit/mL (3 mL) InPn pen INJECT 50 UNITS SUBCUTANEOUSLY IN THE EVENING 15 mL 0    lisinopriL (PRINIVIL,ZESTRIL) 5 MG tablet Take 1 tablet by mouth once daily 90 tablet 1    meloxicam (MOBIC) 7.5 MG tablet Take 1 tablet by mouth once daily 30 tablet 0    metFORMIN (GLUCOPHAGE-XR) 750 MG ER 24hr tablet Take 1 tablet by mouth once daily with breakfast 90 tablet 0    metoprolol tartrate (LOPRESSOR) 25 MG tablet Take 1 tablet by mouth twice daily 180 tablet 0    ONETOUCH DELICA PLUS LANCET 33 gauge Misc USE   TO CHECK GLUCOSE TWICE DAILY 100 each 3    ONETOUCH ULTRA TEST Strp USE  STRIP TO CHECK GLUCOSE TWICE DAILY 100 each 3    pen needle, diabetic 30 gauge x 1/3" Ndle 1 pen by Misc.(Non-Drug; Combo Route) route once daily. To use with levimir flex pen 100 each 3    propranoloL (INDERAL LA) 60 MG 24 hr capsule Take 60 mg by mouth once daily.      rosuvastatin (CRESTOR) 20 MG tablet Take 1 tablet by mouth once daily 90 tablet 0    aspirin (ECOTRIN) " 81 MG EC tablet Take 1 tablet (81 mg total) by mouth once daily. 100 tablet 3    calcium carbonate (OS-GEORGINA) 500 mg calcium (1,250 mg) tablet Take 1 tablet (500 mg total) by mouth 2 (two) times daily. 200 tablet 3    calcium carbonate-vitamin D3 (LIQUID CALCIUM WITH VITAMIN D) 600 mg calcium- 200 unit Cap Take 1 capsule by mouth 2 (two) times daily. for 365 doses (Patient not taking: Reported on 5/3/2022) 60 capsule 11    magnesium oxide (MAG-OX) 400 mg (241.3 mg magnesium) tablet Take 1 tablet (400 mg total) by mouth once daily. 30 tablet 5    sertraline (ZOLOFT) 50 MG tablet Take 1 tablet (50 mg total) by mouth once daily. 30 tablet 11     No current facility-administered medications for this visit.     Facility-Administered Medications Ordered in Other Visits   Medication Dose Route Frequency Provider Last Rate Last Admin    0.9%  NaCl infusion   Intravenous Continuous Juancho White MD   Stopped at 12/16/20 1500       Family History:   Family History   Problem Relation Age of Onset    Hyperlipidemia Mother     Heart disease Mother        Social History:   Social History     Socioeconomic History    Marital status:    Occupational History    Occupation: retired    Tobacco Use    Smoking status: Never    Smokeless tobacco: Never   Substance and Sexual Activity    Alcohol use: No    Drug use: No       Review of Systems    Objective:     Vitals:    01/19/23 0806   BP: 130/78   Pulse: 110   Resp: 18   Temp: 97.6 °F (36.4 °C)        Physical Exam  Vitals and nursing note reviewed.   Constitutional:       General: She is not in acute distress.     Appearance: Normal appearance. She is well-developed and normal weight. She is not ill-appearing, toxic-appearing or diaphoretic.   HENT:      Head: Normocephalic and atraumatic.   Eyes:      Pupils: Pupils are equal, round, and reactive to light.   Cardiovascular:      Rate and Rhythm: Normal rate and regular rhythm.      Heart sounds: Normal heart sounds. No murmur  heard.    No friction rub. No gallop.   Pulmonary:      Effort: Pulmonary effort is normal. No respiratory distress.      Breath sounds: Normal breath sounds. No stridor. No wheezing, rhonchi or rales.   Chest:      Chest wall: No tenderness.   Musculoskeletal:      Right lower leg: No edema.      Left lower leg: No edema.        Legs:    Neurological:      Mental Status: She is alert.   Psychiatric:         Behavior: Behavior normal.         Thought Content: Thought content normal.         Judgment: Judgment normal.       Assessment:       1. Type 2 diabetes mellitus with diabetic polyneuropathy, with long-term current use of insulin    2. Mixed hyperlipidemia    3. Corn of foot    4. Pain of right lower extremity    5. Colon cancer screening          Plan:       Hoa was seen today for leg pain.    Diagnoses and all orders for this visit:    Type 2 diabetes mellitus with diabetic polyneuropathy, with long-term current use of insulin  -     Hemoglobin A1C; Future  -     Ambulatory referral/consult to Podiatry; Future  -     Ambulatory referral/consult to Optometry; Future    Mixed hyperlipidemia  -     Lipid Panel; Future    Corn of foot  -     Ambulatory referral/consult to Podiatry; Future    Pain of right lower extremity  -     US Lower Extremity Veins Bilateral; Future  -     magnesium oxide (MAG-OX) 400 mg (241.3 mg magnesium) tablet; Take 1 tablet (400 mg total) by mouth once daily.  -     Magnesium; Future    Colon cancer screening  -     Cologuard Screening (Multitarget Stool DNA); Future  -     Cologuard Screening (Multitarget Stool DNA)         Follow up in about 2 months (around 3/19/2023) for follow up DM, follow up leg pain.

## 2023-01-20 LAB
ANION GAP SERPL CALC-SCNC: 8 MMOL/L (ref 8–16)
BASOPHILS # BLD AUTO: 0.05 K/UL (ref 0–0.2)
BASOPHILS NFR BLD: 0.6 % (ref 0–1.9)
BUN SERPL-MCNC: 12 MG/DL (ref 8–23)
CALCIUM SERPL-MCNC: 8.3 MG/DL (ref 8.7–10.5)
CHLORIDE SERPL-SCNC: 100 MMOL/L (ref 95–110)
CO2 SERPL-SCNC: 23 MMOL/L (ref 23–29)
CREAT SERPL-MCNC: 0.4 MG/DL (ref 0.5–1.4)
DIFFERENTIAL METHOD: ABNORMAL
EOSINOPHIL # BLD AUTO: 0.1 K/UL (ref 0–0.5)
EOSINOPHIL NFR BLD: 0.9 % (ref 0–8)
ERYTHROCYTE [DISTWIDTH] IN BLOOD BY AUTOMATED COUNT: 12.6 % (ref 11.5–14.5)
EST. GFR  (NO RACE VARIABLE): >60 ML/MIN/1.73 M^2
GLUCOSE SERPL-MCNC: 282 MG/DL (ref 70–110)
HCT VFR BLD AUTO: 36.8 % (ref 37–48.5)
HGB BLD-MCNC: 13 G/DL (ref 12–16)
IMM GRANULOCYTES # BLD AUTO: 0.05 K/UL (ref 0–0.04)
IMM GRANULOCYTES NFR BLD AUTO: 0.6 % (ref 0–0.5)
LYMPHOCYTES # BLD AUTO: 3 K/UL (ref 1–4.8)
LYMPHOCYTES NFR BLD: 33.4 % (ref 18–48)
MCH RBC QN AUTO: 29.6 PG (ref 27–31)
MCHC RBC AUTO-ENTMCNC: 35.3 G/DL (ref 32–36)
MCV RBC AUTO: 84 FL (ref 82–98)
MONOCYTES # BLD AUTO: 0.6 K/UL (ref 0.3–1)
MONOCYTES NFR BLD: 6.7 % (ref 4–15)
NEUTROPHILS # BLD AUTO: 5.2 K/UL (ref 1.8–7.7)
NEUTROPHILS NFR BLD: 57.8 % (ref 38–73)
NRBC BLD-RTO: 0 /100 WBC
PLATELET # BLD AUTO: 315 K/UL (ref 150–450)
PMV BLD AUTO: 9.6 FL (ref 9.2–12.9)
POTASSIUM SERPL-SCNC: 3.4 MMOL/L (ref 3.5–5.1)
RBC # BLD AUTO: 4.39 M/UL (ref 4–5.4)
SODIUM SERPL-SCNC: 131 MMOL/L (ref 136–145)
TSH SERPL DL<=0.005 MIU/L-ACNC: 1.75 UIU/ML (ref 0.34–5.6)
TSH SERPL DL<=0.005 MIU/L-ACNC: 1.75 UIU/ML (ref 0.34–5.6)
WBC # BLD AUTO: 8.94 K/UL (ref 3.9–12.7)

## 2023-01-20 PROCEDURE — 83036 HEMOGLOBIN GLYCOSYLATED A1C: CPT | Performed by: INTERNAL MEDICINE

## 2023-01-20 PROCEDURE — 63600175 PHARM REV CODE 636 W HCPCS: Performed by: STUDENT IN AN ORGANIZED HEALTH CARE EDUCATION/TRAINING PROGRAM

## 2023-01-20 PROCEDURE — 25000003 PHARM REV CODE 250: Performed by: STUDENT IN AN ORGANIZED HEALTH CARE EDUCATION/TRAINING PROGRAM

## 2023-01-20 PROCEDURE — 63600175 PHARM REV CODE 636 W HCPCS: Performed by: INTERNAL MEDICINE

## 2023-01-20 PROCEDURE — 82962 GLUCOSE BLOOD TEST: CPT

## 2023-01-20 PROCEDURE — 85025 COMPLETE CBC W/AUTO DIFF WBC: CPT | Performed by: INTERNAL MEDICINE

## 2023-01-20 PROCEDURE — 36415 COLL VENOUS BLD VENIPUNCTURE: CPT | Performed by: INTERNAL MEDICINE

## 2023-01-20 PROCEDURE — 25000003 PHARM REV CODE 250: Performed by: INTERNAL MEDICINE

## 2023-01-20 PROCEDURE — 80048 BASIC METABOLIC PNL TOTAL CA: CPT | Performed by: INTERNAL MEDICINE

## 2023-01-20 PROCEDURE — 84443 ASSAY THYROID STIM HORMONE: CPT | Performed by: INTERNAL MEDICINE

## 2023-01-20 PROCEDURE — 12000002 HC ACUTE/MED SURGE SEMI-PRIVATE ROOM

## 2023-01-20 RX ORDER — INSULIN ASPART 100 [IU]/ML
0-5 INJECTION, SOLUTION INTRAVENOUS; SUBCUTANEOUS
Status: DISCONTINUED | OUTPATIENT
Start: 2023-01-20 | End: 2023-01-21

## 2023-01-20 RX ORDER — ACETAMINOPHEN 325 MG/1
650 TABLET ORAL EVERY 8 HOURS PRN
Status: DISCONTINUED | OUTPATIENT
Start: 2023-01-20 | End: 2023-01-22 | Stop reason: HOSPADM

## 2023-01-20 RX ORDER — LISINOPRIL 5 MG/1
5 TABLET ORAL DAILY
Status: DISCONTINUED | OUTPATIENT
Start: 2023-01-20 | End: 2023-01-22 | Stop reason: HOSPADM

## 2023-01-20 RX ORDER — ASPIRIN 81 MG/1
81 TABLET ORAL DAILY
Status: DISCONTINUED | OUTPATIENT
Start: 2023-01-20 | End: 2023-01-22 | Stop reason: HOSPADM

## 2023-01-20 RX ORDER — TALC
6 POWDER (GRAM) TOPICAL NIGHTLY PRN
Status: DISCONTINUED | OUTPATIENT
Start: 2023-01-20 | End: 2023-01-22 | Stop reason: HOSPADM

## 2023-01-20 RX ORDER — CALCIUM GLUCONATE 20 MG/ML
2 INJECTION, SOLUTION INTRAVENOUS
Status: DISCONTINUED | OUTPATIENT
Start: 2023-01-20 | End: 2023-01-22 | Stop reason: HOSPADM

## 2023-01-20 RX ORDER — INSULIN ASPART 100 [IU]/ML
10 INJECTION, SOLUTION INTRAVENOUS; SUBCUTANEOUS ONCE
Status: COMPLETED | OUTPATIENT
Start: 2023-01-20 | End: 2023-01-20

## 2023-01-20 RX ORDER — LANOLIN ALCOHOL/MO/W.PET/CERES
800 CREAM (GRAM) TOPICAL
Status: DISCONTINUED | OUTPATIENT
Start: 2023-01-20 | End: 2023-01-22 | Stop reason: HOSPADM

## 2023-01-20 RX ORDER — ATORVASTATIN CALCIUM 40 MG/1
80 TABLET, FILM COATED ORAL DAILY
Status: DISCONTINUED | OUTPATIENT
Start: 2023-01-20 | End: 2023-01-22 | Stop reason: HOSPADM

## 2023-01-20 RX ORDER — METOPROLOL TARTRATE 25 MG/1
25 TABLET, FILM COATED ORAL 2 TIMES DAILY
Status: DISCONTINUED | OUTPATIENT
Start: 2023-01-20 | End: 2023-01-22 | Stop reason: HOSPADM

## 2023-01-20 RX ORDER — IBUPROFEN 200 MG
24 TABLET ORAL
Status: DISCONTINUED | OUTPATIENT
Start: 2023-01-20 | End: 2023-01-22 | Stop reason: HOSPADM

## 2023-01-20 RX ORDER — POTASSIUM CHLORIDE 7.45 MG/ML
80 INJECTION INTRAVENOUS
Status: DISCONTINUED | OUTPATIENT
Start: 2023-01-20 | End: 2023-01-22 | Stop reason: HOSPADM

## 2023-01-20 RX ORDER — LEVOTHYROXINE SODIUM 25 UG/1
50 TABLET ORAL
Status: DISCONTINUED | OUTPATIENT
Start: 2023-01-20 | End: 2023-01-22 | Stop reason: HOSPADM

## 2023-01-20 RX ORDER — HYDROCODONE BITARTRATE AND ACETAMINOPHEN 5; 325 MG/1; MG/1
1 TABLET ORAL EVERY 4 HOURS PRN
Status: DISCONTINUED | OUTPATIENT
Start: 2023-01-20 | End: 2023-01-22 | Stop reason: HOSPADM

## 2023-01-20 RX ORDER — MAGNESIUM SULFATE HEPTAHYDRATE 40 MG/ML
2 INJECTION, SOLUTION INTRAVENOUS
Status: DISCONTINUED | OUTPATIENT
Start: 2023-01-20 | End: 2023-01-22 | Stop reason: HOSPADM

## 2023-01-20 RX ORDER — POTASSIUM CHLORIDE 7.45 MG/ML
60 INJECTION INTRAVENOUS
Status: DISCONTINUED | OUTPATIENT
Start: 2023-01-20 | End: 2023-01-22 | Stop reason: HOSPADM

## 2023-01-20 RX ORDER — POTASSIUM CHLORIDE 7.45 MG/ML
40 INJECTION INTRAVENOUS
Status: DISCONTINUED | OUTPATIENT
Start: 2023-01-20 | End: 2023-01-22 | Stop reason: HOSPADM

## 2023-01-20 RX ORDER — CALCIUM GLUCONATE 20 MG/ML
3 INJECTION, SOLUTION INTRAVENOUS
Status: DISCONTINUED | OUTPATIENT
Start: 2023-01-20 | End: 2023-01-22 | Stop reason: HOSPADM

## 2023-01-20 RX ORDER — SERTRALINE HYDROCHLORIDE 50 MG/1
50 TABLET, FILM COATED ORAL DAILY
Status: DISCONTINUED | OUTPATIENT
Start: 2023-01-20 | End: 2023-01-22 | Stop reason: HOSPADM

## 2023-01-20 RX ORDER — SODIUM,POTASSIUM PHOSPHATES 280-250MG
2 POWDER IN PACKET (EA) ORAL
Status: DISCONTINUED | OUTPATIENT
Start: 2023-01-20 | End: 2023-01-22 | Stop reason: HOSPADM

## 2023-01-20 RX ORDER — ONDANSETRON 2 MG/ML
4 INJECTION INTRAMUSCULAR; INTRAVENOUS EVERY 8 HOURS PRN
Status: DISCONTINUED | OUTPATIENT
Start: 2023-01-20 | End: 2023-01-22 | Stop reason: HOSPADM

## 2023-01-20 RX ORDER — LANOLIN ALCOHOL/MO/W.PET/CERES
400 CREAM (GRAM) TOPICAL DAILY
Status: DISCONTINUED | OUTPATIENT
Start: 2023-01-20 | End: 2023-01-22 | Stop reason: HOSPADM

## 2023-01-20 RX ORDER — CEFEPIME HYDROCHLORIDE 1 G/50ML
1 INJECTION, SOLUTION INTRAVENOUS
Status: DISCONTINUED | OUTPATIENT
Start: 2023-01-20 | End: 2023-01-20

## 2023-01-20 RX ORDER — IBUPROFEN 200 MG
16 TABLET ORAL
Status: DISCONTINUED | OUTPATIENT
Start: 2023-01-20 | End: 2023-01-22 | Stop reason: HOSPADM

## 2023-01-20 RX ORDER — FAMOTIDINE 20 MG/1
20 TABLET, FILM COATED ORAL DAILY
Status: DISCONTINUED | OUTPATIENT
Start: 2023-01-20 | End: 2023-01-22 | Stop reason: HOSPADM

## 2023-01-20 RX ORDER — CALCIUM GLUCONATE 20 MG/ML
1 INJECTION, SOLUTION INTRAVENOUS
Status: DISCONTINUED | OUTPATIENT
Start: 2023-01-20 | End: 2023-01-22 | Stop reason: HOSPADM

## 2023-01-20 RX ORDER — GLUCAGON 1 MG
1 KIT INJECTION
Status: DISCONTINUED | OUTPATIENT
Start: 2023-01-20 | End: 2023-01-22 | Stop reason: HOSPADM

## 2023-01-20 RX ORDER — MAGNESIUM SULFATE HEPTAHYDRATE 40 MG/ML
4 INJECTION, SOLUTION INTRAVENOUS
Status: DISCONTINUED | OUTPATIENT
Start: 2023-01-20 | End: 2023-01-22 | Stop reason: HOSPADM

## 2023-01-20 RX ORDER — ENOXAPARIN SODIUM 100 MG/ML
40 INJECTION SUBCUTANEOUS EVERY 24 HOURS
Status: DISCONTINUED | OUTPATIENT
Start: 2023-01-20 | End: 2023-01-22 | Stop reason: HOSPADM

## 2023-01-20 RX ADMIN — ATORVASTATIN CALCIUM 80 MG: 40 TABLET, FILM COATED ORAL at 08:01

## 2023-01-20 RX ADMIN — LISINOPRIL 5 MG: 5 TABLET ORAL at 08:01

## 2023-01-20 RX ADMIN — METOPROLOL TARTRATE 25 MG: 25 TABLET, FILM COATED ORAL at 09:01

## 2023-01-20 RX ADMIN — MAGNESIUM OXIDE 400 MG: 400 TABLET ORAL at 08:01

## 2023-01-20 RX ADMIN — FAMOTIDINE 20 MG: 20 TABLET, FILM COATED ORAL at 08:01

## 2023-01-20 RX ADMIN — ENOXAPARIN SODIUM 40 MG: 100 INJECTION SUBCUTANEOUS at 06:01

## 2023-01-20 RX ADMIN — INSULIN ASPART 10 UNITS: 100 INJECTION, SOLUTION INTRAVENOUS; SUBCUTANEOUS at 08:01

## 2023-01-20 RX ADMIN — POTASSIUM BICARBONATE 35 MEQ: 391 TABLET, EFFERVESCENT ORAL at 07:01

## 2023-01-20 RX ADMIN — CEFTRIAXONE 1 G: 1 INJECTION, SOLUTION INTRAVENOUS at 01:01

## 2023-01-20 RX ADMIN — ASPIRIN 81 MG: 81 TABLET, COATED ORAL at 08:01

## 2023-01-20 RX ADMIN — INSULIN DETEMIR 30 UNITS: 100 INJECTION, SOLUTION SUBCUTANEOUS at 06:01

## 2023-01-20 RX ADMIN — METOPROLOL TARTRATE 25 MG: 25 TABLET, FILM COATED ORAL at 08:01

## 2023-01-20 RX ADMIN — SERTRALINE HYDROCHLORIDE 50 MG: 50 TABLET ORAL at 08:01

## 2023-01-20 RX ADMIN — LEVOTHYROXINE SODIUM 50 MCG: 25 TABLET ORAL at 05:01

## 2023-01-20 RX ADMIN — ONDANSETRON 4 MG: 2 INJECTION INTRAMUSCULAR; INTRAVENOUS at 02:01

## 2023-01-20 RX ADMIN — INSULIN DETEMIR 30 UNITS: 100 INJECTION, SOLUTION SUBCUTANEOUS at 03:01

## 2023-01-20 NOTE — CARE UPDATE
patient is currently AO x3,  by her bedside.    Her sugars are uncontrolled, she states that she uses 70 units of insulin but is not compliant with it because sometimes she forgets to check her blood sugars and  or take her medication.  She states that she only takes it when she remembers to.  Her and her  live alone and  have no one to help them with their medications but states that the granddaughter would be coming to help them soon.  Per MAR, patient takes 30 units instead of 70 like she told me??, however I would continue her on  30 units daily and titrate upwards as tolerated.  Consider oral medications on discharge to aid with compliance  H&P per previous physician, labs and images reviewed.  Continue current care management.

## 2023-01-20 NOTE — PLAN OF CARE
Met at bedside with patient and Edmond Wilde (spouse) [phone]. Patient not oriented to place or time so spouse completed discharge assessment. Spouse states patient DOES NOT have a living will and no one has medical POA.    [facility]  Initial Discharge Assessment       Primary Care Provider: Carlos Pope MD    Admission Diagnosis: Metabolic encephalopathy [G93.41]    Admission Date: 1/19/2023  Expected Discharge Date:     Discharge Barriers Identified: (P) None    Payor: [employer] / Plan: [employer] MEDICARE PFFS / Product Type: Medicare Advantage /     Extended Emergency Contact Information  Primary Emergency Contact: Radha Lechuga  Mobile Phone: [phone]  Relation: Grandchild  Preferred language: English   needed? No    Discharge Plan A: (P) Home with family  Discharge Plan B: (P) Home with family      [id number] - [place] - [address]  [address]  [place] [address]  Phone: [phone] Fax: [phone]    [facility]  [address]  [place] [address]  Phone: [phone] Fax: [phone]      Initial Assessment (most recent)       Adult Discharge Assessment - 01/20/23 1446          Discharge Assessment    Assessment Type Discharge Planning Assessment (P)      Confirmed/corrected address, phone number and insurance Yes (P)      Confirmed Demographics Correct on Facesheet (P)      Source of Information family (P)      Communicated CAR with patient/caregiver No (P)      Reason For Admission Metabolic encephalopathy (P)      People in Home spouse (P)      Facility Arrived From: home (P)      Do you expect to return to your current living situation? Yes (P)      Do you have help at home or someone to help you manage your care at home? Yes (P)      Who are your caregiver(s) and their phone number(s)? Edmond Wilde (spouse) [phone] (P)      Current cognitive status: Not Oriented to Time;Not Oriented to Place (P)       Equipment Currently Used at Home none (P)      Readmission within 30 days? No (P)      Patient currently being followed by outpatient case management? No (P)      Do you currently have service(s) that help you manage your care at home? No (P)      Do you take prescription medications? Yes (P)      Do you have prescription coverage? Yes (P)      Coverage [employer] (P)      Do you have any problems affording any of your prescribed medications? No (P)      Who is going to help you get home at discharge? Patient instructed to make post hospital follow up appointment with their PCP in 7 to 10 days from discharge (P)      How do you get to doctors appointments? family or friend will provide (P)      Are you on dialysis? No (P)      Do you take coumadin? No (P)      Discharge Plan A Home with family (P)      Discharge Plan B Home with family (P)      DME Needed Upon Discharge  none (P)      Discharge Plan discussed with: Spouse/sig other (P)      Name(s) and Number(s) Edmond Wilde (spouse) [phone] (P)      Discharge Barriers Identified None (P)         OTHER    Name(s) of People in Home Edmond Wilde (spouse) [phone] (P)

## 2023-01-20 NOTE — SUBJECTIVE & OBJECTIVE
Past Medical History:   Diagnosis Date    Coronary artery disease     Diabetes mellitus, type 2     Hyperlipidemia     Hypertension     Hypothyroidism     Neuropathy        Past Surgical History:   Procedure Laterality Date    ANGIOGRAPHY OF LOWER EXTREMITY Left 12/16/2020    Procedure: ANGIOGRAM, LOWER EXTREMITY;  Surgeon: Juancho White MD;  Location: [facility] CATH/EP LAB;  Service: General;  Laterality: Left;    CHOLECYSTECTOMY      CORONARY ARTERY BYPASS GRAFT      FOOT SURGERY Left     HYSTERECTOMY         Review of patient's allergies indicates:  No Known Allergies    Current Facility-Administered Medications on File Prior to Encounter   Medication    0.9%  NaCl infusion     Current Outpatient Medications on File Prior to Encounter   Medication Sig    alendronate (FOSAMAX) 70 MG tablet Take 1 tablet by mouth once a week    aspirin (ECOTRIN) 81 MG EC tablet Take 1 tablet (81 mg total) by mouth once daily.    betamethasone valerate (LUXIQ) 0.12 % foam Apply topically 2 (two) times daily.    blood-glucose meter kit To check BG 2 times daily, to use with insurance preferred meter    calcium carbonate (OS-GEORIGNA) 500 mg calcium (1,250 mg) tablet Take 1 tablet (500 mg total) by mouth 2 (two) times daily.    calcium carbonate-vitamin D3 (LIQUID CALCIUM WITH VITAMIN D) 600 mg calcium- 200 unit Cap Take 1 capsule by mouth 2 (two) times daily. for 365 doses (Patient not taking: Reported on 5/3/2022)    EUTHYROX 50 mcg tablet TAKE 1 TABLET BY MOUTH BEFORE BREAKFAST    famotidine (PEPCID) 20 MG tablet Take 1 tablet by mouth once daily    fluconazole (DIFLUCAN) 150 MG Tab TAKE 1 TABLET BY MOUTH ONCE DAILY FOR 1 DAY    gabapentin (NEURONTIN) 300 MG capsule TAKE 1 CAPSULE BY MOUTH ONCE DAILY IN THE MORNING AND 2 ONCE DAILY AT NIGHT    hydrOXYzine HCL (ATARAX) 25 MG tablet TAKE 1 TABLET BY MOUTH THREE TIMES DAILY AS NEEDED FOR ITCHING    insulin regular (HUMULIN R REGULAR U-100 INSULN) 100 unit/mL Inj injection Follow sliding  "scale printed out.  Check blood sugars 4 times a day (Patient taking differently: Follow sliding scale printed out.  Check blood sugars 4 times a day)    LEVEMIR FLEXTOUCH U-100 INSULN 100 unit/mL (3 mL) InPn pen INJECT 50 UNITS SUBCUTANEOUSLY IN THE EVENING    lisinopriL (PRINIVIL,ZESTRIL) 5 MG tablet Take 1 tablet by mouth once daily    magnesium oxide (MAG-OX) 400 mg (241.3 mg magnesium) tablet Take 1 tablet (400 mg total) by mouth once daily.    meloxicam (MOBIC) 7.5 MG tablet Take 1 tablet by mouth once daily    metFORMIN (GLUCOPHAGE-XR) 750 MG ER 24hr tablet Take 1 tablet by mouth once daily with breakfast    metoprolol tartrate (LOPRESSOR) 25 MG tablet Take 1 tablet by mouth twice daily    ONETOUCH DELICA PLUS LANCET 33 gauge Misc USE   TO CHECK GLUCOSE TWICE DAILY    ONETOUCH ULTRA TEST Strp USE  STRIP TO CHECK GLUCOSE TWICE DAILY    pen needle, diabetic 30 gauge x 1/3" Ndle 1 pen by Misc.(Non-Drug; Combo Route) route once daily. To use with levimir flex pen    propranoloL (INDERAL LA) 60 MG 24 hr capsule Take 60 mg by mouth once daily.    rosuvastatin (CRESTOR) 20 MG tablet Take 1 tablet by mouth once daily    sertraline (ZOLOFT) 50 MG tablet Take 1 tablet (50 mg total) by mouth once daily.     Family History       Problem Relation (Age of Onset)    Heart disease Mother    Hyperlipidemia Mother          Tobacco Use    Smoking status: Never    Smokeless tobacco: Never   Substance and Sexual Activity    Alcohol use: No    Drug use: No    Sexual activity: Not on file     Review of Systems   Constitutional:  Positive for fatigue.   HENT: Negative.     Eyes: Negative.    Respiratory: Negative.     Cardiovascular: Negative.    Gastrointestinal: Negative.    Endocrine: Negative.    Genitourinary: Negative.    Musculoskeletal: Negative.    Skin: Negative.    Allergic/Immunologic: Negative.    Neurological: Negative.    Hematological: Negative.    All other systems reviewed and are negative.  Objective:     Vital " Signs (Most Recent):  Temp: 98.7 °F (37.1 °C) (01/19/23 2210)  Pulse: 95 (01/20/23 0000)  Resp: 18 (01/19/23 1818)  BP: (!) 119/54 (01/20/23 0000)  SpO2: 95 % (01/20/23 0000)   Vital Signs (24h Range):  Temp:  [97.6 °F (36.4 °C)-100.6 °F (38.1 °C)] 98.7 °F (37.1 °C)  Pulse:  [] 95  Resp:  [18] 18  SpO2:  [95 %-99 %] 95 %  BP: (119-170)/(54-81) 119/54     Weight: 65.3 kg (144 lb)  Body mass index is 28.12 kg/m².    Physical Exam  Vitals and nursing note reviewed.   Constitutional:       Appearance: She is well-developed. She is obese.   HENT:      Head: Normocephalic and atraumatic.      Right Ear: External ear normal.      Left Ear: External ear normal.      Nose: Nose normal.   Eyes:      Conjunctiva/sclera: Conjunctivae normal.      Pupils: Pupils are equal, round, and reactive to light.   Cardiovascular:      Rate and Rhythm: Normal rate and regular rhythm.      Heart sounds: Normal heart sounds.   Pulmonary:      Effort: Pulmonary effort is normal.      Breath sounds: Normal breath sounds.      Comments: Decreased entrey without adventitious sounds  Abdominal:      General: Bowel sounds are normal.      Palpations: Abdomen is soft.   Musculoskeletal:         General: Normal range of motion.      Cervical back: Normal range of motion and neck supple.   Skin:     General: Skin is warm and dry.      Capillary Refill: Capillary refill takes less than 2 seconds.   Neurological:      Mental Status: She is alert and oriented to person, place, and time.   Psychiatric:         Behavior: Behavior normal.         Thought Content: Thought content normal.         Judgment: Judgment normal.         CRANIAL NERVES     CN III, IV, VI   Pupils are equal, round, and reactive to light.     Significant Labs: All pertinent labs within the past 24 hours have been reviewed.  CBC:   Recent Labs   Lab 01/19/23  1859   WBC 11.42   HGB 13.5   HCT 38.6        CMP:   Recent Labs   Lab 01/19/23  1859   *   K 3.6   CL 95    CO2 19*   *   BUN 11   CREATININE 0.5   CALCIUM 8.4*   PROT 6.8   ALBUMIN 3.6   BILITOT 1.1*   ALKPHOS 80   AST 15   ALT 14   ANIONGAP 16     Cardiac Markers:   Recent Labs   Lab 01/19/23  1859   *     Lactic Acid:   Recent Labs   Lab 01/19/23 1859 01/19/23  2222   LACTATE 1.4 1.4     Troponin:   Recent Labs   Lab 01/19/23  1859   TROPONINIHS 11.1     Urine Studies:   Recent Labs   Lab 01/19/23  1859   COLORU Yellow   APPEARANCEUA Clear   PHUR 7.0   SPECGRAV >1.030*   PROTEINUA Trace*   GLUCUA 4+*   KETONESU 2+*   BILIRUBINUA Negative   OCCULTUA Negative   NITRITE Negative   UROBILINOGEN Negative   LEUKOCYTESUR Trace*   RBCUA 8*   WBCUA 29*   BACTERIA Negative   SQUAMEPITHEL 11   HYALINECASTS 9*       Significant Imaging: I have reviewed all pertinent imaging results/findings within the past 24 hours.

## 2023-01-20 NOTE — ASSESSMENT & PLAN NOTE
Patient's FSGs are controlled on current medication regimen.  Last A1c reviewed-   Lab Results   Component Value Date    HGBA1C 12.4 (H) 09/17/2019     Most recent fingerstick glucose reviewed- No results for input(s): POCTGLUCOSE in the last 24 hours.  Current correctional scale  Low  Maintain anti-hyperglycemic dose as follows-   Antihyperglycemics (From admission, onward)    None        Hold Oral hypoglycemics while patient is in the hospital.    Inpatient admission for Metabolic Encephalopathy secondary to UTI; because the patient responded so well to volume and antibiotics in ED, antibiotic spectrum was decreased from piperacillin to ceftriaxone; continue home regimen for chronic illnesses; AM labs for review--Decreased long acting from 50 units to 30 units while admitted

## 2023-01-20 NOTE — ED PROVIDER NOTES
Encounter Date: 1/19/2023       History     Chief Complaint   Patient presents with    Fatigue     Pt arrived by ems from home, pt states generalized weakness, intermittent confusion and pain all over. Pt was seen at pcp today. Ems report cbg [id number].  Upon arrival to er pt states she currently has no pain      72-year-old female with a history of hypertension, hyperlipidemia, CAD, diabetes brought in by EMS with altered mental status.  The patient reports diffuse pain that is more pronounced in her legs.  She also reports diarrhea.  She has confused and as such history is limited.  History was supplemented by nursing staff as EMS is no longer available for collateral.  No reported fevers or trauma.    Review of patient's allergies indicates:  No Known Allergies  Past Medical History:   Diagnosis Date    Coronary artery disease     Diabetes mellitus, type 2     Hyperlipidemia     Hypertension     Hypothyroidism     Neuropathy      Past Surgical History:   Procedure Laterality Date    ANGIOGRAPHY OF LOWER EXTREMITY Left 12/16/2020    Procedure: ANGIOGRAM, LOWER EXTREMITY;  Surgeon: Juancho White MD;  Location: [facility] CATH/EP LAB;  Service: General;  Laterality: Left;    CHOLECYSTECTOMY      CORONARY ARTERY BYPASS GRAFT      FOOT SURGERY Left     HYSTERECTOMY       Family History   Problem Relation Age of Onset    Hyperlipidemia Mother     Heart disease Mother      Social History     Tobacco Use    Smoking status: Never    Smokeless tobacco: Never   Substance Use Topics    Alcohol use: No    Drug use: No     Review of Systems   Unable to perform ROS: Mental status change     Physical Exam     Initial Vitals [01/19/23 1818]   BP Pulse Resp Temp SpO2   (!) 170/81 (!) 111 18 98.9 °F (37.2 °C) 99 %      MAP       --         Physical Exam    Nursing note and vitals reviewed.  Constitutional:   Elderly   HENT:   Head: Normocephalic and atraumatic.   Eyes: EOM are normal.   Neck: Neck supple.   Normal range of  motion.  Cardiovascular:  Regular rhythm, normal heart sounds and intact distal pulses.           Tachycardic to 115   Pulmonary/Chest: Breath sounds normal. She has no wheezes. She has no rales.   Abdominal: Abdomen is soft. There is no abdominal tenderness.   Musculoskeletal:      Cervical back: Normal range of motion and neck supple.     Neurological:   Intermittent confusion, follows commands, moves all extremities symmetrically   Skin: Skin is warm and dry.     ED Course   Critical Care    Date/Time: 1/19/2023 10:04 PM  Performed by: Natali Moore MD  Authorized by: Natali Moore MD   Direct patient critical care time: 15 minutes  Additional history critical care time: 5 minutes  Ordering / reviewing critical care time: 5 minutes  Documentation critical care time: 5 minutes  Consulting other physicians critical care time: 5 minutes  Total critical care time (exclusive of procedural time) : 35 minutes  Critical care time was exclusive of separately billable procedures and treating other patients and teaching time.  Critical care was necessary to treat or prevent imminent or life-threatening deterioration of the following conditions: sepsis.  Critical care was time spent personally by me on the following activities: discussions with consultants, examination of patient, obtaining history from patient or surrogate, ordering and performing treatments and interventions, ordering and review of laboratory studies, ordering and review of radiographic studies, pulse oximetry and re-evaluation of patient's condition.      Labs Reviewed   CBC W/ AUTO DIFFERENTIAL - Abnormal; Notable for the following components:       Result Value    Gran # (ANC) 9.3 (*)     Immature Grans (Abs) 0.06 (*)     Gran % 81.5 (*)     Lymph % 14.1 (*)     Mono % 3.2 (*)     All other components within normal limits   COMPREHENSIVE METABOLIC PANEL - Abnormal; Notable for the following components:    Sodium 130 (*)     CO2 19 (*)      Glucose 375 (*)     Calcium 8.4 (*)     Total Bilirubin 1.1 (*)     All other components within normal limits   URINALYSIS, REFLEX TO URINE CULTURE - Abnormal; Notable for the following components:    Specific Gravity, UA >1.030 (*)     Protein, UA Trace (*)     Glucose, UA 4+ (*)     Ketones, UA 2+ (*)     Leukocytes, UA Trace (*)     All other components within normal limits    Narrative:     Specimen Source->Urine   MAGNESIUM - Abnormal; Notable for the following components:    Magnesium 1.5 (*)     All other components within normal limits   B-TYPE NATRIURETIC PEPTIDE - Abnormal; Notable for the following components:     (*)     All other components within normal limits   URINALYSIS MICROSCOPIC - Abnormal; Notable for the following components:    RBC, UA 8 (*)     WBC, UA 29 (*)     Yeast, UA Rare (*)     Hyaline Casts, UA 9 (*)     All other components within normal limits    Narrative:     Specimen Source->Urine   POCT GLUCOSE - Abnormal; Notable for the following components:    POC Glucose 409 (*)     All other components within normal limits   CULTURE, BLOOD   CULTURE, BLOOD   CULTURE, URINE   LACTIC ACID, PLASMA   INFLUENZA A AND B ANTIGEN    Narrative:     Specimen Source->Nasopharyngeal Swab   PROTIME-INR   APTT   TROPONIN I HIGH SENSITIVITY   PROCALCITONIN   SARS-COV-2 RNA AMPLIFICATION, QUAL   BETA - HYDROXYBUTYRATE, SERUM   LACTIC ACID, PLASMA   POCT GLUCOSE MONITORING CONTINUOUS     EKG Readings: (Independently Interpreted)   EKG is sinus tachycardia rate of 104 beats per minute with no ST elevation, T-wave inversion in 1 and aVL as well as V3 through V6     Imaging Results              CT Head Without Contrast (Final result)  Result time 01/19/23 20:59:54      Final result by Allan Aguila MD (01/19/23 20:59:54)                   Narrative:    EXAMINATION: CT HEAD WITHOUT CONTRAST    CLINICAL HISTORY: Mental status change, unknown cause    COMPARISON: None    TECHNIQUE: Noncontrast axial  images of the head were obtained. Sagittal and coronal reformatted images obtained.    FINDINGS:  CT head: Due to patient motion artifact the study was performed with the lungs. The paranasal sinuses and mastoid air cells are clear. No bone lesion is demonstrated.    The cerebellar tonsils are in the appropriate position. The pituitary gland is not enlarged. The ventricles are normal in size. No intracranial hemorrhage or mass lesion is demonstrated.    Mild to moderate age-related small vessel white matter changes are symmetrical. No evidence of prior large vessel ischemia.    Bilateral cataract surgery noted with the orbits otherwise normal. Prominent intracranial vertebral and carotid arterial calcification is present.    IMPRESSION:  1: Age-related small vessel changes. No acute intracranial finding.          RADIATION DOSE REDUCTION: This exam was performed according to our departmental dose-optimization program which includes use of Automated Exposure Control, adjustment of the mA and/or kV according to patient size and/or use of iterative reconstruction technique.    Electronically signed by:  Allan Aguila MD  1/19/2023 8:59 PM CST Workstation: [id number]                                     X-Ray Chest AP Portable (Final result)  Result time 01/19/23 19:23:57      Final result by Domingo Rae MD (01/19/23 19:23:57)                   Narrative:    Reason: Sepsis    FINDINGS:    Portable chest with comparison chest x-ray December 9, 2020 show normal cardiomediastinal silhouette.  Lungs are clear. Pulmonary vasculature is normal. No acute osseous abnormality.    IMPRESSION:    No acute cardiopulmonary abnormality.    Electronically signed by:  Domingo Rae DO  1/19/2023 7:23 PM CST Workstation: [id number]                                  X-Rays:   Independently Interpreted Readings:   Chest X-Ray: Normal heart size.  No infiltrates.  No acute abnormalities.   Medications   lactated ringers infusion (  Intravenous New Bag 1/19/23 2044)   lactated ringers bolus 1,000 mL (1,000 mLs Intravenous New Bag 1/19/23 1909)   piperacillin-tazobactam 4.5 g in dextrose 5 % 100 mL IVPB (ready to mix system) (0 g Intravenous Stopped 1/19/23 2206)   magnesium sulfate 2g in water 50mL IVPB (premix) (0 g Intravenous Stopped 1/19/23 2206)     Medical Decision Making:   ED Management:  72-year-old female presents emergency department with altered mental status.  Differential is broad and includes but not limited to CVA, intracranial hemorrhage, malignancy, sepsis, infection, electrolyte abnormality.  Patient found to be febrile and tachycardic here in the emergency department.  She also arrived covered in feces.  This was cleansed in the emergency department.  She has a white count of 11.4 with a left shift.  She has a bicarb of 19 with an elevated glucose and anion gap of 16.  Venous blood gas and beta hydroxybutyrate are pending at the time of this dictation.  Urinalysis with trace leukocytes, 29 wbc's, rare yeast but negative bacteria.  CT head unremarkable.  Chest x-ray unremarkable.  Patient received fluids per sepsis guidelines and was covered empirically with Zosyn.  We will consult Hospital Medicine for admission for sepsis due to urinary tract infection.  Awaiting venous blood gas and beta hydroxybutyrate to determine if patient requires insulin drip.    Natali Moore MD  Emergency Medicine  01/19/2023 10:07 PM                            Clinical Impression:   Final diagnoses:  [R41.82] Altered mental state  [N39.0] Urinary tract infection without hematuria, site unspecified (Primary)  [A41.9] Sepsis, due to unspecified organism, unspecified whether acute organ dysfunction present        ED Disposition Condition    Admit Stable                Natali Moore MD  01/19/23 2207

## 2023-01-20 NOTE — HPI
"72 year old female with history of DM 2, CAD, Hypothyroidism, HLD, HTN was brought to ED secondary to genralized weakness and "Pain all over". She was seen by her PCP for leg pain yesterday, and it was felt that she had polyneuropathy secondary to her long standing DM. However, even though she had related to the EMS personnel that she had "Pain all over", upon arrival in ED she denied having said that and then stated that she did not have any pain. She wa encephalopathic upon arrival. Currently after hydration and antibiotics for underlying UTI, she feels "Much better", and continues to deny any pain. She is currently alert and oriented X 3. She continued to complain "I'm weak all over." Reportedly when she arrived in the ED she was "Covered in feces", and was cleaned by the staff here.    In ED Labs reviewed and noted below: normal CBC, minimal hyponatremia with normal renal function; BNP is trivially elevated with normal troponin; lactate is normal; procalcitonin is below lower cut-off threshold. Flu and COVID are negative. She was cultured in ED. CT Head: no acute intracranial findings. CXR reviewed: NAPD. EKG reviewed: sinus with Q's in III and aVF; lateral wall T wave inversion, but no acute ST segments    Discussed with ED MD: Inpatient admission for Metabolic Encephalopathy secondary to UTI; because the patient responded so well to volume and antibiotics in ED, antibiotic spectrum was decreased from piperacillin to ceftriaxone; continue home regimen for chronic illnesses; AM labs for review  "

## 2023-01-20 NOTE — ASSESSMENT & PLAN NOTE
Inpatient admission for Metabolic Encephalopathy secondary to UTI; because the patient responded so well to volume and antibiotics in ED, antibiotic spectrum was decreased from piperacillin to ceftriaxone; continue home regimen for chronic illnesses; AM labs for review

## 2023-01-20 NOTE — PROGRESS NOTES
"   01/20/23 0007   Sepsis Timer   Sepsis Perfusion Assessment "I attest a sepsis perfusion exam was performed within 6 hours of sepsis, severe sepsis, or septic shock presentation, following fluid resuscitation."   Sepsis Perfusion meets criteria       "

## 2023-01-20 NOTE — H&P
"[facility] - Emergency Dept  Hospital Medicine  History & Physical    Patient Name: Hoa Wilde  MRN: [id number]  Patient Class: IP- Inpatient  Admission Date: 1/19/2023  Attending Physician: Lamin Sung MD  Primary Care Provider: Carlos Pope MD         Patient information was obtained from patient, past medical records, ER records and ED MD.     Subjective:     Principal Problem:Encephalopathy, metabolic    Chief Complaint:   Chief Complaint   Patient presents with    Fatigue     Pt arrived by ems from home, pt states generalized weakness, intermittent confusion and pain all over. Pt was seen at pcp today. Ems report cbg [id number].  Upon arrival to er pt states she currently has no pain         HPI: 72 year old female with history of DM 2, CAD, Hypothyroidism, HLD, HTN was brought to ED secondary to genralized weakness and "Pain all over". She was seen by her PCP for leg pain yesterday, and it was felt that she had polyneuropathy secondary to her long standing DM. However, even though she had related to the EMS personnel that she had "Pain all over", upon arrival in ED she denied having said that and then stated that she did not have any pain. She wa encephalopathic upon arrival. Currently after hydration and antibiotics for underlying UTI, she feels "Much better", and continues to deny any pain. She is currently alert and oriented X 3. She continued to complain "I'm weak all over." Reportedly when she arrived in the ED she was "Covered in feces", and was cleaned by the staff here.    In ED Labs reviewed and noted below: normal CBC, minimal hyponatremia with normal renal function; BNP is trivially elevated with normal troponin; lactate is normal; procalcitonin is below lower cut-off threshold. Flu and COVID are negative. She was cultured in ED. CT Head: no acute intracranial findings. CXR reviewed: NAPD. EKG reviewed: sinus with Q's in III and aVF; lateral wall T wave inversion, but no acute ST " segments    Discussed with ED MD: Inpatient admission for Metabolic Encephalopathy secondary to UTI; because the patient responded so well to volume and antibiotics in ED, antibiotic spectrum was decreased from piperacillin to ceftriaxone; continue home regimen for chronic illnesses; AM labs for review      Past Medical History:   Diagnosis Date    Coronary artery disease     Diabetes mellitus, type 2     Hyperlipidemia     Hypertension     Hypothyroidism     Neuropathy        Past Surgical History:   Procedure Laterality Date    ANGIOGRAPHY OF LOWER EXTREMITY Left 12/16/2020    Procedure: ANGIOGRAM, LOWER EXTREMITY;  Surgeon: Juancho White MD;  Location: [facility] CATH/EP LAB;  Service: General;  Laterality: Left;    CHOLECYSTECTOMY      CORONARY ARTERY BYPASS GRAFT      FOOT SURGERY Left     HYSTERECTOMY         Review of patient's allergies indicates:  No Known Allergies    Current Facility-Administered Medications on File Prior to Encounter   Medication    0.9%  NaCl infusion     Current Outpatient Medications on File Prior to Encounter   Medication Sig    alendronate (FOSAMAX) 70 MG tablet Take 1 tablet by mouth once a week    aspirin (ECOTRIN) 81 MG EC tablet Take 1 tablet (81 mg total) by mouth once daily.    betamethasone valerate (LUXIQ) 0.12 % foam Apply topically 2 (two) times daily.    blood-glucose meter kit To check BG 2 times daily, to use with insurance preferred meter    calcium carbonate (OS-GEORGINA) 500 mg calcium (1,250 mg) tablet Take 1 tablet (500 mg total) by mouth 2 (two) times daily.    calcium carbonate-vitamin D3 (LIQUID CALCIUM WITH VITAMIN D) 600 mg calcium- 200 unit Cap Take 1 capsule by mouth 2 (two) times daily. for 365 doses (Patient not taking: Reported on 5/3/2022)    EUTHYROX 50 mcg tablet TAKE 1 TABLET BY MOUTH BEFORE BREAKFAST    famotidine (PEPCID) 20 MG tablet Take 1 tablet by mouth once daily    fluconazole (DIFLUCAN) 150 MG Tab TAKE 1 TABLET BY MOUTH ONCE  "DAILY FOR 1 DAY    gabapentin (NEURONTIN) 300 MG capsule TAKE 1 CAPSULE BY MOUTH ONCE DAILY IN THE MORNING AND 2 ONCE DAILY AT NIGHT    hydrOXYzine HCL (ATARAX) 25 MG tablet TAKE 1 TABLET BY MOUTH THREE TIMES DAILY AS NEEDED FOR ITCHING    insulin regular (HUMULIN R REGULAR U-100 INSULN) 100 unit/mL Inj injection Follow sliding scale printed out.  Check blood sugars 4 times a day (Patient taking differently: Follow sliding scale printed out.  Check blood sugars 4 times a day)    LEVEMIR FLEXTOUCH U-100 INSULN 100 unit/mL (3 mL) InPn pen INJECT 50 UNITS SUBCUTANEOUSLY IN THE EVENING    lisinopriL (PRINIVIL,ZESTRIL) 5 MG tablet Take 1 tablet by mouth once daily    magnesium oxide (MAG-OX) 400 mg (241.3 mg magnesium) tablet Take 1 tablet (400 mg total) by mouth once daily.    meloxicam (MOBIC) 7.5 MG tablet Take 1 tablet by mouth once daily    metFORMIN (GLUCOPHAGE-XR) 750 MG ER 24hr tablet Take 1 tablet by mouth once daily with breakfast    metoprolol tartrate (LOPRESSOR) 25 MG tablet Take 1 tablet by mouth twice daily    ONETOUCH DELICA PLUS LANCET 33 gauge Misc USE   TO CHECK GLUCOSE TWICE DAILY    ONETOUCH ULTRA TEST Strp USE  STRIP TO CHECK GLUCOSE TWICE DAILY    pen needle, diabetic 30 gauge x 1/3" Ndle 1 pen by Misc.(Non-Drug; Combo Route) route once daily. To use with levimir flex pen    propranoloL (INDERAL LA) 60 MG 24 hr capsule Take 60 mg by mouth once daily.    rosuvastatin (CRESTOR) 20 MG tablet Take 1 tablet by mouth once daily    sertraline (ZOLOFT) 50 MG tablet Take 1 tablet (50 mg total) by mouth once daily.     Family History       Problem Relation (Age of Onset)    Heart disease Mother    Hyperlipidemia Mother          Tobacco Use    Smoking status: Never    Smokeless tobacco: Never   Substance and Sexual Activity    Alcohol use: No    Drug use: No    Sexual activity: Not on file     Review of Systems   Constitutional:  Positive for fatigue.   HENT: Negative.     Eyes: Negative.  "   Respiratory: Negative.     Cardiovascular: Negative.    Gastrointestinal: Negative.    Endocrine: Negative.    Genitourinary: Negative.    Musculoskeletal: Negative.    Skin: Negative.    Allergic/Immunologic: Negative.    Neurological: Negative.    Hematological: Negative.    All other systems reviewed and are negative.  Objective:     Vital Signs (Most Recent):  Temp: 98.7 °F (37.1 °C) (01/19/23 2210)  Pulse: 95 (01/20/23 0000)  Resp: 18 (01/19/23 1818)  BP: (!) 119/54 (01/20/23 0000)  SpO2: 95 % (01/20/23 0000)   Vital Signs (24h Range):  Temp:  [97.6 °F (36.4 °C)-100.6 °F (38.1 °C)] 98.7 °F (37.1 °C)  Pulse:  [] 95  Resp:  [18] 18  SpO2:  [95 %-99 %] 95 %  BP: (119-170)/(54-81) 119/54     Weight: 65.3 kg (144 lb)  Body mass index is 28.12 kg/m².    Physical Exam  Vitals and nursing note reviewed.   Constitutional:       Appearance: She is well-developed. She is obese.   HENT:      Head: Normocephalic and atraumatic.      Right Ear: External ear normal.      Left Ear: External ear normal.      Nose: Nose normal.   Eyes:      Conjunctiva/sclera: Conjunctivae normal.      Pupils: Pupils are equal, round, and reactive to light.   Cardiovascular:      Rate and Rhythm: Normal rate and regular rhythm.      Heart sounds: Normal heart sounds.   Pulmonary:      Effort: Pulmonary effort is normal.      Breath sounds: Normal breath sounds.      Comments: Decreased entrey without adventitious sounds  Abdominal:      General: Bowel sounds are normal.      Palpations: Abdomen is soft.   Musculoskeletal:         General: Normal range of motion.      Cervical back: Normal range of motion and neck supple.   Skin:     General: Skin is warm and dry.      Capillary Refill: Capillary refill takes less than 2 seconds.   Neurological:      Mental Status: She is alert and oriented to person, place, and time.   Psychiatric:         Behavior: Behavior normal.         Thought Content: Thought content normal.         Judgment:  Judgment normal.         CRANIAL NERVES     CN III, IV, VI   Pupils are equal, round, and reactive to light.     Significant Labs: All pertinent labs within the past 24 hours have been reviewed.  CBC:   Recent Labs   Lab 01/19/23 1859   WBC 11.42   HGB 13.5   HCT 38.6        CMP:   Recent Labs   Lab 01/19/23 1859   *   K 3.6   CL 95   CO2 19*   *   BUN 11   CREATININE 0.5   CALCIUM 8.4*   PROT 6.8   ALBUMIN 3.6   BILITOT 1.1*   ALKPHOS 80   AST 15   ALT 14   ANIONGAP 16     Cardiac Markers:   Recent Labs   Lab 01/19/23 1859   *     Lactic Acid:   Recent Labs   Lab 01/19/23 1859 01/19/23  2222   LACTATE 1.4 1.4     Troponin:   Recent Labs   Lab 01/19/23 1859   TROPONINIHS 11.1     Urine Studies:   Recent Labs   Lab 01/19/23 1859   COLORU Yellow   APPEARANCEUA Clear   PHUR 7.0   SPECGRAV >1.030*   PROTEINUA Trace*   GLUCUA 4+*   KETONESU 2+*   BILIRUBINUA Negative   OCCULTUA Negative   NITRITE Negative   UROBILINOGEN Negative   LEUKOCYTESUR Trace*   RBCUA 8*   WBCUA 29*   BACTERIA Negative   SQUAMEPITHEL 11   HYALINECASTS 9*       Significant Imaging: I have reviewed all pertinent imaging results/findings within the past 24 hours.    Assessment/Plan:     * Encephalopathy, metabolic  Inpatient admission for Metabolic Encephalopathy secondary to UTI; because the patient responded so well to volume and antibiotics in ED, antibiotic spectrum was decreased from piperacillin to ceftriaxone; continue home regimen for chronic illnesses; AM labs for review      Acute cystitis without hematuria  Inpatient admission for Metabolic Encephalopathy secondary to UTI; because the patient responded so well to volume and antibiotics in ED, antibiotic spectrum was decreased from piperacillin to ceftriaxone; continue home regimen for chronic illnesses; AM labs for review      Coronary artery disease  Inpatient admission for Metabolic Encephalopathy secondary to UTI; because the patient responded so well to  volume and antibiotics in ED, antibiotic spectrum was decreased from piperacillin to ceftriaxone; continue home regimen for chronic illnesses; AM labs for review      Hypertension  Inpatient admission for Metabolic Encephalopathy secondary to UTI; because the patient responded so well to volume and antibiotics in ED, antibiotic spectrum was decreased from piperacillin to ceftriaxone; continue home regimen for chronic illnesses; AM labs for review      Diabetes mellitus, type 2  Patient's FSGs are controlled on current medication regimen.  Last A1c reviewed-   Lab Results   Component Value Date    HGBA1C 12.4 (H) 09/17/2019     Most recent fingerstick glucose reviewed- No results for input(s): POCTGLUCOSE in the last 24 hours.  Current correctional scale  Low  Maintain anti-hyperglycemic dose as follows-   Antihyperglycemics (From admission, onward)    None        Hold Oral hypoglycemics while patient is in the hospital.    Inpatient admission for Metabolic Encephalopathy secondary to UTI; because the patient responded so well to volume and antibiotics in ED, antibiotic spectrum was decreased from piperacillin to ceftriaxone; continue home regimen for chronic illnesses; AM labs for review--Decreased long acting from 50 units to 30 units while admitted      VTE Risk Mitigation (From admission, onward)    None             Lamin Sung MD  Department of Hospital Medicine   [facility] - Emergency Dept

## 2023-01-20 NOTE — CARE UPDATE
Noted order for home health. Referral sent via [facility] to [facility] home [facility]. Spoke with Zehra at [facility] to inform of referral coming to them.

## 2023-01-20 NOTE — PROGRESS NOTES
"[facility]  Adult Nutrition   Progress Note (Initial Assessment)     SUMMARY     Recommendations  Recommendation/Intervention:   1. Recommend Diabetic 1800 kcal, Cardiac diet.   2. Recommend obtaining updated HbA1c (last value 9/2022).   3. Educate patient for plate method and consistent carbohydrate intake.    Goals: 1. Patient to consume >/= 50% EEN / EPN. 2. Labs trend to target range.  Communication of RD Recs: reviewed with physician    Dietitian Rounds Brief  RD screen for risk. Patient with Hb A1c > 10%.(Last value 9/2022). 72 year old female with history of DM 2, CAD, Hypothyroidism, HLD, HTN was brought to ED secondary to genralized weakness and "Pain all over". She was seen by her PCP for leg pain yesterday, and it was felt that she had polyneuropathy secondary to her long standing DM. Inpatient admission for Metabolic Encephalopathy secondary to UTI. Patient reported recent weight loss per nursing.   RD clarified patient diet order and discussed need for updated Hb A1c with MD via secure chat. RD to educate pt when appropriate or plate method and consistent carbohydrate intake. No current BM documented.    Diet order:   Current Diet Order: Diabetic 1800 kcal, Cardiac diet      Evaluation of Received Nutrient/Fluid Intake  Energy Calories Required: not meeting needs  Protein Required: not meeting needs  Fluid Required: not meeting needs  Tolerance: tolerating     % Intake of Estimated Energy Needs: 0 - 25 %  % Meal Intake: 0 - 25 %    Intake/Output Summary (Last 24 hours) at 1/20/2023 0940  Last data filed at 1/20/2023 0500  Gross per 24 hour   Intake 1300 ml   Output --   Net 1300 ml      Anthropometrics  Temp: 98.5 °F (36.9 °C)  Height Method: Stated  Height: 5' (152.4 cm)  Height (inches): 60 in  Weight Method: Bed Scale  Weight: 65.5 kg (144 lb 6.4 oz)  Weight (lb): 144.4 lb  Ideal Body Weight (IBW), Female: 100 lb  % Ideal Body Weight, Female (lb): 144.4 %  BMI (Calculated): 28.2   "   Estimated/Assessed Needs  Weight Used For Calorie Calculations: 65.6 kg (144 lb 10 oz)  Energy Calorie Requirements (kcal): 1218-9749 (25-30)     Protein Requirements: 78-98 (1.2-1.5)  Weight Used For Protein Calculations: 65.5 kg (144 lb 6.4 oz)     Estimated Fluid Requirement Method: RDA Method  RDA Method (mL): 1640     Reason for Assessment  Reason For Assessment: identified at risk by screening criteria  Relevant Medical History: DM 2, CAD, Hypothyroidism, HLD, HTN  Interdisciplinary Rounds: did not attend    Nutrition/Diet History  Food Allergies: NKFA  Factors Affecting Nutritional Intake: None identified at this time    Nutrition Risk Screen  Nutrition Risk Screen: no indicators present     MST Score: 2  Have you recently lost weight without trying?: Yes: 14-23 lbs  Weight loss score: 2  Have you been eating poorly because of a decreased appetite?: No  Appetite score: 0     Weight History:  Wt Readings from Last 5 Encounters:   01/20/23 65.5 kg (144 lb 6.4 oz)   01/19/23 65.3 kg (144 lb)   08/04/22 66.7 kg (147 lb)   05/03/22 68.4 kg (150 lb 14.4 oz)   03/24/22 68.9 kg (152 lb)      Lab/Procedures/Meds: Pertinent Labs/Meds Reviewed    Medications:Pertinent Medications Reviewed  Scheduled Meds:   aspirin  81 mg Oral Daily    atorvastatin  80 mg Oral Daily    cefTRIAXone (ROCEPHIN) IVPB  1 g Intravenous Q24H    enoxaparin  40 mg Subcutaneous Daily    famotidine  20 mg Oral Daily    [START ON 1/21/2023] insulin detemir U-100  30 Units Subcutaneous Daily    levothyroxine  50 mcg Oral Before breakfast    lisinopriL  5 mg Oral Daily    magnesium oxide  400 mg Oral Daily    metoprolol tartrate  25 mg Oral BID    sertraline  50 mg Oral Daily     Continuous Infusions:  PRN Meds:.acetaminophen, dextrose 10%, dextrose 10%, glucagon (human recombinant), glucose, glucose, HYDROcodone-acetaminophen, insulin aspart U-100, melatonin, ondansetron    Labs: Pertinent Labs Reviewed  Clinical Chemistry:  Recent Labs   Lab  01/19/23 1859 01/20/23 0420   * 131*   K 3.6 3.4*   CL 95 100   CO2 19* 23   * 282*   BUN 11 12   CREATININE 0.5 0.4*   CALCIUM 8.4* 8.3*   PROT 6.8  --    ALBUMIN 3.6  --    BILITOT 1.1*  --    ALKPHOS 80  --    AST 15  --    ALT 14  --    ANIONGAP 16 8   MG 1.5*  --      CBC:   Recent Labs   Lab 01/20/23 0421   WBC 8.94   RBC 4.39   HGB 13.0   HCT 36.8*      MCV 84   MCH 29.6   MCHC 35.3     Cardiac Profile:  Recent Labs   Lab 01/19/23 1859   *     Thyroid & Parathyroid:  Recent Labs   Lab 01/20/23 0420   TSH 1.750  1.750     Monitor and Evaluation  Food and Nutrient Intake: energy intake, food and beverage intake  Food and Nutrient Adminstration: diet order  Knowledge/Beliefs/Attitudes: food and nutrition knowledge/skill  Physical Activity and Function: nutrition-related ADLs and IADLs  Anthropometric Measurements: weight, weight change, body mass index  Biochemical Data, Medical Tests and Procedures: electrolyte and renal panel, gastrointestinal profile, glucose/endocrine profile, inflammatory profile, lipid profile, other (specify) (Hb A1c)  Nutrition-Focused Physical Findings: overall appearance     Nutrition Risk  Level of Risk/Frequency of Follow-up: moderate - high     Nutrition Follow-Up  RD Follow-up?: Yes      Carolina Whiting RD, LDN 01/20/2023 9:40 AM

## 2023-01-21 PROBLEM — E87.1 HYPONATREMIA: Status: ACTIVE | Noted: 2023-01-21

## 2023-01-21 PROBLEM — E03.9 HYPOTHYROIDISM: Chronic | Status: ACTIVE | Noted: 2023-01-21

## 2023-01-21 PROBLEM — E83.42 HYPOMAGNESEMIA: Status: RESOLVED | Noted: 2023-01-21 | Resolved: 2023-01-21

## 2023-01-21 PROBLEM — I25.10 CORONARY ARTERY DISEASE: Chronic | Status: ACTIVE | Noted: 2020-04-30

## 2023-01-21 PROBLEM — G93.41 ENCEPHALOPATHY, METABOLIC: Status: RESOLVED | Noted: 2023-01-19 | Resolved: 2023-01-21

## 2023-01-21 PROBLEM — E87.1 HYPONATREMIA: Status: RESOLVED | Noted: 2023-01-21 | Resolved: 2023-01-21

## 2023-01-21 PROBLEM — E83.42 HYPOMAGNESEMIA: Status: ACTIVE | Noted: 2023-01-21

## 2023-01-21 LAB
ANION GAP SERPL CALC-SCNC: 5 MMOL/L (ref 8–16)
BASOPHILS # BLD AUTO: 0.04 K/UL (ref 0–0.2)
BASOPHILS NFR BLD: 0.4 % (ref 0–1.9)
BUN SERPL-MCNC: 15 MG/DL (ref 8–23)
CALCIUM SERPL-MCNC: 8.7 MG/DL (ref 8.7–10.5)
CHLORIDE SERPL-SCNC: 103 MMOL/L (ref 95–110)
CO2 SERPL-SCNC: 28 MMOL/L (ref 23–29)
CREAT SERPL-MCNC: 0.5 MG/DL (ref 0.5–1.4)
DIFFERENTIAL METHOD: NORMAL
EOSINOPHIL # BLD AUTO: 0.1 K/UL (ref 0–0.5)
EOSINOPHIL NFR BLD: 0.6 % (ref 0–8)
ERYTHROCYTE [DISTWIDTH] IN BLOOD BY AUTOMATED COUNT: 12.9 % (ref 11.5–14.5)
EST. GFR  (NO RACE VARIABLE): >60 ML/MIN/1.73 M^2
ESTIMATED AVG GLUCOSE: 335 MG/DL (ref 68–131)
GLUCOSE SERPL-MCNC: 162 MG/DL (ref 70–110)
GLUCOSE SERPL-MCNC: 188 MG/DL (ref 70–110)
GLUCOSE SERPL-MCNC: 218 MG/DL (ref 70–110)
GLUCOSE SERPL-MCNC: 238 MG/DL (ref 70–110)
GLUCOSE SERPL-MCNC: 290 MG/DL (ref 70–110)
GLUCOSE SERPL-MCNC: 321 MG/DL (ref 70–110)
GLUCOSE SERPL-MCNC: 345 MG/DL (ref 70–110)
GLUCOSE SERPL-MCNC: 363 MG/DL (ref 70–110)
GLUCOSE SERPL-MCNC: 79 MG/DL (ref 70–110)
GLUCOSE SERPL-MCNC: 86 MG/DL (ref 70–110)
GLUCOSE SERPL-MCNC: 93 MG/DL (ref 70–110)
HBA1C MFR BLD: 13.3 % (ref 4.5–6.2)
HCT VFR BLD AUTO: 37.1 % (ref 37–48.5)
HGB BLD-MCNC: 12.8 G/DL (ref 12–16)
IMM GRANULOCYTES # BLD AUTO: 0.03 K/UL (ref 0–0.04)
IMM GRANULOCYTES NFR BLD AUTO: 0.3 % (ref 0–0.5)
LYMPHOCYTES # BLD AUTO: 3 K/UL (ref 1–4.8)
LYMPHOCYTES NFR BLD: 30.9 % (ref 18–48)
MAGNESIUM SERPL-MCNC: 1.8 MG/DL (ref 1.6–2.6)
MCH RBC QN AUTO: 29.4 PG (ref 27–31)
MCHC RBC AUTO-ENTMCNC: 34.5 G/DL (ref 32–36)
MCV RBC AUTO: 85 FL (ref 82–98)
MONOCYTES # BLD AUTO: 0.6 K/UL (ref 0.3–1)
MONOCYTES NFR BLD: 6.3 % (ref 4–15)
NEUTROPHILS # BLD AUTO: 5.9 K/UL (ref 1.8–7.7)
NEUTROPHILS NFR BLD: 61.5 % (ref 38–73)
NRBC BLD-RTO: 0 /100 WBC
PLATELET # BLD AUTO: 322 K/UL (ref 150–450)
PMV BLD AUTO: 9.7 FL (ref 9.2–12.9)
POTASSIUM SERPL-SCNC: 4.4 MMOL/L (ref 3.5–5.1)
RBC # BLD AUTO: 4.36 M/UL (ref 4–5.4)
SODIUM SERPL-SCNC: 136 MMOL/L (ref 136–145)
WBC # BLD AUTO: 9.58 K/UL (ref 3.9–12.7)

## 2023-01-21 PROCEDURE — 80048 BASIC METABOLIC PNL TOTAL CA: CPT | Performed by: INTERNAL MEDICINE

## 2023-01-21 PROCEDURE — 25000003 PHARM REV CODE 250: Performed by: INTERNAL MEDICINE

## 2023-01-21 PROCEDURE — 25000003 PHARM REV CODE 250: Performed by: STUDENT IN AN ORGANIZED HEALTH CARE EDUCATION/TRAINING PROGRAM

## 2023-01-21 PROCEDURE — 12000002 HC ACUTE/MED SURGE SEMI-PRIVATE ROOM

## 2023-01-21 PROCEDURE — 97535 SELF CARE MNGMENT TRAINING: CPT

## 2023-01-21 PROCEDURE — 97165 OT EVAL LOW COMPLEX 30 MIN: CPT

## 2023-01-21 PROCEDURE — 85025 COMPLETE CBC W/AUTO DIFF WBC: CPT | Performed by: INTERNAL MEDICINE

## 2023-01-21 PROCEDURE — 82962 GLUCOSE BLOOD TEST: CPT

## 2023-01-21 PROCEDURE — 63600175 PHARM REV CODE 636 W HCPCS: Performed by: INTERNAL MEDICINE

## 2023-01-21 PROCEDURE — 83735 ASSAY OF MAGNESIUM: CPT | Performed by: STUDENT IN AN ORGANIZED HEALTH CARE EDUCATION/TRAINING PROGRAM

## 2023-01-21 PROCEDURE — 36415 COLL VENOUS BLD VENIPUNCTURE: CPT | Performed by: INTERNAL MEDICINE

## 2023-01-21 PROCEDURE — 97161 PT EVAL LOW COMPLEX 20 MIN: CPT

## 2023-01-21 RX ORDER — GABAPENTIN 300 MG/1
300 CAPSULE ORAL 2 TIMES DAILY
Status: DISCONTINUED | OUTPATIENT
Start: 2023-01-21 | End: 2023-01-22 | Stop reason: HOSPADM

## 2023-01-21 RX ORDER — INSULIN ASPART 100 [IU]/ML
1-10 INJECTION, SOLUTION INTRAVENOUS; SUBCUTANEOUS
Status: DISCONTINUED | OUTPATIENT
Start: 2023-01-21 | End: 2023-01-22 | Stop reason: HOSPADM

## 2023-01-21 RX ADMIN — GABAPENTIN 300 MG: 300 CAPSULE ORAL at 09:01

## 2023-01-21 RX ADMIN — INSULIN ASPART 4 UNITS: 100 INJECTION, SOLUTION INTRAVENOUS; SUBCUTANEOUS at 05:01

## 2023-01-21 RX ADMIN — ASPIRIN 81 MG: 81 TABLET, COATED ORAL at 09:01

## 2023-01-21 RX ADMIN — MAGNESIUM OXIDE 400 MG: 400 TABLET ORAL at 09:01

## 2023-01-21 RX ADMIN — FAMOTIDINE 20 MG: 20 TABLET, FILM COATED ORAL at 09:01

## 2023-01-21 RX ADMIN — POTASSIUM BICARBONATE 35 MEQ: 391 TABLET, EFFERVESCENT ORAL at 12:01

## 2023-01-21 RX ADMIN — CEFTRIAXONE 1 G: 1 INJECTION, SOLUTION INTRAVENOUS at 12:01

## 2023-01-21 RX ADMIN — ATORVASTATIN CALCIUM 80 MG: 40 TABLET, FILM COATED ORAL at 09:01

## 2023-01-21 RX ADMIN — SERTRALINE HYDROCHLORIDE 50 MG: 50 TABLET ORAL at 09:01

## 2023-01-21 RX ADMIN — METOPROLOL TARTRATE 25 MG: 25 TABLET, FILM COATED ORAL at 09:01

## 2023-01-21 RX ADMIN — LISINOPRIL 5 MG: 5 TABLET ORAL at 09:01

## 2023-01-21 RX ADMIN — LEVOTHYROXINE SODIUM 50 MCG: 25 TABLET ORAL at 06:01

## 2023-01-21 RX ADMIN — INSULIN ASPART 3 UNITS: 100 INJECTION, SOLUTION INTRAVENOUS; SUBCUTANEOUS at 12:01

## 2023-01-21 RX ADMIN — INSULIN ASPART 5 UNITS: 100 INJECTION, SOLUTION INTRAVENOUS; SUBCUTANEOUS at 09:01

## 2023-01-21 RX ADMIN — ENOXAPARIN SODIUM 40 MG: 100 INJECTION SUBCUTANEOUS at 05:01

## 2023-01-21 NOTE — HOSPITAL COURSE
Assumed care of this patient on 1/21/23.  Patient with known history of uncontrolled insulin-dependent diabetes mellitus with HbA1c 13.3%, states she only takes 70 units once nightly, does not check glucose at home.  Presented due to diffuse body pain, later denied in the ED, has leg pain due to neuropathy, was reportedly intermittently confused in the ED. She was admitted for concern for possible UTI as well as glucose control.  Blood and urine culture no growth and antibiotics discontinued.  Placed on 30 units long-acting insulin with sliding scale and monitoring.  Seen by PT/OT and recommendations for home health, case management consulted.  On 01/21 alert and oriented x3, states she feels much improved. On 1/22 patient mentation has remained stable, glucose was monitored, clinically improved.  She states her granddaughter will be assisting on returning home, home health was also ordered.  Ambulatory referral to endocrinology given need for improved glycemic control.  She will also follow-up with primary care provider.  Discharging on lower dose of long-acting insulin as unclear if she is truly compliant (she admits she is), but morning glucose dropping down to 50s to 60s.  Discharge plan including medication, follow-up as well as return precautions were reviewed, she expressed understanding, no questions or concerns.  Communicated with nursing.      Discharge examination   Lying in bed, alert, oriented, on room air, abdomen soft and nontender

## 2023-01-21 NOTE — PT/OT/SLP EVAL
Occupational Therapy   Evaluation    Name: Hoa Wilde  MRN: [id number]  Admitting Diagnosis: Encephalopathy, metabolic  Recent Surgery: * No surgery found *      Recommendations:     Discharge Recommendations: home health OT  Discharge Equipment Recommendations:  none  Barriers to discharge:  None    Assessment:     Hoa Wilde is a 72 y.o. female with a medical diagnosis of Encephalopathy, metabolic.  She presents with improving medical acuity and functional mobility. Performance deficits affecting function: weakness, impaired endurance, impaired self care skills, impaired functional mobility, gait instability, impaired balance, decreased safety awareness, impaired cardiopulmonary response to activity.      Rehab Prognosis: Fair; patient would benefit from acute skilled OT services to address these deficits and reach maximum level of function.       Plan:     Patient to be seen 5 x/week to address the above listed problems via self-care/home management, therapeutic activities, therapeutic exercises  Plan of Care Expires: 02/21/23  Plan of Care Reviewed with: patient    Subjective     Chief Complaint: general weakness  Patient/Family Comments/goals: improved cognition, functional mobility and Adl independence.    Occupational Profile:  Living Environment: lives with spouse in a 1 story home with threshold to enter. Granddaughter plans on temporarily residing with patient at d/c.  Previous level of function: modified independent for ambulation, ADLs using a rollator. Relies on spouse for assistance in/out of bath tub and transportation.  Roles and Routines: limited homemaker  Equipment Used at Home: rollator  Assistance upon Discharge: Spouse and Granddaughter    Pain/Comfort:  Pain Rating 1: 0/10  Pain Rating Post-Intervention 1: 0/10    Patients cultural, spiritual, Pentec[employer]al conflicts given the current situation: no    Objective:     Communicated with: nurse prior to session.  Patient found HOB elevated with  telemetry, peripheral IV upon OT entry to room.    General Precautions: Standard, fall  Orthopedic Precautions: N/A  Braces: N/A  Respiratory Status: Room air    Occupational Performance:    Bed Mobility:    Patient completed Scooting/Bridging with contact guard assistance  Patient completed Supine to Sit with contact guard assistance  Patient completed Sit to Supine with contact guard assistance  Performed unsupported sitting EOB with contact guard assistance.    Functional Mobility/Transfers:  Patient completed Sit <> Stand Transfer with contact guard assistance  with  rolling walker   Functional Mobility: ambulated 5 feet using rolling walker with contact guard assistance.    Activities of Daily Living:  Lower Body Dressing: contact guard assistance to don/doff socks sitting EOB.    Cognitive/Visual Perceptual:  Cognitive/Psychosocial Skills:     -       Oriented to: Person, Place, and Situation   -       Follows Commands/attention:Follows two-step commands  -       Communication: clear/fluent  -       Memory: No Deficits noted  -       Safety awareness/insight to disability: impaired   -       Mood/Affect/Coping skills/emotional control: Cooperative and Pleasant  Visual/Perceptual:      -Intact Acuity    Physical Exam:  Balance:    -       Sitting/Standing: Contact Guard  Upper Extremity Range of Motion:     -       Right Upper Extremity: WFL  -       Left Upper Extremity: WFL  Upper Extremity Strength:    -       Right Upper Extremity: 4/5  -       Left Upper Extremity: 4/5   Strength:    -       Right Upper Extremity: WFL  -       Left Upper Extremity: WFL  Fine Motor Coordination:    -       Intact    AMPAC 6 Click ADL:  AMPAC Total Score: 19    Treatment & Education:  Patient educated on the purpose of Occupational Therapy and the importance of getting OOB.    Patient left HOB elevated with all lines intact, call button in reach, and bed alarm on    GOALS:   Multidisciplinary Problems       Occupational  Therapy Goals          Problem: Occupational Therapy    Goal Priority Disciplines Outcome Interventions   Occupational Therapy Goal     OT, PT/OT     Description: Goals to be met by: 2/21/2023     Patient will increase functional independence with ADLs by performing:    UE Dressing with Supervision.  LE Dressing with Supervision.  Grooming while standing at sink with Supervision.  Toileting from toilet with Supervision for hygiene and clothing management.   Toilet transfer to toilet with Supervision.  Perform 30 minutes sitting/standing Adl activity with no losses of balance.                         History:     Past Medical History:   Diagnosis Date    Coronary artery disease     Diabetes mellitus, type 2     Hyperlipidemia     Hypertension     Hypothyroidism     Neuropathy          Past Surgical History:   Procedure Laterality Date    ANGIOGRAPHY OF LOWER EXTREMITY Left 12/16/2020    Procedure: ANGIOGRAM, LOWER EXTREMITY;  Surgeon: Juancho White MD;  Location: [facility] CATH/EP LAB;  Service: General;  Laterality: Left;    CHOLECYSTECTOMY      CORONARY ARTERY BYPASS GRAFT      FOOT SURGERY Left     HYSTERECTOMY         Time Tracking:     OT Date of Treatment: 01/21/23  OT Start Time: 0905  OT Stop Time: 0922  OT Total Time (min): 17 min    Billable Minutes:Evaluation 4  Self Care/Home Management 13    1/21/2023

## 2023-01-21 NOTE — SUBJECTIVE & OBJECTIVE
Interval History:  See hospital course.  Patient states she feels much improved today.  States at home she takes long-acting insulin 70 units, does not check her glucose at home.  Afebrile, on room air, HbA1c 13.3%.  Labs with WBC 9.58, BUN/creatinine 15/0.5.  Blood and urine culture no growth to date.  Discussed with patient.  No visitors at bedside.    Review of Systems   Constitutional:  Negative for fever.   Respiratory:  Negative for shortness of breath.    Cardiovascular:  Negative for chest pain.   Gastrointestinal:  Negative for nausea and vomiting.   Psychiatric/Behavioral:  Negative for confusion.    Objective:     Vital Signs (Most Recent):  Temp: 97.7 °F (36.5 °C) (01/21/23 1609)  Pulse: 77 (01/21/23 1609)  Resp: 18 (01/21/23 1609)  BP: 99/69 (01/21/23 1609)  SpO2: 99 % (01/21/23 1609) Vital Signs (24h Range):  Temp:  [97.3 °F (36.3 °C)-98.5 °F (36.9 °C)] 97.7 °F (36.5 °C)  Pulse:  [72-84] 77  Resp:  [18] 18  SpO2:  [96 %-100 %] 99 %  BP: ()/(58-76) 99/69     Weight: 65.5 kg (144 lb 6.4 oz)  Body mass index is 28.2 kg/m².    Intake/Output Summary (Last 24 hours) at 1/21/2023 1726  Last data filed at 1/21/2023 1610  Gross per 24 hour   Intake 50 ml   Output 600 ml   Net -550 ml      Physical Exam  Vitals and nursing note reviewed.   Constitutional:       General: She is not in acute distress.     Appearance: She is not ill-appearing.      Comments: Sitting in bed, eating dinner, NAD, cooperative   HENT:      Head: Normocephalic and atraumatic.      Mouth/Throat:      Mouth: Mucous membranes are moist.   Cardiovascular:      Comments: Healed sternotomy  Pulmonary:      Effort: No respiratory distress.      Breath sounds: Normal breath sounds. No wheezing.      Comments: On RA  Abdominal:      Palpations: Abdomen is soft.      Tenderness: There is no abdominal tenderness.   Neurological:      Mental Status: She is alert and oriented to person, place, and time.   Psychiatric:         Mood and Affect:  Mood normal.       Significant Labs: Blood Culture:   Recent Labs   Lab 01/19/23  1900 01/19/23  1902   LABBLOO No Growth to date  No Growth to date No Growth to date  No Growth to date     BMP:   Recent Labs   Lab 01/21/23  0534   GLU 79      K 4.4      CO2 28   BUN 15   CREATININE 0.5   CALCIUM 8.7   MG 1.8     CBC:   Recent Labs   Lab 01/19/23 1859 01/20/23  0421 01/21/23  0534   WBC 11.42 8.94 9.58   HGB 13.5 13.0 12.8   HCT 38.6 36.8* 37.1    315 322     CMP:   Recent Labs   Lab 01/19/23 1859 01/20/23  0420 01/21/23  0534   * 131* 136   K 3.6 3.4* 4.4   CL 95 100 103   CO2 19* 23 28   * 282* 79   BUN 11 12 15   CREATININE 0.5 0.4* 0.5   CALCIUM 8.4* 8.3* 8.7   PROT 6.8  --   --    ALBUMIN 3.6  --   --    BILITOT 1.1*  --   --    ALKPHOS 80  --   --    AST 15  --   --    ALT 14  --   --    ANIONGAP 16 8 5*     Cardiac Markers:   Recent Labs   Lab 01/19/23 1859   *     Lactic Acid:   Recent Labs   Lab 01/19/23 1859 01/19/23  2222   LACTATE 1.4 1.4     Magnesium:   Recent Labs   Lab 01/19/23 1859 01/21/23  0534   MG 1.5* 1.8     POCT Glucose: No results for input(s): POCTGLUCOSE in the last 48 hours.  Respiratory Culture: No results for input(s): GSRESP, RESPIRATORYC in the last 48 hours.  Troponin:   Recent Labs   Lab 01/19/23 1859   TROPONINIHS 11.1     TSH:   Recent Labs   Lab 01/20/23 0420   TSH 1.750  1.750     Urine Culture:   Recent Labs   Lab 01/19/23 1859   LABURIN No growth to date     Urine Studies:   Recent Labs   Lab 01/19/23 1859   COLORU Yellow   APPEARANCEUA Clear   PHUR 7.0   SPECGRAV >1.030*   PROTEINUA Trace*   GLUCUA 4+*   KETONESU 2+*   BILIRUBINUA Negative   OCCULTUA Negative   NITRITE Negative   UROBILINOGEN Negative   LEUKOCYTESUR Trace*   RBCUA 8*   WBCUA 29*   BACTERIA Negative   SQUAMEPITHEL 11   HYALINECASTS 9*       Significant Imaging: I have reviewed all pertinent imaging results/findings within the past 24 hours.    CT Head Without  Contrast    Result Date: 1/19/2023  EXAMINATION: CT HEAD WITHOUT CONTRAST CLINICAL HISTORY: Mental status change, unknown cause COMPARISON: None TECHNIQUE: Noncontrast axial images of the head were obtained. Sagittal and coronal reformatted images obtained. FINDINGS: CT head: Due to patient motion artifact the study was performed with the lungs. The paranasal sinuses and mastoid air cells are clear. No bone lesion is demonstrated. The cerebellar tonsils are in the appropriate position. The pituitary gland is not enlarged. The ventricles are normal in size. No intracranial hemorrhage or mass lesion is demonstrated. Mild to moderate age-related small vessel white matter changes are symmetrical. No evidence of prior large vessel ischemia. Bilateral cataract surgery noted with the orbits otherwise normal. Prominent intracranial vertebral and carotid arterial calcification is present. IMPRESSION: 1: Age-related small vessel changes. No acute intracranial finding. RADIATION DOSE REDUCTION: This exam was performed according to our departmental dose-optimization program which includes use of Automated Exposure Control, adjustment of the mA and/or kV according to patient size and/or use of iterative reconstruction technique. Electronically signed by:  Allan Aguila MD  1/19/2023 8:59 PM CST Workstation: [id number]    X-Ray Chest AP Portable    Result Date: 1/19/2023  Reason: Sepsis FINDINGS: Portable chest with comparison chest x-ray December 9, 2020 show normal cardiomediastinal silhouette. Lungs are clear. Pulmonary vasculature is normal. No acute osseous abnormality. IMPRESSION: No acute cardiopulmonary abnormality. Electronically signed by:  Domingo Rae DO  1/19/2023 7:23 PM CST Workstation: [id number]

## 2023-01-21 NOTE — PT/OT/SLP EVAL
Physical Therapy Evaluation    Patient Name:  Hoa Wilde   MRN:  [id number]    Recommendations:     Discharge Recommendations: home with home health, home health PT   Discharge Equipment Recommendations: walker, rolling   Barriers to discharge: None    Assessment:     Hoa Wilde is a 72 y.o. female admitted with a medical diagnosis of Encephalopathy, metabolic.  She presents with the following impairments/functional limitations: weakness, impaired endurance, gait instability, impaired balance, impaired functional mobility, impaired self care skills, decreased safety awareness.    Rehab Prognosis: Good; patient would benefit from acute skilled PT services to address these deficits and reach maximum level of function.    Recent Surgery: * No surgery found *      Plan:     During this hospitalization, patient to be seen 5 x/week to address the identified rehab impairments via gait training, therapeutic activities, therapeutic exercises, neuromuscular re-education and progress toward the following goals:    Plan of Care Expires:  02/25/23    Subjective     Chief Complaint: Pt has no new complaints  Patient/Family Comments/goals: Go Home  Pain/Comfort:  Pain Rating 1: 0/10    Patients cultural, spiritual, Restorationism conflicts given the current situation: no    Living Environment:  Pt lives /[facility] and G[facility]  Prior to admission, patients level of function was (I).  Equipment used at home: none.  DME owned (not currently used): none.  Upon discharge, patient will have assistance from family as needed.    Objective:     Communicated with nurse prior to session.  Patient found sitting edge of bed with peripheral IV, PureWick, telemetry  upon PT entry to room.    General Precautions: Standard, fall  Orthopedic Precautions:N/A   Braces: N/A  Respiratory Status: Room air    Exams:  Cognitive Exam:  Patient is oriented to Person, Place, Time, and Situation  Gross Motor Coordination:  WFL  Sensation:    -        Intact  RLE ROM: WFL  RLE Strength: WFL  LLE ROM: WFL  LLE Strength: WFL    Functional Mobility:  Bed Mobility:     Supine to Sit: independence  Sit to Supine: independence  Transfers:     Sit to Stand:  contact guard assistance with rolling walker  Bed to Chair: minimum assistance with  rolling walker  using  Stand Pivot  Gait: contact guard assistance with rolling walker 60ft  Balance: standing supported dynamic: Fair-      AM-PAC 6 CLICK MOBILITY  Total Score:18       Treatment & Education:  PT edu pt on balance correction strategies and HH expectations    Patient left up in chair with all lines intact, call button in reach, and GDtr present.    GOALS:   Multidisciplinary Problems       Physical Therapy Goals          Problem: Physical Therapy    Goal Priority Disciplines Outcome Goal Variances Interventions   Physical Therapy Goal     PT, PT/OT      Description: Goals to be met by: 23     Patient will increase functional independence with mobility by performin. Sit to stand transfer with Modified [place]  2. Bed to chair transfer with Modified [place] using Rolling Walker  3. Gait  x 150 feet with Modified [place] using Rolling Walker.                          History:     Past Medical History:   Diagnosis Date    Coronary artery disease     Diabetes mellitus, type 2     Hyperlipidemia     Hypertension     Hypothyroidism     Neuropathy        Past Surgical History:   Procedure Laterality Date    ANGIOGRAPHY OF LOWER EXTREMITY Left 2020    Procedure: ANGIOGRAM, LOWER EXTREMITY;  Surgeon: Juancho White MD;  Location: [facility] CATH/EP LAB;  Service: General;  Laterality: Left;    CHOLECYSTECTOMY      CORONARY ARTERY BYPASS GRAFT      FOOT SURGERY Left     HYSTERECTOMY         Time Tracking:     PT Received On: 23  PT Start Time: 1055     PT Stop Time: 1110  PT Total Time (min): 15 min     Billable Minutes: Evaluation 15      2023

## 2023-01-21 NOTE — PLAN OF CARE
Problem: Adult Inpatient Plan of Care  Goal: Plan of Care Review  Outcome: Ongoing, Progressing  Goal: Absence of Hospital-Acquired Illness or Injury  Outcome: Ongoing, Progressing  Goal: Optimal Comfort and Wellbeing  Outcome: Ongoing, Progressing  Goal: Readiness for Transition of Care  Outcome: Ongoing, Progressing     Problem: Diabetes Comorbidity  Goal: Blood Glucose Level Within Targeted Range  Outcome: Ongoing, Progressing     Problem: Skin Injury Risk Increased  Goal: Skin Health and Integrity  Outcome: Ongoing, Progressing

## 2023-01-21 NOTE — PROGRESS NOTES
"[facility] Medicine  Progress Note    Patient Name: Hoa Wilde  MRN: [id number]  Patient Class: IP- Inpatient   Admission Date: 1/19/2023  Length of Stay: 1 days  Attending Physician: Carmella Gibbs MD  Primary Care Provider: Carlos Pope MD        Subjective:     Principal Problem:Diabetes mellitus, type 2        HPI:  72 year old female with history of DM 2, CAD, Hypothyroidism, HLD, HTN was brought to ED secondary to genralized weakness and "Pain all over". She was seen by her PCP for leg pain yesterday, and it was felt that she had polyneuropathy secondary to her long standing DM. However, even though she had related to the EMS personnel that she had "Pain all over", upon arrival in ED she denied having said that and then stated that she did not have any pain. She wa encephalopathic upon arrival. Currently after hydration and antibiotics for underlying UTI, she feels "Much better", and continues to deny any pain. She is currently alert and oriented X 3. She continued to complain "I'm weak all over." Reportedly when she arrived in the ED she was "Covered in feces", and was cleaned by the staff here.    In ED Labs reviewed and noted below: normal CBC, minimal hyponatremia with normal renal function; BNP is trivially elevated with normal troponin; lactate is normal; procalcitonin is below lower cut-off threshold. Flu and COVID are negative. She was cultured in ED. CT Head: no acute intracranial findings. CXR reviewed: NAPD. EKG reviewed: sinus with Q's in III and aVF; lateral wall T wave inversion, but no acute ST segments    Discussed with ED MD: Inpatient admission for Metabolic Encephalopathy secondary to UTI; because the patient responded so well to volume and antibiotics in ED, antibiotic spectrum was decreased from piperacillin to ceftriaxone; continue home regimen for chronic illnesses; AM labs for review      Overview/Hospital Course:  Assumed care of this patient on 1/21/23.  " Patient with known history of uncontrolled insulin-dependent diabetes mellitus with HbA1c 13.3%, states she only takes 70 units once nightly, does not check glucose at home.  Presented due to diffuse body pain, later denied in the ED, has leg pain due to neuropathy, was reportedly intermittently confused in the ED. She was admitted for concern for possible UTI as well as glucose control.  Blood and urine culture no growth at antibiotics discontinued.  Placed on 30 units long-acting insulin with sliding scale and monitoring.  Seen by PT/OT and recommendations for home health, case management consulted.  On 01/21 alert and oriented x3, states she feels much improved, eager for discharge soon.      Interval History:  See hospital course.  Patient states she feels much improved today.  States at home she takes long-acting insulin 70 units, does not check her glucose at home.  Afebrile, on room air, HbA1c 13.3%.  Labs with WBC 9.58, BUN/creatinine 15/0.5.  Blood and urine culture no growth to date.  Discussed with patient.  No visitors at bedside.    Review of Systems   Constitutional:  Negative for fever.   Respiratory:  Negative for shortness of breath.    Cardiovascular:  Negative for chest pain.   Gastrointestinal:  Negative for nausea and vomiting.   Psychiatric/Behavioral:  Negative for confusion.    Objective:     Vital Signs (Most Recent):  Temp: 97.7 °F (36.5 °C) (01/21/23 1609)  Pulse: 77 (01/21/23 1609)  Resp: 18 (01/21/23 1609)  BP: 99/69 (01/21/23 1609)  SpO2: 99 % (01/21/23 1609) Vital Signs (24h Range):  Temp:  [97.3 °F (36.3 °C)-98.5 °F (36.9 °C)] 97.7 °F (36.5 °C)  Pulse:  [72-84] 77  Resp:  [18] 18  SpO2:  [96 %-100 %] 99 %  BP: ()/(58-76) 99/69     Weight: 65.5 kg (144 lb 6.4 oz)  Body mass index is 28.2 kg/m².    Intake/Output Summary (Last 24 hours) at 1/21/2023 1726  Last data filed at 1/21/2023 1610  Gross per 24 hour   Intake 50 ml   Output 600 ml   Net -550 ml      Physical Exam  Vitals and  nursing note reviewed.   Constitutional:       General: She is not in acute distress.     Appearance: She is not ill-appearing.      Comments: Sitting in bed, eating dinner, NAD, cooperative   HENT:      Head: Normocephalic and atraumatic.      Mouth/Throat:      Mouth: Mucous membranes are moist.   Cardiovascular:      Comments: Healed sternotomy  Pulmonary:      Effort: No respiratory distress.      Breath sounds: Normal breath sounds. No wheezing.      Comments: On RA  Abdominal:      Palpations: Abdomen is soft.      Tenderness: There is no abdominal tenderness.   Neurological:      Mental Status: She is alert and oriented to person, place, and time.   Psychiatric:         Mood and Affect: Mood normal.       Significant Labs: Blood Culture:   Recent Labs   Lab 01/19/23  1900 01/19/23 1902   LABBLOO No Growth to date  No Growth to date No Growth to date  No Growth to date     BMP:   Recent Labs   Lab 01/21/23  0534   GLU 79      K 4.4      CO2 28   BUN 15   CREATININE 0.5   CALCIUM 8.7   MG 1.8     CBC:   Recent Labs   Lab 01/19/23 1859 01/20/23  0421 01/21/23  0534   WBC 11.42 8.94 9.58   HGB 13.5 13.0 12.8   HCT 38.6 36.8* 37.1    315 322     CMP:   Recent Labs   Lab 01/19/23 1859 01/20/23  0420 01/21/23  0534   * 131* 136   K 3.6 3.4* 4.4   CL 95 100 103   CO2 19* 23 28   * 282* 79   BUN 11 12 15   CREATININE 0.5 0.4* 0.5   CALCIUM 8.4* 8.3* 8.7   PROT 6.8  --   --    ALBUMIN 3.6  --   --    BILITOT 1.1*  --   --    ALKPHOS 80  --   --    AST 15  --   --    ALT 14  --   --    ANIONGAP 16 8 5*     Cardiac Markers:   Recent Labs   Lab 01/19/23 1859   *     Lactic Acid:   Recent Labs   Lab 01/19/23 1859 01/19/23  2222   LACTATE 1.4 1.4     Magnesium:   Recent Labs   Lab 01/19/23 1859 01/21/23  0534   MG 1.5* 1.8     POCT Glucose: No results for input(s): POCTGLUCOSE in the last 48 hours.  Respiratory Culture: No results for input(s): GSRESP, RESPIRATORYC in the last  48 hours.  Troponin:   Recent Labs   Lab 01/19/23 1859   TROPONINIHS 11.1     TSH:   Recent Labs   Lab 01/20/23  0420   TSH 1.750  1.750     Urine Culture:   Recent Labs   Lab 01/19/23 1859   LABURIN No growth to date     Urine Studies:   Recent Labs   Lab 01/19/23 1859   COLORU Yellow   APPEARANCEUA Clear   PHUR 7.0   SPECGRAV >1.030*   PROTEINUA Trace*   GLUCUA 4+*   KETONESU 2+*   BILIRUBINUA Negative   OCCULTUA Negative   NITRITE Negative   UROBILINOGEN Negative   LEUKOCYTESUR Trace*   RBCUA 8*   WBCUA 29*   BACTERIA Negative   SQUAMEPITHEL 11   HYALINECASTS 9*       Significant Imaging: I have reviewed all pertinent imaging results/findings within the past 24 hours.    CT Head Without Contrast    Result Date: 1/19/2023  EXAMINATION: CT HEAD WITHOUT CONTRAST CLINICAL HISTORY: Mental status change, unknown cause COMPARISON: None TECHNIQUE: Noncontrast axial images of the head were obtained. Sagittal and coronal reformatted images obtained. FINDINGS: CT head: Due to patient motion artifact the study was performed with the lungs. The paranasal sinuses and mastoid air cells are clear. No bone lesion is demonstrated. The cerebellar tonsils are in the appropriate position. The pituitary gland is not enlarged. The ventricles are normal in size. No intracranial hemorrhage or mass lesion is demonstrated. Mild to moderate age-related small vessel white matter changes are symmetrical. No evidence of prior large vessel ischemia. Bilateral cataract surgery noted with the orbits otherwise normal. Prominent intracranial vertebral and carotid arterial calcification is present. IMPRESSION: 1: Age-related small vessel changes. No acute intracranial finding. RADIATION DOSE REDUCTION: This exam was performed according to our departmental dose-optimization program which includes use of Automated Exposure Control, adjustment of the mA and/or kV according to patient size and/or use of iterative reconstruction technique.  Electronically signed by:  Allan Aguila MD  1/19/2023 8:59 PM CST Workstation: [id number]    X-Ray Chest AP Portable    Result Date: 1/19/2023  Reason: Sepsis FINDINGS: Portable chest with comparison chest x-ray December 9, 2020 show normal cardiomediastinal silhouette. Lungs are clear. Pulmonary vasculature is normal. No acute osseous abnormality. IMPRESSION: No acute cardiopulmonary abnormality. Electronically signed by:  Domingo Rae DO  1/19/2023 7:23 PM CST Workstation: [id number]         Assessment/Plan:      Active Hospital Problems    Diagnosis    *DM insulin-dependent diabetes mellitus, uncontrolled with hyperglycemia with HbA1c 13.3%    Hypothyroidism    Coronary artery disease s/p CABG    Hypertension    Hyperlipidemia    Neuropathy due to secondary diabetes     Plan:  Continue care on medical floor  Resume home gabapentin   Continue determir 30 units, moderate dose insulin sliding scale t.i.d. with meals, as needed hypoglycemic measures, needs improved glycemic control   Discontinue antibiotics, blood in urine culture no growth to date   Fall and delirium precautions  Electrolytes sliding scale repletion  Intermittent lab checks  PT/OT, case management consulted for home health   Further plan as per hospital course    VTE Risk Mitigation (From admission, onward)         Ordered     enoxaparin injection 40 mg  Daily         01/20/23 0244     IP VTE HIGH RISK PATIENT  Once         01/20/23 0244     Place sequential compression device  Until discontinued         01/20/23 0244                Discharge Planning   CAR:      Code Status: Full Code   Is the patient medically ready for discharge?:     Reason for patient still in hospital (select all that apply): Patient trending condition  Discharge Plan A: Home with family                  Carmella Gibbs MD  Department of Hospital Medicine   [facility]

## 2023-01-22 VITALS
HEART RATE: 74 BPM | RESPIRATION RATE: 18 BRPM | OXYGEN SATURATION: 100 % | SYSTOLIC BLOOD PRESSURE: 114 MMHG | DIASTOLIC BLOOD PRESSURE: 70 MMHG | BODY MASS INDEX: 28.35 KG/M2 | WEIGHT: 144.38 LBS | HEIGHT: 60 IN | TEMPERATURE: 99 F

## 2023-01-22 LAB
BACTERIA UR CULT: NO GROWTH
GLUCOSE SERPL-MCNC: 167 MG/DL (ref 70–110)
GLUCOSE SERPL-MCNC: 185 MG/DL (ref 70–110)
GLUCOSE SERPL-MCNC: 58 MG/DL (ref 70–110)

## 2023-01-22 PROCEDURE — 25000003 PHARM REV CODE 250: Performed by: INTERNAL MEDICINE

## 2023-01-22 PROCEDURE — 36415 COLL VENOUS BLD VENIPUNCTURE: CPT | Performed by: INTERNAL MEDICINE

## 2023-01-22 PROCEDURE — 82947 ASSAY GLUCOSE BLOOD QUANT: CPT | Performed by: INTERNAL MEDICINE

## 2023-01-22 RX ORDER — INSULIN DETEMIR 100 [IU]/ML
35 INJECTION, SOLUTION SUBCUTANEOUS NIGHTLY
Qty: 15 ML | Refills: 0 | Status: SHIPPED | OUTPATIENT
Start: 2023-01-22 | End: 2023-08-02

## 2023-01-22 RX ORDER — GLIPIZIDE AND METFORMIN HCL 5; 500 MG/1; MG/1
1 TABLET, FILM COATED ORAL
Qty: 60 TABLET | Refills: 0 | Status: SHIPPED | OUTPATIENT
Start: 2023-01-22 | End: 2023-03-02 | Stop reason: SDUPTHER

## 2023-01-22 RX ADMIN — LISINOPRIL 5 MG: 5 TABLET ORAL at 10:01

## 2023-01-22 RX ADMIN — ATORVASTATIN CALCIUM 80 MG: 40 TABLET, FILM COATED ORAL at 10:01

## 2023-01-22 RX ADMIN — METOPROLOL TARTRATE 25 MG: 25 TABLET, FILM COATED ORAL at 10:01

## 2023-01-22 RX ADMIN — LEVOTHYROXINE SODIUM 50 MCG: 25 TABLET ORAL at 05:01

## 2023-01-22 RX ADMIN — MAGNESIUM OXIDE 400 MG: 400 TABLET ORAL at 10:01

## 2023-01-22 RX ADMIN — ASPIRIN 81 MG: 81 TABLET, COATED ORAL at 10:01

## 2023-01-22 RX ADMIN — FAMOTIDINE 20 MG: 20 TABLET, FILM COATED ORAL at 10:01

## 2023-01-22 RX ADMIN — GABAPENTIN 300 MG: 300 CAPSULE ORAL at 10:01

## 2023-01-22 RX ADMIN — SERTRALINE HYDROCHLORIDE 50 MG: 50 TABLET ORAL at 10:01

## 2023-01-22 NOTE — DISCHARGE SUMMARY
"[facility] Medicine  Discharge Summary      Patient Name: Hoa Wilde  MRN: [id number]  PRISCILLA: [id number]  Patient Class: IP- Inpatient  Admission Date: 1/19/2023  Hospital Length of Stay: 2 days  Discharge Date and Time: 1/22/2023  1:35 PM  Attending Physician: No att. providers found   Discharging Provider: Carmella Gibbs MD  Primary Care Provider: Carlos Pope MD    Primary Care Team: Networked reference to record PCT     HPI:   72 year old female with history of DM 2, CAD, Hypothyroidism, HLD, HTN was brought to ED secondary to genralized weakness and "Pain all over". She was seen by her PCP for leg pain yesterday, and it was felt that she had polyneuropathy secondary to her long standing DM. However, even though she had related to the EMS personnel that she had "Pain all over", upon arrival in ED she denied having said that and then stated that she did not have any pain. She wa encephalopathic upon arrival. Currently after hydration and antibiotics for underlying UTI, she feels "Much better", and continues to deny any pain. She is currently alert and oriented X 3. She continued to complain "I'm weak all over." Reportedly when she arrived in the ED she was "Covered in feces", and was cleaned by the staff here.    In ED Labs reviewed and noted below: normal CBC, minimal hyponatremia with normal renal function; BNP is trivially elevated with normal troponin; lactate is normal; procalcitonin is below lower cut-off threshold. Flu and COVID are negative. She was cultured in ED. CT Head: no acute intracranial findings. CXR reviewed: NAPD. EKG reviewed: sinus with Q's in III and aVF; lateral wall T wave inversion, but no acute ST segments    Discussed with ED MD: Inpatient admission for Metabolic Encephalopathy secondary to UTI; because the patient responded so well to volume and antibiotics in ED, antibiotic spectrum was decreased from piperacillin to ceftriaxone; continue home regimen for " chronic illnesses; AM labs for review      * No surgery found *      Hospital Course:   Assumed care of this patient on 1/21/23.  Patient with known history of uncontrolled insulin-dependent diabetes mellitus with HbA1c 13.3%, states she only takes 70 units once nightly, does not check glucose at home.  Presented due to diffuse body pain, later denied in the ED, has leg pain due to neuropathy, was reportedly intermittently confused in the ED. She was admitted for concern for possible UTI as well as glucose control.  Blood and urine culture no growth and antibiotics discontinued.  Placed on 30 units long-acting insulin with sliding scale and monitoring.  Seen by PT/OT and recommendations for home health, case management consulted.  On 01/21 alert and oriented x3, states she feels much improved. On 1/22 patient mentation has remained stable, glucose was monitored, clinically improved.  She states her granddaughter will be assisting on returning home, home health was also ordered.  Ambulatory referral to endocrinology given need for improved glycemic control.  She will also follow-up with primary care provider.  Discharging on lower dose of long-acting insulin as unclear if she is truly compliant (she admits she is), but morning glucose dropping down to 50s to 60s.  Discharge plan including medication, follow-up as well as return precautions were reviewed, she expressed understanding, no questions or concerns.  Communicated with nursing.      Discharge examination   Lying in bed, alert, oriented, on room air, abdomen soft and nontender       Goals of Care Treatment Preferences:  Code Status: Full Code      Consults:   Consults (From admission, onward)        Status Ordering Provider     IP consult to case management  Once        Provider:  (Not yet assigned)    Completed DAX REECE          No new Assessment & Plan notes have been filed under this hospital service since the last note was generated.  Service:  Hospital Medicine    Final Active Diagnoses:    Diagnosis Date Noted POA    PRINCIPAL PROBLEM:  DM insulin-dependent diabetes mellitus, uncontrolled with hyperglycemia with HbA1c 13.3% [E11.9] 06/29/2017 Yes    Hypothyroidism [E03.9] 01/21/2023 Yes     Chronic    Coronary artery disease s/p CABG [I25.10] 04/30/2020 Yes     Chronic    Hypertension [I10] 12/03/2018 Yes    Hyperlipidemia [E78.5] 12/03/2018 Yes    Neuropathy due to secondary diabetes [E13.40] 11/16/2017 Yes      Problems Resolved During this Admission:    Diagnosis Date Noted Date Resolved POA    Hyponatremia [E87.1] 01/21/2023 01/21/2023 Yes    Hypomagnesemia [E83.42] 01/21/2023 01/21/2023 Yes    Encephalopathy, metabolic [G93.41] 01/19/2023 01/21/2023 Yes       Discharged Condition: good    Disposition: Home-Health Care [facility]    Follow Up:   Follow-up Information     Carlos Pope MD. Schedule an appointment as soon as possible for a visit in 1 week(s).    Specialty: Family Medicine  Why: follow up  Contact information:  [address] [place] [address]  [address] [place] [id number]  [phone]                       Patient Instructions:      Ambulatory referral/consult to Home Health   Standing Status: Future   Referral Priority: Routine Referral Type: Home Health   Referral Reason: Specialty Services Required   Requested Specialty: Home Health Services   Number of Visits Requested: 1     Ambulatory referral/consult to Endocrinology   Standing Status: Future   Referral Priority: Routine Referral Type: Consultation   Requested Specialty: Endocrinology   Number of Visits Requested: 1     Diet diabetic     Notify your health care provider if you experience any of the following:  temperature >100.4     Notify your health care provider if you experience any of the following:  increased confusion or weakness     Activity as tolerated       Significant Diagnostic Studies: Labs:   BMP:   Recent Labs   Lab 01/21/23  0534 01/22/23  0901   GLU 79 167*     --    K  4.4  --      --    CO2 28  --    BUN 15  --    CREATININE 0.5  --    CALCIUM 8.7  --    MG 1.8  --    , CMP   Recent Labs   Lab 01/21/23  0534 01/22/23  0901     --    K 4.4  --      --    CO2 28  --    GLU 79 167*   BUN 15  --    CREATININE 0.5  --    CALCIUM 8.7  --    ANIONGAP 5*  --    , CBC   Recent Labs   Lab 01/21/23  0534   WBC 9.58   HGB 12.8   HCT 37.1      , INR   Lab Results   Component Value Date    INR 1.0 01/19/2023    INR 1.1 12/09/2020   , Lipid Panel   Lab Results   Component Value Date    CHOL 138 05/05/2020    HDL 40 05/05/2020    LDLCALC 47.2 (L) 05/05/2020    TRIG 254 (H) 05/05/2020    CHOLHDL 29.0 05/05/2020   , Troponin No results for input(s): TROPONINI in the last 168 hours. and A1C:   Recent Labs   Lab 01/20/23  0422   HGBA1C 13.3*     CT Head Without Contrast    Result Date: 1/19/2023  EXAMINATION: CT HEAD WITHOUT CONTRAST CLINICAL HISTORY: Mental status change, unknown cause COMPARISON: None TECHNIQUE: Noncontrast axial images of the head were obtained. Sagittal and coronal reformatted images obtained. FINDINGS: CT head: Due to patient motion artifact the study was performed with the lungs. The paranasal sinuses and mastoid air cells are clear. No bone lesion is demonstrated. The cerebellar tonsils are in the appropriate position. The pituitary gland is not enlarged. The ventricles are normal in size. No intracranial hemorrhage or mass lesion is demonstrated. Mild to moderate age-related small vessel white matter changes are symmetrical. No evidence of prior large vessel ischemia. Bilateral cataract surgery noted with the orbits otherwise normal. Prominent intracranial vertebral and carotid arterial calcification is present. IMPRESSION: 1: Age-related small vessel changes. No acute intracranial finding. RADIATION DOSE REDUCTION: This exam was performed according to our departmental dose-optimization program which includes use of Automated Exposure Control,  adjustment of the mA and/or kV according to patient size and/or use of iterative reconstruction technique. Electronically signed by:  Allan Aguila MD  1/19/2023 8:59 PM CST Workstation: [id number]    X-Ray Chest AP Portable    Result Date: 1/19/2023  Reason: Sepsis FINDINGS: Portable chest with comparison chest x-ray December 9, 2020 show normal cardiomediastinal silhouette. Lungs are clear. Pulmonary vasculature is normal. No acute osseous abnormality. IMPRESSION: No acute cardiopulmonary abnormality. Electronically signed by:  Domingo Rae DO  1/19/2023 7:23 PM CST Workstation: [id number]    Pending Diagnostic Studies:     None         Medications:  Reconciled Home Medications:      Medication List      START taking these medications    glipizide-metformin 5-500 mg per tablet  Commonly known as: METAGLIP  Take 1 tablet by mouth 2 (two) times daily before meals. Take with breakfast and dinner        CHANGE how you take these medications    LEVEMIR FLEXTOUCH U-100 INSULN 100 unit/mL (3 mL) Inpn pen  Generic drug: insulin detemir U-100  Inject 35 Units into the skin every evening.  What changed: how much to take        CONTINUE taking these medications    alendronate 70 MG tablet  Commonly known as: FOSAMAX  Take 1 tablet by mouth once a week     aspirin 81 MG EC tablet  Commonly known as: ECOTRIN  Take 1 tablet (81 mg total) by mouth once daily.     betamethasone valerate 0.12 % foam  Commonly known as: LUXIQ  Apply topically 2 (two) times daily.     blood-glucose meter kit  To check BG 2 times daily, to use with insurance preferred meter     calcium carbonate 500 mg calcium (1,250 mg) tablet  Commonly known as: OS-GEORGINA  Take 1 tablet (500 mg total) by mouth 2 (two) times daily.     EUTHYROX 50 MCG tablet  Generic drug: levothyroxine  TAKE 1 TABLET BY MOUTH BEFORE BREAKFAST     famotidine 20 MG tablet  Commonly known as: PEPCID  Take 1 tablet by mouth once daily     fluconazole 150 MG Tab  Commonly known as:  "DIFLUCAN  TAKE 1 TABLET BY MOUTH ONCE DAILY FOR 1 DAY     gabapentin 300 MG capsule  Commonly known as: NEURONTIN  TAKE 1 CAPSULE BY MOUTH ONCE DAILY IN THE MORNING AND 2 ONCE DAILY AT NIGHT     hydrOXYzine HCL 25 MG tablet  Commonly known as: ATARAX  TAKE 1 TABLET BY MOUTH THREE TIMES DAILY AS NEEDED FOR ITCHING     lisinopriL 5 MG tablet  Commonly known as: PRINIVIL,ZESTRIL  Take 1 tablet by mouth once daily     magnesium oxide 400 mg (241.3 mg magnesium) tablet  Commonly known as: MAG-OX  Take 1 tablet (400 mg total) by mouth once daily.     metoprolol tartrate 25 MG tablet  Commonly known as: LOPRESSOR  Take 1 tablet by mouth twice daily     ONETOUCH DELICA PLUS LANCET 33 gauge Misc  Generic drug: lancets  USE   TO CHECK GLUCOSE TWICE DAILY     ONETOUCH ULTRA TEST Strp  Generic drug: blood sugar diagnostic  USE  STRIP TO CHECK GLUCOSE TWICE DAILY     pen needle, diabetic 30 gauge x 1/3" Ndle  1 pen by Misc.(Non-Drug; Combo Route) route once daily. To use with levimir flex pen     rosuvastatin 20 MG tablet  Commonly known as: CRESTOR  Take 1 tablet by mouth once daily     sertraline 50 MG tablet  Commonly known as: ZOLOFT  Take 1 tablet (50 mg total) by mouth once daily.        STOP taking these medications    calcium carbonate-vitamin D3 600 mg-5 mcg (200 unit) Cap  Commonly known as: LIQUID CALCIUM WITH VITAMIN D     meloxicam 7.5 MG tablet  Commonly known as: MOBIC     metFORMIN 750 MG ER 24hr tablet  Commonly known as: GLUCOPHAGE-XR     propranoloL 60 MG 24 hr capsule  Commonly known as: INDERAL LA            Indwelling Lines/Drains at time of discharge:   Lines/Drains/Airways     None                 Time spent on the discharge of patient: 33 minutes         Carmella Gibbs MD  Department of Hospital Medicine  [facility]  "

## 2023-01-22 NOTE — PLAN OF CARE
Patient cleared for discharge from case management standpoint.    Chart and discharge orders reviewed.  Patient discharged home with home health - [facility] - start date 1/23/23.

## 2023-01-22 NOTE — PLAN OF CARE
Patient cleared for discharge from case management standpoint.    Chart and discharge orders reviewed.  Patient discharged home with [facility] to start on 1/23.  No    01/22/23 1021   Final Note   Assessment Type Final Discharge Note   Anticipated Discharge Disposition Home-Health   What phone number can be called within the next 1-3 days to see how you are doing after discharge? [phone]   Post-Acute Status   Post-Acute Authorization Home Health   Home Health Status Set-up Complete/Auth obtained   Coverage [employer]   Discharge Delays None known at this time     urther case management needs.

## 2023-01-23 NOTE — PT/OT/SLP DISCHARGE
Occupational Therapy Discharge Summary    Hoa Wilde  MRN: [id number]   Principal Problem: Diabetes mellitus, type 2      Patient Discharged from acute Occupational Therapy on 1/22/2023.  Please refer to prior OT note dated 1/21/2023 for functional status.    Assessment:      Patient has not met goals.    Objective:     GOALS:   Multidisciplinary Problems       Occupational Therapy Goals       Not on file              Multidisciplinary Problems (Resolved)          Problem: Occupational Therapy    Goal Priority Disciplines Outcome Interventions   Occupational Therapy Goal   (Resolved)     OT, PT/OT Met    Description: Goals to be met by: 2/21/2023     Patient will increase functional independence with ADLs by performing:    UE Dressing with Supervision.  LE Dressing with Supervision.  Grooming while standing at sink with Supervision.  Toileting from toilet with Supervision for hygiene and clothing management.   Toilet transfer to toilet with Supervision.  Perform 30 minutes sitting/standing Adl activity with no losses of balance.                         Reasons for Discontinuation of Therapy Services  Patient d/c home.       Plan:     Patient Discharged to: Home with Home Health Service    1/23/2023

## 2023-01-24 LAB
BACTERIA BLD CULT: NORMAL
BACTERIA BLD CULT: NORMAL

## 2023-01-24 PROCEDURE — G0180 MD CERTIFICATION HHA PATIENT: HCPCS | Mod: ,,, | Performed by: FAMILY MEDICINE

## 2023-01-24 PROCEDURE — G0180 PR HOME HEALTH MD CERTIFICATION: ICD-10-PCS | Mod: ,,, | Performed by: FAMILY MEDICINE

## 2023-01-26 ENCOUNTER — TELEPHONE (OUTPATIENT)
Dept: FAMILY MEDICINE | Facility: CLINIC | Age: 73
End: 2023-01-26

## 2023-01-26 DIAGNOSIS — E11.42 TYPE 2 DIABETES MELLITUS WITH DIABETIC POLYNEUROPATHY, WITH LONG-TERM CURRENT USE OF INSULIN: Primary | ICD-10-CM

## 2023-01-26 DIAGNOSIS — Z79.4 TYPE 2 DIABETES MELLITUS WITH DIABETIC POLYNEUROPATHY, WITH LONG-TERM CURRENT USE OF INSULIN: Primary | ICD-10-CM

## 2023-01-26 DIAGNOSIS — S91.309A OPEN WOUND OF FOOT, UNSPECIFIED LATERALITY, INITIAL ENCOUNTER: ICD-10-CM

## 2023-01-26 DIAGNOSIS — S91.339A PENETRATING WOUND OF FOOT, UNSPECIFIED LATERALITY, INITIAL ENCOUNTER: ICD-10-CM

## 2023-01-26 NOTE — TELEPHONE ENCOUNTER
nurse Ana called and left a message that she went out to see pt after she was discharge from hospital. States pt has a diabetic foot ulcer under her foot that has a necrotic scab on it. She states pt also has not sensation in the foot. She states pt did not currently have a f/u appt with podiatry but promised to call and schedule one. States she could try to debride but would need orders. She also states if you want labs or any other orders implemented for the pt please let her know. She also wanted to know if pt needed to schedule an appt to see you or just have pt to f/u with podiatry?

## 2023-02-15 ENCOUNTER — EXTERNAL HOME HEALTH (OUTPATIENT)
Dept: HOME HEALTH SERVICES | Facility: HOSPITAL | Age: 73
End: 2023-02-15
Payer: COMMERCIAL

## 2023-03-02 ENCOUNTER — TELEPHONE (OUTPATIENT)
Dept: FAMILY MEDICINE | Facility: CLINIC | Age: 73
End: 2023-03-02

## 2023-03-02 ENCOUNTER — HOSPITAL ENCOUNTER (OUTPATIENT)
Dept: RADIOLOGY | Facility: HOSPITAL | Age: 73
Discharge: HOME | End: 2023-03-02
Attending: FAMILY MEDICINE
Payer: COMMERCIAL

## 2023-03-02 DIAGNOSIS — Z79.4 TYPE 2 DIABETES MELLITUS WITH HYPERGLYCEMIA, WITH LONG-TERM CURRENT USE OF INSULIN: Primary | ICD-10-CM

## 2023-03-02 DIAGNOSIS — E11.65 TYPE 2 DIABETES MELLITUS WITH HYPERGLYCEMIA, WITH LONG-TERM CURRENT USE OF INSULIN: Primary | ICD-10-CM

## 2023-03-02 DIAGNOSIS — M79.604 PAIN OF RIGHT LOWER EXTREMITY: ICD-10-CM

## 2023-03-02 PROCEDURE — 93970 EXTREMITY STUDY: CPT | Mod: TC,PO

## 2023-03-02 RX ORDER — GLIPIZIDE AND METFORMIN HCL 5; 500 MG/1; MG/1
1 TABLET, FILM COATED ORAL
Qty: 180 TABLET | Refills: 1 | Status: SHIPPED | OUTPATIENT
Start: 2023-03-02 | End: 2023-04-01

## 2023-03-02 NOTE — TELEPHONE ENCOUNTER
----- Message from Carlos Pope MD sent at 3/2/2023  3:37 PM CST -----  Results Ok, notify patient.

## 2023-03-02 NOTE — TELEPHONE ENCOUNTER
Spoke to pt notifying her of US results. Pt questioned why her leg was still hurting if the results where good. I informed her that was not a question that I could answer and that she could discuss her concerns at her appt on 3/17/23. Pt stated understanding.

## 2023-03-16 ENCOUNTER — TELEPHONE (OUTPATIENT)
Dept: FAMILY MEDICINE | Facility: CLINIC | Age: 73
End: 2023-03-16

## 2023-03-17 ENCOUNTER — OFFICE VISIT (OUTPATIENT)
Dept: FAMILY MEDICINE | Facility: CLINIC | Age: 73
End: 2023-03-17
Payer: COMMERCIAL

## 2023-03-17 VITALS
TEMPERATURE: 98 F | HEIGHT: 60 IN | RESPIRATION RATE: 18 BRPM | HEART RATE: 92 BPM | SYSTOLIC BLOOD PRESSURE: 118 MMHG | DIASTOLIC BLOOD PRESSURE: 68 MMHG | BODY MASS INDEX: 29.84 KG/M2 | OXYGEN SATURATION: 98 % | WEIGHT: 152 LBS

## 2023-03-17 DIAGNOSIS — M70.61 TROCHANTERIC BURSITIS OF RIGHT HIP: ICD-10-CM

## 2023-03-17 DIAGNOSIS — E11.42 TYPE 2 DIABETES MELLITUS WITH DIABETIC POLYNEUROPATHY, WITH LONG-TERM CURRENT USE OF INSULIN: ICD-10-CM

## 2023-03-17 DIAGNOSIS — Z12.39 SCREENING BREAST EXAMINATION: ICD-10-CM

## 2023-03-17 DIAGNOSIS — Z12.39 ENCOUNTER FOR SCREENING FOR MALIGNANT NEOPLASM OF BREAST, UNSPECIFIED SCREENING MODALITY: ICD-10-CM

## 2023-03-17 DIAGNOSIS — M79.604 PAIN OF RIGHT LOWER EXTREMITY: Primary | ICD-10-CM

## 2023-03-17 DIAGNOSIS — Z79.4 TYPE 2 DIABETES MELLITUS WITH DIABETIC POLYNEUROPATHY, WITH LONG-TERM CURRENT USE OF INSULIN: ICD-10-CM

## 2023-03-17 PROCEDURE — 3046F PR MOST RECENT HEMOGLOBIN A1C LEVEL > 9.0%: ICD-10-PCS | Mod: CPTII,S$GLB,, | Performed by: FAMILY MEDICINE

## 2023-03-17 PROCEDURE — 99213 PR OFFICE/OUTPT VISIT, EST, LEVL III, 20-29 MIN: ICD-10-PCS | Mod: S$GLB,,, | Performed by: FAMILY MEDICINE

## 2023-03-17 PROCEDURE — 3008F BODY MASS INDEX DOCD: CPT | Mod: CPTII,S$GLB,, | Performed by: FAMILY MEDICINE

## 2023-03-17 PROCEDURE — 3074F SYST BP LT 130 MM HG: CPT | Mod: CPTII,S$GLB,, | Performed by: FAMILY MEDICINE

## 2023-03-17 PROCEDURE — 1125F PR PAIN SEVERITY QUANTIFIED, PAIN PRESENT: ICD-10-PCS | Mod: CPTII,S$GLB,, | Performed by: FAMILY MEDICINE

## 2023-03-17 PROCEDURE — 3078F PR MOST RECENT DIASTOLIC BLOOD PRESSURE < 80 MM HG: ICD-10-PCS | Mod: CPTII,S$GLB,, | Performed by: FAMILY MEDICINE

## 2023-03-17 PROCEDURE — 3288F FALL RISK ASSESSMENT DOCD: CPT | Mod: CPTII,S$GLB,, | Performed by: FAMILY MEDICINE

## 2023-03-17 PROCEDURE — 1159F PR MEDICATION LIST DOCUMENTED IN MEDICAL RECORD: ICD-10-PCS | Mod: CPTII,S$GLB,, | Performed by: FAMILY MEDICINE

## 2023-03-17 PROCEDURE — 99213 OFFICE O/P EST LOW 20 MIN: CPT | Mod: S$GLB,,, | Performed by: FAMILY MEDICINE

## 2023-03-17 PROCEDURE — 1125F AMNT PAIN NOTED PAIN PRSNT: CPT | Mod: CPTII,S$GLB,, | Performed by: FAMILY MEDICINE

## 2023-03-17 PROCEDURE — 3074F PR MOST RECENT SYSTOLIC BLOOD PRESSURE < 130 MM HG: ICD-10-PCS | Mod: CPTII,S$GLB,, | Performed by: FAMILY MEDICINE

## 2023-03-17 PROCEDURE — 3078F DIAST BP <80 MM HG: CPT | Mod: CPTII,S$GLB,, | Performed by: FAMILY MEDICINE

## 2023-03-17 PROCEDURE — 3288F PR FALLS RISK ASSESSMENT DOCUMENTED: ICD-10-PCS | Mod: CPTII,S$GLB,, | Performed by: FAMILY MEDICINE

## 2023-03-17 PROCEDURE — 3046F HEMOGLOBIN A1C LEVEL >9.0%: CPT | Mod: CPTII,S$GLB,, | Performed by: FAMILY MEDICINE

## 2023-03-17 PROCEDURE — 1101F PT FALLS ASSESS-DOCD LE1/YR: CPT | Mod: CPTII,S$GLB,, | Performed by: FAMILY MEDICINE

## 2023-03-17 PROCEDURE — 3008F PR BODY MASS INDEX (BMI) DOCUMENTED: ICD-10-PCS | Mod: CPTII,S$GLB,, | Performed by: FAMILY MEDICINE

## 2023-03-17 PROCEDURE — 1101F PR PT FALLS ASSESS DOC 0-1 FALLS W/OUT INJ PAST YR: ICD-10-PCS | Mod: CPTII,S$GLB,, | Performed by: FAMILY MEDICINE

## 2023-03-17 PROCEDURE — 1159F MED LIST DOCD IN RCRD: CPT | Mod: CPTII,S$GLB,, | Performed by: FAMILY MEDICINE

## 2023-03-17 RX ORDER — MELOXICAM 15 MG/1
15 TABLET ORAL DAILY
Qty: 30 TABLET | Refills: 5 | Status: SHIPPED | OUTPATIENT
Start: 2023-03-17 | End: 2023-09-13

## 2023-03-17 RX ORDER — CYCLOBENZAPRINE HCL 5 MG
5 TABLET ORAL 3 TIMES DAILY PRN
Qty: 10 TABLET | Refills: 0 | Status: SHIPPED | OUTPATIENT
Start: 2023-03-17 | End: 2023-03-27

## 2023-03-17 NOTE — PROGRESS NOTES
Subjective:       Patient ID: Hoa Wilde is a 73 y.o. female.    Chief Complaint: Foot Pain      Patient comes in with a relatively new leg pain this is not associated with her diabetic neuropathy she reports of pain in her right hip which radiates across the right thigh to the knee, and to the knee and below to the shin. No rash.  She was seen here on January 19th with the pain in the right lower extremity as well.  Patient reports some neuropathy in both feet is stable with the gabapentin.    Extremity Weakness   The pain is present in the back, right upper leg, right hip, right lower leg, right knee and right ankle. This is a chronic problem. The current episode started more than 1 month ago. There has been no history of extremity trauma. The pain is moderate.     Allergies and Medications:   Review of patient's allergies indicates:  No Known Allergies  Current Outpatient Medications   Medication Sig Dispense Refill    alendronate (FOSAMAX) 70 MG tablet Take 1 tablet by mouth once a week 8 tablet 11    betamethasone valerate (LUXIQ) 0.12 % foam Apply topically 2 (two) times daily. 100 g 0    blood-glucose meter kit To check BG 2 times daily, to use with insurance preferred meter 1 each 0    EUTHYROX 50 mcg tablet TAKE 1 TABLET BY MOUTH BEFORE BREAKFAST 90 tablet 3    famotidine (PEPCID) 20 MG tablet Take 1 tablet by mouth once daily 90 tablet 0    fluconazole (DIFLUCAN) 150 MG Tab TAKE 1 TABLET BY MOUTH ONCE DAILY FOR 1 DAY 1 tablet 0    gabapentin (NEURONTIN) 300 MG capsule TAKE 1 CAPSULE BY MOUTH ONCE DAILY IN THE MORNING AND 2 ONCE DAILY AT NIGHT 270 capsule 0    glipizide-metformin (METAGLIP) 5-500 mg per tablet Take 1 tablet by mouth 2 (two) times daily before meals. Take with breakfast and dinner 180 tablet 1    hydrOXYzine HCL (ATARAX) 25 MG tablet TAKE 1 TABLET BY MOUTH THREE TIMES DAILY AS NEEDED FOR ITCHING 21 tablet 0    insulin detemir U-100 (LEVEMIR FLEXTOUCH U-100 INSULN) 100 unit/mL (3 mL) InPn  "pen Inject 35 Units into the skin every evening. 15 mL 0    lisinopriL (PRINIVIL,ZESTRIL) 5 MG tablet Take 1 tablet by mouth once daily 90 tablet 1    magnesium oxide (MAG-OX) 400 mg (241.3 mg magnesium) tablet Take 1 tablet (400 mg total) by mouth once daily. 30 tablet 5    metoprolol tartrate (LOPRESSOR) 25 MG tablet Take 1 tablet by mouth twice daily 180 tablet 0    ONETOUCH DELICA PLUS LANCET 33 gauge Misc USE   TO CHECK GLUCOSE TWICE DAILY 100 each 3    ONETOUCH ULTRA TEST Strp USE  STRIP TO CHECK GLUCOSE TWICE DAILY 100 each 3    pen needle, diabetic 30 gauge x 1/3" Ndle 1 pen by Misc.(Non-Drug; Combo Route) route once daily. To use with levimir flex pen 100 each 3    rosuvastatin (CRESTOR) 20 MG tablet Take 1 tablet by mouth once daily 90 tablet 0    sertraline (ZOLOFT) 50 MG tablet Take 1 tablet by mouth once daily 30 tablet 11    aspirin (ECOTRIN) 81 MG EC tablet Take 1 tablet (81 mg total) by mouth once daily. 100 tablet 3    calcium carbonate (OS-GEORGINA) 500 mg calcium (1,250 mg) tablet Take 1 tablet (500 mg total) by mouth 2 (two) times daily. 200 tablet 3    meloxicam (MOBIC) 15 MG tablet Take 1 tablet (15 mg total) by mouth once daily. 30 tablet 5     No current facility-administered medications for this visit.     Facility-Administered Medications Ordered in Other Visits   Medication Dose Route Frequency Provider Last Rate Last Admin    0.9%  NaCl infusion   Intravenous Continuous Juancho White MD   Stopped at 12/16/20 1500       Family History:   Family History   Problem Relation Age of Onset    Hyperlipidemia Mother     Heart disease Mother        Social History:   Social History     Socioeconomic History    Marital status:    Occupational History    Occupation: retired    Tobacco Use    Smoking status: Never    Smokeless tobacco: Never   Substance and Sexual Activity    Alcohol use: No    Drug use: No       Review of Systems   Musculoskeletal:  Positive for extremity weakness.     Objective: "     Vitals:    03/17/23 1305   BP: 118/68   Pulse: 92   Resp: 18   Temp: 97.6 °F (36.4 °C)        Physical Exam  Musculoskeletal:        Back:        Assessment:       1. Pain of right lower extremity    2. Trochanteric bursitis of right hip        Plan:       Hoa was seen today for foot pain.    Diagnoses and all orders for this visit:    Pain of right lower extremity  -     Ambulatory referral/consult to Physical/Occupational Therapy; Future    Trochanteric bursitis of right hip  -     meloxicam (MOBIC) 15 MG tablet; Take 1 tablet (15 mg total) by mouth once daily.         No follow-ups on file.

## 2023-03-21 ENCOUNTER — HOSPITAL ENCOUNTER (OUTPATIENT)
Dept: RADIOLOGY | Facility: HOSPITAL | Age: 73
Discharge: HOME | End: 2023-03-21
Attending: FAMILY MEDICINE
Payer: COMMERCIAL

## 2023-03-21 DIAGNOSIS — M70.61 TROCHANTERIC BURSITIS OF RIGHT HIP: ICD-10-CM

## 2023-03-21 PROCEDURE — 73502 X-RAY EXAM HIP UNI 2-3 VIEWS: CPT | Mod: TC,PO,RT

## 2023-03-24 ENCOUNTER — TELEPHONE (OUTPATIENT)
Dept: FAMILY MEDICINE | Facility: CLINIC | Age: 73
End: 2023-03-24

## 2023-03-24 RX ORDER — LIDOCAINE 50 MG/G
1 PATCH TOPICAL DAILY
Qty: 30 PATCH | Refills: 0 | Status: SHIPPED | OUTPATIENT
Start: 2023-03-24 | End: 2023-04-23

## 2023-03-24 NOTE — TELEPHONE ENCOUNTER
On a several different medicines for pain we can add a Lidoderm patch to use for 12 hours during the day.

## 2023-03-24 NOTE — TELEPHONE ENCOUNTER
Patient states she needs something for pain for her leg. The flexeril or the tylenol is not helping.

## 2023-03-24 NOTE — TELEPHONE ENCOUNTER
----- Message from Carlos Pope MD sent at 3/22/2023  8:08 AM CDT -----  X-rays show mild degenerative joint disease but no fractures continue present therapy.

## 2023-03-25 PROCEDURE — G0179 MD RECERTIFICATION HHA PT: HCPCS | Mod: ,,, | Performed by: FAMILY MEDICINE

## 2023-03-25 PROCEDURE — G0179 PR HOME HEALTH MD RECERTIFICATION: ICD-10-PCS | Mod: ,,, | Performed by: FAMILY MEDICINE

## 2023-03-29 ENCOUNTER — TELEPHONE (OUTPATIENT)
Dept: FAMILY MEDICINE | Facility: CLINIC | Age: 73
End: 2023-03-29

## 2023-03-30 ENCOUNTER — TELEPHONE (OUTPATIENT)
Dept: FAMILY MEDICINE | Facility: CLINIC | Age: 73
End: 2023-03-30

## 2023-04-03 ENCOUNTER — TELEPHONE (OUTPATIENT)
Dept: FAMILY MEDICINE | Facility: CLINIC | Age: 73
End: 2023-04-03

## 2023-04-12 ENCOUNTER — DOCUMENT SCAN (OUTPATIENT)
Dept: HOME HEALTH SERVICES | Facility: HOSPITAL | Age: 73
End: 2023-04-12
Payer: COMMERCIAL

## 2023-04-13 ENCOUNTER — EXTERNAL HOME HEALTH (OUTPATIENT)
Dept: HOME HEALTH SERVICES | Facility: HOSPITAL | Age: 73
End: 2023-04-13
Payer: COMMERCIAL

## 2023-05-02 ENCOUNTER — TELEPHONE (OUTPATIENT)
Dept: FAMILY MEDICINE | Facility: CLINIC | Age: 73
End: 2023-05-02
Payer: COMMERCIAL

## 2023-05-08 ENCOUNTER — TELEPHONE (OUTPATIENT)
Dept: FAMILY MEDICINE | Facility: CLINIC | Age: 73
End: 2023-05-08

## 2023-05-11 ENCOUNTER — TELEPHONE (OUTPATIENT)
Dept: FAMILY MEDICINE | Facility: CLINIC | Age: 73
End: 2023-05-11

## 2023-05-11 NOTE — TELEPHONE ENCOUNTER
Spoke with pt regarding if she received an eye exam recently. Pt stated she hadn't but will make an appt soon.Pt also had a question about her foot. She was advised to make an appt with her podiatrist.

## 2023-05-15 DIAGNOSIS — L29.9 ITCHING: ICD-10-CM

## 2023-05-15 RX ORDER — HYDROXYZINE HYDROCHLORIDE 25 MG/1
TABLET, FILM COATED ORAL
Qty: 90 TABLET | Refills: 1 | Status: SHIPPED | OUTPATIENT
Start: 2023-05-15

## 2023-05-23 ENCOUNTER — TELEPHONE (OUTPATIENT)
Dept: FAMILY MEDICINE | Facility: CLINIC | Age: 73
End: 2023-05-23

## 2023-06-08 DIAGNOSIS — Z79.4 TYPE 2 DIABETES MELLITUS WITH HYPERGLYCEMIA, WITH LONG-TERM CURRENT USE OF INSULIN: Primary | ICD-10-CM

## 2023-06-08 DIAGNOSIS — E11.65 TYPE 2 DIABETES MELLITUS WITH HYPERGLYCEMIA, WITH LONG-TERM CURRENT USE OF INSULIN: Primary | ICD-10-CM

## 2023-06-08 RX ORDER — INSULIN PUMP SYRINGE, 3 ML
EACH MISCELLANEOUS
Qty: 1 EACH | Refills: 0 | Status: SHIPPED | OUTPATIENT
Start: 2023-06-08 | End: 2024-06-07

## 2023-06-09 ENCOUNTER — TELEPHONE (OUTPATIENT)
Dept: FAMILY MEDICINE | Facility: CLINIC | Age: 73
End: 2023-06-09

## 2023-06-09 DIAGNOSIS — E11.42 TYPE 2 DIABETES MELLITUS WITH DIABETIC POLYNEUROPATHY, WITH LONG-TERM CURRENT USE OF INSULIN: Primary | ICD-10-CM

## 2023-06-09 DIAGNOSIS — Z79.4 TYPE 2 DIABETES MELLITUS WITH DIABETIC POLYNEUROPATHY, WITH LONG-TERM CURRENT USE OF INSULIN: Primary | ICD-10-CM

## 2023-06-14 ENCOUNTER — OFFICE VISIT (OUTPATIENT)
Dept: FAMILY MEDICINE | Facility: CLINIC | Age: 73
End: 2023-06-14
Payer: COMMERCIAL

## 2023-06-14 VITALS
TEMPERATURE: 98 F | RESPIRATION RATE: 18 BRPM | HEART RATE: 84 BPM | BODY MASS INDEX: 27.68 KG/M2 | SYSTOLIC BLOOD PRESSURE: 100 MMHG | WEIGHT: 141 LBS | OXYGEN SATURATION: 99 % | HEIGHT: 60 IN | DIASTOLIC BLOOD PRESSURE: 68 MMHG

## 2023-06-14 DIAGNOSIS — I73.9 PVD (PERIPHERAL VASCULAR DISEASE): ICD-10-CM

## 2023-06-14 DIAGNOSIS — E11.65 TYPE 2 DIABETES MELLITUS WITH HYPERGLYCEMIA, WITH LONG-TERM CURRENT USE OF INSULIN: ICD-10-CM

## 2023-06-14 DIAGNOSIS — M79.604 PAIN OF RIGHT LOWER EXTREMITY: Primary | ICD-10-CM

## 2023-06-14 DIAGNOSIS — Z79.4 TYPE 2 DIABETES MELLITUS WITH HYPERGLYCEMIA, WITH LONG-TERM CURRENT USE OF INSULIN: ICD-10-CM

## 2023-06-14 PROCEDURE — 3078F DIAST BP <80 MM HG: CPT | Mod: CPTII,S$GLB,, | Performed by: FAMILY MEDICINE

## 2023-06-14 PROCEDURE — 99214 PR OFFICE/OUTPT VISIT, EST, LEVL IV, 30-39 MIN: ICD-10-PCS | Mod: S$GLB,,, | Performed by: FAMILY MEDICINE

## 2023-06-14 PROCEDURE — 1101F PR PT FALLS ASSESS DOC 0-1 FALLS W/OUT INJ PAST YR: ICD-10-PCS | Mod: CPTII,S$GLB,, | Performed by: FAMILY MEDICINE

## 2023-06-14 PROCEDURE — 3288F PR FALLS RISK ASSESSMENT DOCUMENTED: ICD-10-PCS | Mod: CPTII,S$GLB,, | Performed by: FAMILY MEDICINE

## 2023-06-14 PROCEDURE — 3074F SYST BP LT 130 MM HG: CPT | Mod: CPTII,S$GLB,, | Performed by: FAMILY MEDICINE

## 2023-06-14 PROCEDURE — 1101F PT FALLS ASSESS-DOCD LE1/YR: CPT | Mod: CPTII,S$GLB,, | Performed by: FAMILY MEDICINE

## 2023-06-14 PROCEDURE — 3046F HEMOGLOBIN A1C LEVEL >9.0%: CPT | Mod: CPTII,S$GLB,, | Performed by: FAMILY MEDICINE

## 2023-06-14 PROCEDURE — 3288F FALL RISK ASSESSMENT DOCD: CPT | Mod: CPTII,S$GLB,, | Performed by: FAMILY MEDICINE

## 2023-06-14 PROCEDURE — 3008F PR BODY MASS INDEX (BMI) DOCUMENTED: ICD-10-PCS | Mod: CPTII,S$GLB,, | Performed by: FAMILY MEDICINE

## 2023-06-14 PROCEDURE — 3046F PR MOST RECENT HEMOGLOBIN A1C LEVEL > 9.0%: ICD-10-PCS | Mod: CPTII,S$GLB,, | Performed by: FAMILY MEDICINE

## 2023-06-14 PROCEDURE — 1159F MED LIST DOCD IN RCRD: CPT | Mod: CPTII,S$GLB,, | Performed by: FAMILY MEDICINE

## 2023-06-14 PROCEDURE — 99214 OFFICE O/P EST MOD 30 MIN: CPT | Mod: S$GLB,,, | Performed by: FAMILY MEDICINE

## 2023-06-14 PROCEDURE — 3078F PR MOST RECENT DIASTOLIC BLOOD PRESSURE < 80 MM HG: ICD-10-PCS | Mod: CPTII,S$GLB,, | Performed by: FAMILY MEDICINE

## 2023-06-14 PROCEDURE — 3074F PR MOST RECENT SYSTOLIC BLOOD PRESSURE < 130 MM HG: ICD-10-PCS | Mod: CPTII,S$GLB,, | Performed by: FAMILY MEDICINE

## 2023-06-14 PROCEDURE — 3008F BODY MASS INDEX DOCD: CPT | Mod: CPTII,S$GLB,, | Performed by: FAMILY MEDICINE

## 2023-06-14 PROCEDURE — 1159F PR MEDICATION LIST DOCUMENTED IN MEDICAL RECORD: ICD-10-PCS | Mod: CPTII,S$GLB,, | Performed by: FAMILY MEDICINE

## 2023-06-14 PROCEDURE — 1125F AMNT PAIN NOTED PAIN PRSNT: CPT | Mod: CPTII,S$GLB,, | Performed by: FAMILY MEDICINE

## 2023-06-14 PROCEDURE — 1125F PR PAIN SEVERITY QUANTIFIED, PAIN PRESENT: ICD-10-PCS | Mod: CPTII,S$GLB,, | Performed by: FAMILY MEDICINE

## 2023-06-14 RX ORDER — DULAGLUTIDE 0.75 MG/.5ML
0.75 INJECTION, SOLUTION SUBCUTANEOUS
Qty: 4 PEN | Refills: 0 | Status: SHIPPED | OUTPATIENT
Start: 2023-06-14 | End: 2023-07-12

## 2023-06-14 NOTE — PROGRESS NOTES
"Subjective:       Patient ID: Hoa Wilde is a 73 y.o. female.    Chief Complaint: Leg Pain      Patient in here because of right leg pain she is had this for a long time but now starting to turn colors over the last week or 2.  She reports blue discoloration of the skin that started approximately 1 month ago she started using blue emu lotion.  It is helping the pain but only lasts for a few hours.  Patient has not seen a podiatrist since Dr. Ramirez retired, but does have an appointment with a doctor "Chan".  Lab Results       Component                Value               Date                       WBC                      9.58                01/21/2023                 HGB                      12.8                01/21/2023                 HCT                      37.1                01/21/2023                 PLT                      322                 01/21/2023                 CHOL                     138                 05/05/2020                 TRIG                     254 (H)             05/05/2020                 HDL                      40                  05/05/2020                 ALT                      14                  01/19/2023                 AST                      15                  01/19/2023                 NA                       136                 01/21/2023                 K                        4.4                 01/21/2023                 CL                       103                 01/21/2023                 CREATININE               0.5                 01/21/2023                 BUN                      15                  01/21/2023                 CO2                      28                  01/21/2023                 TSH                      1.750               01/20/2023                 TSH                      1.750               01/20/2023                 INR                      1.0                 01/19/2023                 HGBA1C                   13.3 (H)            " 01/20/2023                  Leg Pain     Diabetes  She presents for her follow-up diabetic visit. She has type 2 diabetes mellitus. Her disease course has been worsening. Pertinent negatives for hypoglycemia include no confusion, dizziness, headaches or hunger. Diabetic complications include peripheral neuropathy.     Allergies and Medications:   Review of patient's allergies indicates:  No Known Allergies  Current Outpatient Medications   Medication Sig Dispense Refill    alendronate (FOSAMAX) 70 MG tablet Take 1 tablet by mouth once a week 8 tablet 11    betamethasone valerate (LUXIQ) 0.12 % foam Apply topically 2 (two) times daily. 100 g 0    blood-glucose meter kit To check BG 2 times daily, to use with insurance preferred meter 1 each 0    blood-glucose meter kit Use as instructed 1 each 0    EUTHYROX 50 mcg tablet TAKE 1 TABLET BY MOUTH BEFORE BREAKFAST 90 tablet 3    famotidine (PEPCID) 20 MG tablet Take 1 tablet by mouth once daily 90 tablet 0    fluconazole (DIFLUCAN) 150 MG Tab TAKE 1 TABLET BY MOUTH ONCE DAILY FOR 1 DAY 1 tablet 0    gabapentin (NEURONTIN) 300 MG capsule TAKE 1 CAPSULE BY MOUTH ONCE DAILY IN THE MORNING AND 2 ONCE DAILY AT NIGHT 270 capsule 0    hydrOXYzine HCL (ATARAX) 25 MG tablet TAKE 1 TABLET BY MOUTH THREE TIMES DAILY AS NEEDED FOR ITCHING 90 tablet 1    insulin detemir U-100 (LEVEMIR FLEXTOUCH U-100 INSULN) 100 unit/mL (3 mL) InPn pen Inject 35 Units into the skin every evening. 15 mL 0    lisinopriL (PRINIVIL,ZESTRIL) 5 MG tablet Take 1 tablet by mouth once daily 90 tablet 1    magnesium oxide (MAG-OX) 400 mg (241.3 mg magnesium) tablet Take 1 tablet (400 mg total) by mouth once daily. 30 tablet 5    meloxicam (MOBIC) 15 MG tablet Take 1 tablet (15 mg total) by mouth once daily. 30 tablet 5    metoprolol tartrate (LOPRESSOR) 25 MG tablet Take 1 tablet by mouth twice daily 180 tablet 0    ONETOUCH DELICA PLUS LANCET 33 gauge Misc USE   TO CHECK GLUCOSE TWICE DAILY 100 each 3     "ONETOUCH ULTRA TEST Strp USE  STRIP TO CHECK GLUCOSE TWICE DAILY 100 each 3    pen needle, diabetic 30 gauge x 1/3" Ndle 1 pen by Misc.(Non-Drug; Combo Route) route once daily. To use with levimir flex pen 100 each 3    rosuvastatin (CRESTOR) 20 MG tablet Take 1 tablet by mouth once daily 90 tablet 0    sertraline (ZOLOFT) 50 MG tablet Take 1 tablet by mouth once daily 30 tablet 11    aspirin (ECOTRIN) 81 MG EC tablet Take 1 tablet (81 mg total) by mouth once daily. 100 tablet 3    calcium carbonate (OS-GEORGINA) 500 mg calcium (1,250 mg) tablet Take 1 tablet (500 mg total) by mouth 2 (two) times daily. 200 tablet 3    dulaglutide (TRULICITY) 0.75 mg/0.5 mL pen injector Inject 0.75 mg into the skin every 7 days. 4 pen 0    glipizide-metformin (METAGLIP) 5-500 mg per tablet Take 1 tablet by mouth 2 (two) times daily before meals. Take with breakfast and dinner 180 tablet 1     No current facility-administered medications for this visit.     Facility-Administered Medications Ordered in Other Visits   Medication Dose Route Frequency Provider Last Rate Last Admin    0.9%  NaCl infusion   Intravenous Continuous Juancho White MD   Stopped at 12/16/20 1500       Family History:   Family History   Problem Relation Age of Onset    Hyperlipidemia Mother     Heart disease Mother        Social History:   Social History     Socioeconomic History    Marital status:    Occupational History    Occupation: retired    Tobacco Use    Smoking status: Never    Smokeless tobacco: Never   Substance and Sexual Activity    Alcohol use: No    Drug use: No       Review of Systems   Neurological:  Negative for dizziness and headaches.   Psychiatric/Behavioral:  Negative for confusion.      Objective:     Vitals:    06/14/23 1308   BP: 100/68   Pulse: 84   Resp: 18   Temp: 98.4 °F (36.9 °C)        Physical Exam    Assessment:       1. Pain of right lower extremity    2. PVD (peripheral vascular disease)    3. Type 2 diabetes mellitus with " hyperglycemia, with long-term current use of insulin        Plan:       Hoa was seen today for leg pain.    Diagnoses and all orders for this visit:    Pain of right lower extremity  -     Ambulatory referral/consult to Vascular Surgery; Future  -     Ambulatory referral/consult to Podiatry; Future    PVD (peripheral vascular disease)  -     Ambulatory referral/consult to Vascular Surgery; Future  -     Ambulatory referral/consult to Podiatry; Future    Type 2 diabetes mellitus with hyperglycemia, with long-term current use of insulin  -     Ambulatory referral/consult to Ophthalmology; Future  -     dulaglutide (TRULICITY) 0.75 mg/0.5 mL pen injector; Inject 0.75 mg into the skin every 7 days.  -     Lipid Panel; Future  -     Microalbumin/Creatinine Ratio, Urine; Future  -     Comprehensive Metabolic Panel; Future  -     Hemoglobin A1C; Future         Follow up in about 3 months (around 9/14/2023).

## 2023-06-15 ENCOUNTER — TELEPHONE (OUTPATIENT)
Dept: FAMILY MEDICINE | Facility: CLINIC | Age: 73
End: 2023-06-15

## 2023-07-10 ENCOUNTER — PATIENT OUTREACH (OUTPATIENT)
Dept: ADMINISTRATIVE | Facility: HOSPITAL | Age: 73
End: 2023-07-10
Payer: COMMERCIAL

## 2023-07-10 NOTE — LETTER
July 10, 2023    Hoa Wilde  [address]  [place] [address]             [facility]  [address]  [place] [address]  Phone: [phone] [facility] is committed to your overall health.  To help you get the most out of each of your visits, we will review your information to make sure you are up to date on all of your recommended tests and/or procedures.       Your PCP  Carlos Pope MD   found that you may be due for:   Health Maintenance Due   Topic Date Due    Diabetes Urine Screening  Never done    Mammogram  Never done    Colorectal Cancer Screening  Never done    Shingles Vaccine (1 of 2) Never done    Eye Exam  02/06/2021    Lipid Panel  05/05/2021    COVID-19 Vaccine (4 - Moderna series) 03/25/2022    Foot Exam  02/01/2023    Hemoglobin A1c  04/20/2023    DEXA Scan  06/04/2023         If you have had any of the above done at another facility, please let us know where you went so that we can request your record. So that your chart with [facility] will be complete.  If you would like to schedule any of these, please contact us at [phone].      Thank you for choosing [facility] .    Kathya WALKER  Clinical Care Coordinator  [facility] / [facility]

## 2023-07-10 NOTE — PROGRESS NOTES
Population Health Chart Review & Patient Outreach Details:     Reason for Outreach Encounter:     [x]  Non-Compliant Report   []  Payor Report (Humana, PHN, BCBS, MSSP, MCIP, C, etc.)   []  Pre-Visit Chart Review     Updates Requested / Reviewed:     [x]  Care Everywhere    [x]     [x]  External Sources ([facility], [facility], DIS, etc.)   [x]  Care Team Updated    Patient Outreach Method:    []  Telephone Outreach Completed   [] Successful   [] Left Voicemail   [] Unable to Contact (wrong number, no voicemail)  []  [facility] Portal Outreach Sent  [x]  Letter Outreach Mailed  [x]  Fax Sent for External Records  [x]  External Records Upload    Health Maintenance Topics Addressed and Outreach Outcomes / Actions Taken:        [x]      Breast Cancer Screening []  Mammo Scheduled      []  External Records Requested     []  Added Reminder to Complete to Upcoming Primary Care Appt Notes     []  Patient Declined     []  Patient Will Call Back to Schedule     []  Patient Will Schedule with External Provider / Order Routed if Applicable             []       Cervical Cancer Screening []  Pap Scheduled      []  External Records Requested     []  Added Reminder to Complete to Upcoming Primary Care Appt Notes     []  Patient Declined     []  Patient Will Call Back to Schedule     []  Patient Will Schedule with External Provider               [x]          Colorectal Cancer Screening []  Colonoscopy Case Request or Referral Placed     []  External Records Requested     []  Added Reminder to Complete to Upcoming Primary Care Appt Notes     []  Patient Declined     []  Patient Will Call Back to Schedule     []  Patient Will Schedule with External Provider     []  Fit Kit Mailed (add the SmartPhrase under additional notes)     []  Reminded Patient to Complete Home Test             [x]      Diabetic Eye Exam []  Eye Camera Scheduled or Optometry Referral Placed     [x]  External Records Requested     []  Added Reminder to  Complete to Upcoming Primary Care Appt Notes     []  Patient Declined     []  Patient Will Call Back to Schedule     []  Patient Will Schedule with External Provider             []      Blood Pressure Control []  Primary Care Follow Up Visit Scheduled     []  Remote Blood Pressure Reading Captured     []  Added Reminder to Complete to Upcoming Primary Care Appt Notes     []  Patient Declined     []  Patient Will Call Back / Patient Will Send Portal Message with Reading     []  Patient Will Call Back to Schedule Provider Visit             [x]       HbA1c & Other Labs []  Lab Appt Scheduled for Due Labs     []  Primary Care Follow Up Visit Scheduled      []  Reminded Patient to Complete Home Test     []  Added Reminder to Complete to Upcoming Primary Care Appt Notes     []  Patient Declined     []  Patient Will Call Back to Schedule     []  Patient Will Schedule with External Provider / Order Routed if Applicable           []    Schedule Primary Care Appt []  Primary Care Appt Scheduled     []  Patient Declined     []  Patient Will Call Back to Schedule     []  Pt Established with External Provider & Updated Care Team             []      Medication Adherence []  Primary Care Appointment Scheduled     []  Added Reminder to Upcoming Primary Care Appt Notes     []  Patient Reminded to  Prescription     []  Patient Declined, Provider Notified if Needed     []  Sent Provider Message to Review and/or Add Exclusion to Problem List             []      Osteoporosis Screening []  DXA Appointment Scheduled     []  External Records Requested     []  Added Reminder to Complete to Upcoming Primary Care Appt Notes     []  Patient Declined     []  Patient Will Call Back to Schedule     []  Patient Will Schedule with External Provider / Order Routed if Applicable     Additional Care Coordinator Notes:     [facility] Clean Up Panel Gap Report Review. External record ( Foot Exam 2/1/2022 ) hyperlinked in Health Maintenance.      Further  Action Needed If Patient Returns Outreach:

## 2023-07-10 NOTE — LETTER
AUTHORIZATION FOR RELEASE OF   CONFIDENTIAL INFORMATION    Dear Dr. Peter,    We are seeing Hoa Wilde, date of birth 1950, in the clinic at [address] [facility]. Carlos Pope MD is the patient's PCP. Hoa Wilde has an outstanding lab/procedure at the time we reviewed her chart. In order to help keep her health information updated, she has authorized us to request the following medical record(s):                                              ( X )  EYE EXAM ( Most Recent )           Please fax records to [facility], Rory J Duffour, MD, [phone]    If you have any questions, please contact     Kathya NORMA  Clinical Care Coordinator  [facility]      patient Name: Hoa Wilde  : 1950  Patient Phone #: [phone]       Show    CONSENT AND ACKNOWLEDGEMENT         FORM       Hoa Wilde  MRN: [id number]  : 1950  Age: 73 y.o.  Sex: female       MEDICARE-PATIENTS CERTIFICATION, AUTHORIZATION TO RELEASE INFORMATION AND PAYMENT REQUEST:  I certify that the information given by me in applying under the Title XVII of Social Security Act is correct.  I authorize any gilbert of medical or other information about me to release to the Social Security Administration or its intermediaries or carriers any information needed for this or a related Medicare claim.  I request that payment of authorized benefits be made on my behalf to [facility] and [facility] ([facility]).  I also acknowledge upon admission, that I received the Important Message from Medicare.     AUTHORIZATION TO PAY INSURANCE BENEFITS:  For and in consideration of medical services rendered to the patient named herein, I hereby assign and transfer to [facility], including but not limited to hospital based physicians, attending physicians, consulting physicians, nurse practitioners and physicians assistants the rights for the payment of  medical benefits which I may have under the policy/policies identified by me during registration or any policy which may be determined hereafter to pay benefits otherwise payable to me or to a beneficiary designated in the policy.  By this assignment, I authorize payment directly to [facility], hospital based physicians, attending physicians and consulting physicians of all medical benefits payable under the aforesaid policy/policies, but not to exceed the hospitals and/or clinic regular charges.     GUARANTEE OF ACCOUNT:  I/We certify that the information given is true and correct to the best of my/our knowledge.  I/We understand that bills are payable within thirty (30) days of the date of service.  If it becomes necessary for the account to be referred to an  or collection agency, the undersigned agrees to pay the reasonable s fees or collection expenses. I/We kristal permission and consent to [facility], our assignees, and third party collection agents to contact myself/us by any telephone number associated with myself/us, including wireless numbers and to leave answering machine and voicemail messages and include in any such messages, information required by law (including debt collection laws) and/or messages regarding amounts owed; to send text messages or emails using any email addresses I/we provided; to use pre-recorded/artificial voice messages  and/or an automatic dialing device in connection with any communications.   I/We agree to be responsible for the payment of all charges of this medical service and hospital based physicians, attending physicians and consulting physicians services rendered to the above named patient     COMMUNICATION AUTHORIZATION:   I hereby authorize Jeniffer Olivares, to contact me on my cell phone and/or home phone using prerecorded messages, artificial voice messages, automatic telephone dialing devices or other computer assisted technology, or by  electronic mail, text messaging, or by any other form of electronic communication. This includes, but is not limited to, appointment reminders, yearly physical exam reminders, preventive care reminders, patient campaigns and welcome calls. I understand I have the right to opt out of these communications at any time.        Page 1 of 3                 CONSENT AND ACKNOWLEDGEMENT FORM CONTINUED     AUTHORIZATION TO RELEASE INFORMATION:  I hereby authorize [facility] and hospital based physicians to release the information for this occasion of service requested by my insurance company or third party payor for the purpose of obtaining payment for services rendered during this admission and/or to other healthcare providers for the purpose of follow-up care or evaluation of care.  This information may or may not include mental health and/or substance abuse information.     AUTHORIZATION FOR MEDICAL AND/OR SURGICAL TREATMENT:  I hereby authorize [facility] and its employees or agents to provide hospital care incident to this admission, including without limitations, consent to routine diagnostic procedures and medical treatment, which is to include whatever procedures that are deemed necessary by the admitting doctor and such other physicians or assistants as he may designate.     PERSONAL VALUABLES:      It is understood and agreed that the [facility] maintains a safe for the safekeeping of money and valuables and the hospital shall not be liable for the loss of damage to any money, jewelry, glasses, documents, dentures, hearing aids or other articles of unusual value, unless placed therein, and shall not be liable for loss or damage to any other personal property, unless deposited with the hospital for safekeeping.  VALUABLES ARE NOT TO BE LEFT IN THE PATIENTS ROOM.     ADVANCE DIRECTIVES:  I understand that I am not required to have Advance Directives in order to be treated.  I have received written  information about my rights to formulate Advance Directives.     NOTICE OF PRIVACY PRACTICES/PATIENT RIGHTS/ADMISSION PACKET:  I acknowledge that I have received copies of the [facility] Notice of Privacy Practices, Patient Rights, and the Admission packet, which contains Smoking Cessation information. I understand that weapons, illegal drugs, or any other items considered  contraband, are not allowed on the [facility] campus, and that I do not have such items in my possession.        CONSENT TO PHOTOGRAPH AND/OR VIDEO TAPE DOCUMENTATION OF CARE:  I understand that photographs, videotapes, digital, or other images may be recorded to document my care.  I acknowledge that [facility] will retain the ownership rights to these photographs, videotapes, digital, or other images, and that I will be allowed access to view or obtain copies of any photographs, videotapes, digital, or other images created as part of the documentation of my care.  I understand that these images will be stored in a secure manner that will protect my privacy and that they will be kept for the time period required by law or by policy at [facility]. Images that identify me will be released and/or used outside the institution only upon written authorization from me or my legal representative ([employer]ORY, 2001).                                                                                                                                Page 2 of 3                    CONSENT AND ACKNOWLEDGEMENT FORM CONTINUED     [place] IMM[employer]ATION NETWORK (LINKS) PARTICIPATION:  I acknowledge that I have been informed about [place] [employer] Network, or LINKS.  I understand that it is a means to keep track of my immunization records for myself, doctors offices, hospitals and other health care providers through secure, electronic means.     INSURANCE NETWORK ACKNOWLEDGEMENT:                                                                            I  acknowledge that I have received notice, based on the information available at this time, regarding the status of my insurance plan as in or out of network at [facility]. I understand that a full listing of accepted insurance plans can be found at the [facility] website.     NOTICE     HEALTH CARE SERVICES MAY BE PROVIDED TO YOU AT A NETWORK HEALTH CARE FACILITY BY FACILITY-BASED PHYSICIANS WHO ARE NOT IN YOUR HEALTH PLAN.  YOU MAY BE RESPONSIBLE FOR PAYMENT OF ALL OR PART OF THE FEES FOR THOSE OUT-OF-NETWORK SERVICES, IN ADDITION TO APPLICABLE AMOUNTS DUE FOR CO-PAYMENTS, COINSURANCE, DEDUCTIBLES, AND NON-COVERED SERVICES.  SPECIFIC INFORMATION ABOUT IN-NETWORK AND OUT-OF NETWORK FACILITY-BASED PHYSICIANS CAN BE FOUND AT THE WEBSITE ADDRESS OF YOUR HEALTH PLAN OR BY CALLING THE [employer] TELEPHONE NUMBER OF YOUR HEALTH PLAN.        I/WE HAVE READ, UNDERSTAND AND AGREE TO THE ABOVE.        _______________________________   Patient/Legal Guardian Signature     This signature was collected at 03/02/2023     Time (if no electronic signature):____________     self       _______________________________   Printed Name/Relationship to Patient       Witness  Sign Here  _______________________________   Witness Signature     This signature was collected at 03/02/2023        _______________________________   Printed Name         Page 3 of 3

## 2023-07-15 DIAGNOSIS — M85.89 OSTEOPENIA OF MULTIPLE SITES: ICD-10-CM

## 2023-07-17 RX ORDER — ALENDRONATE SODIUM 70 MG/1
TABLET ORAL
Qty: 12 TABLET | Refills: 1 | Status: SHIPPED | OUTPATIENT
Start: 2023-07-17

## 2023-08-02 DIAGNOSIS — E11.43 DIABETIC AUTONOMIC NEUROPATHY ASSOCIATED WITH TYPE 2 DIABETES MELLITUS: ICD-10-CM

## 2023-08-02 RX ORDER — INSULIN DETEMIR 100 [IU]/ML
50 INJECTION, SOLUTION SUBCUTANEOUS NIGHTLY
Qty: 15 ML | Refills: 0 | Status: SHIPPED | OUTPATIENT
Start: 2023-08-02 | End: 2024-03-27 | Stop reason: SDUPTHER

## 2023-08-03 ENCOUNTER — HOSPITAL ENCOUNTER (EMERGENCY)
Facility: HOSPITAL | Age: 73
Discharge: HOME | End: 2023-08-03
Attending: EMERGENCY MEDICINE
Payer: COMMERCIAL

## 2023-08-03 VITALS
TEMPERATURE: 98 F | RESPIRATION RATE: 18 BRPM | DIASTOLIC BLOOD PRESSURE: 101 MMHG | HEART RATE: 88 BPM | OXYGEN SATURATION: 97 % | SYSTOLIC BLOOD PRESSURE: 144 MMHG | WEIGHT: 141 LBS | BODY MASS INDEX: 27.68 KG/M2 | HEIGHT: 60 IN

## 2023-08-03 DIAGNOSIS — I10 ELEVATED BLOOD PRESSURE READING WITH DIAGNOSIS OF HYPERTENSION: ICD-10-CM

## 2023-08-03 DIAGNOSIS — N30.00 ACUTE CYSTITIS WITHOUT HEMATURIA: Primary | ICD-10-CM

## 2023-08-03 DIAGNOSIS — R73.9 HYPERGLYCEMIA: ICD-10-CM

## 2023-08-03 LAB
ALBUMIN SERPL BCP-MCNC: 3.7 G/DL (ref 3.5–5.2)
ALLENS TEST: ABNORMAL
ALP SERPL-CCNC: 111 U/L (ref 55–135)
ALT SERPL W/O P-5'-P-CCNC: 14 U/L (ref 10–44)
ANION GAP SERPL CALC-SCNC: 4 MMOL/L (ref 8–16)
AST SERPL-CCNC: 13 U/L (ref 10–40)
B-OH-BUTYR BLD STRIP-SCNC: 0.3 MMOL/L (ref 0–0.5)
BACTERIA #/AREA URNS HPF: ABNORMAL /HPF
BASOPHILS # BLD AUTO: 0.06 K/UL (ref 0–0.2)
BASOPHILS NFR BLD: 0.8 % (ref 0–1.9)
BILIRUB SERPL-MCNC: 0.5 MG/DL (ref 0.1–1)
BILIRUB UR QL STRIP: NEGATIVE
BUN SERPL-MCNC: 17 MG/DL (ref 8–23)
CALCIUM SERPL-MCNC: 8.5 MG/DL (ref 8.7–10.5)
CHLORIDE SERPL-SCNC: 101 MMOL/L (ref 95–110)
CLARITY UR: ABNORMAL
CO2 SERPL-SCNC: 25 MMOL/L (ref 23–29)
COLOR UR: YELLOW
CREAT SERPL-MCNC: 0.6 MG/DL (ref 0.5–1.4)
DELSYS: ABNORMAL
DIFFERENTIAL METHOD: NORMAL
EOSINOPHIL # BLD AUTO: 0.1 K/UL (ref 0–0.5)
EOSINOPHIL NFR BLD: 1.8 % (ref 0–8)
ERYTHROCYTE [DISTWIDTH] IN BLOOD BY AUTOMATED COUNT: 12.7 % (ref 11.5–14.5)
EST. GFR  (NO RACE VARIABLE): >60 ML/MIN/1.73 M^2
GLUCOSE SERPL-MCNC: 379 MG/DL (ref 70–110)
GLUCOSE SERPL-MCNC: 387 MG/DL (ref 70–110)
GLUCOSE UR QL STRIP: ABNORMAL
HCO3 UR-SCNC: 24.6 MMOL/L (ref 24–28)
HCT VFR BLD AUTO: 39 % (ref 37–48.5)
HGB BLD-MCNC: 13.2 G/DL (ref 12–16)
HGB UR QL STRIP: NEGATIVE
HYALINE CASTS #/AREA URNS LPF: 7 /LPF
IMM GRANULOCYTES # BLD AUTO: 0.02 K/UL (ref 0–0.04)
IMM GRANULOCYTES NFR BLD AUTO: 0.3 % (ref 0–0.5)
KETONES UR QL STRIP: NEGATIVE
LEUKOCYTE ESTERASE UR QL STRIP: ABNORMAL
LYMPHOCYTES # BLD AUTO: 2.9 K/UL (ref 1–4.8)
LYMPHOCYTES NFR BLD: 38.8 % (ref 18–48)
MCH RBC QN AUTO: 27.8 PG (ref 27–31)
MCHC RBC AUTO-ENTMCNC: 33.8 G/DL (ref 32–36)
MCV RBC AUTO: 82 FL (ref 82–98)
MICROSCOPIC COMMENT: ABNORMAL
MODE: ABNORMAL
MONOCYTES # BLD AUTO: 0.4 K/UL (ref 0.3–1)
MONOCYTES NFR BLD: 5.8 % (ref 4–15)
NEUTROPHILS # BLD AUTO: 4 K/UL (ref 1.8–7.7)
NEUTROPHILS NFR BLD: 52.5 % (ref 38–73)
NITRITE UR QL STRIP: NEGATIVE
NRBC BLD-RTO: 0 /100 WBC
PCO2 BLDA: 48.5 MMHG (ref 35–45)
PH SMN: 7.31 [PH] (ref 7.35–7.45)
PH UR STRIP: 6 [PH] (ref 5–8)
PLATELET # BLD AUTO: 306 K/UL (ref 150–450)
PMV BLD AUTO: 9.7 FL (ref 9.2–12.9)
PO2 BLDA: 27 MMHG (ref 40–60)
POC BE: -2 MMOL/L
POC SATURATED O2: 44 % (ref 95–100)
POC TCO2: 26 MMOL/L (ref 24–29)
POTASSIUM SERPL-SCNC: 3.7 MMOL/L (ref 3.5–5.1)
PROT SERPL-MCNC: 7 G/DL (ref 6–8.4)
PROT UR QL STRIP: NEGATIVE
RBC # BLD AUTO: 4.74 M/UL (ref 4–5.4)
RBC #/AREA URNS HPF: 37 /HPF (ref 0–4)
SAMPLE: ABNORMAL
SITE: ABNORMAL
SODIUM SERPL-SCNC: 130 MMOL/L (ref 136–145)
SP GR UR STRIP: 1.02 (ref 1–1.03)
SQUAMOUS #/AREA URNS HPF: 2 /HPF
URN SPEC COLLECT METH UR: ABNORMAL
UROBILINOGEN UR STRIP-ACNC: NEGATIVE EU/DL
WBC # BLD AUTO: 7.58 K/UL (ref 3.9–12.7)
WBC #/AREA URNS HPF: 95 /HPF (ref 0–5)
YEAST URNS QL MICRO: ABNORMAL

## 2023-08-03 PROCEDURE — 25000003 PHARM REV CODE 250: Performed by: STUDENT IN AN ORGANIZED HEALTH CARE EDUCATION/TRAINING PROGRAM

## 2023-08-03 PROCEDURE — 82962 GLUCOSE BLOOD TEST: CPT

## 2023-08-03 PROCEDURE — 87086 URINE CULTURE/COLONY COUNT: CPT | Performed by: STUDENT IN AN ORGANIZED HEALTH CARE EDUCATION/TRAINING PROGRAM

## 2023-08-03 PROCEDURE — 99285 EMERGENCY DEPT VISIT HI MDM: CPT | Mod: 25

## 2023-08-03 PROCEDURE — 99900035 HC TECH TIME PER 15 MIN (STAT)

## 2023-08-03 PROCEDURE — 93010 ELECTROCARDIOGRAM REPORT: CPT | Mod: ,,, | Performed by: INTERNAL MEDICINE

## 2023-08-03 PROCEDURE — 96360 HYDRATION IV INFUSION INIT: CPT

## 2023-08-03 PROCEDURE — 82803 BLOOD GASES ANY COMBINATION: CPT

## 2023-08-03 PROCEDURE — 80053 COMPREHEN METABOLIC PANEL: CPT | Performed by: STUDENT IN AN ORGANIZED HEALTH CARE EDUCATION/TRAINING PROGRAM

## 2023-08-03 PROCEDURE — 81001 URINALYSIS AUTO W/SCOPE: CPT | Performed by: STUDENT IN AN ORGANIZED HEALTH CARE EDUCATION/TRAINING PROGRAM

## 2023-08-03 PROCEDURE — 93005 ELECTROCARDIOGRAM TRACING: CPT | Performed by: INTERNAL MEDICINE

## 2023-08-03 PROCEDURE — 85025 COMPLETE CBC W/AUTO DIFF WBC: CPT | Performed by: STUDENT IN AN ORGANIZED HEALTH CARE EDUCATION/TRAINING PROGRAM

## 2023-08-03 PROCEDURE — 93010 EKG 12-LEAD: ICD-10-PCS | Mod: ,,, | Performed by: INTERNAL MEDICINE

## 2023-08-03 PROCEDURE — 82010 KETONE BODYS QUAN: CPT | Performed by: STUDENT IN AN ORGANIZED HEALTH CARE EDUCATION/TRAINING PROGRAM

## 2023-08-03 RX ORDER — CEFUROXIME AXETIL 500 MG/1
500 TABLET ORAL 2 TIMES DAILY
Qty: 14 TABLET | Refills: 0 | Status: SHIPPED | OUTPATIENT
Start: 2023-08-03 | End: 2024-02-15

## 2023-08-03 RX ADMIN — SODIUM CHLORIDE 1000 ML: 0.9 INJECTION, SOLUTION INTRAVENOUS at 04:08

## 2023-08-03 NOTE — FIRST PROVIDER EVALUATION
Medical screening examination initiated.  I have conducted a focused provider triage encounter, findings are as follows:    Brief history of present illness:  Incidental hyperglycemia noted at Podiatry when this morning.  Patient has no acute complaints.  Tells me she did not take her insulin last night.    Vitals:    08/03/23 1538   BP: (!) 186/84   BP Location: Right arm   Patient Position: Sitting   Pulse: 89   Resp: 18   Temp: 98.1 °F (36.7 °C)   TempSrc: Oral   SpO2: 98%   Weight: 64 kg (141 lb)   Height: 5' (1.524 m)       Pertinent physical exam:  Nontoxic, well-appearing.    Brief workup plan:  Labs    Preliminary workup initiated; this workup will be continued and followed by the physician or advanced practice provider that is assigned to the patient when roomed.

## 2023-08-03 NOTE — ED PROVIDER NOTES
Encounter Date: 8/3/2023       History     Chief Complaint   Patient presents with    Hyperglycemia     Pt arrived via EMS from podiatrist getting nails clipped, during vital assessment pt had hyperglycemia of 407, pt stated she forgot to take her insulin last night.      This is a 73-year-old female with history of hypertension diabetes, presenting with high blood glucose.  Patient was asymptomatic.  She was at her podiatrist today when they checked her sugar and it was greater than 400.  Patient says she forgot to take her insulin last night.  She was also very hypertensive.  She says she has not taken any of her home medications today.  She does admit to some mild dysuria and frequency.  She denies any fever or chills, abdominal or flank pain, vomiting or diarrhea, chest pain or shortness a breath.    The history is provided by the patient.     Review of patient's allergies indicates:  No Known Allergies  Past Medical History:   Diagnosis Date    Coronary artery disease     Diabetes mellitus, type 2     Hyperlipidemia     Hypertension     Hypothyroidism     Neuropathy      Past Surgical History:   Procedure Laterality Date    ANGIOGRAPHY OF LOWER EXTREMITY Left 12/16/2020    Procedure: ANGIOGRAM, LOWER EXTREMITY;  Surgeon: Juancho White MD;  Location: [facility] CATH/EP LAB;  Service: General;  Laterality: Left;    CHOLECYSTECTOMY      CORONARY ARTERY BYPASS GRAFT      FOOT SURGERY Left     HYSTERECTOMY       Family History   Problem Relation Age of Onset    Hyperlipidemia Mother     Heart disease Mother      Social History     Tobacco Use    Smoking status: Never    Smokeless tobacco: Never   Substance Use Topics    Alcohol use: No    Drug use: No     Review of Systems   Genitourinary:  Positive for dysuria.   All other systems reviewed and are negative.      Physical Exam     Initial Vitals [08/03/23 1538]   BP Pulse Resp Temp SpO2   (!) 186/84 89 18 98.1 °F (36.7 °C) 98 %      MAP       --         Physical  Exam    Nursing note and vitals reviewed.  Constitutional: She appears well-developed and well-nourished. She is not diaphoretic. No distress.   HENT:   Head: Normocephalic.   Eyes: Conjunctivae are normal.   Neck: Neck supple.   Normal range of motion.  Cardiovascular:  Normal rate.           Pulmonary/Chest: No respiratory distress.   Abdominal: She exhibits no distension.   Musculoskeletal:         General: No edema.      Cervical back: Normal range of motion and neck supple.     Neurological: She is alert. She has normal strength. GCS eye subscore is 4. GCS verbal subscore is 5. GCS motor subscore is 6.   Skin: Skin is warm and dry.   Psychiatric: She has a normal mood and affect.         ED Course   Procedures  Labs Reviewed   COMPREHENSIVE METABOLIC PANEL - Abnormal; Notable for the following components:       Result Value    Sodium 130 (*)     Glucose 387 (*)     Calcium 8.5 (*)     Anion Gap 4 (*)     All other components within normal limits   URINALYSIS, REFLEX TO URINE CULTURE - Abnormal; Notable for the following components:    Appearance, UA Cloudy (*)     Glucose, UA 4+ (*)     Leukocytes, UA 3+ (*)     All other components within normal limits    Narrative:     Specimen Source->Urine   URINALYSIS MICROSCOPIC - Abnormal; Notable for the following components:    RBC, UA 37 (*)     WBC, UA 95 (*)     Yeast, UA Few (*)     Hyaline Casts, UA 7 (*)     All other components within normal limits    Narrative:     Specimen Source->Urine   POCT GLUCOSE - Abnormal; Notable for the following components:    POC Glucose 379 (*)     All other components within normal limits   ISTAT PROCEDURE - Abnormal; Notable for the following components:    POC PH 7.313 (*)     POC PCO2 48.5 (*)     POC PO2 27 (*)     POC SATURATED O2 44 (*)     All other components within normal limits   CULTURE, URINE   CBC W/ AUTO DIFFERENTIAL   BETA - HYDROXYBUTYRATE, SERUM   POCT GLUCOSE MONITORING CONTINUOUS        ECG Results               EKG 12-lead (In process)  Result time 08/03/23 16:45:01      In process by Interface, Lab In [facility] (08/03/23 16:45:01)                   Narrative:    Test Reason : R73.9,    Vent. Rate : 087 BPM     Atrial Rate : 087 BPM     P-R Int : 184 ms          QRS Dur : 080 ms      QT Int : 390 ms       P-R-T Axes : 080 -17 136 degrees     QTc Int : 469 ms    Normal sinus rhythm  Anterolateral infarct ,age undetermined  Abnormal ECG  When compared with ECG of 19-JAN-2023 19:21,  Anterior infarct is now Present  Anterolateral infarct is now Present  Criteria for Inferior infarct are no longer Present    Referred By: [email or web address]   SELF           Confirmed By:                                   Imaging Results              X-Ray Chest AP Portable (Final result)  Result time 08/03/23 16:20:11      Final result by Rafat Cornejo MD (08/03/23 16:20:11)                   Narrative:    Chest single view    CLINICAL DATA: Hyperglycemia    FINDINGS: AP view is compared to January 19, 2023. Heart size is normal. There has been previous median sternotomy. The lungs are clear. No acute osseous abnormality is identified.    IMPRESSION:  1. No acute process.  2. Post sternotomy changes.    Electronically signed by:  Rafat Cornejo MD  8/3/2023 4:20 PM CDT Workstation: [id number]                                     Medications   sodium chloride 0.9% bolus 1,000 mL 1,000 mL (0 mLs Intravenous Stopped 8/3/23 1710)     Medical Decision Making:   Initial Assessment:   BP glucoses in the upper 300s.  She is normal anion gap, CO2, serum ketones, no evidence of DKA.  She does appear to have a UTI.  BP is also 190/100 but she is asymptomatic.  Likely due to medication noncompliance.  We will discharge with antibiotic.  Stressed the need for medication compliance.                          Clinical Impression:   Final diagnoses:  [R73.9] Hyperglycemia  [N30.00] Acute cystitis without hematuria (Primary)  [I10] Elevated blood pressure reading  with diagnosis of hypertension        ED Disposition Condition    Discharge Stable          ED Prescriptions       Medication Sig Dispense Start Date End Date Auth. Provider    cefUROXime (CEFTIN) 500 MG tablet Take 1 tablet (500 mg total) by mouth 2 (two) times daily. 14 tablet 8/3/2023 -- Dontrell Bee MD          Follow-up Information       Follow up With Specialties Details Why Contact Info    Carlos Pope MD Family Medicine   [address] [place], [place] [id number]  [phone]               Dontrell Bee MD  08/03/23 4655

## 2023-08-06 LAB — BACTERIA UR CULT: NO GROWTH

## 2023-08-18 DIAGNOSIS — Z79.4 TYPE 2 DIABETES MELLITUS WITH DIABETIC POLYNEUROPATHY, WITH LONG-TERM CURRENT USE OF INSULIN: ICD-10-CM

## 2023-08-18 DIAGNOSIS — L97.528: ICD-10-CM

## 2023-08-18 DIAGNOSIS — E11.42 TYPE 2 DIABETES MELLITUS WITH DIABETIC POLYNEUROPATHY, WITH LONG-TERM CURRENT USE OF INSULIN: ICD-10-CM

## 2023-08-18 DIAGNOSIS — E13.621: ICD-10-CM

## 2023-08-18 RX ORDER — GABAPENTIN 300 MG/1
CAPSULE ORAL
Qty: 270 CAPSULE | Refills: 1 | Status: SHIPPED | OUTPATIENT
Start: 2023-08-18

## 2023-09-20 DIAGNOSIS — Z78.0 MENOPAUSE: ICD-10-CM

## 2023-09-22 ENCOUNTER — TELEPHONE (OUTPATIENT)
Dept: FAMILY MEDICINE | Facility: CLINIC | Age: 73
End: 2023-09-22

## 2023-09-22 NOTE — TELEPHONE ENCOUNTER
The following medication needs a prior authorization:     Medication Name: hydrOXYzine HCL (ATARAX    Dosage: 25 MG tablet    Frequency: Sig: TAKE 1 TABLET BY MOUTH THREE TIMES DAILY AS NEEDED FOR ITCHING    Directions for use: Sig: TAKE 1 TABLET BY MOUTH THREE TIMES DAILY AS NEEDED FOR ITCHING    Diagnosis: Itching     Is the request for a reauthorization? YES    Is the patient currently stable on therapy? YES    Please list all therapeutic alternatives previously used with start/end dates and outcome:

## 2024-02-02 ENCOUNTER — TELEPHONE (OUTPATIENT)
Dept: FAMILY MEDICINE | Facility: CLINIC | Age: 74
End: 2024-02-02
Payer: MEDICARE

## 2024-02-06 ENCOUNTER — OFFICE VISIT (OUTPATIENT)
Dept: FAMILY MEDICINE | Facility: CLINIC | Age: 74
End: 2024-02-06
Payer: MEDICARE

## 2024-02-06 VITALS
RESPIRATION RATE: 18 BRPM | BODY MASS INDEX: 29.25 KG/M2 | HEART RATE: 47 BPM | WEIGHT: 149 LBS | TEMPERATURE: 98 F | DIASTOLIC BLOOD PRESSURE: 80 MMHG | HEIGHT: 60 IN | OXYGEN SATURATION: 99 % | SYSTOLIC BLOOD PRESSURE: 130 MMHG

## 2024-02-06 DIAGNOSIS — E11.621 DIABETIC ULCER OF LEFT MIDFOOT ASSOCIATED WITH TYPE 2 DIABETES MELLITUS, UNSPECIFIED ULCER STAGE: Primary | ICD-10-CM

## 2024-02-06 DIAGNOSIS — E11.42 TYPE 2 DIABETES MELLITUS WITH DIABETIC POLYNEUROPATHY, WITH LONG-TERM CURRENT USE OF INSULIN: ICD-10-CM

## 2024-02-06 DIAGNOSIS — L97.429 DIABETIC ULCER OF LEFT MIDFOOT ASSOCIATED WITH TYPE 2 DIABETES MELLITUS, UNSPECIFIED ULCER STAGE: Primary | ICD-10-CM

## 2024-02-06 DIAGNOSIS — Z79.4 TYPE 2 DIABETES MELLITUS WITH DIABETIC POLYNEUROPATHY, WITH LONG-TERM CURRENT USE OF INSULIN: ICD-10-CM

## 2024-02-06 DIAGNOSIS — Z23 IMMUNIZATION DUE: ICD-10-CM

## 2024-02-06 LAB — GLUCOSE SERPL-MCNC: 416 MG/DL (ref 70–110)

## 2024-02-06 PROCEDURE — 99213 OFFICE O/P EST LOW 20 MIN: CPT | Mod: 25,S$GLB,, | Performed by: FAMILY MEDICINE

## 2024-02-06 PROCEDURE — 99999 PR PBB SHADOW E&M-EST. PATIENT-LVL V: CPT | Mod: PBBFAC,,, | Performed by: FAMILY MEDICINE

## 2024-02-06 PROCEDURE — 82962 GLUCOSE BLOOD TEST: CPT | Mod: S$GLB,,, | Performed by: FAMILY MEDICINE

## 2024-02-06 RX ORDER — MELOXICAM 15 MG/1
15 TABLET ORAL
COMMUNITY
Start: 2023-11-12

## 2024-02-06 RX ORDER — SEMAGLUTIDE 0.68 MG/ML
0.5 INJECTION, SOLUTION SUBCUTANEOUS
Qty: 3 ML | Refills: 5 | Status: SHIPPED | OUTPATIENT
Start: 2024-02-06 | End: 2024-03-27

## 2024-02-06 NOTE — PATIENT INSTRUCTIONS
Blood sugar testing:  Test BS on Monday 7am and 4pm, record in the log book and return to clinic in 2 weeks for follow-up.

## 2024-02-06 NOTE — PROGRESS NOTES
Subjective:       Patient ID: Hoa Wilde is a 74 y.o. female.    Chief Complaint: Wound Check (Left foot)      Here because of a foot wound.  She said that the wound has popped up several weeks ago.  Patient said this started when she had some dried skin.  Has not been to the urgent care or ER.  Does not checking her blood sugars.  Lab Results       Component                Value               Date                       WBC                      7.58                08/03/2023                 HGB                      13.2                08/03/2023                 HCT                      39.0                08/03/2023                 PLT                      306                 08/03/2023                 CHOL                     138                 05/05/2020                 TRIG                     254 (H)             05/05/2020                 HDL                      40                  05/05/2020                 ALT                      14                  08/03/2023                 AST                      13                  08/03/2023                 NA                       130 (L)             08/03/2023                 K                        3.7                 08/03/2023                 CL                       101                 08/03/2023                 CREATININE               0.6                 08/03/2023                 BUN                      17                  08/03/2023                 CO2                      25                  08/03/2023                 TSH                      1.750               01/20/2023                 TSH                      1.750               01/20/2023                 INR                      1.0                 01/19/2023                 HGBA1C                   13.3 (H)            01/20/2023            Patient has not been checking her sugar at home.  Wt Readings from Last 3 Encounters:  02/06/24 : 67.6 kg (149 lb)  08/03/23 : 64 kg (141 lb)  06/14/23 : 64 kg  (141 lb)          Diabetes  She presents for her follow-up diabetic visit. She has type 2 diabetes mellitus. MedicAlert identification noted. Her disease course has been stable. When asked about meal planning, she reported none. She has not had a previous visit with a dietitian. She rarely participates in exercise. She does not see a podiatrist.Eye exam is not current.       Allergies and Medications:   Review of patient's allergies indicates:  No Known Allergies  Current Outpatient Medications   Medication Sig Dispense Refill    alendronate (FOSAMAX) 70 MG tablet Take 1 tablet by mouth once a week 12 tablet 1    aspirin (ECOTRIN) 81 MG EC tablet Take 1 tablet (81 mg total) by mouth once daily. 100 tablet 3    betamethasone valerate (LUXIQ) 0.12 % foam Apply topically 2 (two) times daily. 100 g 0    blood-glucose meter kit To check BG 2 times daily, to use with insurance preferred meter 1 each 0    blood-glucose meter kit Use as instructed 1 each 0    cefUROXime (CEFTIN) 500 MG tablet Take 1 tablet (500 mg total) by mouth 2 (two) times daily. 14 tablet 0    EUTHYROX 50 mcg tablet TAKE 1 TABLET BY MOUTH BEFORE BREAKFAST 90 tablet 3    famotidine (PEPCID) 20 MG tablet Take 1 tablet by mouth once daily 90 tablet 0    fluconazole (DIFLUCAN) 150 MG Tab TAKE 1 TABLET BY MOUTH ONCE DAILY FOR 1 DAY 1 tablet 0    gabapentin (NEURONTIN) 300 MG capsule TAKE 1 CAPSULE BY MOUTH ONCE DAILY IN THE MORNING AND 2 ONCE DAILY AT NIGHT 270 capsule 1    hydrOXYzine HCL (ATARAX) 25 MG tablet TAKE 1 TABLET BY MOUTH THREE TIMES DAILY AS NEEDED FOR ITCHING 90 tablet 1    LEVEMIR FLEXPEN 100 unit/mL (3 mL) InPn pen INJECT 50 UNITS SUBCUTANEOUSLY IN THE EVENING 15 mL 0    lisinopriL (PRINIVIL,ZESTRIL) 5 MG tablet Take 1 tablet by mouth once daily 90 tablet 1    meloxicam (MOBIC) 15 MG tablet Take 15 mg by mouth.      metoprolol tartrate (LOPRESSOR) 25 MG tablet Take 1 tablet by mouth twice daily 180 tablet 0    ONETOUCH DELICA PLUS LANCET 33  "gauge Misc USE   TO CHECK GLUCOSE TWICE DAILY 100 each 3    ONETOUCH ULTRA TEST Strp USE  STRIP TO CHECK GLUCOSE TWICE DAILY 100 each 3    pen needle, diabetic 30 gauge x 1/3" Ndle 1 pen by Misc.(Non-Drug; Combo Route) route once daily. To use with levimir flex pen 100 each 3    rosuvastatin (CRESTOR) 20 MG tablet Take 1 tablet by mouth once daily 90 tablet 0    sertraline (ZOLOFT) 50 MG tablet Take 1 tablet by mouth once daily 30 tablet 11    calcium carbonate (OS-GEORGINA) 500 mg calcium (1,250 mg) tablet Take 1 tablet (500 mg total) by mouth 2 (two) times daily. 200 tablet 3    glipizide-metformin (METAGLIP) 5-500 mg per tablet Take 1 tablet by mouth 2 (two) times daily before meals. Take with breakfast and dinner 180 tablet 1    semaglutide (OZEMPIC) 0.25 mg or 0.5 mg (2 mg/3 mL) pen injector Inject 0.5 mg into the skin every 7 days. 3 mL 5     No current facility-administered medications for this visit.     Facility-Administered Medications Ordered in Other Visits   Medication Dose Route Frequency Provider Last Rate Last Admin    0.9%  NaCl infusion   Intravenous Continuous Juancho White MD   Stopped at 12/16/20 1500       Family History:   Family History   Problem Relation Age of Onset    Hyperlipidemia Mother     Heart disease Mother        Social History:   Social History     Socioeconomic History    Marital status:    Occupational History    Occupation: retired    Tobacco Use    Smoking status: Never    Smokeless tobacco: Never   Substance and Sexual Activity    Alcohol use: No    Drug use: No     Social Determinants of Health     Stress: No Stress Concern Present (12/21/2020)    [employer] - Occupational Stress Questionnaire     Feeling of Stress : Only a little       Review of Systems    Objective:     Vitals:    02/06/24 1002   BP: 130/80   Pulse: (!) 47   Resp: 18   Temp: 98.3 °F (36.8 °C)        Physical Exam    Assessment:       1. Diabetic ulcer of left midfoot " associated with type 2 diabetes mellitus, unspecified ulcer stage    2. Type 2 diabetes mellitus with diabetic polyneuropathy, with long-term current use of insulin    3. Immunization due        Plan:       Hoa was seen today for wound check.    Diagnoses and all orders for this visit:    Diabetic ulcer of left midfoot associated with type 2 diabetes mellitus, unspecified ulcer stage  -     Ambulatory referral/consult to Podiatry; Future  -     POCT Glucose, Hand-Held Device  -     Ambulatory referral/consult to Optometry; Future  -     semaglutide (OZEMPIC) 0.25 mg or 0.5 mg (2 mg/3 mL) pen injector; Inject 0.5 mg into the skin every 7 days.  -     Ambulatory referral/consult to Diabetes Education; Future    Type 2 diabetes mellitus with diabetic polyneuropathy, with long-term current use of insulin  -     Comprehensive Metabolic Panel; Future  -     Hemoglobin A1C; Future  -     Microalbumin/Creatinine Ratio, Urine; Future  -     CBC Auto Differential; Future  -     C-Reactive Protein; Future  -     semaglutide (OZEMPIC) 0.25 mg or 0.5 mg (2 mg/3 mL) pen injector; Inject 0.5 mg into the skin every 7 days.  -     Ambulatory referral/consult to Diabetes Education; Future    Immunization due         Follow up in about 2 weeks (around 2/20/2024) for follow up DM with blood sugar log.

## 2024-02-07 ENCOUNTER — TELEPHONE (OUTPATIENT)
Dept: FAMILY MEDICINE | Facility: CLINIC | Age: 74
End: 2024-02-07

## 2024-02-08 ENCOUNTER — TELEPHONE (OUTPATIENT)
Dept: PODIATRY | Facility: CLINIC | Age: 74
End: 2024-02-08
Payer: MEDICARE

## 2024-02-08 NOTE — TELEPHONE ENCOUNTER
----- Message from Basilia Purvis sent at 2/8/2024  9:39 AM CST -----  Contact: pt  Type:  Same Day Apoointment Request    Name of Caller:pt    When is the first available appointment? Department book    Symptoms: ulcer on left foot side / wound, not draining    Would the patient rather a call back or a response via [facility]? Call back    Best Call Back Number: [phone]    Additional Information: please call to schedule  Thanks

## 2024-02-15 ENCOUNTER — OFFICE VISIT (OUTPATIENT)
Dept: PODIATRY | Facility: CLINIC | Age: 74
End: 2024-02-15
Payer: MEDICARE

## 2024-02-15 ENCOUNTER — HOSPITAL ENCOUNTER (OUTPATIENT)
Dept: RADIOLOGY | Facility: HOSPITAL | Age: 74
Discharge: HOME | End: 2024-02-15
Attending: STUDENT IN AN ORGANIZED HEALTH CARE EDUCATION/TRAINING PROGRAM
Payer: MEDICARE

## 2024-02-15 VITALS — WEIGHT: 149 LBS | HEIGHT: 60 IN | BODY MASS INDEX: 29.25 KG/M2

## 2024-02-15 DIAGNOSIS — Z79.4 TYPE 2 DIABETES MELLITUS WITH FOOT ULCER, WITH LONG-TERM CURRENT USE OF INSULIN: ICD-10-CM

## 2024-02-15 DIAGNOSIS — E11.621 TYPE 2 DIABETES MELLITUS WITH FOOT ULCER, WITH LONG-TERM CURRENT USE OF INSULIN: ICD-10-CM

## 2024-02-15 DIAGNOSIS — L97.509 TYPE 2 DIABETES MELLITUS WITH FOOT ULCER, WITH LONG-TERM CURRENT USE OF INSULIN: ICD-10-CM

## 2024-02-15 DIAGNOSIS — L97.522 ULCER OF LEFT FOOT WITH FAT LAYER EXPOSED: ICD-10-CM

## 2024-02-15 DIAGNOSIS — L97.522 ULCER OF LEFT FOOT WITH FAT LAYER EXPOSED: Primary | ICD-10-CM

## 2024-02-15 LAB
GRAM STN SPEC: NORMAL
GRAM STN SPEC: NORMAL

## 2024-02-15 PROCEDURE — 99999 PR PBB SHADOW E&M-EST. PATIENT-LVL IV: CPT | Mod: PBBFAC,,, | Performed by: STUDENT IN AN ORGANIZED HEALTH CARE EDUCATION/TRAINING PROGRAM

## 2024-02-15 PROCEDURE — 11042 DBRDMT SUBQ TIS 1ST 20SQCM/<: CPT | Mod: S$GLB,,, | Performed by: STUDENT IN AN ORGANIZED HEALTH CARE EDUCATION/TRAINING PROGRAM

## 2024-02-15 PROCEDURE — 87070 CULTURE OTHR SPECIMN AEROBIC: CPT | Performed by: STUDENT IN AN ORGANIZED HEALTH CARE EDUCATION/TRAINING PROGRAM

## 2024-02-15 PROCEDURE — 73630 X-RAY EXAM OF FOOT: CPT | Mod: 26,LT,, | Performed by: RADIOLOGY

## 2024-02-15 PROCEDURE — 87075 CULTR BACTERIA EXCEPT BLOOD: CPT | Performed by: STUDENT IN AN ORGANIZED HEALTH CARE EDUCATION/TRAINING PROGRAM

## 2024-02-15 PROCEDURE — 87076 CULTURE ANAEROBE IDENT EACH: CPT | Performed by: STUDENT IN AN ORGANIZED HEALTH CARE EDUCATION/TRAINING PROGRAM

## 2024-02-15 PROCEDURE — 99214 OFFICE O/P EST MOD 30 MIN: CPT | Mod: 25,S$GLB,, | Performed by: STUDENT IN AN ORGANIZED HEALTH CARE EDUCATION/TRAINING PROGRAM

## 2024-02-15 PROCEDURE — 87205 SMEAR GRAM STAIN: CPT | Performed by: STUDENT IN AN ORGANIZED HEALTH CARE EDUCATION/TRAINING PROGRAM

## 2024-02-15 PROCEDURE — 73630 X-RAY EXAM OF FOOT: CPT | Mod: TC,PO,LT

## 2024-02-15 RX ORDER — AMOXICILLIN AND CLAVULANATE POTASSIUM 875; 125 MG/1; MG/1
1 TABLET, FILM COATED ORAL 2 TIMES DAILY
Qty: 14 TABLET | Refills: 0 | Status: SHIPPED | OUTPATIENT
Start: 2024-02-15 | End: 2024-02-22

## 2024-02-15 NOTE — PROGRESS NOTES
Subjective:      Patient ID: Hoa Wilde is a 74 y.o. female.    Chief Complaint: Wound Check and Diabetes Mellitus    Is Hoa is new to us here at [facility].  She has a history of left foot ulcerations and presents with 1 today.  She notes a new wound that presented about 2 weeks ago.  She denies any trauma to the area that she can recall.  She has another area of the bottom of her foot that is concerning for wound.  He denies any fever, nausea, vomiting or chills.  She admits that she has little feeling in her feet.    Review of Systems   Skin:         Ulcer on left foot   Neurological:  Positive for numbness and paresthesias.   All other systems reviewed and are negative.          Objective:      Physical Exam  Cardiovascular:      Pulses:           Posterior tibial pulses are 1+ on the left side.   Feet:      Comments: Left foot:   Ulcer 1.  At the lateral aspect of the 5th MTPJ.  Measures 2 x 2 cm with 0.2 mm of depth.  There is surrounding erythema and edema.  There is no fluctuance or crepitus or purulent drainage appreciated.  There is no probe to bone.    Ulcer 2..  Ulcer at the submet 1 aspect of the left foot.  Measures 0.5 x 0.5 cm with 0.1 mm of depth.  There is no periwound erythema or edema.  There is no probe to bone.              Assessment:       Encounter Diagnoses   Name Primary?    Ulcer of left foot with fat layer exposed Yes    Type 2 diabetes mellitus with foot ulcer, with long-term current use of insulin          Plan:       Hoa was seen today for wound check and diabetes mellitus.    Diagnoses and all orders for this visit:    Ulcer of left foot with fat layer exposed  -     X-Ray Foot Complete Left; Future  -     Aerobic culture  -     Culture, Anaerobic  -     Gram stain  -     Ambulatory referral/consult to Home Health; Future    Type 2 diabetes mellitus with foot ulcer, with long-term current use of insulin  -     X-Ray Foot Complete Left; Future  -     Aerobic culture  -      "Culture, Anaerobic  -     Gram stain  -     Ambulatory referral/consult to Home Health; Future    Other orders  -     amoxicillin-clavulanate 875-125mg (AUGMENTIN) 875-125 mg per tablet; Take 1 tablet by mouth 2 (two) times daily. for 7 days      I counseled the patient on her conditions, their implications and medical management.    Cultures taken of the 5th MTPJ ulcer.  X-rays ordered to evaluate for any osseous abnormalities.  Prescription of Augmentin sent for patient to take for 7 days.    Wounds were debrided and redressed.  I will send for home health for continued local wound care.  Patient is to follow-up in 3 weeks.  She is presents to the ED if there is any worsening infection.    [facility]  Please change dressing MWF: -Cleanse wounds with sterile saline or wound cleanser. -Pat Dry. -Apply iodosorb to wounds on both wounds on the L foot. -Cover with robert foam, cast padding x 2 and secured with loose coban with no compression.. Thank you.     Vamsi Aguilera DPM    Wound Debridement    Date/Time: 2/15/2024 10:20 AM    Performed by: Vamsi Aguilera DPM  Authorized by: Vamsi Aguilera DPM    Time out: Immediately prior to procedure a "time out" was called to verify the correct patient, procedure, equipment, support staff and site/side marked as required.    Consent Done?:  Yes (Verbal)    Wound Details:    Location:  Left foot    Location:  Left 5th Metatarsal Head    Type of Debridement:  Excisional       Length (cm):  2       Area (sq cm):  4       Width (cm):  2       Percent Debrided (%):  100       Depth (cm):  0.3       Total Area Debrided (sq cm):  4    Depth of debridement:  Subcutaneous tissue    Tissue debrided:  Subcutaneous    Devitalized tissue debrided:  Callus    Instruments:  Curette  Bleeding:  Minimal  Hemostasis Achieved: Yes  Method Used:  Alginate    Additional wounds:  1    2nd Wound Details:     Location:  Left foot    Location:  Left 1st Metatarsal Head    Location:  Left 1st " Metatarsal Head    Type of Debridement:  Excisional       Length (cm):  0.5       Area (sq cm):  0.25       Width (cm):  0.5       Percent Debrided (%):  100       Depth (cm):  0.1       Total Area Debrided (sq cm):  0.25    Depth of debridement:  Subcutaneous tissue    Tissue debrided:  Dermis and Epidermis    Devitalized tissue debrided:  Callus    Instruments:  Curette  Bleeding:  Minimal  Hemostasis Achieved: Yes    Method Used:  Pressure  Patient tolerance:  Patient tolerated the procedure well with no immediate complications  1st Wound Pain Assessment: 0  2nd Wound Pain Assessment: 0

## 2024-02-16 ENCOUNTER — TELEPHONE (OUTPATIENT)
Dept: PODIATRY | Facility: CLINIC | Age: 74
End: 2024-02-16
Payer: MEDICARE

## 2024-02-17 LAB — BACTERIA SPEC AEROBE CULT: NORMAL

## 2024-02-19 LAB — BACTERIA SPEC ANAEROBE CULT: ABNORMAL

## 2024-02-19 PROCEDURE — G0180 MD CERTIFICATION HHA PATIENT: HCPCS | Mod: ,,, | Performed by: STUDENT IN AN ORGANIZED HEALTH CARE EDUCATION/TRAINING PROGRAM

## 2024-02-27 ENCOUNTER — OFFICE VISIT (OUTPATIENT)
Dept: PODIATRY | Facility: CLINIC | Age: 74
End: 2024-02-27
Payer: MEDICARE

## 2024-02-27 VITALS — WEIGHT: 149.06 LBS | HEIGHT: 60 IN | BODY MASS INDEX: 29.26 KG/M2

## 2024-02-27 DIAGNOSIS — L97.522 ULCER OF LEFT FOOT WITH FAT LAYER EXPOSED: Primary | ICD-10-CM

## 2024-02-27 DIAGNOSIS — Z79.4 TYPE 2 DIABETES MELLITUS WITH FOOT ULCER, WITH LONG-TERM CURRENT USE OF INSULIN: ICD-10-CM

## 2024-02-27 DIAGNOSIS — E11.621 TYPE 2 DIABETES MELLITUS WITH FOOT ULCER, WITH LONG-TERM CURRENT USE OF INSULIN: ICD-10-CM

## 2024-02-27 DIAGNOSIS — L97.509 TYPE 2 DIABETES MELLITUS WITH FOOT ULCER, WITH LONG-TERM CURRENT USE OF INSULIN: ICD-10-CM

## 2024-02-27 DIAGNOSIS — M86.172 OTHER ACUTE OSTEOMYELITIS OF LEFT FOOT: ICD-10-CM

## 2024-02-27 LAB
GRAM STN SPEC: NORMAL
GRAM STN SPEC: NORMAL

## 2024-02-27 PROCEDURE — 11044 DBRDMT BONE 1ST 20 SQ CM/<: CPT | Mod: S$GLB,,, | Performed by: STUDENT IN AN ORGANIZED HEALTH CARE EDUCATION/TRAINING PROGRAM

## 2024-02-27 PROCEDURE — 99999 PR PBB SHADOW E&M-EST. PATIENT-LVL IV: CPT | Mod: PBBFAC,,, | Performed by: STUDENT IN AN ORGANIZED HEALTH CARE EDUCATION/TRAINING PROGRAM

## 2024-02-27 PROCEDURE — 88305 TISSUE EXAM BY PATHOLOGIST: CPT | Mod: PO | Performed by: PATHOLOGY

## 2024-02-27 PROCEDURE — 87076 CULTURE ANAEROBE IDENT EACH: CPT | Mod: 59 | Performed by: STUDENT IN AN ORGANIZED HEALTH CARE EDUCATION/TRAINING PROGRAM

## 2024-02-27 PROCEDURE — 87070 CULTURE OTHR SPECIMN AEROBIC: CPT | Performed by: STUDENT IN AN ORGANIZED HEALTH CARE EDUCATION/TRAINING PROGRAM

## 2024-02-27 PROCEDURE — 88305 TISSUE EXAM BY PATHOLOGIST: CPT | Mod: 26,,, | Performed by: PATHOLOGY

## 2024-02-27 PROCEDURE — 99214 OFFICE O/P EST MOD 30 MIN: CPT | Mod: 25,S$GLB,, | Performed by: STUDENT IN AN ORGANIZED HEALTH CARE EDUCATION/TRAINING PROGRAM

## 2024-02-27 PROCEDURE — 87075 CULTR BACTERIA EXCEPT BLOOD: CPT | Performed by: STUDENT IN AN ORGANIZED HEALTH CARE EDUCATION/TRAINING PROGRAM

## 2024-02-27 PROCEDURE — 87205 SMEAR GRAM STAIN: CPT | Performed by: STUDENT IN AN ORGANIZED HEALTH CARE EDUCATION/TRAINING PROGRAM

## 2024-02-27 PROCEDURE — 20220 BONE BIOPSY TROCAR/NDL SUPFC: CPT | Mod: 51,S$GLB,, | Performed by: STUDENT IN AN ORGANIZED HEALTH CARE EDUCATION/TRAINING PROGRAM

## 2024-02-27 PROCEDURE — 87176 TISSUE HOMOGENIZATION CULTR: CPT | Performed by: STUDENT IN AN ORGANIZED HEALTH CARE EDUCATION/TRAINING PROGRAM

## 2024-02-27 RX ORDER — SULFAMETHOXAZOLE AND TRIMETHOPRIM 800; 160 MG/1; MG/1
1 TABLET ORAL 2 TIMES DAILY
Qty: 20 TABLET | Refills: 0 | Status: SHIPPED | OUTPATIENT
Start: 2024-02-27 | End: 2024-03-08

## 2024-02-29 LAB
BACTERIA SPEC AEROBE CULT: ABNORMAL
FINAL PATHOLOGIC DIAGNOSIS: NORMAL
GROSS: NORMAL
Lab: NORMAL

## 2024-02-29 NOTE — PROGRESS NOTES
Subjective:      Patient ID: Hoa Wilde is a 74 y.o. female.    Chief Complaint: Procedure    Is Hoa is new to us here at [facility].  She has a history of left foot ulcerations and presents with 1 today.  She notes a new wound that presented about 2 weeks ago.  She denies any trauma to the area that she can recall.  She has another area of the bottom of her foot that is concerning for wound.  He denies any fever, nausea, vomiting or chills.  She admits that she has little feeling in her feet.    2/27/24: Ms. Wilde is doing okay. Has some L foot pain today. She last had x-rays showing erosions of the 5th met head and base of the proximal phalanx. She completed a 1 week course of augmentin that doesn't appear to have helped.     Review of Systems   Skin:         Ulcer on left foot   Neurological:  Positive for numbness and paresthesias.   All other systems reviewed and are negative.          Objective:      Physical Exam  Cardiovascular:      Pulses:           Posterior tibial pulses are 1+ on the left side.   Feet:      Comments: Left foot:   Ulcer 1.  At the lateral aspect of the 5th MTPJ.  Measures 2 x 2 cm with 0.2 mm of depth.  There is surrounding erythema and edema.  There is no fluctuance or crepitus or purulent drainage appreciated.  There is now probe to bone.    Ulcer 2..  Ulcer at the submet 1 aspect of the left foot.  Measures 0.5 x 0.5 cm with 0.1 mm of depth.  There is no periwound erythema or edema.  There is no probe to bone.              Assessment:       Encounter Diagnoses   Name Primary?    Ulcer of left foot with fat layer exposed Yes    Type 2 diabetes mellitus with foot ulcer, with long-term current use of insulin     Other acute osteomyelitis of left foot          Plan:       Hoa was seen today for procedure.    Diagnoses and all orders for this visit:    Ulcer of left foot with fat layer exposed  -     Aerobic culture  -     Culture, Anaerobic  -     Gram stain  -     Specimen to  Pathology Orthopedics  -     Ambulatory referral/consult to Infectious Disease; Future  -     US Lower Extrem Arteries Bilat with KODI (xpd); Future  -     SUBSEQUENT HOME HEALTH ORDERS    Type 2 diabetes mellitus with foot ulcer, with long-term current use of insulin  -     Aerobic culture  -     Culture, Anaerobic  -     Gram stain  -     Specimen to Pathology Orthopedics  -     Ambulatory referral/consult to Infectious Disease; Future  -     US Lower Extrem Arteries Bilat with KODI (xpd); Future  -     SUBSEQUENT HOME HEALTH ORDERS    Other acute osteomyelitis of left foot  -     Aerobic culture  -     Culture, Anaerobic  -     Gram stain  -     Specimen to Pathology Orthopedics  -     Ambulatory referral/consult to Infectious Disease; Future  -     US Lower Extrem Arteries Bilat with KODI (xpd); Future  -     SUBSEQUENT HOME HEALTH ORDERS    Other orders  -     sulfamethoxazole-trimethoprim 800-160mg (BACTRIM DS) 800-160 mg Tab; Take 1 tablet by mouth 2 (two) times daily. for 10 days  -     Wound Debridement      I counseled the patient on her conditions, their implications and medical management.    Bone biopsy was taken today. Referral to ID for long term antibiotics. Starting on bactrim for now. Art US and ABIs pending.    F/u 2 weeks due to distance away.    [facility]  Please change dressing MWF: -Cleanse wounds with sterile saline or wound cleanser. -Pat Dry. -Apply iodosorb to wounds on both wounds on the L foot. -Cover with robert foam, cast padding x 2 and secured with loose coban with no compression.. Thank you.     Vamsi Aguilera DPM    Wound Debridement    Date/Time: 2/27/2024 1:00 PM    Performed by: Vamsi Aguilera DPM  Authorized by: Vamsi Aguilera DPM    Consent Done?:  Yes (Verbal)    Wound Details:    Location:  Left foot    Location:  Left 5th Metatarsal Head    Type of Debridement:  Excisional       Length (cm):  2       Area (sq cm):  4       Width (cm):  2       Percent Debrided (%):   100       Depth (cm):  0.3       Total Area Debrided (sq cm):  4    Depth of debridement:  Bone    Tissue debrided:  Subcutaneous and Fascia    Devitalized tissue debrided:  Callus    Instruments:  Curette  Bleeding:  Minimal  Hemostasis Achieved: Yes  Method Used:  Pressure  Patient tolerance:  Patient tolerated the procedure well with no immediate complications  1st Wound Pain Assessment: 0  Specimen Collected: Specimen sent to pathology    02/27/2024  Procedure: Bone biopsy of the L foot  Diagnosis: Osteomyelitis  Performing provider: Dr. Vamsi Aguilera DPM     Procedure in detail: A time out was performed following WHO surgical safety checklist guidelines. All present are in agreement.     The site was then prepped and draped in cleanly. A stab incision was made and a jamshidi needle was inserted to extract a small piece of bone. The bone was split and sent for cultures and pathology. Hemostasis was achieved with pressure. The wound was painted with iodosorb and dressed with xeroform and DSD.

## 2024-03-05 ENCOUNTER — HOSPITAL ENCOUNTER (OUTPATIENT)
Dept: RADIOLOGY | Facility: HOSPITAL | Age: 74
Discharge: HOME | End: 2024-03-05
Attending: STUDENT IN AN ORGANIZED HEALTH CARE EDUCATION/TRAINING PROGRAM
Payer: MEDICARE

## 2024-03-05 ENCOUNTER — PATIENT OUTREACH (OUTPATIENT)
Dept: ADMINISTRATIVE | Facility: HOSPITAL | Age: 74
End: 2024-03-05
Payer: MEDICARE

## 2024-03-05 DIAGNOSIS — L97.522 ULCER OF LEFT FOOT WITH FAT LAYER EXPOSED: ICD-10-CM

## 2024-03-05 DIAGNOSIS — E11.621 TYPE 2 DIABETES MELLITUS WITH FOOT ULCER, WITH LONG-TERM CURRENT USE OF INSULIN: ICD-10-CM

## 2024-03-05 DIAGNOSIS — Z79.4 TYPE 2 DIABETES MELLITUS WITH FOOT ULCER, WITH LONG-TERM CURRENT USE OF INSULIN: ICD-10-CM

## 2024-03-05 DIAGNOSIS — L97.509 TYPE 2 DIABETES MELLITUS WITH FOOT ULCER, WITH LONG-TERM CURRENT USE OF INSULIN: ICD-10-CM

## 2024-03-05 DIAGNOSIS — M86.172 OTHER ACUTE OSTEOMYELITIS OF LEFT FOOT: ICD-10-CM

## 2024-03-05 LAB — BACTERIA SPEC ANAEROBE CULT: ABNORMAL

## 2024-03-05 PROCEDURE — 93922 UPR/L XTREMITY ART 2 LEVELS: CPT | Mod: TC

## 2024-03-05 PROCEDURE — 93922 UPR/L XTREMITY ART 2 LEVELS: CPT | Mod: 26,,, | Performed by: RADIOLOGY

## 2024-03-05 PROCEDURE — 93925 LOWER EXTREMITY STUDY: CPT | Mod: 26,,, | Performed by: RADIOLOGY

## 2024-03-05 NOTE — PROGRESS NOTES
Population Health Chart Review & Patient Outreach Details      Additional Pop Health Notes:               Updates Requested / Reviewed:      Updated Care Coordination Note         Health Maintenance Topics Overdue:    Health Maintenance Due   Topic Date Due    Diabetes Urine Screening  Never done    Mammogram  Never done    Colorectal Cancer Screening  Never done    Shingles Vaccine (1 of 2) Never done    RSV Vaccine (Age 60+ and Pregnant patients) (1 - 1-dose 60+ series) Never done    Eye Exam  02/06/2021    Lipid Panel  05/05/2021    Foot Exam  02/01/2023    Hemoglobin A1c  04/20/2023    DEXA Scan  06/04/2023    Aspirin/Antiplatelet Therapy  08/04/2023    Influenza Vaccine (1) 09/01/2023    COVID-19 Vaccine (4 - 2023-24 season) 09/01/2023         Health Maintenance Topic(s) Outreach Outcomes & Actions Taken:    Eye Exam - Outreach Outcomes & Actions Taken  : Pt Will Schedule with External Provider / Order Routed & Care Team Updated if Applicable and Pt will call to schedule once her foot heals.

## 2024-03-07 ENCOUNTER — OFFICE VISIT (OUTPATIENT)
Dept: INFECTIOUS DISEASES | Facility: CLINIC | Age: 74
End: 2024-03-07
Payer: MEDICARE

## 2024-03-07 ENCOUNTER — TELEPHONE (OUTPATIENT)
Dept: INFECTIOUS DISEASES | Facility: CLINIC | Age: 74
End: 2024-03-07
Payer: MEDICARE

## 2024-03-07 ENCOUNTER — OFFICE VISIT (OUTPATIENT)
Dept: PODIATRY | Facility: CLINIC | Age: 74
End: 2024-03-07
Payer: MEDICARE

## 2024-03-07 VITALS — BODY MASS INDEX: 29.25 KG/M2 | HEIGHT: 60 IN | WEIGHT: 149 LBS

## 2024-03-07 DIAGNOSIS — Z79.4 TYPE 2 DIABETES MELLITUS WITH FOOT ULCER, WITH LONG-TERM CURRENT USE OF INSULIN: ICD-10-CM

## 2024-03-07 DIAGNOSIS — E11.621 TYPE 2 DIABETES MELLITUS WITH FOOT ULCER, WITH LONG-TERM CURRENT USE OF INSULIN: ICD-10-CM

## 2024-03-07 DIAGNOSIS — L97.509 TYPE 2 DIABETES MELLITUS WITH FOOT ULCER, WITH LONG-TERM CURRENT USE OF INSULIN: ICD-10-CM

## 2024-03-07 DIAGNOSIS — M86.172 OTHER ACUTE OSTEOMYELITIS OF LEFT FOOT: Primary | ICD-10-CM

## 2024-03-07 DIAGNOSIS — L97.522 ULCER OF LEFT FOOT WITH FAT LAYER EXPOSED: Primary | ICD-10-CM

## 2024-03-07 DIAGNOSIS — L97.522 ULCER OF LEFT FOOT WITH FAT LAYER EXPOSED: ICD-10-CM

## 2024-03-07 DIAGNOSIS — M86.172 OTHER ACUTE OSTEOMYELITIS OF LEFT FOOT: ICD-10-CM

## 2024-03-07 PROBLEM — U07.1 COVID: Status: RESOLVED | Noted: 2022-08-04 | Resolved: 2024-03-07

## 2024-03-07 PROBLEM — M86.9 PYOGENIC INFLAMMATION OF BONE: Status: ACTIVE | Noted: 2024-03-07

## 2024-03-07 PROBLEM — Z23 IMMUNIZATION DUE: Status: RESOLVED | Noted: 2019-09-18 | Resolved: 2024-03-07

## 2024-03-07 PROCEDURE — 99999 PR PBB SHADOW E&M-EST. PATIENT-LVL IV: CPT | Mod: PBBFAC,,, | Performed by: STUDENT IN AN ORGANIZED HEALTH CARE EDUCATION/TRAINING PROGRAM

## 2024-03-07 PROCEDURE — 11042 DBRDMT SUBQ TIS 1ST 20SQCM/<: CPT | Mod: S$GLB,,, | Performed by: STUDENT IN AN ORGANIZED HEALTH CARE EDUCATION/TRAINING PROGRAM

## 2024-03-07 PROCEDURE — 99214 OFFICE O/P EST MOD 30 MIN: CPT | Mod: 25,S$GLB,, | Performed by: STUDENT IN AN ORGANIZED HEALTH CARE EDUCATION/TRAINING PROGRAM

## 2024-03-07 PROCEDURE — 99999 PR PBB SHADOW E&M-EST. PATIENT-LVL IV: CPT | Mod: PBBFAC,,, | Performed by: PHYSICIAN ASSISTANT

## 2024-03-07 PROCEDURE — 99204 OFFICE O/P NEW MOD 45 MIN: CPT | Mod: S$GLB,,, | Performed by: PHYSICIAN ASSISTANT

## 2024-03-07 RX ORDER — METRONIDAZOLE 500 MG/1
500 TABLET ORAL 3 TIMES DAILY
Qty: 135 TABLET | Refills: 0 | Status: SHIPPED | OUTPATIENT
Start: 2024-03-07 | End: 2024-04-21

## 2024-03-07 RX ORDER — SODIUM CHLORIDE 0.9 % (FLUSH) 0.9 %
10 SYRINGE (ML) INJECTION
Status: CANCELLED | OUTPATIENT
Start: 2024-03-08

## 2024-03-07 RX ORDER — HEPARIN 100 UNIT/ML
500 SYRINGE INTRAVENOUS
OUTPATIENT
Start: 2024-03-08

## 2024-03-07 NOTE — TELEPHONE ENCOUNTER
New Orders:  Per Joselin Galan PA-C:  D/C PO Bactrim, patient informed in clinic 3/7/24  PICC line placement at [facility] PICC TEAM, orders place in [facility] scheduled on 3/8/24 at 130 pm. Start IV Ceftriaxone 2 g every 24 hours x 6 weeks from start, first dose at [facility] Infusion 3/8/24 at 230 pm. Please draw baseline cbc, cmp, crp prior to start of IV abx IF pt has not had drawn already by  on 3/8/24.  Start Metronidazole 500 mg PO tid when start IV abx, prescription being sent to her pharmacy.  Weekly PICC Care, IV abx, weekly cbc, cmp, crp [facility]  and fax to [facility] at [phone].  [facility]  - PLEASE DRAW BASELINE CBC, CMP, CRP ON 3/8/24 AT VISIT unless she states she had drawn at [facility] earlier.  [facility] notified via fax, also spoke with Robert.  Follow up in 2 weeks with ID, scheduled.  Faxed to [facility]  at [phone]  Faxed to [facility] at [phone]    DX: Other acute osteomyelitis of left foot   M86.172     Thank you,    Joselin Galan PA-C    Faxed by Nedra Buitrago LPN

## 2024-03-07 NOTE — PROGRESS NOTES
Patient ID: Hoa Wilde is a 74 y.o. female.    Subjective:           Chief Complaint: Wound Infection      HPI    75yo female with a PMH of T2DM, HLD, CAD, HTN, hypothyroidism, and diabetic neuropathy referred to Infectious Diseases for osteomyelitis of the left foot.    - developed ulcer to the lateral left foot early 2024  - saw Podiatry who put her on 1 week of augmentin without improvement. Cultures at the time grew Prevotella  - XRY showed ulcer with likely acute osteo of the base of the 5th proximal phalanx and 5th MTH  - 2/27/24 Bone biopsy cultures: Strep anginosus, Prevotella  - Path: Chronic osteo  - abx changed from augmentin to Bactrim at last Podiatry visit, and she was referred to Infectious Diseases  - arterial US without significant stenosis    Interval HPI:  3/7/24  - Initial visit, new patient to our department. Patient seen in Podiatry clinic alongside Dr. Aguilera.  is with her. They report her wound is improved in appearance since switching abx to Bactrim  - she denies fevers, chills, night sweats  - reports she has had to have a PICC line in the past    Past Medical History:   Diagnosis Date    Coronary artery disease     Diabetes mellitus, type 2     Hyperlipidemia     Hypertension     Hypothyroidism     Neuropathy        Past Surgical History:   Procedure Laterality Date    ANGIOGRAPHY OF LOWER EXTREMITY Left 12/16/2020    Procedure: ANGIOGRAM, LOWER EXTREMITY;  Surgeon: Juancho White MD;  Location: [facility] CATH/EP LAB;  Service: General;  Laterality: Left;    CHOLECYSTECTOMY      CORONARY ARTERY BYPASS GRAFT      FOOT SURGERY Left     HYSTERECTOMY         Review of patient's allergies indicates:  No Known Allergies    Family History   Problem Relation Age of Onset    Hyperlipidemia Mother     Heart disease Mother        Social History     Socioeconomic History    Marital status:    Occupational History    Occupation: retired    Tobacco Use    Smoking status: Never    Smokeless  "tobacco: Never   Substance and Sexual Activity    Alcohol use: No    Drug use: No     Social Determinants of Health     Stress: No Stress Concern Present (12/21/2020)    [employer] - Occupational Stress Questionnaire     Feeling of Stress : Only a little       Current Outpatient Medications   Medication Instructions    alendronate (FOSAMAX) 70 MG tablet Take 1 tablet by mouth once a week    aspirin (ECOTRIN) 81 mg, Oral, Daily    betamethasone valerate (LUXIQ) 0.12 % foam Topical (Top), 2 times daily    blood-glucose meter kit To check BG 2 times daily, to use with insurance preferred meter    blood-glucose meter kit Use as instructed    calcium carbonate (OS-GEORGINA) 500 mg, Oral, 2 times daily    EUTHYROX 50 mcg tablet TAKE 1 TABLET BY MOUTH BEFORE BREAKFAST    famotidine (PEPCID) 20 MG tablet Take 1 tablet by mouth once daily    fluconazole (DIFLUCAN) 150 MG Tab TAKE 1 TABLET BY MOUTH ONCE DAILY FOR 1 DAY    gabapentin (NEURONTIN) 300 MG capsule TAKE 1 CAPSULE BY MOUTH ONCE DAILY IN THE MORNING AND 2 ONCE DAILY AT NIGHT    glipizide-metformin (METAGLIP) 5-500 mg per tablet 1 tablet, Oral, 2 times daily before meals, Take with breakfast and dinner    hydrOXYzine HCL (ATARAX) 25 MG tablet TAKE 1 TABLET BY MOUTH THREE TIMES DAILY AS NEEDED FOR ITCHING    LEVEMIR FLEXPEN 50 Units, Subcutaneous, Nightly    lisinopriL (PRINIVIL,ZESTRIL) 5 MG tablet Take 1 tablet by mouth once daily    meloxicam (MOBIC) 15 mg, Oral    metoprolol tartrate (LOPRESSOR) 25 MG tablet Take 1 tablet by mouth twice daily    ONETOUCH DELICA PLUS LANCET 33 gauge Misc USE   TO CHECK GLUCOSE TWICE DAILY    ONETOUCH ULTRA TEST Strp USE  STRIP TO CHECK GLUCOSE TWICE DAILY    OZEMPIC 0.5 mg, Subcutaneous, Every 7 days    pen needle, diabetic 30 gauge x 1/3" Ndle 1 pen , Misc.(Non-Drug; Combo Route), Daily, To use with levimir flex pen    rosuvastatin (CRESTOR) 20 MG tablet Take 1 tablet by mouth once daily    sertraline " (ZOLOFT) 50 MG tablet Take 1 tablet by mouth once daily    sulfamethoxazole-trimethoprim 800-160mg (BACTRIM DS) 800-160 mg Tab 1 tablet, Oral, 2 times daily         Review of Systems   Constitutional:  Negative for chills, diaphoresis, fatigue and fever.   HENT:  Negative for congestion and sore throat.    Respiratory:  Negative for cough and shortness of breath.    Cardiovascular:  Negative for chest pain, palpitations and leg swelling.   Gastrointestinal:  Negative for abdominal pain, diarrhea, nausea and vomiting.   Genitourinary:  Negative for dysuria, flank pain, hematuria and urgency.   Musculoskeletal:  Negative for joint swelling, myalgias, neck pain and neck stiffness.   Skin:  Positive for color change and wound. Negative for rash.   Allergic/Immunologic: Negative for immunocompromised state.   Neurological:  Negative for dizziness, weakness, numbness and headaches.   Psychiatric/Behavioral:  Negative for confusion.            Objective:     There were no vitals filed for this visit.    There is no height or weight on file to calculate BMI.         Physical Exam  Vitals and nursing note reviewed.   Constitutional:       General: She is awake. She is not in acute distress.     Appearance: Normal appearance. She is well-developed. She is not diaphoretic.   HENT:      Head: Normocephalic and atraumatic.      Right Ear: External ear normal.      Left Ear: External ear normal.      Nose: Nose normal.   Eyes:      General: No scleral icterus.     Conjunctiva/sclera: Conjunctivae normal.      Pupils: Pupils are equal, round, and reactive to light.   Cardiovascular:      Rate and Rhythm: Normal rate and regular rhythm.   Pulmonary:      Effort: Pulmonary effort is normal. No accessory muscle usage or respiratory distress.   Abdominal:      General: There is no distension.   Musculoskeletal:      Cervical back: Normal range of motion and neck supple.   Feet:      Left foot:      Skin integrity: Ulcer, skin  breakdown and dry skin present. No erythema or warmth.      Toenail Condition: Left toenails are abnormally thick. Fungal disease present.  Skin:     General: Skin is warm and dry.      Coloration: Skin is not jaundiced.   Neurological:      General: No focal deficit present.      Mental Status: She is alert and oriented to person, place, and time.   Psychiatric:         Attention and Perception: Attention normal.         Mood and Affect: Mood normal.         Speech: Speech normal.         Behavior: Behavior normal.         Clinic image 3/7/24      Assessment:           Hoa was seen today for wound infection.    Diagnoses and all orders for this visit:    Other acute osteomyelitis of left foot  -     Ambulatory referral/consult to Infectious Disease  -     Ambulatory referral/consult to [facility] Dept; Future  -     Outpatient PICC Insertion; Future  -     X-Ray Chest 1 View S/P PICC Line by Nursing; Future  -     PICC catheter may be used for therapy after catheter placements and confirmed by CXR  -     Anesthesia US Guide Vascular Access  -     metroNIDAZOLE (FLAGYL) 500 MG tablet; Take 1 tablet (500 mg total) by mouth 3 (three) times daily.    Ulcer of left foot with fat layer exposed  -     Ambulatory referral/consult to Infectious Disease    Type 2 diabetes mellitus with foot ulcer, with long-term current use of insulin  -     Ambulatory referral/consult to Infectious Disease    Other orders  -     cefTRIAXone (ROCEPHIN) 2 g in dextrose 5 % in water (D5W) 100 mL IVPB (MB+)  -     sodium chloride 0.9% 250 mL flush bag  -     sodium chloride 0.9% flush 10 mL  -     heparin, porcine (PF) 100 unit/mL injection flush 500 Units  -     alteplase injection 2 mg         Plan:     - improved appearance of wound on Bactrim which is odd since this would not cover Strep anginosus or Prevotella  - will initiate osteomyelitis course of abx  - patient already under care of Home Health   - PICC line ordered to [facility]. Needs  weekly PICC care  - start Ceftriaxone 2g IV q24h - orders sent to [facility]  - start Metronidazole 500mg po TID  - check baseline CBC, CMP, CRP (orders pending in chart already from PCP) and repeat weekly while on IV abx  - length of therapy 6 weeks  - note that Pathology describes chronic osteomyelitis. If the above does not resolve her infection, then surgery will need to be entertained  - f/u in ID clinic alongside Podiatry in 2-3 weeks      Discussed and seen with Podiatry, Dr. Aguilera     Greater than 45 minutes was spent on this encounter, which included: review of recent encounters, review and interpretation of labs/images, obtaining pertinent history, performing a physical examination, counseling and educating the patient/family/caregiver, ordering medications/tests, documenting in the electronic health record, and coordinating care with necessary providers.    CASE LevyC  Infectious Diseases  [facility]

## 2024-03-08 ENCOUNTER — LAB VISIT (OUTPATIENT)
Dept: LAB | Facility: HOSPITAL | Age: 74
End: 2024-03-08
Attending: NURSE PRACTITIONER
Payer: MEDICARE

## 2024-03-08 ENCOUNTER — TELEPHONE (OUTPATIENT)
Dept: RHEUMATOLOGY | Facility: CLINIC | Age: 74
End: 2024-03-08
Payer: MEDICARE

## 2024-03-08 DIAGNOSIS — M86.9 DIABETIC FOOT ULCER WITH OSTEOMYELITIS: Primary | ICD-10-CM

## 2024-03-08 DIAGNOSIS — L97.509 DIABETIC FOOT ULCER WITH OSTEOMYELITIS: Primary | ICD-10-CM

## 2024-03-08 DIAGNOSIS — E11.621 DIABETIC FOOT ULCER WITH OSTEOMYELITIS: Primary | ICD-10-CM

## 2024-03-08 DIAGNOSIS — E11.42 DIABETIC POLYNEUROPATHY: ICD-10-CM

## 2024-03-08 DIAGNOSIS — E11.69 DIABETIC FOOT ULCER WITH OSTEOMYELITIS: Primary | ICD-10-CM

## 2024-03-08 DIAGNOSIS — L97.522 ULCER OF LEFT FOOT, WITH FAT LAYER EXPOSED: ICD-10-CM

## 2024-03-08 LAB
ALBUMIN SERPL BCP-MCNC: 3.9 G/DL (ref 3.5–5.2)
ALP SERPL-CCNC: 109 U/L (ref 55–135)
ALT SERPL W/O P-5'-P-CCNC: 13 U/L (ref 10–44)
ANION GAP SERPL CALC-SCNC: 11 MMOL/L (ref 8–16)
AST SERPL-CCNC: 10 U/L (ref 10–40)
BASOPHILS # BLD AUTO: 0.06 K/UL (ref 0–0.2)
BASOPHILS NFR BLD: 0.9 % (ref 0–1.9)
BILIRUB SERPL-MCNC: 0.4 MG/DL (ref 0.1–1)
BUN SERPL-MCNC: 12 MG/DL (ref 8–23)
CALCIUM SERPL-MCNC: 9.6 MG/DL (ref 8.7–10.5)
CHLORIDE SERPL-SCNC: 100 MMOL/L (ref 95–110)
CO2 SERPL-SCNC: 22 MMOL/L (ref 23–29)
CREAT SERPL-MCNC: 0.9 MG/DL (ref 0.5–1.4)
CRP SERPL-MCNC: 1.22 MG/L (ref 0–3.19)
DIFFERENTIAL METHOD BLD: NORMAL
EOSINOPHIL # BLD AUTO: 0.1 K/UL (ref 0–0.5)
EOSINOPHIL NFR BLD: 2 % (ref 0–8)
ERYTHROCYTE [DISTWIDTH] IN BLOOD BY AUTOMATED COUNT: 12.9 % (ref 11.5–14.5)
EST. GFR  (NO RACE VARIABLE): >60 ML/MIN/1.73 M^2
ESTIMATED AVG GLUCOSE: 295 MG/DL (ref 68–131)
GLUCOSE SERPL-MCNC: 335 MG/DL (ref 70–110)
HBA1C MFR BLD: 11.9 % (ref 4.5–6.2)
HCT VFR BLD AUTO: 42.4 % (ref 37–48.5)
HGB BLD-MCNC: 14.5 G/DL (ref 12–16)
IMM GRANULOCYTES # BLD AUTO: 0.02 K/UL (ref 0–0.04)
IMM GRANULOCYTES NFR BLD AUTO: 0.3 % (ref 0–0.5)
LYMPHOCYTES # BLD AUTO: 2.8 K/UL (ref 1–4.8)
LYMPHOCYTES NFR BLD: 42.5 % (ref 18–48)
MCH RBC QN AUTO: 28 PG (ref 27–31)
MCHC RBC AUTO-ENTMCNC: 34.2 G/DL (ref 32–36)
MCV RBC AUTO: 82 FL (ref 82–98)
MONOCYTES # BLD AUTO: 0.3 K/UL (ref 0.3–1)
MONOCYTES NFR BLD: 4.2 % (ref 4–15)
NEUTROPHILS # BLD AUTO: 3.3 K/UL (ref 1.8–7.7)
NEUTROPHILS NFR BLD: 50.1 % (ref 38–73)
NRBC BLD-RTO: 0 /100 WBC
PLATELET # BLD AUTO: 390 K/UL (ref 150–450)
PMV BLD AUTO: 9.7 FL (ref 9.2–12.9)
POTASSIUM SERPL-SCNC: 4.5 MMOL/L (ref 3.5–5.1)
PROT SERPL-MCNC: 7.7 G/DL (ref 6–8.4)
RBC # BLD AUTO: 5.17 M/UL (ref 4–5.4)
SODIUM SERPL-SCNC: 133 MMOL/L (ref 136–145)
WBC # BLD AUTO: 6.59 K/UL (ref 3.9–12.7)

## 2024-03-08 PROCEDURE — 36415 COLL VENOUS BLD VENIPUNCTURE: CPT | Performed by: PHYSICIAN ASSISTANT

## 2024-03-08 PROCEDURE — 83036 HEMOGLOBIN GLYCOSYLATED A1C: CPT | Performed by: PHYSICIAN ASSISTANT

## 2024-03-08 PROCEDURE — 86141 C-REACTIVE PROTEIN HS: CPT | Performed by: PHYSICIAN ASSISTANT

## 2024-03-08 PROCEDURE — 80053 COMPREHEN METABOLIC PANEL: CPT | Performed by: PHYSICIAN ASSISTANT

## 2024-03-08 PROCEDURE — 85025 COMPLETE CBC W/AUTO DIFF WBC: CPT | Performed by: PHYSICIAN ASSISTANT

## 2024-03-08 NOTE — TELEPHONE ENCOUNTER
Spoke to Heather  with home health and she had a question about the Hemaglobin A1C. I spoke to Joselin SAN and the Hemaglobin A1C should be drawn just today and then does not be done weekly. I informed Heather and she verbalized understanding.

## 2024-03-08 NOTE — TELEPHONE ENCOUNTER
----- Message from Colleen Caballero sent at 3/8/2024  8:56 AM CST -----  Regarding: conflicting orders  Contact: Heather from [facility]  Type:  Needs Medical Advice    Who Called:  Heather from [facility]    Best Call Back Number:  for heather    Additional Information: they have received 2 conflicting orders for care.  Re: hemoglobin orders  Please advise.  Thank you.

## 2024-03-08 NOTE — PROGRESS NOTES
Subjective:      Patient ID: Hoa Wilde is a 74 y.o. female.    Chief Complaint: Wound Check    Is Hoa is new to us here at [facility].  She has a history of left foot ulcerations and presents with 1 today.  She notes a new wound that presented about 2 weeks ago.  She denies any trauma to the area that she can recall.  She has another area of the bottom of her foot that is concerning for wound.  He denies any fever, nausea, vomiting or chills.  She admits that she has little feeling in her feet.    2/27/24: Ms. Wilde is doing okay. Has some L foot pain today. She last had x-rays showing erosions of the 5th met head and base of the proximal phalanx. She completed a 1 week course of augmentin that doesn't appear to have helped.     3/7/24:  F/u for L foot wound. She is doing much better than last week. She tolerated the antibiotics well. She has + chronic OM in the pathology and + bacteria in the cultures of the left 5th metatarsal taken last week.     Review of Systems   Skin:         Ulcer on left foot   Neurological:  Positive for numbness and paresthesias.   All other systems reviewed and are negative.          Objective:      Physical Exam  Cardiovascular:      Pulses:           Posterior tibial pulses are 1+ on the left side.   Feet:      Comments: Left foot:   Ulcer 1.  At the lateral aspect of the 5th MTPJ.  Measures 2 x 0.5 cm with 0.2 mm of depth.  Erythema and edema are vastly improved.  There is no fluctuance or crepitus or purulent drainage appreciated.  There is now probe to bone.    Ulcer 2..  Ulcer at the submet 1 aspect of the left foot.  Measures 0.2 x 0.2 cm with 0.1 mm of depth.  There is no periwound erythema or edema.  There is no probe to bone.              Assessment:       Encounter Diagnoses   Name Primary?    Ulcer of left foot with fat layer exposed Yes    Type 2 diabetes mellitus with foot ulcer, with long-term current use of insulin     Other acute osteomyelitis of left foot             Plan:       Hoa was seen today for wound check.    Diagnoses and all orders for this visit:    Ulcer of left foot with fat layer exposed    Type 2 diabetes mellitus with foot ulcer, with long-term current use of insulin    Other acute osteomyelitis of left foot        I counseled the patient on her conditions, their implications and medical management.    Ms. Wilde was evaluated today with ID. As the cultures are growing bacteria that aren't covered by the bactrim that she was previously taken, the decision was made to continue antibiotics for 6 weeks with rocephin and flagyl.     F/u 2 weeks due to distance away.    [facility]   Please change dressing MWF: -Cleanse wounds with sterile saline or wound cleanser. -Pat Dry. -Apply iodosorb to wounds on both wounds on the L foot. -Cover with robert foam, cast padding x 2 and secured with loose coban with no compression.. Thank you.     Vamsi Aguilera DPM    Wound Debridement    Date/Time: 3/7/2024 10:20 AM    Performed by: Vamsi Aguilera DPM  Authorized by: Vamsi Aguilera DPM    Consent Done?:  Yes (Verbal)    Wound Details:    Location:  Left foot    Location:  Left 5th Metatarsal Head    Type of Debridement:  Excisional       Length (cm):  2       Area (sq cm):  1       Width (cm):  0.5       Percent Debrided (%):  100       Depth (cm):  0.2       Total Area Debrided (sq cm):  1    Depth of debridement:  Subcutaneous tissue    Tissue debrided:  Subcutaneous    Devitalized tissue debrided:  Callus    Instruments:  Curette  Bleeding:  Minimal  Hemostasis Achieved: Yes  Method Used:  Pressure  Patient tolerance:  Patient tolerated the procedure well with no immediate complications  1st Wound Pain Assessment: 0

## 2024-03-11 ENCOUNTER — TELEPHONE (OUTPATIENT)
Dept: INFECTIOUS DISEASES | Facility: CLINIC | Age: 74
End: 2024-03-11
Payer: MEDICARE

## 2024-03-11 ENCOUNTER — TELEPHONE (OUTPATIENT)
Dept: FAMILY MEDICINE | Facility: CLINIC | Age: 74
End: 2024-03-11
Payer: MEDICARE

## 2024-03-11 NOTE — TELEPHONE ENCOUNTER
Received word from [email or web address] that apparently patient called to cancel her PICC placement and 1st dose of Ceftriaxone today due to feeling ill. She was instructed to call back and reschedule when feeling better.

## 2024-03-11 NOTE — TELEPHONE ENCOUNTER
Called patient with lab results. Patient expressed verbal understanding. Appointment was made for patient.

## 2024-03-11 NOTE — TELEPHONE ENCOUNTER
----- Message from Carlos Pope MD sent at 3/11/2024 11:49 AM CDT -----  A1 c is still very high. RTC with BS diary in 2 weeks

## 2024-03-12 ENCOUNTER — PATIENT MESSAGE (OUTPATIENT)
Dept: ADMINISTRATIVE | Facility: HOSPITAL | Age: 74
End: 2024-03-12
Payer: MEDICARE

## 2024-03-14 ENCOUNTER — LAB VISIT (OUTPATIENT)
Dept: LAB | Facility: HOSPITAL | Age: 74
End: 2024-03-14
Attending: PHYSICIAN ASSISTANT
Payer: MEDICARE

## 2024-03-14 ENCOUNTER — TELEPHONE (OUTPATIENT)
Dept: INFECTIOUS DISEASES | Facility: CLINIC | Age: 74
End: 2024-03-14
Payer: MEDICARE

## 2024-03-14 DIAGNOSIS — M86.172 ACUTE OSTEOMYELITIS OF LEFT ANKLE OR FOOT: Primary | ICD-10-CM

## 2024-03-14 LAB
ALBUMIN SERPL BCP-MCNC: 3.5 G/DL (ref 3.5–5.2)
ALP SERPL-CCNC: 89 U/L (ref 55–135)
ALT SERPL W/O P-5'-P-CCNC: 15 U/L (ref 10–44)
ANION GAP SERPL CALC-SCNC: 12 MMOL/L (ref 8–16)
AST SERPL-CCNC: 13 U/L (ref 10–40)
BASOPHILS # BLD AUTO: 0.04 K/UL (ref 0–0.2)
BASOPHILS NFR BLD: 0.4 % (ref 0–1.9)
BILIRUB SERPL-MCNC: 0.3 MG/DL (ref 0.1–1)
BUN SERPL-MCNC: 11 MG/DL (ref 8–23)
CALCIUM SERPL-MCNC: 8.8 MG/DL (ref 8.7–10.5)
CHLORIDE SERPL-SCNC: 101 MMOL/L (ref 95–110)
CO2 SERPL-SCNC: 23 MMOL/L (ref 23–29)
CREAT SERPL-MCNC: 0.7 MG/DL (ref 0.5–1.4)
CRP SERPL-MCNC: 1.9 MG/L (ref 0–8.2)
DIFFERENTIAL METHOD BLD: ABNORMAL
EOSINOPHIL # BLD AUTO: 0.1 K/UL (ref 0–0.5)
EOSINOPHIL NFR BLD: 1.2 % (ref 0–8)
ERYTHROCYTE [DISTWIDTH] IN BLOOD BY AUTOMATED COUNT: 12.6 % (ref 11.5–14.5)
EST. GFR  (NO RACE VARIABLE): >60 ML/MIN/1.73 M^2
GLUCOSE SERPL-MCNC: 318 MG/DL (ref 70–110)
HCT VFR BLD AUTO: 38.3 % (ref 37–48.5)
HGB BLD-MCNC: 13.1 G/DL (ref 12–16)
IMM GRANULOCYTES # BLD AUTO: 0.02 K/UL (ref 0–0.04)
IMM GRANULOCYTES NFR BLD AUTO: 0.2 % (ref 0–0.5)
LYMPHOCYTES # BLD AUTO: 3.1 K/UL (ref 1–4.8)
LYMPHOCYTES NFR BLD: 34.2 % (ref 18–48)
MCH RBC QN AUTO: 28.1 PG (ref 27–31)
MCHC RBC AUTO-ENTMCNC: 34.2 G/DL (ref 32–36)
MCV RBC AUTO: 82 FL (ref 82–98)
MONOCYTES # BLD AUTO: 0.4 K/UL (ref 0.3–1)
MONOCYTES NFR BLD: 3.8 % (ref 4–15)
NEUTROPHILS # BLD AUTO: 5.5 K/UL (ref 1.8–7.7)
NEUTROPHILS NFR BLD: 60.2 % (ref 38–73)
NRBC BLD-RTO: 0 /100 WBC
PLATELET # BLD AUTO: 309 K/UL (ref 150–450)
PMV BLD AUTO: 9.9 FL (ref 9.2–12.9)
POTASSIUM SERPL-SCNC: 3.5 MMOL/L (ref 3.5–5.1)
PROT SERPL-MCNC: 6.7 G/DL (ref 6–8.4)
RBC # BLD AUTO: 4.66 M/UL (ref 4–5.4)
SODIUM SERPL-SCNC: 136 MMOL/L (ref 136–145)
WBC # BLD AUTO: 9.17 K/UL (ref 3.9–12.7)

## 2024-03-14 PROCEDURE — 86140 C-REACTIVE PROTEIN: CPT | Performed by: PHYSICIAN ASSISTANT

## 2024-03-14 PROCEDURE — 85025 COMPLETE CBC W/AUTO DIFF WBC: CPT | Performed by: PHYSICIAN ASSISTANT

## 2024-03-14 PROCEDURE — 80053 COMPREHEN METABOLIC PANEL: CPT | Performed by: PHYSICIAN ASSISTANT

## 2024-03-14 PROCEDURE — 36415 COLL VENOUS BLD VENIPUNCTURE: CPT | Performed by: PHYSICIAN ASSISTANT

## 2024-03-14 NOTE — TELEPHONE ENCOUNTER
Turned call to pt. Discussed pt is to be on both IV abs AND Flagyl 500 mg tid.  Pt voiced understanding and appreciation for the call back as the   nurse told her to d/c the oral abx. Reassured pt of orders again.Confirmed ID f/u as well

## 2024-03-14 NOTE — TELEPHONE ENCOUNTER
----- Message from Janelle Real sent at 3/14/2024  2:10 PM CDT -----  Type:  Needs Medical Advice    Who Called:  Pt    Would the patient rather a call back or a response via [facility]?  Call back    Best Call Back Number:  [phone]    Additional Information:  pt is trying to find out if she needs to discontinue oral antibiotics, since she is taking antibiotics through the pic line. Would like to speak with someone today.   Please call back to advise. Thanks!

## 2024-03-20 ENCOUNTER — LAB VISIT (OUTPATIENT)
Dept: LAB | Facility: HOSPITAL | Age: 74
End: 2024-03-20
Attending: PHYSICIAN ASSISTANT
Payer: MEDICARE

## 2024-03-20 DIAGNOSIS — E11.621 DIABETIC FOOT ULCER WITH OSTEOMYELITIS: ICD-10-CM

## 2024-03-20 DIAGNOSIS — L97.522 ULCER OF LEFT FOOT, WITH FAT LAYER EXPOSED: ICD-10-CM

## 2024-03-20 DIAGNOSIS — M86.9 DIABETIC FOOT ULCER WITH OSTEOMYELITIS: ICD-10-CM

## 2024-03-20 DIAGNOSIS — L97.422 ULCER OF LEFT HEEL, WITH FAT LAYER EXPOSED: ICD-10-CM

## 2024-03-20 DIAGNOSIS — L97.509 DIABETIC FOOT ULCER WITH OSTEOMYELITIS: ICD-10-CM

## 2024-03-20 DIAGNOSIS — E11.69 DIABETIC FOOT ULCER WITH OSTEOMYELITIS: ICD-10-CM

## 2024-03-20 DIAGNOSIS — E11.51 TYPE II DIABETES MELLITUS WITH PERIPHERAL CIRCULATORY DISORDER: Primary | ICD-10-CM

## 2024-03-20 LAB
ALBUMIN SERPL BCP-MCNC: 3.7 G/DL (ref 3.5–5.2)
ALP SERPL-CCNC: 95 U/L (ref 55–135)
ALT SERPL W/O P-5'-P-CCNC: 15 U/L (ref 10–44)
ANION GAP SERPL CALC-SCNC: 11 MMOL/L (ref 8–16)
AST SERPL-CCNC: 15 U/L (ref 10–40)
BASOPHILS # BLD AUTO: 0.06 K/UL (ref 0–0.2)
BASOPHILS NFR BLD: 0.8 % (ref 0–1.9)
BILIRUB SERPL-MCNC: 0.3 MG/DL (ref 0.1–1)
BUN SERPL-MCNC: 7 MG/DL (ref 8–23)
CALCIUM SERPL-MCNC: 9.1 MG/DL (ref 8.7–10.5)
CHLORIDE SERPL-SCNC: 100 MMOL/L (ref 95–110)
CO2 SERPL-SCNC: 25 MMOL/L (ref 23–29)
CREAT SERPL-MCNC: 0.8 MG/DL (ref 0.5–1.4)
CRP SERPL-MCNC: 1.5 MG/L (ref 0–8.2)
DIFFERENTIAL METHOD BLD: ABNORMAL
EOSINOPHIL # BLD AUTO: 0.2 K/UL (ref 0–0.5)
EOSINOPHIL NFR BLD: 2.6 % (ref 0–8)
ERYTHROCYTE [DISTWIDTH] IN BLOOD BY AUTOMATED COUNT: 12.7 % (ref 11.5–14.5)
EST. GFR  (NO RACE VARIABLE): >60 ML/MIN/1.73 M^2
GLUCOSE SERPL-MCNC: 353 MG/DL (ref 70–110)
HCT VFR BLD AUTO: 42.2 % (ref 37–48.5)
HGB BLD-MCNC: 14.1 G/DL (ref 12–16)
IMM GRANULOCYTES # BLD AUTO: 0.01 K/UL (ref 0–0.04)
IMM GRANULOCYTES NFR BLD AUTO: 0.1 % (ref 0–0.5)
LYMPHOCYTES # BLD AUTO: 3.9 K/UL (ref 1–4.8)
LYMPHOCYTES NFR BLD: 48.2 % (ref 18–48)
MCH RBC QN AUTO: 27.6 PG (ref 27–31)
MCHC RBC AUTO-ENTMCNC: 33.4 G/DL (ref 32–36)
MCV RBC AUTO: 83 FL (ref 82–98)
MONOCYTES # BLD AUTO: 0.4 K/UL (ref 0.3–1)
MONOCYTES NFR BLD: 4.5 % (ref 4–15)
NEUTROPHILS # BLD AUTO: 3.5 K/UL (ref 1.8–7.7)
NEUTROPHILS NFR BLD: 43.8 % (ref 38–73)
NRBC BLD-RTO: 0 /100 WBC
PLATELET # BLD AUTO: 302 K/UL (ref 150–450)
PMV BLD AUTO: 10.3 FL (ref 9.2–12.9)
POTASSIUM SERPL-SCNC: 3.7 MMOL/L (ref 3.5–5.1)
PROT SERPL-MCNC: 7.2 G/DL (ref 6–8.4)
RBC # BLD AUTO: 5.11 M/UL (ref 4–5.4)
SODIUM SERPL-SCNC: 136 MMOL/L (ref 136–145)
WBC # BLD AUTO: 7.99 K/UL (ref 3.9–12.7)

## 2024-03-20 PROCEDURE — 85025 COMPLETE CBC W/AUTO DIFF WBC: CPT | Performed by: PHYSICIAN ASSISTANT

## 2024-03-20 PROCEDURE — 80053 COMPREHEN METABOLIC PANEL: CPT | Performed by: PHYSICIAN ASSISTANT

## 2024-03-20 PROCEDURE — 86140 C-REACTIVE PROTEIN: CPT | Performed by: PHYSICIAN ASSISTANT

## 2024-03-20 PROCEDURE — 36415 COLL VENOUS BLD VENIPUNCTURE: CPT | Performed by: PHYSICIAN ASSISTANT

## 2024-03-21 ENCOUNTER — OFFICE VISIT (OUTPATIENT)
Dept: INFECTIOUS DISEASES | Facility: CLINIC | Age: 74
End: 2024-03-21
Payer: MEDICARE

## 2024-03-21 ENCOUNTER — OFFICE VISIT (OUTPATIENT)
Dept: PODIATRY | Facility: CLINIC | Age: 74
End: 2024-03-21
Payer: MEDICARE

## 2024-03-21 DIAGNOSIS — E11.621 TYPE 2 DIABETES MELLITUS WITH FOOT ULCER, WITH LONG-TERM CURRENT USE OF INSULIN: ICD-10-CM

## 2024-03-21 DIAGNOSIS — L97.509 TYPE 2 DIABETES MELLITUS WITH FOOT ULCER, WITH LONG-TERM CURRENT USE OF INSULIN: ICD-10-CM

## 2024-03-21 DIAGNOSIS — Z79.4 TYPE 2 DIABETES MELLITUS WITH FOOT ULCER, WITH LONG-TERM CURRENT USE OF INSULIN: ICD-10-CM

## 2024-03-21 DIAGNOSIS — Z79.2 RECEIVING INTRAVENOUS ANTIBIOTIC TREATMENT AS OUTPATIENT: ICD-10-CM

## 2024-03-21 DIAGNOSIS — E11.42 TYPE 2 DIABETES MELLITUS WITH DIABETIC POLYNEUROPATHY, WITH LONG-TERM CURRENT USE OF INSULIN: ICD-10-CM

## 2024-03-21 DIAGNOSIS — L97.522 ULCER OF LEFT FOOT WITH FAT LAYER EXPOSED: Primary | ICD-10-CM

## 2024-03-21 DIAGNOSIS — M86.172 OTHER ACUTE OSTEOMYELITIS OF LEFT FOOT: Primary | ICD-10-CM

## 2024-03-21 DIAGNOSIS — L97.522 ULCER OF LEFT FOOT WITH FAT LAYER EXPOSED: ICD-10-CM

## 2024-03-21 DIAGNOSIS — Z79.4 TYPE 2 DIABETES MELLITUS WITH DIABETIC POLYNEUROPATHY, WITH LONG-TERM CURRENT USE OF INSULIN: ICD-10-CM

## 2024-03-21 PROCEDURE — 99999 PR PBB SHADOW E&M-EST. PATIENT-LVL III: CPT | Mod: PBBFAC,,, | Performed by: PHYSICIAN ASSISTANT

## 2024-03-21 PROCEDURE — 3046F HEMOGLOBIN A1C LEVEL >9.0%: CPT | Mod: CPTII,S$GLB,, | Performed by: PHYSICIAN ASSISTANT

## 2024-03-21 PROCEDURE — 1159F MED LIST DOCD IN RCRD: CPT | Mod: CPTII,S$GLB,, | Performed by: PHYSICIAN ASSISTANT

## 2024-03-21 PROCEDURE — 1160F RVW MEDS BY RX/DR IN RCRD: CPT | Mod: CPTII,S$GLB,, | Performed by: PHYSICIAN ASSISTANT

## 2024-03-21 PROCEDURE — 11042 DBRDMT SUBQ TIS 1ST 20SQCM/<: CPT | Mod: S$GLB,,, | Performed by: STUDENT IN AN ORGANIZED HEALTH CARE EDUCATION/TRAINING PROGRAM

## 2024-03-21 PROCEDURE — 99214 OFFICE O/P EST MOD 30 MIN: CPT | Mod: S$GLB,,, | Performed by: PHYSICIAN ASSISTANT

## 2024-03-21 PROCEDURE — 99999 PR PBB SHADOW E&M-EST. PATIENT-LVL I: CPT | Mod: PBBFAC,,, | Performed by: STUDENT IN AN ORGANIZED HEALTH CARE EDUCATION/TRAINING PROGRAM

## 2024-03-21 PROCEDURE — 99213 OFFICE O/P EST LOW 20 MIN: CPT | Mod: 25,S$GLB,, | Performed by: STUDENT IN AN ORGANIZED HEALTH CARE EDUCATION/TRAINING PROGRAM

## 2024-03-21 NOTE — PROGRESS NOTES
Patient ID: Hoa Wilde is a 74 y.o. female.    Subjective:           Chief Complaint: Wound Infection      HPI    73yo female with a PMH of T2DM, HLD, CAD, HTN, hypothyroidism, and diabetic neuropathy referred to Infectious Diseases for osteomyelitis of the left foot.    - developed ulcer to the lateral left foot early 2024  - saw Podiatry who put her on 1 week of augmentin without improvement. Cultures at the time grew Prevotella  - XRY showed ulcer with likely acute osteo of the base of the 5th proximal phalanx and 5th MTH  - 2/27/24 Bone biopsy cultures: Strep anginosus, Prevotella  - Path: Chronic osteo  - abx changed from augmentin to Bactrim at last Podiatry visit, and she was referred to Infectious Diseases  - arterial US without significant stenosis  - 3/7/24 Patient seen in Podiatry clinic alongside Dr. Aguilera as new patient. They report her wound is improved in appearance since switching abx to Bactrim which is odd since this would not cover Strep anginosus or Prevotella. Started on PO Metronidazole and IV Ceftriaxone and advised to d/c Bactrim  - 3/13/24 first date IV CTX, 6 week EOC 4/24/24    Interval HPI:  3/21/24  - seen in Podiatry clinic alongside Dr. Aguilera  - patient reports she has overall been doing well, tolerating IV CTX and PO Metronidazole  - denies fevers, chills, night sweats, or foot pain  - has upcoming appt with PCP to discuss regimen for her diabetes with out of control BG    Past Medical History:   Diagnosis Date    Coronary artery disease     Diabetes mellitus, type 2     Hyperlipidemia     Hypertension     Hypothyroidism     Neuropathy        Past Surgical History:   Procedure Laterality Date    ANGIOGRAPHY OF LOWER EXTREMITY Left 12/16/2020    Procedure: ANGIOGRAM, LOWER EXTREMITY;  Surgeon: Juancho White MD;  Location: [facility] CATH/EP LAB;  Service: General;  Laterality: Left;    CHOLECYSTECTOMY      CORONARY ARTERY BYPASS GRAFT      FOOT SURGERY Left     HYSTERECTOMY      PERIPHERALLY  INSERTED CENTRAL CATHETER INSERTION N/A 3/13/2024    Procedure: INSERTION, PICC;  Surgeon: PICC, [facility];  Location: [facility] ENDO;  Service: Cardiology;  Laterality: N/A;  Dr Mendez       Review of patient's allergies indicates:  No Known Allergies    Family History   Problem Relation Age of Onset    Hyperlipidemia Mother     Heart disease Mother        Social History     Socioeconomic History    Marital status:    Occupational History    Occupation: retired    Tobacco Use    Smoking status: Never    Smokeless tobacco: Never   Substance and Sexual Activity    Alcohol use: No    Drug use: No     Social Determinants of Health     Stress: No Stress Concern Present (12/21/2020)    [facility] of Occupational Health - Occupational Stress Questionnaire     Feeling of Stress : Only a little       Current Outpatient Medications   Medication Instructions    alendronate (FOSAMAX) 70 MG tablet Take 1 tablet by mouth once a week    aspirin (ECOTRIN) 81 mg, Oral, Daily    betamethasone valerate (LUXIQ) 0.12 % foam Topical (Top), 2 times daily    blood-glucose meter kit To check BG 2 times daily, to use with insurance preferred meter    blood-glucose meter kit Use as instructed    calcium carbonate (OS-GEORGINA) 500 mg, Oral, 2 times daily    EUTHYROX 50 mcg tablet TAKE 1 TABLET BY MOUTH BEFORE BREAKFAST    famotidine (PEPCID) 20 MG tablet Take 1 tablet by mouth once daily    fluconazole (DIFLUCAN) 150 MG Tab TAKE 1 TABLET BY MOUTH ONCE DAILY FOR 1 DAY    gabapentin (NEURONTIN) 300 MG capsule TAKE 1 CAPSULE BY MOUTH ONCE DAILY IN THE MORNING AND 2 ONCE DAILY AT NIGHT    glipizide-metformin (METAGLIP) 5-500 mg per tablet 1 tablet, Oral, 2 times daily before meals, Take with breakfast and dinner    hydrOXYzine HCL (ATARAX) 25 MG tablet TAKE 1 TABLET BY MOUTH THREE TIMES DAILY AS NEEDED FOR ITCHING    LEVEMIR FLEXPEN 50 Units, Subcutaneous, Nightly    lisinopriL (PRINIVIL,ZESTRIL) 5 MG tablet Take 1 tablet by mouth once daily     "meloxicam (MOBIC) 15 mg, Oral    metoprolol tartrate (LOPRESSOR) 25 MG tablet Take 1 tablet by mouth twice daily    metroNIDAZOLE (FLAGYL) 500 mg, Oral, 3 times daily    ONETOUCH DELICA PLUS LANCET 33 gauge Misc USE   TO CHECK GLUCOSE TWICE DAILY    ONETOUCH ULTRA TEST Strp USE  STRIP TO CHECK GLUCOSE TWICE DAILY    OZEMPIC 0.5 mg, Subcutaneous, Every 7 days    pen needle, diabetic 30 gauge x 1/3" Ndle 1 pen , Misc.(Non-Drug; Combo Route), Daily, To use with levimir flex pen    rosuvastatin (CRESTOR) 20 MG tablet Take 1 tablet by mouth once daily    sertraline (ZOLOFT) 50 MG tablet Take 1 tablet by mouth once daily         Review of Systems   Constitutional:  Negative for chills, diaphoresis, fatigue and fever.   HENT:  Negative for congestion and sore throat.    Respiratory:  Negative for cough and shortness of breath.    Cardiovascular:  Negative for chest pain, palpitations and leg swelling.   Gastrointestinal:  Negative for abdominal pain, diarrhea, nausea and vomiting.   Genitourinary:  Negative for dysuria, flank pain, hematuria and urgency.   Musculoskeletal:  Negative for joint swelling, myalgias, neck pain and neck stiffness.   Skin:  Positive for color change and wound. Negative for rash.   Allergic/Immunologic: Negative for immunocompromised state.   Neurological:  Negative for dizziness, weakness, numbness and headaches.   Psychiatric/Behavioral:  Negative for confusion.            Objective:     There were no vitals filed for this visit.    There is no height or weight on file to calculate BMI.         Physical Exam  Vitals and nursing note reviewed.   Constitutional:       General: She is awake. She is not in acute distress.     Appearance: Normal appearance. She is well-developed. She is not diaphoretic.   HENT:      Head: Normocephalic and atraumatic.      Right Ear: External ear normal.      Left Ear: External ear normal.      Nose: Nose normal.   Eyes:      General: No scleral icterus.     " Conjunctiva/sclera: Conjunctivae normal.      Pupils: Pupils are equal, round, and reactive to light.   Cardiovascular:      Rate and Rhythm: Normal rate and regular rhythm.   Pulmonary:      Effort: Pulmonary effort is normal. No accessory muscle usage or respiratory distress.   Abdominal:      General: There is no distension.   Musculoskeletal:      Cervical back: Normal range of motion and neck supple.   Feet:      Left foot:      Skin integrity: Ulcer, skin breakdown and dry skin present. No erythema or warmth.      Toenail Condition: Left toenails are abnormally thick. Fungal disease present.  Skin:     General: Skin is warm and dry.      Coloration: Skin is not jaundiced.   Neurological:      General: No focal deficit present.      Mental Status: She is alert and oriented to person, place, and time.   Psychiatric:         Attention and Perception: Attention normal.         Mood and Affect: Mood normal.         Speech: Speech normal.         Behavior: Behavior normal.       Clinic image 3/21/24      Clinic image 3/7/24      Assessment:           Hoa was seen today for wound infection.    Diagnoses and all orders for this visit:    Other acute osteomyelitis of left foot    Ulcer of left foot with fat layer exposed    Type 2 diabetes mellitus with foot ulcer, with long-term current use of insulin    Receiving intravenous antibiotic treatment as outpatient           Plan:     - left foot wound continues to improve. One hole is closed  - labs reviewed and are WNL except for elevated BG  - cont Ceftriaxone 2g IV q24h  - cont Metronidazole 500mg po TID  - cont weekly CBC, CMP, CRP   - length of therapy 6 weeks (EOC 4/24/24)  - note that Pathology describes chronic osteomyelitis. If the above does not resolve her infection, then surgery will need to be entertained  - f/u in ID clinic alongside Podiatry at her EOC      Discussed and seen with Podiatry, Dr. Aguilera     Greater than 30 minutes was spent on this encounter,  which included: review of recent encounters, review and interpretation of labs/images, obtaining pertinent history, performing a physical examination, counseling and educating the patient/family/caregiver, ordering medications/tests, documenting in the electronic health record, and coordinating care with necessary providers.    Joselin Galan PA-C  Infectious Diseases  [facility]

## 2024-03-21 NOTE — PROGRESS NOTES
Subjective:      Patient ID: Hoa Wilde is a 74 y.o. female.    Chief Complaint: No chief complaint on file.    Is Hoa is new to us here at [facility].  She has a history of left foot ulcerations and presents with 1 today.  She notes a new wound that presented about 2 weeks ago.  She denies any trauma to the area that she can recall.  She has another area of the bottom of her foot that is concerning for wound.  He denies any fever, nausea, vomiting or chills.  She admits that she has little feeling in her feet.    2/27/24: Ms. Wilde is doing okay. Has some L foot pain today. She last had x-rays showing erosions of the 5th met head and base of the proximal phalanx. She completed a 1 week course of augmentin that doesn't appear to have helped.     3/7/24:  F/u for L foot wound. She is doing much better than last week. She tolerated the antibiotics well. She has + chronic OM in the pathology and + bacteria in the cultures of the left 5th metatarsal taken last week.     3/21/24:   Ms. Camara returns for L foot wound. Doing well and has no complaints of pain.     Review of Systems   Skin:         Ulcer on left foot   Neurological:  Positive for numbness and paresthesias.   All other systems reviewed and are negative.          Objective:      Physical Exam  Cardiovascular:      Pulses:           Posterior tibial pulses are 1+ on the left side.   Feet:      Comments: Left foot:   Ulcer 1.  At the lateral aspect of the 5th MTPJ.  Measures 0.4 x 0.6 cm with 0.2 mm of depth.  Erythema and edema are resolved.  There is no fluctuance or crepitus or purulent drainage appreciated.  There is no probe to bone.    Ulcer 2 is fully epithelialized.               Assessment:       Encounter Diagnoses   Name Primary?    Ulcer of left foot with fat layer exposed Yes    Type 2 diabetes mellitus with foot ulcer, with long-term current use of insulin     Type 2 diabetes mellitus with diabetic polyneuropathy, with long-term current use  of insulin            Plan:       Diagnoses and all orders for this visit:    Ulcer of left foot with fat layer exposed  -     SUBSEQUENT HOME HEALTH ORDERS    Type 2 diabetes mellitus with foot ulcer, with long-term current use of insulin  -     SUBSEQUENT HOME HEALTH ORDERS    Type 2 diabetes mellitus with diabetic polyneuropathy, with long-term current use of insulin  -     SUBSEQUENT HOME HEALTH ORDERS    Other orders  -     Wound Debridement        I counseled the patient on her conditions, their implications and medical management.    Ms. Wilde was evaluated today with ID. Continue abx regiment.     F/u 2 weeks due to distance away.    [facility]   Please change dressing M and Th: -Cleanse wounds with sterile saline or wound cleanser. -Pat Dry. -Apply saline moistened rupali to wounds on the L foot. -Cover with robert foam, cast padding x 2 and secured with loose coban with no compression. Thank you.     Vamsi Aguilera DPM    Wound Debridement    Date/Time: 3/21/2024 10:40 AM    Performed by: Vamsi Aguilera DPM  Authorized by: Vamsi Aguilera DPM    Consent Done?:  Yes (Verbal)    Wound Details:    Location:  Left foot    Location:  Left 5th Metatarsal Head    Type of Debridement:  Excisional       Length (cm):  0.4       Area (sq cm):  0.24       Width (cm):  0.6       Percent Debrided (%):  100       Depth (cm):  0.2       Total Area Debrided (sq cm):  0.24    Depth of debridement:  Subcutaneous tissue    Tissue debrided:  Dermis and Epidermis    Devitalized tissue debrided:  Callus    Instruments:  Curette  Bleeding:  Minimal  Hemostasis Achieved: Yes  Method Used:  Pressure  Patient tolerance:  Patient tolerated the procedure well with no immediate complications  1st Wound Pain Assessment: 0

## 2024-03-27 ENCOUNTER — OFFICE VISIT (OUTPATIENT)
Dept: FAMILY MEDICINE | Facility: CLINIC | Age: 74
End: 2024-03-27
Payer: MEDICARE

## 2024-03-27 ENCOUNTER — EXTERNAL HOME HEALTH (OUTPATIENT)
Dept: HOME HEALTH SERVICES | Facility: HOSPITAL | Age: 74
End: 2024-03-27
Payer: MEDICARE

## 2024-03-27 VITALS
WEIGHT: 143 LBS | RESPIRATION RATE: 18 BRPM | DIASTOLIC BLOOD PRESSURE: 74 MMHG | BODY MASS INDEX: 28.07 KG/M2 | TEMPERATURE: 98 F | SYSTOLIC BLOOD PRESSURE: 128 MMHG | HEART RATE: 73 BPM | HEIGHT: 60 IN | OXYGEN SATURATION: 99 %

## 2024-03-27 DIAGNOSIS — E11.43 DIABETIC AUTONOMIC NEUROPATHY ASSOCIATED WITH TYPE 2 DIABETES MELLITUS: ICD-10-CM

## 2024-03-27 DIAGNOSIS — Z12.31 SCREENING MAMMOGRAM FOR BREAST CANCER: ICD-10-CM

## 2024-03-27 DIAGNOSIS — Z12.11 COLON CANCER SCREENING: ICD-10-CM

## 2024-03-27 DIAGNOSIS — E11.42 TYPE 2 DIABETES MELLITUS WITH DIABETIC POLYNEUROPATHY, WITH LONG-TERM CURRENT USE OF INSULIN: Primary | ICD-10-CM

## 2024-03-27 DIAGNOSIS — Z79.4 TYPE 2 DIABETES MELLITUS WITH DIABETIC POLYNEUROPATHY, WITH LONG-TERM CURRENT USE OF INSULIN: Primary | ICD-10-CM

## 2024-03-27 PROCEDURE — 99999 PR PBB SHADOW E&M-EST. PATIENT-LVL V: CPT | Mod: PBBFAC,,, | Performed by: FAMILY MEDICINE

## 2024-03-27 PROCEDURE — 99214 OFFICE O/P EST MOD 30 MIN: CPT | Mod: S$GLB,,, | Performed by: FAMILY MEDICINE

## 2024-03-27 RX ORDER — SEMAGLUTIDE 1.34 MG/ML
1 INJECTION, SOLUTION SUBCUTANEOUS
Qty: 3 ML | Refills: 11 | Status: SHIPPED | OUTPATIENT
Start: 2024-03-27 | End: 2025-03-27

## 2024-03-27 RX ORDER — INSULIN DETEMIR 100 [IU]/ML
70 INJECTION, SOLUTION SUBCUTANEOUS NIGHTLY
Qty: 15 ML | Refills: 0 | Status: SHIPPED | OUTPATIENT
Start: 2024-03-27

## 2024-03-27 NOTE — PROGRESS NOTES
Subjective:       Patient ID: Hoa Wilde is a 74 y.o. female.    Chief Complaint: Diabetes      Here for follow-up on diabetes he is currently on IV antibiotics through PICC line for osteomyelitis of the foot.  Here for follow-up on the diabetes.  She is getting aggressive therapy for osteo on the lateral aspect of the left foot.  Lab Results.       Component                Value               Date                       WBC                      7.99                03/20/2024                 HGB                      14.1                03/20/2024                 HCT                      42.2                03/20/2024                 PLT                      302                 03/20/2024                 CHOL                     138                 05/05/2020                 TRIG                     254 (H)             05/05/2020                 HDL                      40                  05/05/2020                 ALT                      15                  03/20/2024                 AST                      15                  03/20/2024                 NA                       136                 03/20/2024                 K                        3.7                 03/20/2024                 CL                       100                 03/20/2024                 CREATININE               0.8                 03/20/2024                 BUN                      7 (L)               03/20/2024                 CO2                      25                  03/20/2024                 TSH                      1.750               01/20/2023                 TSH                      1.750               01/20/2023                 INR                      1.0                 01/19/2023                 HGBA1C                   11.9 (H)            03/08/2024                  Diabetes  She presents for her follow-up diabetic visit. She has type 2 diabetes mellitus. MedicAlert identification noted. Her disease course has been  "stable. Her breakfast blood glucose range is generally >200 mg/dl. Her dinner blood glucose range is generally >200 mg/dl. (Often runs over 300 postprandial) An ACE inhibitor/angiotensin II receptor blocker is being taken. She sees a podiatrist.Eye exam is current.       Allergies and Medications:   Review of patient's allergies indicates:  No Known Allergies  Current Outpatient Medications   Medication Sig Dispense Refill    alendronate (FOSAMAX) 70 MG tablet Take 1 tablet by mouth once a week 12 tablet 1    betamethasone valerate (LUXIQ) 0.12 % foam Apply topically 2 (two) times daily. 100 g 0    blood-glucose meter kit To check BG 2 times daily, to use with insurance preferred meter 1 each 0    blood-glucose meter kit Use as instructed 1 each 0    EUTHYROX 50 mcg tablet TAKE 1 TABLET BY MOUTH BEFORE BREAKFAST 90 tablet 3    famotidine (PEPCID) 20 MG tablet Take 1 tablet by mouth once daily 90 tablet 0    fluconazole (DIFLUCAN) 150 MG Tab TAKE 1 TABLET BY MOUTH ONCE DAILY FOR 1 DAY 1 tablet 0    gabapentin (NEURONTIN) 300 MG capsule TAKE 1 CAPSULE BY MOUTH ONCE DAILY IN THE MORNING AND 2 ONCE DAILY AT NIGHT 270 capsule 1    hydrOXYzine HCL (ATARAX) 25 MG tablet TAKE 1 TABLET BY MOUTH THREE TIMES DAILY AS NEEDED FOR ITCHING 90 tablet 1    lisinopriL (PRINIVIL,ZESTRIL) 5 MG tablet Take 1 tablet by mouth once daily 90 tablet 1    meloxicam (MOBIC) 15 MG tablet Take 15 mg by mouth.      metoprolol tartrate (LOPRESSOR) 25 MG tablet Take 1 tablet by mouth twice daily 180 tablet 0    metroNIDAZOLE (FLAGYL) 500 MG tablet Take 1 tablet (500 mg total) by mouth 3 (three) times daily. 135 tablet 0    ONETOUCH DELICA PLUS LANCET 33 gauge Misc USE   TO CHECK GLUCOSE TWICE DAILY 100 each 3    ONETOUCH ULTRA TEST Strp USE  STRIP TO CHECK GLUCOSE TWICE DAILY 100 each 3    pen needle, diabetic 30 gauge x 1/3" Ndle 1 pen by Misc.(Non-Drug; Combo Route) route once daily. To use with levimir flex pen 100 each 3    rosuvastatin " (CRESTOR) 20 MG tablet Take 1 tablet by mouth once daily 90 tablet 0    sertraline (ZOLOFT) 50 MG tablet Take 1 tablet by mouth once daily 30 tablet 11    aspirin (ECOTRIN) 81 MG EC tablet Take 1 tablet (81 mg total) by mouth once daily. 100 tablet 3    calcium carbonate (OS-GEORGINA) 500 mg calcium (1,250 mg) tablet Take 1 tablet (500 mg total) by mouth 2 (two) times daily. 200 tablet 3    glipizide-metformin (METAGLIP) 5-500 mg per tablet Take 1 tablet by mouth 2 (two) times daily before meals. Take with breakfast and dinner 180 tablet 1    insulin detemir U-100, Levemir, (LEVEMIR FLEXPEN) 100 unit/mL (3 mL) InPn pen Inject 70 Units into the skin every evening. 15 mL 0    semaglutide (OZEMPIC) 1 mg/dose (4 mg/3 mL) Inject 1 mg into the skin every 7 days. 3 mL 11     No current facility-administered medications for this visit.     Facility-Administered Medications Ordered in Other Visits   Medication Dose Route Frequency Provider Last Rate Last Admin    0.9%  NaCl infusion   Intravenous Continuous Juancho White MD   Stopped at 12/16/20 1500       Family History:   Family History   Problem Relation Age of Onset    Hyperlipidemia Mother     Heart disease Mother        Social History:   Social History     Socioeconomic History    Marital status:    Occupational History    Occupation: retired    Tobacco Use    Smoking status: Never    Smokeless tobacco: Never   Substance and Sexual Activity    Alcohol use: No    Drug use: No     Social Determinants of Health     Stress: No Stress Concern Present (12/21/2020)    [place] of Occupational Health - Occupational Stress Questionnaire     Feeling of Stress : Only a little       Review of Systems    Objective:     Vitals:    03/27/24 1316   BP: 128/74   Pulse: 73   Resp: 18   Temp: 97.8 °F (36.6 °C)        Physical Exam  Vitals and nursing note reviewed.   Constitutional:       General: She is not in acute distress.     Appearance: Normal appearance. She is  well-developed and normal weight. She is not ill-appearing, toxic-appearing or diaphoretic.   HENT:      Head: Normocephalic and atraumatic.   Eyes:      Pupils: Pupils are equal, round, and reactive to light.   Cardiovascular:      Rate and Rhythm: Normal rate and regular rhythm.      Heart sounds: Normal heart sounds. No murmur heard.     No friction rub. No gallop.   Pulmonary:      Effort: Pulmonary effort is normal. No respiratory distress.      Breath sounds: Normal breath sounds. No stridor. No wheezing, rhonchi or rales.   Chest:      Chest wall: No tenderness.   Musculoskeletal:      Right lower leg: No edema.      Left lower leg: No edema.      Comments: Patient's foot is actively wrapped and in a walking boot however she brings in photographs of a unstageable lesion in the left 5th metatarsal and a healing wound on the base of the 1st metatarsal   Neurological:      Mental Status: She is alert.   Psychiatric:         Behavior: Behavior normal.         Thought Content: Thought content normal.         Judgment: Judgment normal.         Assessment:       1. Type 2 diabetes mellitus with diabetic polyneuropathy, with long-term current use of insulin    2. Diabetic autonomic neuropathy associated with type 2 diabetes mellitus    3. Screening mammogram for breast cancer    4. Colon cancer screening        Plan:       Hoa was seen today for diabetes.    Diagnoses and all orders for this visit:    Type 2 diabetes mellitus with diabetic polyneuropathy, with long-term current use of insulin  -     semaglutide (OZEMPIC) 1 mg/dose (4 mg/3 mL); Inject 1 mg into the skin every 7 days.  -     Ambulatory referral/consult to Diabetes Education; Future    Diabetic autonomic neuropathy associated with type 2 diabetes mellitus  -     insulin detemir U-100, Levemir, (LEVEMIR FLEXPEN) 100 unit/mL (3 mL) InPn pen; Inject 70 Units into the skin every evening.    Screening mammogram for breast cancer  -     Mammo Digital  Screening Bilat; Future    Colon cancer screening  -     Cologuard Screening (Multitarget Stool DNA); Future  -     Cologuard Screening (Multitarget Stool DNA)         Follow up in about 1 month (around 4/27/2024) for follow up DM.

## 2024-03-28 ENCOUNTER — PATIENT MESSAGE (OUTPATIENT)
Dept: ADMINISTRATIVE | Facility: HOSPITAL | Age: 74
End: 2024-03-28
Payer: MEDICARE

## 2024-03-28 ENCOUNTER — LAB VISIT (OUTPATIENT)
Dept: LAB | Facility: HOSPITAL | Age: 74
End: 2024-03-28
Attending: PHYSICIAN ASSISTANT
Payer: MEDICARE

## 2024-03-28 DIAGNOSIS — L97.422 ULCER OF LEFT HEEL, WITH FAT LAYER EXPOSED: Primary | ICD-10-CM

## 2024-03-28 LAB
ALBUMIN SERPL BCP-MCNC: 3.6 G/DL (ref 3.5–5.2)
ALP SERPL-CCNC: 77 U/L (ref 55–135)
ALT SERPL W/O P-5'-P-CCNC: 15 U/L (ref 10–44)
ANION GAP SERPL CALC-SCNC: 16 MMOL/L (ref 8–16)
AST SERPL-CCNC: 20 U/L (ref 10–40)
BASOPHILS # BLD AUTO: 0.07 K/UL (ref 0–0.2)
BASOPHILS NFR BLD: 0.9 % (ref 0–1.9)
BILIRUB SERPL-MCNC: 0.4 MG/DL (ref 0.1–1)
BUN SERPL-MCNC: 5 MG/DL (ref 8–23)
CALCIUM SERPL-MCNC: 8.8 MG/DL (ref 8.7–10.5)
CHLORIDE SERPL-SCNC: 98 MMOL/L (ref 95–110)
CO2 SERPL-SCNC: 21 MMOL/L (ref 23–29)
CREAT SERPL-MCNC: 0.7 MG/DL (ref 0.5–1.4)
CRP SERPL-MCNC: 1.1 MG/L (ref 0–8.2)
DIFFERENTIAL METHOD BLD: NORMAL
EOSINOPHIL # BLD AUTO: 0.3 K/UL (ref 0–0.5)
EOSINOPHIL NFR BLD: 4.1 % (ref 0–8)
ERYTHROCYTE [DISTWIDTH] IN BLOOD BY AUTOMATED COUNT: 13.3 % (ref 11.5–14.5)
EST. GFR  (NO RACE VARIABLE): >60 ML/MIN/1.73 M^2
GLUCOSE SERPL-MCNC: 292 MG/DL (ref 70–110)
HCT VFR BLD AUTO: 40.4 % (ref 37–48.5)
HGB BLD-MCNC: 13.8 G/DL (ref 12–16)
IMM GRANULOCYTES # BLD AUTO: 0.02 K/UL (ref 0–0.04)
IMM GRANULOCYTES NFR BLD AUTO: 0.3 % (ref 0–0.5)
LYMPHOCYTES # BLD AUTO: 3.1 K/UL (ref 1–4.8)
LYMPHOCYTES NFR BLD: 38.6 % (ref 18–48)
MCH RBC QN AUTO: 28.5 PG (ref 27–31)
MCHC RBC AUTO-ENTMCNC: 34.2 G/DL (ref 32–36)
MCV RBC AUTO: 84 FL (ref 82–98)
MONOCYTES # BLD AUTO: 0.4 K/UL (ref 0.3–1)
MONOCYTES NFR BLD: 4.7 % (ref 4–15)
NEUTROPHILS # BLD AUTO: 4.1 K/UL (ref 1.8–7.7)
NEUTROPHILS NFR BLD: 51.4 % (ref 38–73)
NRBC BLD-RTO: 0 /100 WBC
PLATELET # BLD AUTO: 275 K/UL (ref 150–450)
PMV BLD AUTO: 10.5 FL (ref 9.2–12.9)
POTASSIUM SERPL-SCNC: 3.2 MMOL/L (ref 3.5–5.1)
PROT SERPL-MCNC: 6.8 G/DL (ref 6–8.4)
RBC # BLD AUTO: 4.84 M/UL (ref 4–5.4)
SODIUM SERPL-SCNC: 135 MMOL/L (ref 136–145)
WBC # BLD AUTO: 7.9 K/UL (ref 3.9–12.7)

## 2024-03-28 PROCEDURE — 85025 COMPLETE CBC W/AUTO DIFF WBC: CPT | Performed by: PHYSICIAN ASSISTANT

## 2024-03-28 PROCEDURE — 86140 C-REACTIVE PROTEIN: CPT | Performed by: PHYSICIAN ASSISTANT

## 2024-03-28 PROCEDURE — 36415 COLL VENOUS BLD VENIPUNCTURE: CPT | Performed by: PHYSICIAN ASSISTANT

## 2024-03-28 PROCEDURE — 80053 COMPREHEN METABOLIC PANEL: CPT | Performed by: PHYSICIAN ASSISTANT

## 2024-04-04 ENCOUNTER — OFFICE VISIT (OUTPATIENT)
Dept: PODIATRY | Facility: CLINIC | Age: 74
End: 2024-04-04
Payer: MEDICARE

## 2024-04-04 ENCOUNTER — LAB VISIT (OUTPATIENT)
Dept: LAB | Facility: HOSPITAL | Age: 74
End: 2024-04-04
Attending: PHYSICIAN ASSISTANT
Payer: MEDICARE

## 2024-04-04 ENCOUNTER — TELEPHONE (OUTPATIENT)
Dept: DIABETES | Facility: CLINIC | Age: 74
End: 2024-04-04
Payer: MEDICARE

## 2024-04-04 VITALS — WEIGHT: 143.06 LBS | HEIGHT: 60 IN | BODY MASS INDEX: 28.09 KG/M2

## 2024-04-04 DIAGNOSIS — L97.522 ULCER OF LEFT FOOT WITH FAT LAYER EXPOSED: Primary | ICD-10-CM

## 2024-04-04 DIAGNOSIS — M86.172 OTHER ACUTE OSTEOMYELITIS OF LEFT FOOT: ICD-10-CM

## 2024-04-04 DIAGNOSIS — L97.522 ULCER OF LEFT FOOT, WITH FAT LAYER EXPOSED: Primary | ICD-10-CM

## 2024-04-04 LAB
ALBUMIN SERPL BCP-MCNC: 3.5 G/DL (ref 3.5–5.2)
ALP SERPL-CCNC: 66 U/L (ref 55–135)
ALT SERPL W/O P-5'-P-CCNC: 12 U/L (ref 10–44)
ANION GAP SERPL CALC-SCNC: 14 MMOL/L (ref 8–16)
AST SERPL-CCNC: 16 U/L (ref 10–40)
BASOPHILS # BLD AUTO: 0.06 K/UL (ref 0–0.2)
BASOPHILS NFR BLD: 0.8 % (ref 0–1.9)
BILIRUB SERPL-MCNC: 0.5 MG/DL (ref 0.1–1)
BUN SERPL-MCNC: 5 MG/DL (ref 8–23)
CALCIUM SERPL-MCNC: 8.8 MG/DL (ref 8.7–10.5)
CHLORIDE SERPL-SCNC: 100 MMOL/L (ref 95–110)
CO2 SERPL-SCNC: 23 MMOL/L (ref 23–29)
CREAT SERPL-MCNC: 0.6 MG/DL (ref 0.5–1.4)
CRP SERPL-MCNC: 1.3 MG/L (ref 0–8.2)
DIFFERENTIAL METHOD BLD: NORMAL
EOSINOPHIL # BLD AUTO: 0.5 K/UL (ref 0–0.5)
EOSINOPHIL NFR BLD: 7.4 % (ref 0–8)
ERYTHROCYTE [DISTWIDTH] IN BLOOD BY AUTOMATED COUNT: 13.2 % (ref 11.5–14.5)
EST. GFR  (NO RACE VARIABLE): >60 ML/MIN/1.73 M^2
GLUCOSE SERPL-MCNC: 282 MG/DL (ref 70–110)
HCT VFR BLD AUTO: 39.1 % (ref 37–48.5)
HGB BLD-MCNC: 13.2 G/DL (ref 12–16)
IMM GRANULOCYTES # BLD AUTO: 0.01 K/UL (ref 0–0.04)
IMM GRANULOCYTES NFR BLD AUTO: 0.1 % (ref 0–0.5)
LYMPHOCYTES # BLD AUTO: 3.2 K/UL (ref 1–4.8)
LYMPHOCYTES NFR BLD: 44.4 % (ref 18–48)
MCH RBC QN AUTO: 27.7 PG (ref 27–31)
MCHC RBC AUTO-ENTMCNC: 33.8 G/DL (ref 32–36)
MCV RBC AUTO: 82 FL (ref 82–98)
MONOCYTES # BLD AUTO: 0.4 K/UL (ref 0.3–1)
MONOCYTES NFR BLD: 4.9 % (ref 4–15)
NEUTROPHILS # BLD AUTO: 3.1 K/UL (ref 1.8–7.7)
NEUTROPHILS NFR BLD: 42.4 % (ref 38–73)
NRBC BLD-RTO: 0 /100 WBC
PLATELET # BLD AUTO: 318 K/UL (ref 150–450)
PMV BLD AUTO: 10.4 FL (ref 9.2–12.9)
POTASSIUM SERPL-SCNC: 3.2 MMOL/L (ref 3.5–5.1)
PROT SERPL-MCNC: 6.5 G/DL (ref 6–8.4)
RBC # BLD AUTO: 4.76 M/UL (ref 4–5.4)
SODIUM SERPL-SCNC: 137 MMOL/L (ref 136–145)
WBC # BLD AUTO: 7.21 K/UL (ref 3.9–12.7)

## 2024-04-04 PROCEDURE — 99499 UNLISTED E&M SERVICE: CPT | Mod: 25,S$GLB,, | Performed by: STUDENT IN AN ORGANIZED HEALTH CARE EDUCATION/TRAINING PROGRAM

## 2024-04-04 PROCEDURE — 80053 COMPREHEN METABOLIC PANEL: CPT | Performed by: PHYSICIAN ASSISTANT

## 2024-04-04 PROCEDURE — 99999 PR PBB SHADOW E&M-EST. PATIENT-LVL III: CPT | Mod: PBBFAC,,, | Performed by: STUDENT IN AN ORGANIZED HEALTH CARE EDUCATION/TRAINING PROGRAM

## 2024-04-04 PROCEDURE — 11042 DBRDMT SUBQ TIS 1ST 20SQCM/<: CPT | Mod: S$GLB,,, | Performed by: STUDENT IN AN ORGANIZED HEALTH CARE EDUCATION/TRAINING PROGRAM

## 2024-04-04 PROCEDURE — 86140 C-REACTIVE PROTEIN: CPT | Performed by: PHYSICIAN ASSISTANT

## 2024-04-04 PROCEDURE — 36415 COLL VENOUS BLD VENIPUNCTURE: CPT | Performed by: PHYSICIAN ASSISTANT

## 2024-04-04 PROCEDURE — 85025 COMPLETE CBC W/AUTO DIFF WBC: CPT | Performed by: PHYSICIAN ASSISTANT

## 2024-04-05 ENCOUNTER — TELEPHONE (OUTPATIENT)
Dept: INFECTIOUS DISEASES | Facility: CLINIC | Age: 74
End: 2024-04-05

## 2024-04-05 ENCOUNTER — DOCUMENT SCAN (OUTPATIENT)
Dept: HOME HEALTH SERVICES | Facility: HOSPITAL | Age: 74
End: 2024-04-05
Payer: MEDICARE

## 2024-04-05 NOTE — TELEPHONE ENCOUNTER
Called pt to f/u on setting up [facility], discussed she will have her granddaughter complete the e-precheck and especially the allow video and allow microphone section.     Also discussed and ok'd with Stephanie Galan PA-C that Joselin will see pt on 4/18/24 with Dr. Aguilera at 820 am if she can at all be here for that time. We have the 4/23/24 VV as back up.  Patient voiced understanding

## 2024-04-11 ENCOUNTER — LAB VISIT (OUTPATIENT)
Dept: LAB | Facility: HOSPITAL | Age: 74
End: 2024-04-11
Attending: PHYSICIAN ASSISTANT
Payer: MEDICARE

## 2024-04-11 DIAGNOSIS — L97.522 ULCER OF LEFT FOOT, WITH FAT LAYER EXPOSED: Primary | ICD-10-CM

## 2024-04-11 LAB
ALBUMIN SERPL BCP-MCNC: 3.5 G/DL (ref 3.5–5.2)
ALP SERPL-CCNC: 64 U/L (ref 55–135)
ALT SERPL W/O P-5'-P-CCNC: 7 U/L (ref 10–44)
ANION GAP SERPL CALC-SCNC: 14 MMOL/L (ref 8–16)
AST SERPL-CCNC: 11 U/L (ref 10–40)
BASOPHILS # BLD AUTO: 0.08 K/UL (ref 0–0.2)
BASOPHILS NFR BLD: 1.3 % (ref 0–1.9)
BILIRUB SERPL-MCNC: 0.5 MG/DL (ref 0.1–1)
BUN SERPL-MCNC: 6 MG/DL (ref 8–23)
CALCIUM SERPL-MCNC: 8.9 MG/DL (ref 8.7–10.5)
CHLORIDE SERPL-SCNC: 101 MMOL/L (ref 95–110)
CO2 SERPL-SCNC: 23 MMOL/L (ref 23–29)
CREAT SERPL-MCNC: 0.6 MG/DL (ref 0.5–1.4)
CRP SERPL-MCNC: 0.9 MG/L (ref 0–8.2)
DIFFERENTIAL METHOD BLD: ABNORMAL
EOSINOPHIL # BLD AUTO: 0.6 K/UL (ref 0–0.5)
EOSINOPHIL NFR BLD: 8.9 % (ref 0–8)
ERYTHROCYTE [DISTWIDTH] IN BLOOD BY AUTOMATED COUNT: 13.3 % (ref 11.5–14.5)
EST. GFR  (NO RACE VARIABLE): >60 ML/MIN/1.73 M^2
GLUCOSE SERPL-MCNC: 277 MG/DL (ref 70–110)
HCT VFR BLD AUTO: 40.3 % (ref 37–48.5)
HGB BLD-MCNC: 13.6 G/DL (ref 12–16)
IMM GRANULOCYTES # BLD AUTO: 0.01 K/UL (ref 0–0.04)
IMM GRANULOCYTES NFR BLD AUTO: 0.2 % (ref 0–0.5)
LYMPHOCYTES # BLD AUTO: 2.8 K/UL (ref 1–4.8)
LYMPHOCYTES NFR BLD: 43.7 % (ref 18–48)
MCH RBC QN AUTO: 27.9 PG (ref 27–31)
MCHC RBC AUTO-ENTMCNC: 33.7 G/DL (ref 32–36)
MCV RBC AUTO: 83 FL (ref 82–98)
MONOCYTES # BLD AUTO: 0.3 K/UL (ref 0.3–1)
MONOCYTES NFR BLD: 5.4 % (ref 4–15)
NEUTROPHILS # BLD AUTO: 2.6 K/UL (ref 1.8–7.7)
NEUTROPHILS NFR BLD: 40.5 % (ref 38–73)
NRBC BLD-RTO: 0 /100 WBC
PLATELET # BLD AUTO: 308 K/UL (ref 150–450)
PMV BLD AUTO: 10.3 FL (ref 9.2–12.9)
POTASSIUM SERPL-SCNC: 3.3 MMOL/L (ref 3.5–5.1)
PROT SERPL-MCNC: 6.5 G/DL (ref 6–8.4)
RBC # BLD AUTO: 4.87 M/UL (ref 4–5.4)
SODIUM SERPL-SCNC: 138 MMOL/L (ref 136–145)
WBC # BLD AUTO: 6.32 K/UL (ref 3.9–12.7)

## 2024-04-11 PROCEDURE — 80053 COMPREHEN METABOLIC PANEL: CPT | Performed by: PHYSICIAN ASSISTANT

## 2024-04-11 PROCEDURE — 86140 C-REACTIVE PROTEIN: CPT | Performed by: PHYSICIAN ASSISTANT

## 2024-04-11 PROCEDURE — 36415 COLL VENOUS BLD VENIPUNCTURE: CPT | Performed by: PHYSICIAN ASSISTANT

## 2024-04-11 PROCEDURE — 85025 COMPLETE CBC W/AUTO DIFF WBC: CPT | Performed by: PHYSICIAN ASSISTANT

## 2024-04-17 ENCOUNTER — PATIENT OUTREACH (OUTPATIENT)
Dept: ADMINISTRATIVE | Facility: HOSPITAL | Age: 74
End: 2024-04-17
Payer: MEDICARE

## 2024-04-17 NOTE — PROGRESS NOTES
"Patient ID: Hoa Wilde is a 74 y.o. female.    Subjective:           Chief Complaint: Wound Infection      HPI    73yo female with a PMH of T2DM, HLD, CAD, HTN, hypothyroidism, and diabetic neuropathy referred to Infectious Diseases for osteomyelitis of the left foot.    - developed ulcer to the lateral left foot early 2024  - saw Podiatry who put her on 1 week of augmentin without improvement. Cultures at the time grew Prevotella  - XRY showed ulcer with likely acute osteo of the base of the 5th proximal phalanx and 5th MTH  - 2/27/24 Bone biopsy cultures: Strep anginosus, Prevotella  - Path: Chronic osteo  - abx changed from augmentin to Bactrim at last Podiatry visit, and she was referred to Infectious Diseases  - arterial US without significant stenosis  - 3/7/24 Patient seen in Podiatry clinic alongside Dr. Aguilera as new patient. They report her wound is improved in appearance since switching abx to Bactrim which is odd since this would not cover Strep anginosus or Prevotella. Started on PO Metronidazole and IV Ceftriaxone and advised to d/c Bactrim  - 3/13/24 first date IV CTX, 6 week EOC 4/24/24    Interval HPI:  4/18/24  - follow up visit, seen in Podiatry clinic  - wounds have continued to heal, now just requiring mepilex dressing. Not completely closed, but close  - she has remained compliant with IV Ceftriaxone and PO Metronidazole. Reports overall tolerating, but does have occasional nausea. Also reporting new "burning" sensation in her stomach in the last couple of days, relieved with Pepto Bismol. No specific pattern identified.  - denies fevers, chills, night sweats.    Past Medical History:   Diagnosis Date    Coronary artery disease     Diabetes mellitus, type 2     Hyperlipidemia     Hypertension     Hypothyroidism     Neuropathy        Past Surgical History:   Procedure Laterality Date    ANGIOGRAPHY OF LOWER EXTREMITY Left 12/16/2020    Procedure: ANGIOGRAM, LOWER EXTREMITY;  Surgeon: Juancho RUSHING" MD Cindy;  Location: [facility] CATH/EP LAB;  Service: General;  Laterality: Left;    CHOLECYSTECTOMY      CORONARY ARTERY BYPASS GRAFT      FOOT SURGERY Left     HYSTERECTOMY      PERIPHERALLY INSERTED CENTRAL CATHETER INSERTION N/A 3/13/2024    Procedure: INSERTION, PICC;  Surgeon: PICALEISHA, NIKKO;  Location: [facility] ENDO;  Service: Cardiology;  Laterality: N/A;  Dr Mendez       Review of patient's allergies indicates:  No Known Allergies    Family History   Problem Relation Name Age of Onset    Hyperlipidemia Mother      Heart disease Mother         Social History     Socioeconomic History    Marital status:    Occupational History    Occupation: retired    Tobacco Use    Smoking status: Never    Smokeless tobacco: Never   Substance and Sexual Activity    Alcohol use: No    Drug use: No     Social Determinants of Health     Stress: No Stress Concern Present (12/21/2020)    [employer] - Occupational Stress Questionnaire     Feeling of Stress : Only a little       Current Outpatient Medications   Medication Instructions    alendronate (FOSAMAX) 70 MG tablet Take 1 tablet by mouth once a week    aspirin (ECOTRIN) 81 mg, Oral, Daily    betamethasone valerate (LUXIQ) 0.12 % foam Topical (Top), 2 times daily    blood-glucose meter kit To check BG 2 times daily, to use with insurance preferred meter    blood-glucose meter kit Use as instructed    calcium carbonate (OS-GEORGINA) 500 mg, Oral, 2 times daily    EUTHYROX 50 mcg tablet TAKE 1 TABLET BY MOUTH BEFORE BREAKFAST    famotidine (PEPCID) 20 MG tablet Take 1 tablet by mouth once daily    fluconazole (DIFLUCAN) 150 MG Tab TAKE 1 TABLET BY MOUTH ONCE DAILY FOR 1 DAY    gabapentin (NEURONTIN) 300 MG capsule TAKE 1 CAPSULE BY MOUTH ONCE DAILY IN THE MORNING AND 2 ONCE DAILY AT NIGHT    glipizide-metformin (METAGLIP) 5-500 mg per tablet 1 tablet, Oral, 2 times daily before meals, Take with breakfast and dinner    hydrOXYzine HCL (ATARAX) 25 MG  "tablet TAKE 1 TABLET BY MOUTH THREE TIMES DAILY AS NEEDED FOR ITCHING    LEVEMIR FLEXPEN 70 Units, Subcutaneous, Nightly    lisinopriL (PRINIVIL,ZESTRIL) 5 MG tablet Take 1 tablet by mouth once daily    meloxicam (MOBIC) 15 mg, Oral    metoprolol tartrate (LOPRESSOR) 25 MG tablet Take 1 tablet by mouth twice daily    metroNIDAZOLE (FLAGYL) 500 mg, Oral, 3 times daily    ONETOUCH DELICA PLUS LANCET 33 gauge Misc USE   TO CHECK GLUCOSE TWICE DAILY    ONETOUCH ULTRA TEST Strp USE  STRIP TO CHECK GLUCOSE TWICE DAILY    OZEMPIC 1 mg, Subcutaneous, Every 7 days    pen needle, diabetic 30 gauge x 1/3" Ndle 1 pen , Misc.(Non-Drug; Combo Route), Daily, To use with levimir flex pen    rosuvastatin (CRESTOR) 20 MG tablet Take 1 tablet by mouth once daily    sertraline (ZOLOFT) 50 MG tablet Take 1 tablet by mouth once daily         Review of Systems   Constitutional:  Negative for chills, diaphoresis, fatigue and fever.   HENT:  Negative for congestion and sore throat.    Respiratory:  Negative for cough and shortness of breath.    Cardiovascular:  Negative for chest pain, palpitations and leg swelling.   Gastrointestinal:  Negative for abdominal pain, diarrhea, nausea and vomiting.   Genitourinary:  Negative for dysuria, flank pain, hematuria and urgency.   Musculoskeletal:  Negative for joint swelling, myalgias, neck pain and neck stiffness.   Skin:  Positive for color change and wound. Negative for rash.   Allergic/Immunologic: Negative for immunocompromised state.   Neurological:  Negative for dizziness, weakness, numbness and headaches.   Psychiatric/Behavioral:  Negative for confusion.            Objective:     There were no vitals filed for this visit.    There is no height or weight on file to calculate BMI.         Physical Exam  Vitals and nursing note reviewed.   Constitutional:       General: She is awake. She is not in acute distress.     Appearance: Normal appearance. She is well-developed. She is not diaphoretic. "   HENT:      Head: Normocephalic and atraumatic.      Right Ear: External ear normal.      Left Ear: External ear normal.      Nose: Nose normal.   Eyes:      General: No scleral icterus.     Conjunctiva/sclera: Conjunctivae normal.      Pupils: Pupils are equal, round, and reactive to light.   Cardiovascular:      Rate and Rhythm: Normal rate and regular rhythm.   Pulmonary:      Effort: Pulmonary effort is normal. No accessory muscle usage or respiratory distress.   Abdominal:      General: There is no distension.   Musculoskeletal:      Cervical back: Normal range of motion and neck supple.   Feet:      Left foot:      Skin integrity: Ulcer, skin breakdown and dry skin present. No erythema or warmth.      Toenail Condition: Left toenails are abnormally thick. Fungal disease present.     Comments: Dry, cracking skin to foot  Skin:     General: Skin is warm and dry.      Coloration: Skin is not jaundiced.      Comments: PICC line RUE   Neurological:      General: No focal deficit present.      Mental Status: She is alert and oriented to person, place, and time.   Psychiatric:         Attention and Perception: Attention normal.         Mood and Affect: Mood normal.         Speech: Speech normal.         Behavior: Behavior normal.         Clinic image 3/21/24      Clinic image 3/7/24      Assessment:           Diagnoses and all orders for this visit:    Other acute osteomyelitis of left foot    Ulcer of left foot with fat layer exposed    Type 2 diabetes mellitus with foot ulcer, with long-term current use of insulin    Receiving intravenous antibiotic treatment as outpatient    Nausea  -     ondansetron (ZOFRAN-ODT) 4 MG TbDL; Take 1 tablet (4 mg total) by mouth every 6 (six) hours as needed (nausea).         Plan:     - left foot wound continues to improve. One hole is closed, other is almost closed  - labs reviewed and are WNL except for elevated BG and slightly low potassium. Advised to f/u with PCP  - Zofran  sent in for PRN nausea  - unclear if GI symptoms are related to abx. Discussed changing her regimen but we ultimately decided she would just complete her current course and see if her symptoms resolve after discontinuing, and then she has f/u with PCP the week after to discuss if not resolved.  - cont Ceftriaxone 2g IV q24h + Metronidazole 500mg po TID to complete her 6 weeks (EOC 4/24/24)  - home health can remove PICC line and d/c IV abx/labs thereafter  - she should continue to f/u with Podiatry as instructed  - may f/u with ID as needed  - note that Pathology describes chronic osteomyelitis. If the above does not resolve her infection, then surgery will need to be entertained      Discussed with Podiatry, Dr. Aguilera     Greater than 30 minutes was spent on this encounter, which included: review of recent encounters, review and interpretation of labs/images, obtaining pertinent history, performing a physical examination, counseling and educating the patient/family/caregiver, ordering medications/tests, documenting in the electronic health record, and coordinating care with necessary providers.    CASE LevyC  Infectious Diseases  [facility]

## 2024-04-17 NOTE — PROGRESS NOTES
Population Health Chart Review & Patient Outreach Details      Additional [facility] Health Notes:               Updates Requested / Reviewed:      Updated Care Coordination Note, Care Everywhere, , External Sources: [facility], [facility], and [facility], and Immunizations Reconciliation Completed or Queried: [facility] Topics Overdue:      VBHM Score: 6     Colon Cancer Screening  Osteoporosis Screening  Urine Screening  Eye Exam  Lipid Panel  Mammogram    Influenza Vaccine  Shingles/Zoster Vaccine  RSV Vaccine                  Health Maintenance Topic(s) Outreach Outcomes & Actions Taken:    Colorectal Cancer Screening - Outreach Outcomes & Actions Taken  : The patient was phoned and left a message.    Eye Exam - Outreach Outcomes & Actions Taken  : Pt Will Schedule with External Provider / Order Routed & Care Team Updated if Applicable    Breast Cancer Screening - Outreach Outcomes & Actions Taken  : Mammogram Order Placed    Osteoporosis Screening - Outreach Outcomes & Actions Taken  : Dexa Order Placed    Lab(s) - Outreach Outcomes & Actions Taken  : Overdue Lab(s) Ordered

## 2024-04-18 ENCOUNTER — OFFICE VISIT (OUTPATIENT)
Dept: PODIATRY | Facility: CLINIC | Age: 74
End: 2024-04-18
Payer: MEDICARE

## 2024-04-18 ENCOUNTER — LAB VISIT (OUTPATIENT)
Dept: LAB | Facility: HOSPITAL | Age: 74
End: 2024-04-18
Attending: PHYSICIAN ASSISTANT
Payer: MEDICARE

## 2024-04-18 ENCOUNTER — OFFICE VISIT (OUTPATIENT)
Dept: INFECTIOUS DISEASES | Facility: CLINIC | Age: 74
End: 2024-04-18
Payer: MEDICARE

## 2024-04-18 VITALS — HEIGHT: 60 IN | BODY MASS INDEX: 28.09 KG/M2 | WEIGHT: 143.06 LBS

## 2024-04-18 DIAGNOSIS — Z79.4 TYPE 2 DIABETES MELLITUS WITH FOOT ULCER, WITH LONG-TERM CURRENT USE OF INSULIN: ICD-10-CM

## 2024-04-18 DIAGNOSIS — E11.621 TYPE 2 DIABETES MELLITUS WITH FOOT ULCER, WITH LONG-TERM CURRENT USE OF INSULIN: ICD-10-CM

## 2024-04-18 DIAGNOSIS — L97.522 ULCER OF LEFT FOOT WITH FAT LAYER EXPOSED: Primary | ICD-10-CM

## 2024-04-18 DIAGNOSIS — M86.172 OTHER ACUTE OSTEOMYELITIS OF LEFT FOOT: Primary | ICD-10-CM

## 2024-04-18 DIAGNOSIS — L97.509 TYPE 2 DIABETES MELLITUS WITH FOOT ULCER, WITH LONG-TERM CURRENT USE OF INSULIN: ICD-10-CM

## 2024-04-18 DIAGNOSIS — L97.522 ULCER OF LEFT FOOT, WITH FAT LAYER EXPOSED: Primary | ICD-10-CM

## 2024-04-18 DIAGNOSIS — Z79.2 RECEIVING INTRAVENOUS ANTIBIOTIC TREATMENT AS OUTPATIENT: ICD-10-CM

## 2024-04-18 DIAGNOSIS — L97.522 ULCER OF LEFT FOOT WITH FAT LAYER EXPOSED: ICD-10-CM

## 2024-04-18 DIAGNOSIS — R11.0 NAUSEA: ICD-10-CM

## 2024-04-18 LAB
ALBUMIN SERPL BCP-MCNC: 3.6 G/DL (ref 3.5–5.2)
ALP SERPL-CCNC: 64 U/L (ref 55–135)
ALT SERPL W/O P-5'-P-CCNC: 10 U/L (ref 10–44)
ANION GAP SERPL CALC-SCNC: 12 MMOL/L (ref 8–16)
AST SERPL-CCNC: 10 U/L (ref 10–40)
BASOPHILS # BLD AUTO: 0.06 K/UL (ref 0–0.2)
BASOPHILS NFR BLD: 0.9 % (ref 0–1.9)
BILIRUB SERPL-MCNC: 0.5 MG/DL (ref 0.1–1)
BUN SERPL-MCNC: 6 MG/DL (ref 8–23)
CALCIUM SERPL-MCNC: 8.8 MG/DL (ref 8.7–10.5)
CHLORIDE SERPL-SCNC: 99 MMOL/L (ref 95–110)
CO2 SERPL-SCNC: 24 MMOL/L (ref 23–29)
CREAT SERPL-MCNC: 0.7 MG/DL (ref 0.5–1.4)
CRP SERPL-MCNC: 0.5 MG/L (ref 0–8.2)
DIFFERENTIAL METHOD BLD: NORMAL
EOSINOPHIL # BLD AUTO: 0.5 K/UL (ref 0–0.5)
EOSINOPHIL NFR BLD: 8 % (ref 0–8)
ERYTHROCYTE [DISTWIDTH] IN BLOOD BY AUTOMATED COUNT: 13.5 % (ref 11.5–14.5)
EST. GFR  (NO RACE VARIABLE): >60 ML/MIN/1.73 M^2
GLUCOSE SERPL-MCNC: 400 MG/DL (ref 70–110)
HCT VFR BLD AUTO: 38.7 % (ref 37–48.5)
HGB BLD-MCNC: 13.3 G/DL (ref 12–16)
IMM GRANULOCYTES # BLD AUTO: 0.01 K/UL (ref 0–0.04)
IMM GRANULOCYTES NFR BLD AUTO: 0.2 % (ref 0–0.5)
LYMPHOCYTES # BLD AUTO: 3 K/UL (ref 1–4.8)
LYMPHOCYTES NFR BLD: 45 % (ref 18–48)
MCH RBC QN AUTO: 28.5 PG (ref 27–31)
MCHC RBC AUTO-ENTMCNC: 34.4 G/DL (ref 32–36)
MCV RBC AUTO: 83 FL (ref 82–98)
MONOCYTES # BLD AUTO: 0.4 K/UL (ref 0.3–1)
MONOCYTES NFR BLD: 5.9 % (ref 4–15)
NEUTROPHILS # BLD AUTO: 2.7 K/UL (ref 1.8–7.7)
NEUTROPHILS NFR BLD: 40 % (ref 38–73)
NRBC BLD-RTO: 0 /100 WBC
PLATELET # BLD AUTO: 285 K/UL (ref 150–450)
PMV BLD AUTO: 10.1 FL (ref 9.2–12.9)
POTASSIUM SERPL-SCNC: 3.3 MMOL/L (ref 3.5–5.1)
PROT SERPL-MCNC: 6.5 G/DL (ref 6–8.4)
RBC # BLD AUTO: 4.67 M/UL (ref 4–5.4)
SODIUM SERPL-SCNC: 135 MMOL/L (ref 136–145)
WBC # BLD AUTO: 6.64 K/UL (ref 3.9–12.7)

## 2024-04-18 PROCEDURE — 99499 UNLISTED E&M SERVICE: CPT | Mod: 25,S$GLB,, | Performed by: STUDENT IN AN ORGANIZED HEALTH CARE EDUCATION/TRAINING PROGRAM

## 2024-04-18 PROCEDURE — 80053 COMPREHEN METABOLIC PANEL: CPT | Performed by: PHYSICIAN ASSISTANT

## 2024-04-18 PROCEDURE — 3046F HEMOGLOBIN A1C LEVEL >9.0%: CPT | Mod: CPTII,S$GLB,, | Performed by: PHYSICIAN ASSISTANT

## 2024-04-18 PROCEDURE — 86140 C-REACTIVE PROTEIN: CPT | Performed by: PHYSICIAN ASSISTANT

## 2024-04-18 PROCEDURE — 1159F MED LIST DOCD IN RCRD: CPT | Mod: CPTII,S$GLB,, | Performed by: PHYSICIAN ASSISTANT

## 2024-04-18 PROCEDURE — 99214 OFFICE O/P EST MOD 30 MIN: CPT | Mod: S$GLB,,, | Performed by: PHYSICIAN ASSISTANT

## 2024-04-18 PROCEDURE — 1160F RVW MEDS BY RX/DR IN RCRD: CPT | Mod: CPTII,S$GLB,, | Performed by: PHYSICIAN ASSISTANT

## 2024-04-18 PROCEDURE — 11042 DBRDMT SUBQ TIS 1ST 20SQCM/<: CPT | Mod: S$GLB,,, | Performed by: STUDENT IN AN ORGANIZED HEALTH CARE EDUCATION/TRAINING PROGRAM

## 2024-04-18 PROCEDURE — 36415 COLL VENOUS BLD VENIPUNCTURE: CPT | Performed by: PHYSICIAN ASSISTANT

## 2024-04-18 PROCEDURE — 99999 PR PBB SHADOW E&M-EST. PATIENT-LVL III: CPT | Mod: PBBFAC,,, | Performed by: PHYSICIAN ASSISTANT

## 2024-04-18 PROCEDURE — 99999 PR PBB SHADOW E&M-EST. PATIENT-LVL III: CPT | Mod: PBBFAC,,, | Performed by: STUDENT IN AN ORGANIZED HEALTH CARE EDUCATION/TRAINING PROGRAM

## 2024-04-18 PROCEDURE — 85025 COMPLETE CBC W/AUTO DIFF WBC: CPT | Performed by: PHYSICIAN ASSISTANT

## 2024-04-18 RX ORDER — ONDANSETRON 4 MG/1
4 TABLET, ORALLY DISINTEGRATING ORAL EVERY 6 HOURS PRN
Qty: 20 TABLET | Refills: 0 | Status: SHIPPED | OUTPATIENT
Start: 2024-04-18

## 2024-04-18 NOTE — PROGRESS NOTES
Subjective:      Patient ID: Hoa Wilde is a 74 y.o. female.    Chief Complaint: Wound Care    Is Hoa is new to us here at [facility].  She has a history of left foot ulcerations and presents with 1 today.  She notes a new wound that presented about 2 weeks ago.  She denies any trauma to the area that she can recall.  She has another area of the bottom of her foot that is concerning for wound.  He denies any fever, nausea, vomiting or chills.  She admits that she has little feeling in her feet.    2/27/24: Ms. Wilde is doing okay. Has some L foot pain today. She last had x-rays showing erosions of the 5th met head and base of the proximal phalanx. She completed a 1 week course of augmentin that doesn't appear to have helped.     3/7/24:  F/u for L foot wound. She is doing much better than last week. She tolerated the antibiotics well. She has + chronic OM in the pathology and + bacteria in the cultures of the left 5th metatarsal taken last week.     3/21/24:   Ms. Camara returns for L foot wound. Doing well and has no complaints of pain.     4/18/24: Doing well     Review of Systems   Skin:         Ulcer on left foot   Neurological:  Positive for numbness and paresthesias.   All other systems reviewed and are negative.          Objective:      Physical Exam  Cardiovascular:      Pulses:           Posterior tibial pulses are 1+ on the left side.   Feet:      Comments: Left foot:   Ulcer 1.  At the lateral aspect of the 5th MTPJ.  Measures 0.2 x 0.2 cm with 0.2 mm of depth.  Erythema and edema are resolved.  There is no fluctuance or crepitus or purulent drainage appreciated.  There is no probe to bone.    Ulcer 2 is fully epithelialized.               Assessment:       Encounter Diagnoses   Name Primary?    Ulcer of left foot with fat layer exposed Yes    Type 2 diabetes mellitus with foot ulcer, with long-term current use of insulin              Plan:       Hoa was seen today for wound care.    Diagnoses and  all orders for this visit:    Ulcer of left foot with fat layer exposed  -     SUBSEQUENT HOME HEALTH ORDERS    Type 2 diabetes mellitus with foot ulcer, with long-term current use of insulin  -     SUBSEQUENT HOME HEALTH ORDERS    Other orders  -     Wound Debridement          I counseled the patient on her conditions, their implications and medical management.    Ms. Wilde was evaluated today with ID. Continue abx regiment.     F/u 2-3 weeks due to distance away.    [facility]   Please change dressing M and Th: -Cleanse wounds with sterile saline or wound cleanser. -Pat Dry. -Apply medihoney to wounds on the L foot. -Cover mepilex border. Thank you.     Vamsi Aguilera DPM    Wound Debridement    Date/Time: 4/18/2024 8:20 AM    Performed by: Vamsi Aguilera DPM  Authorized by: Vamsi Aguilera DPM    Consent Done?:  Yes (Verbal)    Wound Details:    Location:  Left foot    Location:  Left 5th Metatarsal Head    Type of Debridement:  Excisional       Length (cm):  0.2       Area (sq cm):  0.04       Width (cm):  0.2       Percent Debrided (%):  100       Depth (cm):  0.2       Total Area Debrided (sq cm):  0.04    Depth of debridement:  Subcutaneous tissue    Tissue debrided:  Epidermis and Dermis    Devitalized tissue debrided:  Slough    Instruments:  Blade  Bleeding:  None  Patient tolerance:  Patient tolerated the procedure well with no immediate complications  1st Wound Pain Assessment: 0

## 2024-04-19 ENCOUNTER — PATIENT MESSAGE (OUTPATIENT)
Dept: ADMINISTRATIVE | Facility: HOSPITAL | Age: 74
End: 2024-04-19
Payer: MEDICARE

## 2024-04-19 DIAGNOSIS — E87.6 HYPOKALEMIA: Primary | ICD-10-CM

## 2024-04-19 PROCEDURE — G0179 MD RECERTIFICATION HHA PT: HCPCS | Mod: ,,, | Performed by: STUDENT IN AN ORGANIZED HEALTH CARE EDUCATION/TRAINING PROGRAM

## 2024-04-19 RX ORDER — POTASSIUM CHLORIDE 750 MG/1
10 TABLET, EXTENDED RELEASE ORAL DAILY
Qty: 30 TABLET | Refills: 2 | Status: SHIPPED | OUTPATIENT
Start: 2024-04-19 | End: 2024-07-18

## 2024-04-19 NOTE — PROGRESS NOTES
Low sodium and potassium secondary to uncontrolled diabetes.  Will start potassium replacement and patient needs to return to clinic with her blood sugar diary.

## 2024-04-24 ENCOUNTER — TELEPHONE (OUTPATIENT)
Dept: FAMILY MEDICINE | Facility: CLINIC | Age: 74
End: 2024-04-24
Payer: MEDICARE

## 2024-04-24 ENCOUNTER — TELEPHONE (OUTPATIENT)
Dept: INFECTIOUS DISEASES | Facility: CLINIC | Age: 74
End: 2024-04-24
Payer: MEDICARE

## 2024-04-24 NOTE — TELEPHONE ENCOUNTER
----- Message from Carlos Pope MD sent at 4/19/2024  9:05 AM CDT -----  Low sodium and potassium secondary to uncontrolled diabetes.  Will start potassium replacement and patient needs to return to clinic with her blood sugar diary.

## 2024-04-24 NOTE — TELEPHONE ENCOUNTER
Per Joselin Galan PA-C - EOC today 4/24/24, okay for HH to dc PICC line after last dose of IV abx, d/c IV abx, PICC care, and labs.    Called [facility], spoke with Filomena in pharmacy, gave above orders.  Called [facility], spoke with nurse Romero, gave above orders.    F/u with ID PRN

## 2024-05-06 ENCOUNTER — EXTERNAL HOME HEALTH (OUTPATIENT)
Dept: HOME HEALTH SERVICES | Facility: HOSPITAL | Age: 74
End: 2024-05-06
Payer: MEDICARE

## 2024-05-07 ENCOUNTER — DOCUMENT SCAN (OUTPATIENT)
Dept: HOME HEALTH SERVICES | Facility: HOSPITAL | Age: 74
End: 2024-05-07
Payer: MEDICARE

## 2024-05-09 ENCOUNTER — DOCUMENT SCAN (OUTPATIENT)
Dept: HOME HEALTH SERVICES | Facility: HOSPITAL | Age: 74
End: 2024-05-09
Payer: MEDICARE

## 2024-05-09 ENCOUNTER — OFFICE VISIT (OUTPATIENT)
Dept: PODIATRY | Facility: CLINIC | Age: 74
End: 2024-05-09
Payer: MEDICARE

## 2024-05-09 DIAGNOSIS — L97.509 TYPE 2 DIABETES MELLITUS WITH FOOT ULCER, WITH LONG-TERM CURRENT USE OF INSULIN: ICD-10-CM

## 2024-05-09 DIAGNOSIS — E11.621 TYPE 2 DIABETES MELLITUS WITH FOOT ULCER, WITH LONG-TERM CURRENT USE OF INSULIN: ICD-10-CM

## 2024-05-09 DIAGNOSIS — Z87.828 HEALED WOUND: ICD-10-CM

## 2024-05-09 DIAGNOSIS — E11.42 TYPE 2 DIABETES MELLITUS WITH DIABETIC POLYNEUROPATHY, WITH LONG-TERM CURRENT USE OF INSULIN: Primary | ICD-10-CM

## 2024-05-09 DIAGNOSIS — Z79.4 TYPE 2 DIABETES MELLITUS WITH DIABETIC POLYNEUROPATHY, WITH LONG-TERM CURRENT USE OF INSULIN: Primary | ICD-10-CM

## 2024-05-09 DIAGNOSIS — Z79.4 TYPE 2 DIABETES MELLITUS WITH FOOT ULCER, WITH LONG-TERM CURRENT USE OF INSULIN: ICD-10-CM

## 2024-05-09 PROCEDURE — 3046F HEMOGLOBIN A1C LEVEL >9.0%: CPT | Mod: CPTII,S$GLB,, | Performed by: STUDENT IN AN ORGANIZED HEALTH CARE EDUCATION/TRAINING PROGRAM

## 2024-05-09 PROCEDURE — 99999 PR PBB SHADOW E&M-EST. PATIENT-LVL I: CPT | Mod: PBBFAC,,, | Performed by: STUDENT IN AN ORGANIZED HEALTH CARE EDUCATION/TRAINING PROGRAM

## 2024-05-09 PROCEDURE — 99213 OFFICE O/P EST LOW 20 MIN: CPT | Mod: S$GLB,,, | Performed by: STUDENT IN AN ORGANIZED HEALTH CARE EDUCATION/TRAINING PROGRAM

## 2024-05-09 PROCEDURE — 1159F MED LIST DOCD IN RCRD: CPT | Mod: CPTII,S$GLB,, | Performed by: STUDENT IN AN ORGANIZED HEALTH CARE EDUCATION/TRAINING PROGRAM

## 2024-05-09 PROCEDURE — 1160F RVW MEDS BY RX/DR IN RCRD: CPT | Mod: CPTII,S$GLB,, | Performed by: STUDENT IN AN ORGANIZED HEALTH CARE EDUCATION/TRAINING PROGRAM

## 2024-05-09 NOTE — PROGRESS NOTES
Subjective:      Patient ID: Hoa Wilde is a 74 y.o. female.    Chief Complaint: No chief complaint on file.    Is Hoa is new to us here at [facility].  She has a history of left foot ulcerations and presents with 1 today.  She notes a new wound that presented about 2 weeks ago.  She denies any trauma to the area that she can recall.  She has another area of the bottom of her foot that is concerning for wound.  He denies any fever, nausea, vomiting or chills.  She admits that she has little feeling in her feet.    2/27/24: Ms. Wilde is doing okay. Has some L foot pain today. She last had x-rays showing erosions of the 5th met head and base of the proximal phalanx. She completed a 1 week course of augmentin that doesn't appear to have helped.     3/7/24:  F/u for L foot wound. She is doing much better than last week. She tolerated the antibiotics well. She has + chronic OM in the pathology and + bacteria in the cultures of the left 5th metatarsal taken last week.     3/21/24:   Ms. Camara returns for L foot wound. Doing well and has no complaints of pain.     4/18/24: Doing well     5/9/24:  F/u for L foot wound    Review of Systems   Skin:         Ulcer on left foot   Neurological:  Positive for numbness and paresthesias.   All other systems reviewed and are negative.          Objective:      Physical Exam  Cardiovascular:      Pulses:           Posterior tibial pulses are 1+ on the left side.   Feet:      Comments: Left foot:   Ulcer 1.  At the lateral aspect of the 5th MTPJ.  Measures 0.2 x 0.2 cm with 0.2 mm of depth.  Erythema and edema are resolved.  There is no fluctuance or crepitus or purulent drainage appreciated.  There is no probe to bone.    Ulcer 2 is fully epithelialized.       5/9/24:  Full epithelization of the lateral foot wound        Assessment:       Encounter Diagnoses   Name Primary?    Type 2 diabetes mellitus with diabetic polyneuropathy, with long-term current use of insulin Yes    Type  2 diabetes mellitus with foot ulcer, with long-term current use of insulin     Healed wound              Plan:       Diagnoses and all orders for this visit:    Type 2 diabetes mellitus with diabetic polyneuropathy, with long-term current use of insulin  -     DIABETIC SHOES FOR HOME USE    Type 2 diabetes mellitus with foot ulcer, with long-term current use of insulin  -     DIABETIC SHOES FOR HOME USE    Healed wound  -     DIABETIC SHOES FOR HOME USE          I counseled the patient on her conditions, their implications and medical management.    Rx for DM shoes. Return to showering the foot. Creams and soaks can start in 1 week.    F/u 2 months for RFC.    Vamsi Aguilera DPM

## 2024-06-13 ENCOUNTER — OFFICE VISIT (OUTPATIENT)
Dept: PODIATRY | Facility: CLINIC | Age: 74
End: 2024-06-13
Payer: MEDICARE

## 2024-06-13 VITALS — WEIGHT: 143.06 LBS | BODY MASS INDEX: 28.09 KG/M2 | HEIGHT: 60 IN

## 2024-06-13 DIAGNOSIS — I73.9 PAD (PERIPHERAL ARTERY DISEASE): ICD-10-CM

## 2024-06-13 DIAGNOSIS — E11.42 TYPE 2 DIABETES MELLITUS WITH DIABETIC POLYNEUROPATHY, WITH LONG-TERM CURRENT USE OF INSULIN: Primary | ICD-10-CM

## 2024-06-13 DIAGNOSIS — L97.509 TYPE 2 DIABETES MELLITUS WITH FOOT ULCER, WITH LONG-TERM CURRENT USE OF INSULIN: ICD-10-CM

## 2024-06-13 DIAGNOSIS — L85.3 DRY SKIN: ICD-10-CM

## 2024-06-13 DIAGNOSIS — Z79.4 TYPE 2 DIABETES MELLITUS WITH DIABETIC POLYNEUROPATHY, WITH LONG-TERM CURRENT USE OF INSULIN: Primary | ICD-10-CM

## 2024-06-13 DIAGNOSIS — Z79.4 TYPE 2 DIABETES MELLITUS WITH FOOT ULCER, WITH LONG-TERM CURRENT USE OF INSULIN: ICD-10-CM

## 2024-06-13 DIAGNOSIS — E11.621 TYPE 2 DIABETES MELLITUS WITH FOOT ULCER, WITH LONG-TERM CURRENT USE OF INSULIN: ICD-10-CM

## 2024-06-13 PROCEDURE — 1101F PT FALLS ASSESS-DOCD LE1/YR: CPT | Mod: CPTII,S$GLB,, | Performed by: STUDENT IN AN ORGANIZED HEALTH CARE EDUCATION/TRAINING PROGRAM

## 2024-06-13 PROCEDURE — 99999 PR PBB SHADOW E&M-EST. PATIENT-LVL III: CPT | Mod: PBBFAC,,, | Performed by: STUDENT IN AN ORGANIZED HEALTH CARE EDUCATION/TRAINING PROGRAM

## 2024-06-13 PROCEDURE — 1160F RVW MEDS BY RX/DR IN RCRD: CPT | Mod: CPTII,S$GLB,, | Performed by: STUDENT IN AN ORGANIZED HEALTH CARE EDUCATION/TRAINING PROGRAM

## 2024-06-13 PROCEDURE — 11721 DEBRIDE NAIL 6 OR MORE: CPT | Mod: Q8,59,S$GLB, | Performed by: STUDENT IN AN ORGANIZED HEALTH CARE EDUCATION/TRAINING PROGRAM

## 2024-06-13 PROCEDURE — 99213 OFFICE O/P EST LOW 20 MIN: CPT | Mod: 25,S$GLB,, | Performed by: STUDENT IN AN ORGANIZED HEALTH CARE EDUCATION/TRAINING PROGRAM

## 2024-06-13 PROCEDURE — 3046F HEMOGLOBIN A1C LEVEL >9.0%: CPT | Mod: CPTII,S$GLB,, | Performed by: STUDENT IN AN ORGANIZED HEALTH CARE EDUCATION/TRAINING PROGRAM

## 2024-06-13 PROCEDURE — 3288F FALL RISK ASSESSMENT DOCD: CPT | Mod: CPTII,S$GLB,, | Performed by: STUDENT IN AN ORGANIZED HEALTH CARE EDUCATION/TRAINING PROGRAM

## 2024-06-13 PROCEDURE — 1159F MED LIST DOCD IN RCRD: CPT | Mod: CPTII,S$GLB,, | Performed by: STUDENT IN AN ORGANIZED HEALTH CARE EDUCATION/TRAINING PROGRAM

## 2024-06-13 PROCEDURE — 1126F AMNT PAIN NOTED NONE PRSNT: CPT | Mod: CPTII,S$GLB,, | Performed by: STUDENT IN AN ORGANIZED HEALTH CARE EDUCATION/TRAINING PROGRAM

## 2024-06-13 PROCEDURE — 11056 PARNG/CUTG B9 HYPRKR LES 2-4: CPT | Mod: Q8,S$GLB,, | Performed by: STUDENT IN AN ORGANIZED HEALTH CARE EDUCATION/TRAINING PROGRAM

## 2024-06-13 PROCEDURE — 3008F BODY MASS INDEX DOCD: CPT | Mod: CPTII,S$GLB,, | Performed by: STUDENT IN AN ORGANIZED HEALTH CARE EDUCATION/TRAINING PROGRAM

## 2024-06-13 RX ORDER — AMMONIUM LACTATE 12 G/100G
LOTION TOPICAL 2 TIMES DAILY
Qty: 396 G | Refills: 6 | Status: SHIPPED | OUTPATIENT
Start: 2024-06-13 | End: 2025-06-13

## 2024-06-13 NOTE — PROGRESS NOTES
Subjective:      Patient ID: Hoa Wilde is a 74 y.o. female.    Chief Complaint: Wound Care (2 week wound care,left foot, right foot-her doctor wants you to take a look at (previous wound))    Is Hoa is new to us here at [facility].  She has a history of left foot ulcerations and presents with 1 today.  She notes a new wound that presented about 2 weeks ago.  She denies any trauma to the area that she can recall.  She has another area of the bottom of her foot that is concerning for wound.  He denies any fever, nausea, vomiting or chills.  She admits that she has little feeling in her feet.    2/27/24: Ms. Wilde is doing okay. Has some L foot pain today. She last had x-rays showing erosions of the 5th met head and base of the proximal phalanx. She completed a 1 week course of augmentin that doesn't appear to have helped.     3/7/24:  F/u for L foot wound. She is doing much better than last week. She tolerated the antibiotics well. She has + chronic OM in the pathology and + bacteria in the cultures of the left 5th metatarsal taken last week.     3/21/24:   Ms. Camara returns for L foot wound. Doing well and has no complaints of pain.     4/18/24: Doing well     5/9/24:  F/u for L foot wound    6/13/24:  F/u for L foot wound.     Review of Systems   Skin:         Healed ulcer   Neurological:  Positive for numbness and paresthesias.   All other systems reviewed and are negative.          Objective:      Physical Exam  Cardiovascular:      Pulses:           Dorsalis pedis pulses are 1+ on the right side and 0 on the left side.        Posterior tibial pulses are 0 on the right side and 1+ on the left side.   Feet:      Right foot:      Toenail Condition: Right toenails are abnormally thick and long. Fungal disease present.     Left foot:      Toenail Condition: Left toenails are abnormally thick and long. Fungal disease present.     Comments: Left foot:   L foot ulcers are fully epithelialized. There is only  residual dried and scaling skin       Callus to b/l 5th MTPJ      Assessment:       Encounter Diagnoses   Name Primary?    Type 2 diabetes mellitus with diabetic polyneuropathy, with long-term current use of insulin Yes    Type 2 diabetes mellitus with foot ulcer, with long-term current use of insulin     Dry skin              Plan:       Hoa was seen today for wound care.    Diagnoses and all orders for this visit:    Type 2 diabetes mellitus with diabetic polyneuropathy, with long-term current use of insulin    Type 2 diabetes mellitus with foot ulcer, with long-term current use of insulin    Dry skin    Other orders  -     ammonium lactate (LAC-HYDRIN) 12 % lotion; Apply topically 2 (two) times daily.          I counseled the patient on her conditions, their implications and medical management.    Rx for DM shoes. Return to showering the foot. Creams and soaks can start in 1 week.    F/u 2 months for RFC.    Vamsi Aguilera DPM      Routine Foot Care    Date/Time: 6/13/2024 10:00 AM    Performed by: Vamsi Aguilera DPM  Authorized by: Vamsi Aguilera DPM    Consent Done?:  Yes (Verbal)  Hyperkeratotic Skin Lesions?: Yes    Number of trimmed lesions:  2  Location(s):  Left 5th Metatarsal Head and Right 5th Metatarsal Head    Nail Care Type:  Debride  Location(s): All  (Left 1st Toe, Left 3rd Toe, Left 2nd Toe, Left 4th Toe, Left 5th Toe, Right 1st Toe, Right 2nd Toe, Right 3rd Toe, Right 4th Toe and Right 5th Toe)  Patient tolerance:  Patient tolerated the procedure well with no immediate complications

## 2024-06-25 ENCOUNTER — TELEPHONE (OUTPATIENT)
Dept: PODIATRY | Facility: CLINIC | Age: 74
End: 2024-06-25
Payer: MEDICARE

## 2024-06-25 NOTE — TELEPHONE ENCOUNTER
----- Message from Kamillarossy Velásquez sent at 6/25/2024 12:29 PM CDT -----  Type: Needs Medical Advice  Who Called:  Marquita with [facility] Call Back Number: [phone]  Additional Information: Marquita is calling in regards to the diabetic paperwork that was requested. Marqiuta said that the paperwork was incorrect. Diabetic shoes paperwork needs two units for the shoes and six units for the custom inserts. Just needs the first page to be corrected and sent back. Please fax to [phone]. Please call back and advise. Thanks!

## 2024-07-26 ENCOUNTER — PATIENT MESSAGE (OUTPATIENT)
Dept: ADMINISTRATIVE | Facility: HOSPITAL | Age: 74
End: 2024-07-26
Payer: MEDICARE

## 2024-08-02 ENCOUNTER — PATIENT MESSAGE (OUTPATIENT)
Dept: ADMINISTRATIVE | Facility: HOSPITAL | Age: 74
End: 2024-08-02
Payer: MEDICARE

## 2024-08-12 ENCOUNTER — PATIENT MESSAGE (OUTPATIENT)
Dept: ADMINISTRATIVE | Facility: HOSPITAL | Age: 74
End: 2024-08-12
Payer: MEDICARE

## 2024-10-01 ENCOUNTER — PATIENT MESSAGE (OUTPATIENT)
Dept: ADMINISTRATIVE | Facility: HOSPITAL | Age: 74
End: 2024-10-01
Payer: MEDICARE

## 2024-10-10 ENCOUNTER — PATIENT OUTREACH (OUTPATIENT)
Dept: ADMINISTRATIVE | Facility: HOSPITAL | Age: 74
End: 2024-10-10
Payer: MEDICARE

## 2024-10-10 NOTE — PROGRESS NOTES
Population Health Chart Review & Patient Outreach Details      Additional [facility] Health Notes:      The patient was phoned and was left a message.         Updates Requested / Reviewed:      Updated Care Coordination Note, Care Everywhere, , Care Team Updated, and Immunizations Reconciliation Completed or Queried: [facility] Topics Overdue:      VBHM Score: 7     Colon Cancer Screening  Osteoporosis Screening  Urine Screening  Eye Exam  Hemoglobin A1c  Lipid Panel  Mammogram    Influenza Vaccine  Shingles/Zoster Vaccine  RSV Vaccine                  Health Maintenance Topic(s) Outreach Outcomes & Actions Taken:    Eye Exam - Outreach Outcomes & Actions Taken  : External Records Requested & Care Team Updated if Applicable    Colorectal Cancer Screening - Outreach Outcomes & Actions Taken  : Pt Will Schedule with External Provider / Order Routed & Care Team Updated if Applicable    Osteoporosis Screening - Outreach Outcomes & Actions Taken  : Dexa Order Placed    Lab(s) - Outreach Outcomes & Actions Taken  : Overdue Lab(s) Ordered    Breast Cancer Screening - Outreach Outcomes & Actions Taken  : Mammogram Order Placed

## 2024-10-10 NOTE — LETTER
AUTHORIZATION FOR RELEASE OF   CONFIDENTIAL INFORMATION    [facility] ,    We are seeing Magdiel Wilde, date of birth 1950, in the clinic at [facility]. Carlos Pope MD is the patient's PCP. Magdiel Wilde has an outstanding lab/procedure at the time we reviewed her chart. In order to help keep her health information updated, she has authorized us to request the following medical record(s):                                              ( x )  EYE EXAM            Please fax records to [facility], Duffour, Rory J., MD, [phone]     If you have any questions, please contact       Jie Conner  Nurse Clinical Care Coordinator  [facility]  Phone: [phone]  Fax: [phone]      Patient Name: Magdiel Wilde  : 1950  Patient Phone #: [phone]          Magdiel Wilde  MRN: [id number]  : 1950  Age: 74 y.o.  Sex: female         Patient/Legal Guardian Signature  This signature was collected at 2024    magdiel       _______________________________   Printed Name/Relationship to Patient      Consent for Examination and Treatment: I hereby authorize the providers and employees of [facility] ([facility]) to provide medical treatment/services which includes, but is not limited to, performing and administering tests and diagnostic procedures that are deemed necessary, including, but not limited to, imaging examinations, blood tests and other laboratory procedures as may be required by the hospital, clinic, or may be ordered by my physician(s) or persons working under the general and/or special instructions of my physician(s).      I understand and agree that this consent covers all authorized persons, including but not limited to physicians, residents, nurse practitioners, physicians' assistants, specialists, consultants, student nurses, and independently contracted physicians, who are called upon by the physician in  charge, to carry out the diagnostic procedures and medical or surgical treatment.     I hereby authorize [facility] to retain or dispose of any specimens or tissue, should there be such remaining from any test or procedure.     I hereby authorize and give consent for [facility] providers and employees to take photographs, images or videotapes of such diagnostic, surgical or treatment procedures of Patient as may be required by [facility] or as may be ordered by a physician. I further acknowledge and agree that [facility] may use cameras or other devices for patient monitoring.     I am aware that the practice of medicine is not an exact science, and I acknowledge that no guarantees have been made to me as to the outcome of any tests, procedures or treatment.     Authorization for Release of Information: I understand that my insurance company and/or their agents may need information necessary to make determinations about payment/reimbursement. I hereby provide authorization to release to all insurance companies, their successors, assignees, other parties with whom they may have contracted, or others acting on their behalf, that are involved with payment for any hospital and/or clinic charges incurred by the patient, any information that they request and deem necessary for payment/reimbursement, and/or quality review.  I further authorize the release of my health information to physicians or other health care practitioners on staff who are involved in my health care now and in the future, and to other health care providers, entities, or institutions for the purpose of my continued care and treatment, including referrals.     REGISTRATION AUTHORIZATION  Form No. 44759 (Rev. 7/13/2022)       Medicare Patient's Certification and Authorization to Release Information and Payment Request:  I certify that the information given by me in applying for payment under Title XVIII of the Social Security Act is correct. I authorize any gilbert  of medical or other information about me to release to the [employer] [facility], or its intermediaries or carriers, any information needed for this or a related Medicare claim. I request that payment of authorized benefits be made on my behalf.     Assignment of Insurance Benefits:   I hereby authorize any and all insurance companies, health plans, defined   benefit plans, health insurers or any entity that is or may be responsible for payment of my medical expenses to pay all hospital and medical benefits now due, and to become due and payable to me under any hospital benefits, sick benefits, injury benefits or any other benefit for services rendered to me, including Major Medical Benefits, direct to [facility] and all independently contracted physicians. I assign any and all rights that I may have against any and all insurance companies, health plans, defined benefit plans, health insurers or any entity that is or may be responsible for payment of my medical expenses, including, but not limited to any right to appeal a denial of a claim, any right to bring any action, lawsuit, administrative proceeding, or other cause of action on my behalf. I specifically assign my right to pursue litigation against any and all insurance companies, health plans, defined benefit plans, health insurers or any entity that is or may be responsible for payment of my medical expenses based upon a refusal to pay charges.            E. Valuables: It is understood and agreed that [facility] is not liable for the damage to or loss of any money, jewelry,   documents, dentures, eye glasses, hearing aids, prosthetics, or other property of value.     F. Computer Equipment: I understand and agree that should I choose to use computer equipment owned by [facility] or if I choose to access the Internet via [facility]s network, I do so at my own risk. [facility] is not responsible for any damage to my computer equipment or to any damages of any type  that might arise from my loss of equipment or data.     G. Acceptance of Financial Responsibility:  I agree that in consideration of the services and   supplies that have been   or will be furnished to the patient, I am hereby obligated to pay all charges made for or on the account of the patient according to the standard rates (in effect at the time the services and supplies are delivered) established by [facility], including its Patient Financial Assistance Policy to the extent it is applicable. I understand that I am responsible for all charges, or portions thereof, not covered by insurance or other sources. Patient refunds will be distributed only after balances at all [facility] facilities are paid.     H. Communication Authorization:  I hereby authorize [facility] and its representatives, along with any billing service   or  who may work on their behalf, to contact me on   my cell phone and/or home phone using pre- recorded messages, artificial voice messages, automatic telephone dialing devices or other computer assisted technology, or by electronic      mail, text messaging, or by any other form of electronic communication. This includes, but is not limited to, appointment reminders, yearly physical exam reminders, preventive care reminders, patient campaigns, welcome calls, and calls about account balances on my account or any account on which I am listed as a guarantor. I understand I have the right to opt out of these communications at any time.      Relationship  Between  Facility and  Provider:      I understand that some, but not all, providers furnishing services to the patient are not employees or agents of [facility]. The patient is under the care and supervision of his/her attending physician, and it is the responsibility of the facility and its nursing staff to carry out the instructions of such physicians. It is the responsibility of the patient's physician/designee to obtain the patient's  informed consent, when required, for medical or surgical treatment, special diagnostic or therapeutic procedures, or hospital services rendered for the patient under the special instructions of the physician/designee.     REGISTRATION AUTHORIZATION  Form No. 70226 (Rev. 7/13/2022)      Notice of Privacy Practices: I acknowledge I have received a copy of [facility]'s Notice of Privacy Practices.     Facility  Directory: I have discussed with the organization my desire to be either included or excluded  in the facility directory in the event of my being an inpatient at an [facility] facility. I understand that if my choice is to opt-out of being identified in the facility directory that the facility will not provide any information about me such as my condition (e.g. fair, stable, etc.) or my location in the facility (e.g., room number, department).     Immunizations: [facility] shares immunization information with state sponsored health departments to help you and your doctor keep track of your immunization records. By signing, you consent to have this information shared with the health department in your state:                                [place] - LINKS ([place] Immunization Network for Kids Statewide)                                [place] - MIIX ([place] Immunization Information eXchange)                                [place] - ImmPRINT (Immunization Patient Registry with Integrated Technology)     TERM: This authorization is valid for this and subsequent care/treatment I receive at [facility] and will remain valid unless/until revoked in writing by me.     [facility]: As used in this document, [facility] means all [facility] owned and managed facilities, including, but not limited to, all health centers, surgery centers, clinics, urgent care centers, and hospitals.         [facility] complies with applicable Federal civil rights laws and does not discriminate on the basis of race,  color, national origin, age, disability, or sex.  ATENCIÓN: si habla español, tiene a grant disposición servicios gratuitos de asistencia lingüística. [employer] al [phone].  CHÚ Ý: N?u b?n nói Ti?ng Vi?t, có các d?ch v? h? tr? ngôn ng? mi?n phí dành cho b?n. G?i s? [phone].        REGISTRATION AUTHORIZATION  Form No. 92594 (Rev. 7/13/2022)   Patient

## 2024-10-15 ENCOUNTER — PATIENT MESSAGE (OUTPATIENT)
Dept: FAMILY MEDICINE | Facility: CLINIC | Age: 74
End: 2024-10-15
Payer: MEDICARE

## 2024-10-17 DIAGNOSIS — Z78.0 MENOPAUSE: ICD-10-CM

## 2024-10-29 ENCOUNTER — PATIENT OUTREACH (OUTPATIENT)
Dept: ADMINISTRATIVE | Facility: HOSPITAL | Age: 74
End: 2024-10-29
Payer: MEDICARE

## 2024-11-11 ENCOUNTER — PATIENT OUTREACH (OUTPATIENT)
Dept: ADMINISTRATIVE | Facility: HOSPITAL | Age: 74
End: 2024-11-11
Payer: MEDICARE

## 2024-11-11 DIAGNOSIS — Z79.4 TYPE 2 DIABETES MELLITUS WITH FOOT ULCER, WITH LONG-TERM CURRENT USE OF INSULIN: ICD-10-CM

## 2024-11-11 DIAGNOSIS — E11.621 TYPE 2 DIABETES MELLITUS WITH FOOT ULCER, WITH LONG-TERM CURRENT USE OF INSULIN: ICD-10-CM

## 2024-11-11 DIAGNOSIS — L97.509 TYPE 2 DIABETES MELLITUS WITH FOOT ULCER, WITH LONG-TERM CURRENT USE OF INSULIN: ICD-10-CM

## 2024-11-11 NOTE — PROGRESS NOTES
Population Health Chart Review & Patient Outreach Details      Additional [facility] Health Notes:               Updates Requested / Reviewed:      Updated Care Coordination Note, Care Everywhere, , Care Team Updated, and Immunizations Reconciliation Completed or Queried: [facility] Topics Overdue:      VB Score: 7     Colon Cancer Screening  Osteoporosis Screening  Urine Screening  Eye Exam  Hemoglobin A1c  Lipid Panel  Mammogram    Influenza Vaccine  Shingles/Zoster Vaccine  RSV Vaccine                  Health Maintenance Topic(s) Outreach Outcomes & Actions Taken:    Eye Exam - Outreach Outcomes & Actions Taken  : Pt Will Schedule with External Provider / Order Routed & Care Team Updated if Applicable    Lab(s) - Outreach Outcomes & Actions Taken  : Overdue Lab(s) Ordered

## 2024-11-22 ENCOUNTER — PATIENT MESSAGE (OUTPATIENT)
Dept: PODIATRY | Facility: CLINIC | Age: 74
End: 2024-11-22
Payer: MEDICARE

## 2024-12-05 ENCOUNTER — HOSPITAL ENCOUNTER (OUTPATIENT)
Dept: RADIOLOGY | Facility: CLINIC | Age: 74
Discharge: HOME | End: 2024-12-05
Payer: MEDICARE

## 2024-12-05 ENCOUNTER — OFFICE VISIT (OUTPATIENT)
Dept: PODIATRY | Facility: CLINIC | Age: 74
End: 2024-12-05
Payer: MEDICARE

## 2024-12-05 VITALS — WEIGHT: 154.31 LBS | RESPIRATION RATE: 16 BRPM | HEIGHT: 60 IN | BODY MASS INDEX: 30.29 KG/M2

## 2024-12-05 DIAGNOSIS — L97.522 ULCER OF LEFT FOOT WITH FAT LAYER EXPOSED: ICD-10-CM

## 2024-12-05 DIAGNOSIS — Z79.4 TYPE 2 DIABETES MELLITUS WITH FOOT ULCER, WITH LONG-TERM CURRENT USE OF INSULIN: ICD-10-CM

## 2024-12-05 DIAGNOSIS — M86.179 OTHER ACUTE OSTEOMYELITIS, UNSPECIFIED ANKLE AND FOOT: ICD-10-CM

## 2024-12-05 DIAGNOSIS — I73.9 PAD (PERIPHERAL ARTERY DISEASE): ICD-10-CM

## 2024-12-05 DIAGNOSIS — E11.621 TYPE 2 DIABETES MELLITUS WITH FOOT ULCER, WITH LONG-TERM CURRENT USE OF INSULIN: ICD-10-CM

## 2024-12-05 DIAGNOSIS — Z79.4 TYPE 2 DIABETES MELLITUS WITH DIABETIC POLYNEUROPATHY, WITH LONG-TERM CURRENT USE OF INSULIN: ICD-10-CM

## 2024-12-05 DIAGNOSIS — E11.42 TYPE 2 DIABETES MELLITUS WITH DIABETIC POLYNEUROPATHY, WITH LONG-TERM CURRENT USE OF INSULIN: ICD-10-CM

## 2024-12-05 DIAGNOSIS — L97.509 TYPE 2 DIABETES MELLITUS WITH FOOT ULCER, WITH LONG-TERM CURRENT USE OF INSULIN: ICD-10-CM

## 2024-12-05 DIAGNOSIS — E11.621 DIABETIC ULCER OF LEFT FOOT ASSOCIATED WITH TYPE 2 DIABETES MELLITUS, WITH FAT LAYER EXPOSED, UNSPECIFIED PART OF FOOT: Primary | ICD-10-CM

## 2024-12-05 DIAGNOSIS — L97.522 DIABETIC ULCER OF LEFT FOOT ASSOCIATED WITH TYPE 2 DIABETES MELLITUS, WITH FAT LAYER EXPOSED, UNSPECIFIED PART OF FOOT: Primary | ICD-10-CM

## 2024-12-05 PROCEDURE — 1159F MED LIST DOCD IN RCRD: CPT | Mod: CPTII,S$GLB,,

## 2024-12-05 PROCEDURE — 73630 X-RAY EXAM OF FOOT: CPT | Mod: LT,S$GLB,, | Performed by: RADIOLOGY

## 2024-12-05 PROCEDURE — 1101F PT FALLS ASSESS-DOCD LE1/YR: CPT | Mod: CPTII,S$GLB,,

## 2024-12-05 PROCEDURE — 99999 PR PBB SHADOW E&M-EST. PATIENT-LVL III: CPT | Mod: PBBFAC,,,

## 2024-12-05 PROCEDURE — 1126F AMNT PAIN NOTED NONE PRSNT: CPT | Mod: CPTII,S$GLB,,

## 2024-12-05 PROCEDURE — 99214 OFFICE O/P EST MOD 30 MIN: CPT | Mod: 25,S$GLB,,

## 2024-12-05 PROCEDURE — 3008F BODY MASS INDEX DOCD: CPT | Mod: CPTII,S$GLB,,

## 2024-12-05 PROCEDURE — 3046F HEMOGLOBIN A1C LEVEL >9.0%: CPT | Mod: CPTII,S$GLB,,

## 2024-12-05 PROCEDURE — 11044 DBRDMT BONE 1ST 20 SQ CM/<: CPT | Mod: S$GLB,,,

## 2024-12-05 PROCEDURE — 1160F RVW MEDS BY RX/DR IN RCRD: CPT | Mod: CPTII,S$GLB,,

## 2024-12-05 PROCEDURE — 3288F FALL RISK ASSESSMENT DOCD: CPT | Mod: CPTII,S$GLB,,

## 2024-12-05 NOTE — PROGRESS NOTES
[address] [facility] [address]  [place] [address]  Phone: [phone]   [phone]    Patient's PCP:Carlos Pope MD  Referring Provider: No ref. provider found    Subjective:      Chief Complaint:: Foot Ulcer (Left foor first MPJ ulcer notice)    HPI  Hoa Wilde is a 74 y.o. female who presents today with a complaint of left foot ulcer first MPJ. The current episode started less than a week.  The symptoms include open ulcer, bloody drainage. Probable cause of complaint unknown states history of prior ulceration. Dressing with gauze and coban. Using wound cleansing spray to clean wound.Denies, any fevers, chills, nausea, vomiting.     Blood Sugar: has has not checked   Hemoglobin A1c: 11.9    Vitals:    12/05/24 1535   Resp: 16   Weight: 70 kg (154 lb 5.2 oz)   Height: 5' (1.524 m)   PainSc: 0-No pain      Shoe Size:     Past Surgical History:   Procedure Laterality Date    ANGIOGRAPHY OF LOWER EXTREMITY Left 12/16/2020    Procedure: ANGIOGRAM, LOWER EXTREMITY;  Surgeon: Juancho White MD;  Location: [facility] CATH/EP LAB;  Service: General;  Laterality: Left;    CHOLECYSTECTOMY      CORONARY ARTERY BYPASS GRAFT      FOOT SURGERY Left     HYSTERECTOMY      PERIPHERALLY INSERTED CENTRAL CATHETER INSERTION N/A 3/13/2024    Procedure: INSERTION, PICC;  Surgeon: PICC, [facility];  Location: [facility] ENDO;  Service: Cardiology;  Laterality: N/A;  Dr Mendez     Past Medical History:   Diagnosis Date    Coronary artery disease     Diabetes mellitus, type 2     Hyperlipidemia     Hypertension     Hypothyroidism     Neuropathy      Family History   Problem Relation Name Age of Onset    Hyperlipidemia Mother      Heart disease Mother          Social History:   Marital Status:   Alcohol History:  reports no history of alcohol use.  Tobacco History:  reports that she has never smoked. She has never used smokeless tobacco.  Drug History:  reports no history of drug use.    Review of patient's allergies indicates:  No  Known Allergies    Current Outpatient Medications   Medication Sig Dispense Refill    alendronate (FOSAMAX) 70 MG tablet Take 1 tablet by mouth once a week 12 tablet 1    ammonium lactate (LAC-HYDRIN) 12 % lotion Apply topically 2 (two) times daily. 396 g 6    aspirin (ECOTRIN) 81 MG EC tablet Take 1 tablet (81 mg total) by mouth once daily. 100 tablet 3    betamethasone valerate (LUXIQ) 0.12 % foam Apply topically 2 (two) times daily. 100 g 0    blood-glucose meter kit To check BG 2 times daily, to use with insurance preferred meter 1 each 0    calcium carbonate (OS-GEORGINA) 500 mg calcium (1,250 mg) tablet Take 1 tablet (500 mg total) by mouth 2 (two) times daily. 200 tablet 3    EUTHYROX 50 mcg tablet TAKE 1 TABLET BY MOUTH BEFORE BREAKFAST 90 tablet 3    famotidine (PEPCID) 20 MG tablet Take 1 tablet by mouth once daily 90 tablet 0    fluconazole (DIFLUCAN) 150 MG Tab TAKE 1 TABLET BY MOUTH ONCE DAILY FOR 1 DAY 1 tablet 0    gabapentin (NEURONTIN) 300 MG capsule TAKE 1 CAPSULE BY MOUTH ONCE DAILY IN THE MORNING AND 2 ONCE DAILY AT NIGHT 270 capsule 1    glipizide-metformin (METAGLIP) 5-500 mg per tablet Take 1 tablet by mouth 2 (two) times daily before meals. Take with breakfast and dinner 180 tablet 1    hydrOXYzine HCL (ATARAX) 25 MG tablet TAKE 1 TABLET BY MOUTH THREE TIMES DAILY AS NEEDED FOR ITCHING 90 tablet 1    insulin detemir U-100, Levemir, (LEVEMIR FLEXPEN) 100 unit/mL (3 mL) InPn pen Inject 70 Units into the skin every evening. 15 mL 0    lisinopriL (PRINIVIL,ZESTRIL) 5 MG tablet Take 1 tablet by mouth once daily 90 tablet 1    meloxicam (MOBIC) 15 MG tablet Take 15 mg by mouth.      metoprolol tartrate (LOPRESSOR) 25 MG tablet Take 1 tablet by mouth twice daily 180 tablet 0    ondansetron (ZOFRAN-ODT) 4 MG TbDL Take 1 tablet (4 mg total) by mouth every 6 (six) hours as needed (nausea). 20 tablet 0    ONETOUCH DELICA PLUS LANCET 33 gauge Misc USE   TO CHECK GLUCOSE TWICE DAILY 100 each 3    ONETOUCH  "ULTRA TEST Strp USE  STRIP TO CHECK GLUCOSE TWICE DAILY 100 each 3    pen needle, diabetic 30 gauge x 1/3" Ndle 1 pen by Misc.(Non-Drug; Combo Route) route once daily. To use with levimir flex pen 100 each 3    rosuvastatin (CRESTOR) 20 MG tablet Take 1 tablet by mouth once daily 90 tablet 0    semaglutide (OZEMPIC) 1 mg/dose (4 mg/3 mL) Inject 1 mg into the skin every 7 days. 3 mL 11    sertraline (ZOLOFT) 50 MG tablet Take 1 tablet by mouth once daily 30 tablet 11     No current facility-administered medications for this visit.     Facility-Administered Medications Ordered in Other Visits   Medication Dose Route Frequency Provider Last Rate Last Admin    0.9%  NaCl infusion   Intravenous Continuous Juancho White MD   Stopped at 12/16/20 1500       Review of Systems   Constitutional:  Negative for activity change, chills and fever.   Cardiovascular:  Negative for leg swelling.   Gastrointestinal:  Negative for abdominal pain, diarrhea, nausea and vomiting.   Musculoskeletal:  Negative for arthralgias, back pain, gait problem, joint swelling and myalgias.   Skin:  Positive for wound. Negative for color change, pallor and rash.   Neurological:  Positive for numbness. Negative for dizziness, tremors and weakness.         Objective:        Physical Exam:   Foot Exam    General  General Appearance: appears stated age and healthy   Orientation: alert and oriented to person, place, and time   Affect: appropriate       Left Foot/Ankle      Inspection and Palpation  Ecchymosis: none  Tenderness: none   Swelling: none   Skin Exam: callus, drainage (serosanginous) and ulcer; skin not intact, no cellulitis and no erythema   Neurovascular  Dorsalis pedis: absent  Posterior tibial: absent  Capillary refill: 4+  Varicose veins: present  Saphenous nerve sensation: absent  Tibial nerve sensation: absent  Superficial peroneal nerve sensation: absent  Deep peroneal nerve sensation: absent  Sural nerve sensation: absent    Muscle " Strength  Ankle dorsiflexion: 5  Ankle plantar flexion: 5  Ankle inversion: 5  Ankle eversion: 5  Great toe extension: 5  Great toe flexion: 5    Range of Motion    Passive  Ankle dorsiflexion: 0  Ankle eversion: 10  Ankle inversion: 20  1st MTP extension: 35      Comments  Full thickness ulceration plantar sub 1st MTPJ positive probe to bone   Hyperkeratotic covering and periwound  Serosanguinous drainage   Foot cool to touch   No erythema, No warmth, No edema, No Pain, No fluctuance or crepitation, No Malodor          Imaging:   Left foot Xray:   Impression:     As described on the prior study of 02/15/2024 findings are suspicious for osteomyelitis involving the lateral cortex of the head of the 5th metatarsal and adjacent base of the proximal phalanx of the little toe.  Suggest further evaluation with MRI        Electronically signed by:Blanca Myles MD  Date:                                            12/06/2024  Time:                                           13:10         Assessment:       1. Diabetic ulcer of left foot associated with type 2 diabetes mellitus, with fat layer exposed, unspecified part of foot    2. PAD (peripheral artery disease)    3. Ulcer of left foot with fat layer exposed    4. Other acute osteomyelitis, unspecified ankle and foot    5. Type 2 diabetes mellitus with foot ulcer, with long-term current use of insulin    6. Type 2 diabetes mellitus with diabetic polyneuropathy, with long-term current use of insulin      Plan:   Diabetic ulcer of left foot associated with type 2 diabetes mellitus, with fat layer exposed, unspecified part of foot  -     SURGERY SHOE FOR HOME USE  -     Debridement  -     Debridement; Future    PAD (peripheral artery disease)  -      Lower Extremity Arteries Bilateral; Future; Expected date: 12/08/2024    Ulcer of left foot with fat layer exposed  -     X-Ray Foot Complete Left    Other acute osteomyelitis, unspecified ankle and foot  -     MRI Forefoot WO  Contrast LT; Future; Expected date: 12/08/2024    Type 2 diabetes mellitus with foot ulcer, with long-term current use of insulin    Type 2 diabetes mellitus with diabetic polyneuropathy, with long-term current use of insulin      I provided patient education verbally regarding: Patient diagnosis, treatment options, as well as alternatives, risks, and benefits.     With informed consent debridement preformed left plantar foot sub 1st met head, tolerated well, dressed with rupali, hydrafera blue ready, and DSD foot ball.     Keep dressing clean, dry, and intact until next follow up or  wound care visit     (+) Probe to bone on exam, no obvious signs of infection but wound appears chronic     MRI Left forefoot without contrast     Surgical shoe dispensed     Arterial US     Counseled patient on the aspects of diabetes and how it pertains to the feet.  I explained the importance of proper diabetic foot care and how it is essential for the health of their feet.    I discussed the importance of knowing their HGA1c and that the level needs to be as close to 6 as possible.  I discussed the increase complications of high blood sugar including stroke, blindness, heart attack, kidney failure and loss of limb secondary to neuropathy and PVD.    Patient  was made aware of inspecting their feet.  Patient was told to be aware of any breaks in the skin or redness.  With neuropathy, these areas are not recognized early due to the numbness.  I discussed different treatments available to control the symptoms but whcih may not cure the problem.    Shoe inspection. Patient instructed on proper foot hygeine. We discussed wearing proper shoe gear, daily foot inspections, never walking without protective shoe gear, never putting sharp instruments to feet.    This note was created using Dragon voice recognition software that occasionally misinterpreted phrases or words.     Rey Bob, BARRY  Podiatry / Foot and Ankle Surgery  "  Secure chat preferred       Debridement    Date/Time: 12/8/2024 8:34 PM    Performed by: Rey Bob DPM  Authorized by: Rey Bob DPM    Time out: Immediately prior to procedure a "time out" was called to verify the correct patient, procedure, equipment, support staff and site/side marked as required.    Consent Done?:  Yes (Written)    Preparation: Patient was prepped and draped in usual sterile fashion and Patient was prepped and draped with aseptic technique      Wound Details:    Location:  Right foot    Location:  Right Plantar    Type of Debridement:  Excisional       Length (cm):  1.4       Width (cm):  1.5       Depth (cm):  0.4       Area (sq cm):  2.1       Percent Debrided (%):  100       Total Area Debrided (sq cm):  2.1    Depth of debridement:  Bone    Tissue debrided:  Epidermis, Dermis, Subcutaneous, Muscle and Ligament    Devitalized tissue debrided:  Biofilm, Callus, Exudate, Fibrin, Necrotic/Eschar and Slough    Instruments:  Blade, Curette and Nippers  Bleeding:  Minimal  Hemostasis Achieved: Yes  Method Used:  Pressure  Patient tolerance:  Patient tolerated the procedure well with no immediate complications        "

## 2024-12-05 NOTE — PATIENT INSTRUCTIONS
Simple Skin Ulcer  A skin ulcer is a sore on the skin. Skin ulcers often form when blood circulation is impaired. Being bed- or wheelchair-bound can cause pressure that may lead to skin ulcers. Ulcers are generally round areas of red, swollen, thickened skin around a crater-like depression. They are often very slow to heal. If a skin ulcer isn't properly treated, it may become infected. If the infection spreads, it can cause serious health issues.    Symptoms of a skin ulcer include:  Reddish area on the skin  Skin color and texture changes  Swelling  Wound that isn't healing  Crater in the skin  Pain  Drainage or pus    Causes  There are many causes of skin ulcers. Some of these include:  Decreased blood flow to a part of the skin, vascular insufficiency  Trauma  Lack of movement of a part of the body for long periods of time  Infection  Poor hygiene  Varicose veins  Vitamin deficiency    Pressure ulcers  Pressure ulcers are a type of ulcer most commonly seen in people who are confined to bed or a wheelchair. They are caused by prolonged pressure to a spot on the skin. Pressure ulcers usually occur on the back, buttocks, or backs or sides of the legs, arms, or feet (especially the heels).    Home care  You may be prescribed antibiotics to prevent infection. If this is the case, be sure to take all of the medicine, even if your symptoms get better. You may also be given medicines to help relieve pain. Follow the healthcare providers instructions when using these medicines.    General care  Care for the skin ulcer as instructed. Always wash your hands with soap and warm water before and after caring for your wound.  Cover the ulcer with a clean, dry bandage. Remove and change the bandage as instructed. If the bandage becomes wet or dirty, change it as soon as possible.  Follow the doctors instructions about washing. You can shower, but do not soak the healing ulcer until the doctor says its OK.  Do not scratch,  rub, or pick at the healing skin.  Check the area every day for signs of infection, such as increasing pain, redness, warmth, red streaking, swelling, or pus draining from the ulcer.  When resting, raise the area where the ulcer is above the level of the heart.  Avoid smoking or drinking alcohol, as these can delay wound healing.  If you are able, try to walk regularly. This can help with circulation.  Avoid prolonged standing or sitting in one position.  The following tips can help prevent future skin ulcers:  Know your risks for skin ulcers.  Keep the skin clean and dry.  Reposition frequently.  Use protective devices such as pillows, foam wedges, and heel protectors for the knees, ankles, and heels.  Avoid immobilization.    Follow-up care  Follow up with your healthcare provider, or as advised.    When to seek medical advice  Call your healthcare provider right away if any of these occur:  Fever of 100.4°F (38°C) or higher, or as advised by your healthcare provider  Signs of infection. These include increasing pain, warmth, redness, or pus draining from the skin ulcer.  Bleeding from the skin ulcer  Pain in or around the ulcer that doesn't get better even with medicines  Increased swelling  Changes in skin color    Date Last Reviewed: 9/1/2016  © 2744-6825 [employer]. [address] [place] [address], [place] [address]. All rights reserved. This information is not intended as a substitute for professional medical care. Always follow your healthcare professional's instructions.

## 2024-12-06 NOTE — PATIENT INSTRUCTIONS
Simple Skin Ulcer  A skin ulcer is a sore on the skin. Skin ulcers often form when blood circulation is impaired. Being bed- or wheelchair-bound can cause pressure that may lead to skin ulcers. Ulcers are generally round areas of red, swollen, thickened skin around a crater-like depression. They are often very slow to heal. If a skin ulcer isn't properly treated, it may become infected. If the infection spreads, it can cause serious health issues.    Symptoms of a skin ulcer include:  Reddish area on the skin  Skin color and texture changes  Swelling  Wound that isn't healing  Crater in the skin  Pain  Drainage or pus    Causes  There are many causes of skin ulcers. Some of these include:  Decreased blood flow to a part of the skin, vascular insufficiency  Trauma  Lack of movement of a part of the body for long periods of time  Infection  Poor hygiene  Varicose veins  Vitamin deficiency    Pressure ulcers  Pressure ulcers are a type of ulcer most commonly seen in people who are confined to bed or a wheelchair. They are caused by prolonged pressure to a spot on the skin. Pressure ulcers usually occur on the back, buttocks, or backs or sides of the legs, arms, or feet (especially the heels).    Home care  You may be prescribed antibiotics to prevent infection. If this is the case, be sure to take all of the medicine, even if your symptoms get better. You may also be given medicines to help relieve pain. Follow the healthcare providers instructions when using these medicines.    General care  Care for the skin ulcer as instructed. Always wash your hands with soap and warm water before and after caring for your wound.  Cover the ulcer with a clean, dry bandage. Remove and change the bandage as instructed. If the bandage becomes wet or dirty, change it as soon as possible.  Follow the doctors instructions about washing. You can shower, but do not soak the healing ulcer until the doctor says its OK.  Do not scratch,  rub, or pick at the healing skin.  Check the area every day for signs of infection, such as increasing pain, redness, warmth, red streaking, swelling, or pus draining from the ulcer.  When resting, raise the area where the ulcer is above the level of the heart.  Avoid smoking or drinking alcohol, as these can delay wound healing.  If you are able, try to walk regularly. This can help with circulation.  Avoid prolonged standing or sitting in one position.  The following tips can help prevent future skin ulcers:  Know your risks for skin ulcers.  Keep the skin clean and dry.  Reposition frequently.  Use protective devices such as pillows, foam wedges, and heel protectors for the knees, ankles, and heels.  Avoid immobilization.    Follow-up care  Follow up with your healthcare provider, or as advised.    When to seek medical advice  Call your healthcare provider right away if any of these occur:  Fever of 100.4°F (38°C) or higher, or as advised by your healthcare provider  Signs of infection. These include increasing pain, warmth, redness, or pus draining from the skin ulcer.  Bleeding from the skin ulcer  Pain in or around the ulcer that doesn't get better even with medicines  Increased swelling  Changes in skin color    Date Last Reviewed: 9/1/2016  © 4215-6761 [employer]. [address] [place] [address], [place] [address]. All rights reserved. This information is not intended as a substitute for professional medical care. Always follow your healthcare professional's instructions.

## 2024-12-08 RX ORDER — DOXYCYCLINE 100 MG/1
100 CAPSULE ORAL EVERY 12 HOURS
Qty: 28 CAPSULE | Refills: 0 | Status: SHIPPED | OUTPATIENT
Start: 2024-12-08 | End: 2024-12-08

## 2024-12-12 ENCOUNTER — OFFICE VISIT (OUTPATIENT)
Dept: PODIATRY | Facility: CLINIC | Age: 74
End: 2024-12-12
Payer: MEDICARE

## 2024-12-12 VITALS — HEIGHT: 60 IN | BODY MASS INDEX: 29.86 KG/M2 | WEIGHT: 152.13 LBS

## 2024-12-12 DIAGNOSIS — Z79.4 TYPE 2 DIABETES MELLITUS WITH DIABETIC POLYNEUROPATHY, WITH LONG-TERM CURRENT USE OF INSULIN: ICD-10-CM

## 2024-12-12 DIAGNOSIS — E11.42 TYPE 2 DIABETES MELLITUS WITH DIABETIC POLYNEUROPATHY, WITH LONG-TERM CURRENT USE OF INSULIN: ICD-10-CM

## 2024-12-12 DIAGNOSIS — E11.621 DIABETIC ULCER OF LEFT FOOT ASSOCIATED WITH TYPE 2 DIABETES MELLITUS, WITH FAT LAYER EXPOSED, UNSPECIFIED PART OF FOOT: Primary | ICD-10-CM

## 2024-12-12 DIAGNOSIS — L97.522 DIABETIC ULCER OF LEFT FOOT ASSOCIATED WITH TYPE 2 DIABETES MELLITUS, WITH FAT LAYER EXPOSED, UNSPECIFIED PART OF FOOT: Primary | ICD-10-CM

## 2024-12-12 DIAGNOSIS — M86.179 OTHER ACUTE OSTEOMYELITIS, UNSPECIFIED ANKLE AND FOOT: ICD-10-CM

## 2024-12-12 PROCEDURE — 99999 PR PBB SHADOW E&M-EST. PATIENT-LVL IV: CPT | Mod: PBBFAC,,,

## 2024-12-12 PROCEDURE — 1160F RVW MEDS BY RX/DR IN RCRD: CPT | Mod: CPTII,S$GLB,,

## 2024-12-12 PROCEDURE — 3288F FALL RISK ASSESSMENT DOCD: CPT | Mod: CPTII,S$GLB,,

## 2024-12-12 PROCEDURE — 11043 DBRDMT MUSC&/FSCA 1ST 20/<: CPT | Mod: S$GLB,,,

## 2024-12-12 PROCEDURE — 3008F BODY MASS INDEX DOCD: CPT | Mod: CPTII,S$GLB,,

## 2024-12-12 PROCEDURE — 1159F MED LIST DOCD IN RCRD: CPT | Mod: CPTII,S$GLB,,

## 2024-12-12 PROCEDURE — 99213 OFFICE O/P EST LOW 20 MIN: CPT | Mod: 25,S$GLB,,

## 2024-12-12 PROCEDURE — 1101F PT FALLS ASSESS-DOCD LE1/YR: CPT | Mod: CPTII,S$GLB,,

## 2024-12-12 PROCEDURE — 1125F AMNT PAIN NOTED PAIN PRSNT: CPT | Mod: CPTII,S$GLB,,

## 2024-12-12 NOTE — PROGRESS NOTES
[address] [facility] [address]  [place] [address]  Phone: [phone]   [phone]    Patient's PCP:Carlos Pope MD  Referring Provider: No ref. provider found    Subjective:      Chief Complaint:: Wound Check (Left foot wound check )    HPI 12/12/2024  Hoa Wilde is a 74 y.o. female who presents today with a complaint of Left foot wound check. Dressings left in place as instructed, clean, intact, and dry. Pt has dressing intact with surgical shoe on the left foot. No new complaints.Denies, any fevers, chills, nausea, vomiting.     HPI 12/05/2024   Hoa Wilde is a 74 y.o. female who presents today with a complaint of left foot ulcer first MPJ. The current episode started less than a week.  The symptoms include open ulcer, bloody drainage. Probable cause of complaint unknown states history of prior ulceration. Dressing with gauze and coban. Using wound cleansing spray to clean wound.Denies, any fevers, chills, nausea, vomiting.     Systemic Doctor:   Date Last Seen: 3/27/24  Blood Sugar: not taken   Hemoglobin A1c: 11.9    Vitals:    12/12/24 1449   Weight: 69 kg (152 lb 1.9 oz)   Height: 5' (1.524 m)   PainSc:   7      Shoe Size:     Past Surgical History:   Procedure Laterality Date    ANGIOGRAPHY OF LOWER EXTREMITY Left 12/16/2020    Procedure: ANGIOGRAM, LOWER EXTREMITY;  Surgeon: Juancho White MD;  Location: [facility] CATH/EP LAB;  Service: General;  Laterality: Left;    CHOLECYSTECTOMY      CORONARY ARTERY BYPASS GRAFT      FOOT SURGERY Left     HYSTERECTOMY      PERIPHERALLY INSERTED CENTRAL CATHETER INSERTION N/A 3/13/2024    Procedure: INSERTION, PICC;  Surgeon: PICC, STPH;  Location: [facility] ENDO;  Service: Cardiology;  Laterality: N/A;  Dr Mendez     Past Medical History:   Diagnosis Date    Coronary artery disease     Diabetes mellitus, type 2     Hyperlipidemia     Hypertension     Hypothyroidism     Neuropathy      Family History   Problem Relation Name Age of Onset     Hyperlipidemia Mother      Heart disease Mother          Social History:   Marital Status:   Alcohol History:  reports no history of alcohol use.  Tobacco History:  reports that she has never smoked. She has never used smokeless tobacco.  Drug History:  reports no history of drug use.    Review of patient's allergies indicates:  No Known Allergies    Current Outpatient Medications   Medication Sig Dispense Refill    alendronate (FOSAMAX) 70 MG tablet Take 1 tablet by mouth once a week 12 tablet 1    ammonium lactate (LAC-HYDRIN) 12 % lotion Apply topically 2 (two) times daily. 396 g 6    betamethasone valerate (LUXIQ) 0.12 % foam Apply topically 2 (two) times daily. 100 g 0    blood-glucose meter kit To check BG 2 times daily, to use with insurance preferred meter 1 each 0    EUTHYROX 50 mcg tablet TAKE 1 TABLET BY MOUTH BEFORE BREAKFAST 90 tablet 3    famotidine (PEPCID) 20 MG tablet Take 1 tablet by mouth once daily 90 tablet 0    fluconazole (DIFLUCAN) 150 MG Tab TAKE 1 TABLET BY MOUTH ONCE DAILY FOR 1 DAY 1 tablet 0    gabapentin (NEURONTIN) 300 MG capsule TAKE 1 CAPSULE BY MOUTH ONCE DAILY IN THE MORNING AND 2 ONCE DAILY AT NIGHT 270 capsule 1    hydrOXYzine HCL (ATARAX) 25 MG tablet TAKE 1 TABLET BY MOUTH THREE TIMES DAILY AS NEEDED FOR ITCHING 90 tablet 1    insulin detemir U-100, Levemir, (LEVEMIR FLEXPEN) 100 unit/mL (3 mL) InPn pen Inject 70 Units into the skin every evening. 15 mL 0    lisinopriL (PRINIVIL,ZESTRIL) 5 MG tablet Take 1 tablet by mouth once daily 90 tablet 1    meloxicam (MOBIC) 15 MG tablet Take 15 mg by mouth.      metoprolol tartrate (LOPRESSOR) 25 MG tablet Take 1 tablet by mouth twice daily 180 tablet 0    ondansetron (ZOFRAN-ODT) 4 MG TbDL Take 1 tablet (4 mg total) by mouth every 6 (six) hours as needed (nausea). 20 tablet 0    ONETOUCH DELICA PLUS LANCET 33 gauge Misc USE   TO CHECK GLUCOSE TWICE DAILY 100 each 3    ONETOUCH ULTRA TEST Strp USE  STRIP TO CHECK GLUCOSE TWICE  "DAILY 100 each 3    pen needle, diabetic 30 gauge x 1/3" Ndle 1 pen by Misc.(Non-Drug; Combo Route) route once daily. To use with levimir flex pen 100 each 3    rosuvastatin (CRESTOR) 20 MG tablet Take 1 tablet by mouth once daily 90 tablet 0    semaglutide (OZEMPIC) 1 mg/dose (4 mg/3 mL) Inject 1 mg into the skin every 7 days. 3 mL 11    sertraline (ZOLOFT) 50 MG tablet Take 1 tablet by mouth once daily 30 tablet 11    aspirin (ECOTRIN) 81 MG EC tablet Take 1 tablet (81 mg total) by mouth once daily. 100 tablet 3    calcium carbonate (OS-GEORGINA) 500 mg calcium (1,250 mg) tablet Take 1 tablet (500 mg total) by mouth 2 (two) times daily. 200 tablet 3    doxycycline (VIBRAMYCIN) 100 MG Cap Take 100 mg by mouth every 12 (twelve) hours.      doxycycline (VIBRAMYCIN) 100 MG Cap Take 1 capsule (100 mg total) by mouth every 12 (twelve) hours. 56 capsule 0    glipizide-metformin (METAGLIP) 5-500 mg per tablet Take 1 tablet by mouth 2 (two) times daily before meals. Take with breakfast and dinner 180 tablet 1     No current facility-administered medications for this visit.     Facility-Administered Medications Ordered in Other Visits   Medication Dose Route Frequency Provider Last Rate Last Admin    0.9%  NaCl infusion   Intravenous Continuous Juancho White MD   Stopped at 12/16/20 1500       Review of Systems   Constitutional:  Negative for activity change, chills, fatigue, fever and unexpected weight change.   HENT:  Negative for hearing loss and trouble swallowing.    Eyes:  Negative for photophobia and visual disturbance.   Respiratory:  Negative for cough, shortness of breath and wheezing.    Cardiovascular:  Negative for chest pain, palpitations and leg swelling.   Gastrointestinal:  Negative for abdominal pain, diarrhea, nausea and vomiting.   Genitourinary:  Negative for dysuria and frequency.   Musculoskeletal:  Negative for arthralgias, back pain, gait problem, joint swelling, myalgias and neck pain.   Skin:  " Positive for wound. Negative for color change, pallor and rash.   Neurological:  Positive for numbness. Negative for dizziness, tremors, seizures, weakness and headaches.   Hematological:  Does not bruise/bleed easily.         Objective:        Physical Exam:   Foot Exam    General  General Appearance: appears stated age and healthy   Orientation: alert and oriented to person, place, and time   Affect: appropriate       Left Foot/Ankle      Inspection and Palpation  Ecchymosis: none  Tenderness: none   Swelling: none   Skin Exam: callus, drainage (serosanginous) and ulcer; skin not intact, no cellulitis and no erythema   Neurovascular  Dorsalis pedis: absent  Posterior tibial: absent  Capillary refill: 4+  Varicose veins: present  Saphenous nerve sensation: absent  Tibial nerve sensation: absent  Superficial peroneal nerve sensation: absent  Deep peroneal nerve sensation: absent  Sural nerve sensation: absent    Muscle Strength  Ankle dorsiflexion: 5  Ankle plantar flexion: 5  Ankle inversion: 5  Ankle eversion: 5  Great toe extension: 5  Great toe flexion: 5    Range of Motion    Passive  Ankle dorsiflexion: 0  Ankle eversion: 10  Ankle inversion: 20  1st MTP extension: 35      Comments  Full thickness ulceration plantar sub 1st MTPJ positive probe to bone    0.75 x 0.75 x 0.3  Hyperkeratotic covering and periwound  Serosanguinous drainage   Foot cool to touch   No erythema, No warmth, No edema, No Pain, No fluctuance or crepitation, No Malodor        Imaging:   Left foot Xray:   Impression:     As described on the prior study of 02/15/2024 findings are suspicious for osteomyelitis involving the lateral cortex of the head of the 5th metatarsal and adjacent base of the proximal phalanx of the little toe.  Suggest further evaluation with MRI        Electronically signed by:Blanca Myles MD  Date:                                            12/06/2024  Time:                                           13:10          Assessment:       1. Ulcer of foot, unspecified laterality, unspecified ulcer stage      Plan:   Diabetic ulcer of left foot associated with type 2 diabetes mellitus, with fat layer exposed, unspecified part of foot  -     Debridement    PAD (peripheral artery disease)    Ulcer of left foot with fat layer exposed    Other acute osteomyelitis, unspecified ankle and foot    Type 2 diabetes mellitus with foot ulcer, with long-term current use of insulin    Type 2 diabetes mellitus with diabetic polyneuropathy, with long-term current use of insulin      I provided patient education verbally regarding: Patient diagnosis, treatment options, as well as alternatives, risks, and benefits.     With informed consent debridement preformed left plantar foot sub 1st met head, tolerated well, dressed with rupali, hydrafera blue ready, and DSD foot ball.     Keep dressing clean, dry, and intact until next follow up or  wound care visit      no obvious signs of infection but wound appears chronic     MRI Left forefoot without contrast pending patient competition     Arterial US pending patient completion    Counseled patient on the aspects of diabetes and how it pertains to the feet.  I explained the importance of proper diabetic foot care and how it is essential for the health of their feet.    I discussed the importance of knowing their HGA1c and that the level needs to be as close to 6 as possible.  I discussed the increase complications of high blood sugar including stroke, blindness, heart attack, kidney failure and loss of limb secondary to neuropathy and PVD.    Patient  was made aware of inspecting their feet.  Patient was told to be aware of any breaks in the skin or redness.  With neuropathy, these areas are not recognized early due to the numbness.  I discussed different treatments available to control the symptoms but whcih may not cure the problem.    Shoe inspection. Patient instructed on proper foot hygeine. We  "discussed wearing proper shoe gear, daily foot inspections, never walking without protective shoe gear, never putting sharp instruments to feet.    This note was created using Dragon voice recognition software that occasionally misinterpreted phrases or words.     Rey Bob DPM  Podiatry / Foot and Ankle Surgery   Secure chat preferred       Debridement    Date/Time: 12/12/2024 3:00 PM    Performed by: Rey Bob DPM  Authorized by: Rey Bob DPM    Time out: Immediately prior to procedure a "time out" was called to verify the correct patient, procedure, equipment, support staff and site/side marked as required.    Consent Done?:  Yes (Written)    Preparation: Patient was prepped and draped in usual sterile fashion and Patient was prepped and draped with aseptic technique      Wound Details:    Location:  Left foot    Debridement - 1st Wound - Specific Location: sub 1st mtpj.    Type of Debridement:  Excisional       Length (cm):  0.8       Width (cm):  0.8       Depth (cm):  0.3       Area (sq cm):  0.64       Percent Debrided (%):  100       Total Area Debrided (sq cm):  0.64    Depth of debridement:  Muscle/fascia/tendon    Tissue debrided:  Epidermis, Dermis, Subcutaneous, Muscle and Ligament    Devitalized tissue debrided:  Biofilm, Callus, Exudate, Fibrin, Necrotic/Eschar and Slough    Instruments:  Blade, Curette and Nippers  Bleeding:  Minimal  Patient tolerance:  Patient tolerated the procedure well with no immediate complications            "

## 2024-12-16 ENCOUNTER — TELEPHONE (OUTPATIENT)
Dept: PODIATRY | Facility: CLINIC | Age: 74
End: 2024-12-16
Payer: MEDICARE

## 2024-12-16 NOTE — TELEPHONE ENCOUNTER
----- Message from Bertha Kelley sent at 12/16/2024  9:32 AM CST -----  Regarding: MRI  Patient tried to schedule her MRI but they told her that she needed a code to schedule it. Please advise

## 2024-12-19 ENCOUNTER — OFFICE VISIT (OUTPATIENT)
Dept: PODIATRY | Facility: CLINIC | Age: 74
End: 2024-12-19
Payer: MEDICARE

## 2024-12-19 VITALS — WEIGHT: 146.81 LBS | RESPIRATION RATE: 16 BRPM | HEIGHT: 60 IN | BODY MASS INDEX: 28.82 KG/M2

## 2024-12-19 DIAGNOSIS — Z79.4 TYPE 2 DIABETES MELLITUS WITH DIABETIC POLYNEUROPATHY, WITH LONG-TERM CURRENT USE OF INSULIN: ICD-10-CM

## 2024-12-19 DIAGNOSIS — E11.621 DIABETIC ULCER OF LEFT FOOT ASSOCIATED WITH TYPE 2 DIABETES MELLITUS, WITH FAT LAYER EXPOSED, UNSPECIFIED PART OF FOOT: Primary | ICD-10-CM

## 2024-12-19 DIAGNOSIS — E11.621 TYPE 2 DIABETES MELLITUS WITH FOOT ULCER, WITH LONG-TERM CURRENT USE OF INSULIN: ICD-10-CM

## 2024-12-19 DIAGNOSIS — Z79.4 TYPE 2 DIABETES MELLITUS WITH FOOT ULCER, WITH LONG-TERM CURRENT USE OF INSULIN: ICD-10-CM

## 2024-12-19 DIAGNOSIS — L97.509 TYPE 2 DIABETES MELLITUS WITH FOOT ULCER, WITH LONG-TERM CURRENT USE OF INSULIN: ICD-10-CM

## 2024-12-19 DIAGNOSIS — M86.172 OTHER ACUTE OSTEOMYELITIS OF LEFT FOOT: ICD-10-CM

## 2024-12-19 DIAGNOSIS — L97.522 DIABETIC ULCER OF LEFT FOOT ASSOCIATED WITH TYPE 2 DIABETES MELLITUS, WITH FAT LAYER EXPOSED, UNSPECIFIED PART OF FOOT: Primary | ICD-10-CM

## 2024-12-19 DIAGNOSIS — E11.42 TYPE 2 DIABETES MELLITUS WITH DIABETIC POLYNEUROPATHY, WITH LONG-TERM CURRENT USE OF INSULIN: ICD-10-CM

## 2024-12-19 PROCEDURE — 1159F MED LIST DOCD IN RCRD: CPT | Mod: CPTII,S$GLB,,

## 2024-12-19 PROCEDURE — 99213 OFFICE O/P EST LOW 20 MIN: CPT | Mod: 25,S$GLB,,

## 2024-12-19 PROCEDURE — 3288F FALL RISK ASSESSMENT DOCD: CPT | Mod: CPTII,S$GLB,,

## 2024-12-19 PROCEDURE — 1160F RVW MEDS BY RX/DR IN RCRD: CPT | Mod: CPTII,S$GLB,,

## 2024-12-19 PROCEDURE — 3008F BODY MASS INDEX DOCD: CPT | Mod: CPTII,S$GLB,,

## 2024-12-19 PROCEDURE — 99999 PR PBB SHADOW E&M-EST. PATIENT-LVL II: CPT | Mod: PBBFAC,,,

## 2024-12-19 PROCEDURE — 11042 DBRDMT SUBQ TIS 1ST 20SQCM/<: CPT | Mod: S$GLB,,,

## 2024-12-19 PROCEDURE — 1126F AMNT PAIN NOTED NONE PRSNT: CPT | Mod: CPTII,S$GLB,,

## 2024-12-19 PROCEDURE — 1101F PT FALLS ASSESS-DOCD LE1/YR: CPT | Mod: CPTII,S$GLB,,

## 2024-12-19 RX ORDER — DOXYCYCLINE 100 MG/1
100 CAPSULE ORAL EVERY 12 HOURS
COMMUNITY
Start: 2024-12-09 | End: 2025-01-11

## 2024-12-19 RX ORDER — DOXYCYCLINE 100 MG/1
100 CAPSULE ORAL EVERY 12 HOURS
Qty: 56 CAPSULE | Refills: 0 | Status: SHIPPED | OUTPATIENT
Start: 2024-12-19 | End: 2025-01-16

## 2024-12-19 NOTE — PROGRESS NOTES
[address] [facility] [address]  [place] [address]  Phone: [phone]   [phone]    Patient's PCP:Carlos Pope MD  Referring Provider: No ref. provider found    Subjective:      Chief Complaint:: DM Left foot neuropathic ulceration     HPI 12/19/2024  Hoa Wilde is a 74 y.o. female who presents today with a complaint of Left foot wound check. Dressings left in place as instructed, clean, intact, and dry. Pt has dressing intact with surgical shoe on the left foot. No new complaints. Still hasn't completed studies ordered 12/05. Denies, any fevers, chills, nausea, vomiting.     HPI 12/12/2024  Hoa Wilde is a 74 y.o. female who presents today with a complaint of Left foot wound check. Dressings left in place as instructed, clean, intact, and dry. Pt has dressing intact with surgical shoe on the left foot. No new complaints.Denies, any fevers, chills, nausea, vomiting.     HPI 12/05/2024   Hao Wilde is a 74 y.o. female who presents today with a complaint of left foot ulcer first MPJ. The current episode started less than a week.  The symptoms include open ulcer, bloody drainage. Probable cause of complaint unknown states history of prior ulceration. Dressing with gauze and coban. Using wound cleansing spray to clean wound.Denies, any fevers, chills, nausea, vomiting.     Systemic Doctor:   Date Last Seen: 3/27/24  Blood Sugar: not taken   Hemoglobin A1c: 11.9    Vitals:    12/19/24 1347   Resp: 16   Weight: 66.6 kg (146 lb 13.2 oz)   Height: 5' (1.524 m)   PainSc: 0-No pain        Past Surgical History:   Procedure Laterality Date    ANGIOGRAPHY OF LOWER EXTREMITY Left 12/16/2020    Procedure: ANGIOGRAM, LOWER EXTREMITY;  Surgeon: Juancho White MD;  Location: [facility] CATH/EP LAB;  Service: General;  Laterality: Left;    CHOLECYSTECTOMY      CORONARY ARTERY BYPASS GRAFT      FOOT SURGERY Left     HYSTERECTOMY      PERIPHERALLY INSERTED CENTRAL CATHETER INSERTION N/A 3/13/2024    Procedure:  INSERTION, PICC;  Surgeon: PICC, STPH;  Location: [facility];  Service: Cardiology;  Laterality: N/A;  Dr Mendez     Past Medical History:   Diagnosis Date    Coronary artery disease     Diabetes mellitus, type 2     Hyperlipidemia     Hypertension     Hypothyroidism     Neuropathy      Family History   Problem Relation Name Age of Onset    Hyperlipidemia Mother      Heart disease Mother          Social History:   Marital Status:   Alcohol History:  reports no history of alcohol use.  Tobacco History:  reports that she has never smoked. She has never used smokeless tobacco.  Drug History:  reports no history of drug use.    Review of patient's allergies indicates:  No Known Allergies    Current Outpatient Medications   Medication Sig Dispense Refill    doxycycline (VIBRAMYCIN) 100 MG Cap Take 100 mg by mouth every 12 (twelve) hours.      alendronate (FOSAMAX) 70 MG tablet Take 1 tablet by mouth once a week 12 tablet 1    ammonium lactate (LAC-HYDRIN) 12 % lotion Apply topically 2 (two) times daily. 396 g 6    aspirin (ECOTRIN) 81 MG EC tablet Take 1 tablet (81 mg total) by mouth once daily. 100 tablet 3    betamethasone valerate (LUXIQ) 0.12 % foam Apply topically 2 (two) times daily. 100 g 0    blood-glucose meter kit To check BG 2 times daily, to use with insurance preferred meter 1 each 0    calcium carbonate (OS-GEORGINA) 500 mg calcium (1,250 mg) tablet Take 1 tablet (500 mg total) by mouth 2 (two) times daily. 200 tablet 3    doxycycline (VIBRAMYCIN) 100 MG Cap Take 1 capsule (100 mg total) by mouth every 12 (twelve) hours. 56 capsule 0    EUTHYROX 50 mcg tablet TAKE 1 TABLET BY MOUTH BEFORE BREAKFAST 90 tablet 3    famotidine (PEPCID) 20 MG tablet Take 1 tablet by mouth once daily 90 tablet 0    fluconazole (DIFLUCAN) 150 MG Tab TAKE 1 TABLET BY MOUTH ONCE DAILY FOR 1 DAY 1 tablet 0    gabapentin (NEURONTIN) 300 MG capsule TAKE 1 CAPSULE BY MOUTH ONCE DAILY IN THE MORNING AND 2 ONCE DAILY AT NIGHT 270  "capsule 1    glipizide-metformin (METAGLIP) 5-500 mg per tablet Take 1 tablet by mouth 2 (two) times daily before meals. Take with breakfast and dinner 180 tablet 1    hydrOXYzine HCL (ATARAX) 25 MG tablet TAKE 1 TABLET BY MOUTH THREE TIMES DAILY AS NEEDED FOR ITCHING 90 tablet 1    insulin detemir U-100, Levemir, (LEVEMIR FLEXPEN) 100 unit/mL (3 mL) InPn pen Inject 70 Units into the skin every evening. 15 mL 0    lisinopriL (PRINIVIL,ZESTRIL) 5 MG tablet Take 1 tablet by mouth once daily 90 tablet 1    meloxicam (MOBIC) 15 MG tablet Take 15 mg by mouth.      metoprolol tartrate (LOPRESSOR) 25 MG tablet Take 1 tablet by mouth twice daily 180 tablet 0    ondansetron (ZOFRAN-ODT) 4 MG TbDL Take 1 tablet (4 mg total) by mouth every 6 (six) hours as needed (nausea). 20 tablet 0    ONETOUCH DELICA PLUS LANCET 33 gauge Misc USE   TO CHECK GLUCOSE TWICE DAILY 100 each 3    ONETOUCH ULTRA TEST Strp USE  STRIP TO CHECK GLUCOSE TWICE DAILY 100 each 3    pen needle, diabetic 30 gauge x 1/3" Ndle 1 pen by Misc.(Non-Drug; Combo Route) route once daily. To use with levimir flex pen 100 each 3    rosuvastatin (CRESTOR) 20 MG tablet Take 1 tablet by mouth once daily 90 tablet 0    semaglutide (OZEMPIC) 1 mg/dose (4 mg/3 mL) Inject 1 mg into the skin every 7 days. 3 mL 11    sertraline (ZOLOFT) 50 MG tablet Take 1 tablet by mouth once daily 30 tablet 11     No current facility-administered medications for this visit.     Facility-Administered Medications Ordered in Other Visits   Medication Dose Route Frequency Provider Last Rate Last Admin    0.9%  NaCl infusion   Intravenous Continuous Juancho White MD   Stopped at 12/16/20 1500       Review of Systems   Constitutional:  Negative for activity change, chills, fatigue, fever and unexpected weight change.   HENT:  Negative for hearing loss and trouble swallowing.    Eyes:  Negative for photophobia and visual disturbance.   Respiratory:  Negative for cough, shortness of breath and " wheezing.    Cardiovascular:  Negative for chest pain, palpitations and leg swelling.   Gastrointestinal:  Negative for abdominal pain, diarrhea, nausea and vomiting.   Genitourinary:  Negative for dysuria and frequency.   Musculoskeletal:  Negative for arthralgias, back pain, gait problem, joint swelling, myalgias and neck pain.   Skin:  Positive for wound. Negative for color change, pallor and rash.   Neurological:  Positive for numbness. Negative for dizziness, tremors, seizures, weakness and headaches.   Hematological:  Does not bruise/bleed easily.         Objective:        Physical Exam:   Foot Exam    General  General Appearance: appears stated age and healthy   Orientation: alert and oriented to person, place, and time   Affect: appropriate       Left Foot/Ankle      Inspection and Palpation  Ecchymosis: none  Tenderness: none   Swelling: none   Skin Exam: callus, drainage (serosanginous) and ulcer; skin not intact, no cellulitis and no erythema   Neurovascular  Dorsalis pedis: absent  Posterior tibial: absent  Capillary refill: 4+  Varicose veins: present  Saphenous nerve sensation: absent  Tibial nerve sensation: absent  Superficial peroneal nerve sensation: absent  Deep peroneal nerve sensation: absent  Sural nerve sensation: absent    Muscle Strength  Ankle dorsiflexion: 5  Ankle plantar flexion: 5  Ankle inversion: 5  Ankle eversion: 5  Great toe extension: 5  Great toe flexion: 5    Range of Motion    Passive  Ankle dorsiflexion: 0  Ankle eversion: 10  Ankle inversion: 20  1st MTP extension: 35      Comments  Full thickness ulceration plantar sub 1st MTPJ    0.55 x 0.55 x 0.2  Hyperkeratotic periwound   Serosanguinous drainage   No erythema, No warmth, No edema, No Pain, No fluctuance or crepitation, No Malodor          Imaging: None         Assessment:       1. Diabetic ulcer of left foot associated with type 2 diabetes mellitus, with fat layer exposed, unspecified part of foot    2. Type 2 diabetes  mellitus with diabetic polyneuropathy, with long-term current use of insulin    3. Type 2 diabetes mellitus with foot ulcer, with long-term current use of insulin    4. Other acute osteomyelitis of left foot      Plan:   Diabetic ulcer of left foot associated with type 2 diabetes mellitus, with fat layer exposed, unspecified part of foot  -     Debridement    Other acute osteomyelitis, unspecified ankle and foot    Type 2 diabetes mellitus with foot ulcer, with long-term current use of insulin    Type 2 diabetes mellitus with diabetic polyneuropathy, with long-term current use of insulin      I provided patient education verbally regarding: Patient diagnosis, treatment options, as well as alternatives, risks, and benefits.     With informed consent debridement preformed left plantar foot sub 1st met head, tolerated well, dressed 2 layer 1/4 inch offloading felt pads, with rupali, hydrafera blue ready, and DSD foot ball.     Keep dressing clean, dry, and intact until next follow up     No signs of infection, (+) Probe to bone on initial exam, history of chronic OM 5th met, compliance with studies ordered questionable, prophylactic doxycycline coverage. She was already taking doxycycline she had left over from prior wound with osteo Appears to be healing well.    Counseled patient on the aspects of diabetes and how it pertains to the feet.  I explained the importance of proper diabetic foot care and how it is essential for the health of their feet.    I discussed the importance of knowing their HGA1c and that the level needs to be as close to 6 as possible.  I discussed the increase complications of high blood sugar including stroke, blindness, heart attack, kidney failure and loss of limb secondary to neuropathy and PVD.    Patient  was made aware of inspecting their feet.  Patient was told to be aware of any breaks in the skin or redness.  With neuropathy, these areas are not recognized early due to the numbness.  I  "discussed different treatments available to control the symptoms but whcih may not cure the problem.    Shoe inspection. Patient instructed on proper foot hygeine. We discussed wearing proper shoe gear, daily foot inspections, never walking without protective shoe gear, never putting sharp instruments to feet.    This note was created using Dragon voice recognition software that occasionally misinterpreted phrases or words.     Debridement    Date/Time: 12/19/2024 2:00 PM    Performed by: Rey Bob DPM  Authorized by: Rey Bob DPM    Time out: Immediately prior to procedure a "time out" was called to verify the correct patient, procedure, equipment, support staff and site/side marked as required.    Consent Done?:  Yes (Written)    Preparation: Patient was prepped and draped in usual sterile fashion and Patient was prepped and draped with aseptic technique      Wound Details:    Location:  Left foot    Debridement - 1st Wound - Specific Location: Sub 1st MTPJ.    Type of Debridement:  Excisional       Length (cm):  0.6       Width (cm):  0.6       Depth (cm):  0.2       Area (sq cm):  0.36       Percent Debrided (%):  100       Total Area Debrided (sq cm):  0.36    Depth of debridement:  Subcutaneous tissue    Tissue debrided:  Epidermis, Dermis, Subcutaneous, Muscle and Ligament    Devitalized tissue debrided:  Biofilm, Callus, Exudate, Fibrin and Slough    Instruments:  Blade, Curette and Nippers  Bleeding:  Minimal  Patient tolerance:  Patient tolerated the procedure well with no immediate complications          Rey Bob DPM  Podiatry / Foot and Ankle Surgery   Secure chat preferred     "

## 2025-01-02 ENCOUNTER — OFFICE VISIT (OUTPATIENT)
Dept: PODIATRY | Facility: CLINIC | Age: 75
End: 2025-01-02
Payer: MEDICARE

## 2025-01-02 VITALS — WEIGHT: 146.81 LBS | HEIGHT: 60 IN | BODY MASS INDEX: 28.82 KG/M2

## 2025-01-02 DIAGNOSIS — M86.172 OTHER ACUTE OSTEOMYELITIS OF LEFT FOOT: ICD-10-CM

## 2025-01-02 DIAGNOSIS — L97.522 DIABETIC ULCER OF LEFT FOOT ASSOCIATED WITH TYPE 2 DIABETES MELLITUS, WITH FAT LAYER EXPOSED, UNSPECIFIED PART OF FOOT: Primary | ICD-10-CM

## 2025-01-02 DIAGNOSIS — E11.621 DIABETIC ULCER OF LEFT FOOT ASSOCIATED WITH TYPE 2 DIABETES MELLITUS, WITH FAT LAYER EXPOSED, UNSPECIFIED PART OF FOOT: Primary | ICD-10-CM

## 2025-01-02 DIAGNOSIS — E11.42 TYPE 2 DIABETES MELLITUS WITH DIABETIC POLYNEUROPATHY, WITH LONG-TERM CURRENT USE OF INSULIN: ICD-10-CM

## 2025-01-02 DIAGNOSIS — Z79.4 TYPE 2 DIABETES MELLITUS WITH DIABETIC POLYNEUROPATHY, WITH LONG-TERM CURRENT USE OF INSULIN: ICD-10-CM

## 2025-01-02 PROCEDURE — 11042 DBRDMT SUBQ TIS 1ST 20SQCM/<: CPT | Mod: S$GLB,,,

## 2025-01-02 PROCEDURE — 99213 OFFICE O/P EST LOW 20 MIN: CPT | Mod: 25,S$GLB,,

## 2025-01-02 PROCEDURE — 1160F RVW MEDS BY RX/DR IN RCRD: CPT | Mod: CPTII,S$GLB,,

## 2025-01-02 PROCEDURE — 3008F BODY MASS INDEX DOCD: CPT | Mod: CPTII,S$GLB,,

## 2025-01-02 PROCEDURE — 1125F AMNT PAIN NOTED PAIN PRSNT: CPT | Mod: CPTII,S$GLB,,

## 2025-01-02 PROCEDURE — 99999 PR PBB SHADOW E&M-EST. PATIENT-LVL IV: CPT | Mod: PBBFAC,,,

## 2025-01-02 PROCEDURE — 1159F MED LIST DOCD IN RCRD: CPT | Mod: CPTII,S$GLB,,

## 2025-01-02 PROCEDURE — 3288F FALL RISK ASSESSMENT DOCD: CPT | Mod: CPTII,S$GLB,,

## 2025-01-02 PROCEDURE — 1101F PT FALLS ASSESS-DOCD LE1/YR: CPT | Mod: CPTII,S$GLB,,

## 2025-01-02 NOTE — PROGRESS NOTES
[address] [facility] [address]  [place] [address]  Phone: [phone]   [phone]    Patient's PCP:Carlos Pope MD  Referring Provider: No ref. provider found    Subjective:      Chief Complaint:: DM Left foot neuropathic ulceration     HPI 01/02/2025  Hoa Wilde is a 74 y.o. female who presents today with a complaint of Left foot wound check. Dressings left in place as instructed, clean, intact, and dry. Pt has dressing intact with surgical shoe on the left foot. No new complaints. Taking and tolerating doxycycline well without complaints.Denies, any fevers, chills, nausea, vomiting.     HPI 12/19/2024  Hoa Wilde is a 74 y.o. female who presents today with a complaint of Left foot wound check. Dressings left in place as instructed, clean, intact, and dry. Pt has dressing intact with surgical shoe on the left foot. No new complaints. Still hasn't completed studies ordered 12/05. Denies, any fevers, chills, nausea, vomiting.     HPI 12/12/2024  Hoa Wilde is a 74 y.o. female who presents today with a complaint of Left foot wound check. Dressings left in place as instructed, clean, intact, and dry. Pt has dressing intact with surgical shoe on the left foot. No new complaints.Denies, any fevers, chills, nausea, vomiting.     HPI 12/05/2024   Hoa Wilde is a 74 y.o. female who presents today with a complaint of left foot ulcer first MPJ. The current episode started less than a week.  The symptoms include open ulcer, bloody drainage. Probable cause of complaint unknown states history of prior ulceration. Dressing with gauze and coban. Using wound cleansing spray to clean wound.Denies, any fevers, chills, nausea, vomiting.     Systemic Doctor:   Date Last Seen: 3/27/24  Blood Sugar: not taken   Hemoglobin A1c: 11.9    Vitals:    01/02/25 1408   Weight: 66.6 kg (146 lb 13.2 oz)   Height: 5' (1.524 m)   PainSc:   5        Past Surgical History:   Procedure Laterality Date    ANGIOGRAPHY OF LOWER  EXTREMITY Left 12/16/2020    Procedure: ANGIOGRAM, LOWER EXTREMITY;  Surgeon: Juancho White MD;  Location: [facility] CATH/EP LAB;  Service: General;  Laterality: Left;    CHOLECYSTECTOMY      CORONARY ARTERY BYPASS GRAFT      FOOT SURGERY Left     HYSTERECTOMY      PERIPHERALLY INSERTED CENTRAL CATHETER INSERTION N/A 3/13/2024    Procedure: INSERTION, PICC;  Surgeon: PICC, STPH;  Location: [facility] ENDO;  Service: Cardiology;  Laterality: N/A;  Dr Mendez     Past Medical History:   Diagnosis Date    Coronary artery disease     Diabetes mellitus, type 2     Hyperlipidemia     Hypertension     Hypothyroidism     Neuropathy      Family History   Problem Relation Name Age of Onset    Hyperlipidemia Mother      Heart disease Mother          Social History:   Marital Status:   Alcohol History:  reports no history of alcohol use.  Tobacco History:  reports that she has never smoked. She has never used smokeless tobacco.  Drug History:  reports no history of drug use.    Review of patient's allergies indicates:  No Known Allergies    Current Outpatient Medications   Medication Sig Dispense Refill    alendronate (FOSAMAX) 70 MG tablet Take 1 tablet by mouth once a week 12 tablet 1    ammonium lactate (LAC-HYDRIN) 12 % lotion Apply topically 2 (two) times daily. 396 g 6    betamethasone valerate (LUXIQ) 0.12 % foam Apply topically 2 (two) times daily. 100 g 0    blood-glucose meter kit To check BG 2 times daily, to use with insurance preferred meter 1 each 0    doxycycline (VIBRAMYCIN) 100 MG Cap Take 100 mg by mouth every 12 (twelve) hours.      doxycycline (VIBRAMYCIN) 100 MG Cap Take 1 capsule (100 mg total) by mouth every 12 (twelve) hours. 56 capsule 0    EUTHYROX 50 mcg tablet TAKE 1 TABLET BY MOUTH BEFORE BREAKFAST 90 tablet 3    famotidine (PEPCID) 20 MG tablet Take 1 tablet by mouth once daily 90 tablet 0    fluconazole (DIFLUCAN) 150 MG Tab TAKE 1 TABLET BY MOUTH ONCE DAILY FOR 1 DAY 1 tablet 0    gabapentin  "(NEURONTIN) 300 MG capsule TAKE 1 CAPSULE BY MOUTH ONCE DAILY IN THE MORNING AND 2 ONCE DAILY AT NIGHT 270 capsule 1    hydrOXYzine HCL (ATARAX) 25 MG tablet TAKE 1 TABLET BY MOUTH THREE TIMES DAILY AS NEEDED FOR ITCHING 90 tablet 1    insulin detemir U-100, Levemir, (LEVEMIR FLEXPEN) 100 unit/mL (3 mL) InPn pen Inject 70 Units into the skin every evening. 15 mL 0    lisinopriL (PRINIVIL,ZESTRIL) 5 MG tablet Take 1 tablet by mouth once daily 90 tablet 1    meloxicam (MOBIC) 15 MG tablet Take 15 mg by mouth.      metoprolol tartrate (LOPRESSOR) 25 MG tablet Take 1 tablet by mouth twice daily 180 tablet 0    ondansetron (ZOFRAN-ODT) 4 MG TbDL Take 1 tablet (4 mg total) by mouth every 6 (six) hours as needed (nausea). 20 tablet 0    ONETOUCH DELICA PLUS LANCET 33 gauge Misc USE   TO CHECK GLUCOSE TWICE DAILY 100 each 3    ONETOUCH ULTRA TEST Strp USE  STRIP TO CHECK GLUCOSE TWICE DAILY 100 each 3    pen needle, diabetic 30 gauge x 1/3" Ndle 1 pen by Misc.(Non-Drug; Combo Route) route once daily. To use with levimir flex pen 100 each 3    rosuvastatin (CRESTOR) 20 MG tablet Take 1 tablet by mouth once daily 90 tablet 0    semaglutide (OZEMPIC) 1 mg/dose (4 mg/3 mL) Inject 1 mg into the skin every 7 days. 3 mL 11    sertraline (ZOLOFT) 50 MG tablet Take 1 tablet by mouth once daily 30 tablet 11    aspirin (ECOTRIN) 81 MG EC tablet Take 1 tablet (81 mg total) by mouth once daily. 100 tablet 3    calcium carbonate (OS-GEORGINA) 500 mg calcium (1,250 mg) tablet Take 1 tablet (500 mg total) by mouth 2 (two) times daily. 200 tablet 3    glipizide-metformin (METAGLIP) 5-500 mg per tablet Take 1 tablet by mouth 2 (two) times daily before meals. Take with breakfast and dinner 180 tablet 1     No current facility-administered medications for this visit.     Facility-Administered Medications Ordered in Other Visits   Medication Dose Route Frequency Provider Last Rate Last Admin    0.9%  NaCl infusion   Intravenous Randal White " Juancho RUSHING MD   Stopped at 12/16/20 1500       Review of Systems   Constitutional:  Negative for activity change, chills, fatigue, fever and unexpected weight change.   HENT:  Negative for hearing loss and trouble swallowing.    Eyes:  Negative for photophobia and visual disturbance.   Respiratory:  Negative for cough, shortness of breath and wheezing.    Cardiovascular:  Negative for chest pain, palpitations and leg swelling.   Gastrointestinal:  Negative for abdominal pain, diarrhea, nausea and vomiting.   Genitourinary:  Negative for dysuria and frequency.   Musculoskeletal:  Negative for arthralgias, back pain, gait problem, joint swelling, myalgias and neck pain.   Skin:  Negative for color change, pallor, rash and wound.   Neurological:  Negative for dizziness, tremors, seizures, weakness, numbness and headaches.   Hematological:  Does not bruise/bleed easily.         Objective:        Physical Exam:   Foot Exam    General  General Appearance: appears stated age and healthy   Orientation: alert and oriented to person, place, and time   Affect: appropriate       Left Foot/Ankle      Inspection and Palpation  Ecchymosis: none  Tenderness: none   Swelling: none   Skin Exam: callus, drainage (serosanginous) and ulcer; skin not intact, no cellulitis and no erythema   Neurovascular  Dorsalis pedis: absent  Posterior tibial: absent  Capillary refill: 4+  Varicose veins: present  Saphenous nerve sensation: absent  Tibial nerve sensation: absent  Superficial peroneal nerve sensation: absent  Deep peroneal nerve sensation: absent  Sural nerve sensation: absent    Muscle Strength  Ankle dorsiflexion: 5  Ankle plantar flexion: 5  Ankle inversion: 5  Ankle eversion: 5  Great toe extension: 5  Great toe flexion: 5    Range of Motion    Passive  Ankle dorsiflexion: 0  Ankle eversion: 10  Ankle inversion: 20  1st MTP extension: 35      Comments  Full thickness ulceration plantar sub 1st MTPJ   0.25 x 0.25 .0.2  Hyperkeratotic  periwound   Serosanguinous drainage   No erythema, No warmth, No edema, No Pain, No fluctuance or crepitation, No Malodor      Imaging: None         Assessment:       1. Diabetic ulcer of left foot associated with type 2 diabetes mellitus, with fat layer exposed, unspecified part of foot    2. Other acute osteomyelitis of left foot    3. Type 2 diabetes mellitus with diabetic polyneuropathy, with long-term current use of insulin        Plan:   Diabetic ulcer of left foot associated with type 2 diabetes mellitus, with fat layer exposed, unspecified part of foot  -     Debridement    Other acute osteomyelitis, unspecified ankle and foot    Type 2 diabetes mellitus with foot ulcer, with long-term current use of insulin    Type 2 diabetes mellitus with diabetic polyneuropathy, with long-term current use of insulin      I provided patient education verbally regarding: Patient diagnosis, treatment options, as well as alternatives, risks, and benefits.     With informed consent debridement preformed left plantar foot sub 1st met head, tolerated well, dressed 2 layer 1/4 inch offloading felt pads, with rupali, hydrafera blue ready, and DSD foot ball.     Keep dressing clean, dry, and intact until next follow up     No signs of infection, (+) Probe to bone on initial exam, history of chronic OM 5th met, compliance with studies ordered questionable, prophylactic doxycycline coverage. She was already taking doxycycline she had left over from prior wound with osteo Appears to be healing well.    Counseled patient on the aspects of diabetes and how it pertains to the feet.  I explained the importance of proper diabetic foot care and how it is essential for the health of their feet.    I discussed the importance of knowing their HGA1c and that the level needs to be as close to 6 as possible.  I discussed the increase complications of high blood sugar including stroke, blindness, heart attack, kidney failure and loss of limb  "secondary to neuropathy and PVD.    Patient  was made aware of inspecting their feet.  Patient was told to be aware of any breaks in the skin or redness.  With neuropathy, these areas are not recognized early due to the numbness.  I discussed different treatments available to control the symptoms but whcih may not cure the problem.    Shoe inspection. Patient instructed on proper foot hygeine. We discussed wearing proper shoe gear, daily foot inspections, never walking without protective shoe gear, never putting sharp instruments to feet.    This note was created using Dragon voice recognition software that occasionally misinterpreted phrases or words.     Debridement    Date/Time: 1/2/2025 2:20 PM    Performed by: Rey Bob DPM  Authorized by: Rey Bob DPM    Time out: Immediately prior to procedure a "time out" was called to verify the correct patient, procedure, equipment, support staff and site/side marked as required.    Consent Done?:  Yes (Written)    Preparation: Patient was prepped and draped in usual sterile fashion and Patient was prepped and draped with aseptic technique      Wound Details:    Location:  Left foot    Type of Debridement:  Excisional       Length (cm):  0.3       Width (cm):  0.3       Depth (cm):  0.2       Area (sq cm):  0.09       Percent Debrided (%):  100       Total Area Debrided (sq cm):  0.09    Depth of debridement:  Subcutaneous tissue    Tissue debrided:  Epidermis, Dermis, Subcutaneous, Muscle and Ligament    Devitalized tissue debrided:  Biofilm, Callus, Exudate, Fibrin and Slough    Instruments:  Blade, Curette and Nippers  Bleeding:  Minimal  Patient tolerance:  Patient tolerated the procedure well with no immediate complications          Rey Bob DPM  Podiatry / Foot and Ankle Surgery   Secure chat preferred   "

## 2025-01-09 ENCOUNTER — OFFICE VISIT (OUTPATIENT)
Dept: PODIATRY | Facility: CLINIC | Age: 75
End: 2025-01-09
Payer: MEDICARE

## 2025-01-09 VITALS — HEIGHT: 60 IN | BODY MASS INDEX: 29.08 KG/M2 | WEIGHT: 148.13 LBS

## 2025-01-09 DIAGNOSIS — E11.42 TYPE 2 DIABETES MELLITUS WITH DIABETIC POLYNEUROPATHY, WITH LONG-TERM CURRENT USE OF INSULIN: ICD-10-CM

## 2025-01-09 DIAGNOSIS — L97.522 DIABETIC ULCER OF LEFT FOOT ASSOCIATED WITH TYPE 2 DIABETES MELLITUS, WITH FAT LAYER EXPOSED, UNSPECIFIED PART OF FOOT: Primary | ICD-10-CM

## 2025-01-09 DIAGNOSIS — L97.509 TYPE 2 DIABETES MELLITUS WITH FOOT ULCER, WITH LONG-TERM CURRENT USE OF INSULIN: ICD-10-CM

## 2025-01-09 DIAGNOSIS — Z79.4 TYPE 2 DIABETES MELLITUS WITH FOOT ULCER, WITH LONG-TERM CURRENT USE OF INSULIN: ICD-10-CM

## 2025-01-09 DIAGNOSIS — E11.621 TYPE 2 DIABETES MELLITUS WITH FOOT ULCER, WITH LONG-TERM CURRENT USE OF INSULIN: ICD-10-CM

## 2025-01-09 DIAGNOSIS — M21.6X9 EQUINUS DEFORMITY OF FOOT: ICD-10-CM

## 2025-01-09 DIAGNOSIS — Z79.4 TYPE 2 DIABETES MELLITUS WITH DIABETIC POLYNEUROPATHY, WITH LONG-TERM CURRENT USE OF INSULIN: ICD-10-CM

## 2025-01-09 DIAGNOSIS — E11.621 DIABETIC ULCER OF LEFT FOOT ASSOCIATED WITH TYPE 2 DIABETES MELLITUS, WITH FAT LAYER EXPOSED, UNSPECIFIED PART OF FOOT: Primary | ICD-10-CM

## 2025-01-09 PROCEDURE — 11042 DBRDMT SUBQ TIS 1ST 20SQCM/<: CPT | Mod: S$GLB,,,

## 2025-01-09 PROCEDURE — 3008F BODY MASS INDEX DOCD: CPT | Mod: CPTII,S$GLB,,

## 2025-01-09 PROCEDURE — 99999 PR PBB SHADOW E&M-EST. PATIENT-LVL II: CPT | Mod: PBBFAC,,,

## 2025-01-09 PROCEDURE — 1126F AMNT PAIN NOTED NONE PRSNT: CPT | Mod: CPTII,S$GLB,,

## 2025-01-09 PROCEDURE — 3288F FALL RISK ASSESSMENT DOCD: CPT | Mod: CPTII,S$GLB,,

## 2025-01-09 PROCEDURE — 1101F PT FALLS ASSESS-DOCD LE1/YR: CPT | Mod: CPTII,S$GLB,,

## 2025-01-09 PROCEDURE — 99213 OFFICE O/P EST LOW 20 MIN: CPT | Mod: 25,S$GLB,,

## 2025-01-09 NOTE — PROGRESS NOTES
[address] [facility] [address]  [place] [address]  Phone: [phone]   [phone]    Patient's PCP:Carlos Pope MD  Referring Provider: No ref. provider found    Subjective:      Chief Complaint:: DM Left foot neuropathic ulceration     HPI 01/09/2025  Hoa Wilde is a 74 y.o. female who presents today with a complaint of Left foot wound check. Dressings left in place as instructed, clean, intact, and dry. Pt has dressing intact with surgical shoe on the left foot. No new complaints. Taking and tolerating doxycycline well without complaints.Denies, any fevers, chills, nausea, vomiting.     HPI 01/02/2025  Hoa Wilde is a 74 y.o. female who presents today with a complaint of Left foot wound check. Dressings left in place as instructed, clean, intact, and dry. Pt has dressing intact with surgical shoe on the left foot. No new complaints. Taking and tolerating doxycycline well without complaints.Denies, any fevers, chills, nausea, vomiting.     HPI 12/19/2024  Hoa Wilde is a 74 y.o. female who presents today with a complaint of Left foot wound check. Dressings left in place as instructed, clean, intact, and dry. Pt has dressing intact with surgical shoe on the left foot. No new complaints. Still hasn't completed studies ordered 12/05. Denies, any fevers, chills, nausea, vomiting.     HPI 12/12/2024  Hoa Wilde is a 74 y.o. female who presents today with a complaint of Left foot wound check. Dressings left in place as instructed, clean, intact, and dry. Pt has dressing intact with surgical shoe on the left foot. No new complaints.Denies, any fevers, chills, nausea, vomiting.     HPI 12/05/2024   Hoa Wilde is a 74 y.o. female who presents today with a complaint of left foot ulcer first MPJ. The current episode started less than a week.  The symptoms include open ulcer, bloody drainage. Probable cause of complaint unknown states history of prior ulceration. Dressing with gauze and coban. Using wound  cleansing spray to clean wound.Denies, any fevers, chills, nausea, vomiting.     Systemic Doctor:   Date Last Seen: 3/27/24  Blood Sugar: not taken   Hemoglobin A1c: 11.9    Vitals:    01/09/25 1456   Weight: 67.2 kg (148 lb 2.4 oz)   Height: 5' (1.524 m)   PainSc: 0-No pain        Past Surgical History:   Procedure Laterality Date    ANGIOGRAPHY OF LOWER EXTREMITY Left 12/16/2020    Procedure: ANGIOGRAM, LOWER EXTREMITY;  Surgeon: Juancho White MD;  Location: [facility] CATH/EP LAB;  Service: General;  Laterality: Left;    CHOLECYSTECTOMY      CORONARY ARTERY BYPASS GRAFT      FOOT SURGERY Left     HYSTERECTOMY      PERIPHERALLY INSERTED CENTRAL CATHETER INSERTION N/A 3/13/2024    Procedure: INSERTION, PICC;  Surgeon: PICC, [facility];  Location: [facility] ENDO;  Service: Cardiology;  Laterality: N/A;  Dr Mendez     Past Medical History:   Diagnosis Date    Coronary artery disease     Diabetes mellitus, type 2     Hyperlipidemia     Hypertension     Hypothyroidism     Neuropathy      Family History   Problem Relation Name Age of Onset    Hyperlipidemia Mother      Heart disease Mother          Social History:   Marital Status:   Alcohol History:  reports no history of alcohol use.  Tobacco History:  reports that she has never smoked. She has never used smokeless tobacco.  Drug History:  reports no history of drug use.    Review of patient's allergies indicates:  No Known Allergies    Current Outpatient Medications   Medication Sig Dispense Refill    alendronate (FOSAMAX) 70 MG tablet Take 1 tablet by mouth once a week 12 tablet 1    ammonium lactate (LAC-HYDRIN) 12 % lotion Apply topically 2 (two) times daily. 396 g 6    betamethasone valerate (LUXIQ) 0.12 % foam Apply topically 2 (two) times daily. 100 g 0    blood-glucose meter kit To check BG 2 times daily, to use with insurance preferred meter 1 each 0    doxycycline (VIBRAMYCIN) 100 MG Cap Take 100 mg by mouth every 12 (twelve) hours.      doxycycline  "(VIBRAMYCIN) 100 MG Cap Take 1 capsule (100 mg total) by mouth every 12 (twelve) hours. 56 capsule 0    EUTHYROX 50 mcg tablet TAKE 1 TABLET BY MOUTH BEFORE BREAKFAST 90 tablet 3    famotidine (PEPCID) 20 MG tablet Take 1 tablet by mouth once daily 90 tablet 0    fluconazole (DIFLUCAN) 150 MG Tab TAKE 1 TABLET BY MOUTH ONCE DAILY FOR 1 DAY 1 tablet 0    gabapentin (NEURONTIN) 300 MG capsule TAKE 1 CAPSULE BY MOUTH ONCE DAILY IN THE MORNING AND 2 ONCE DAILY AT NIGHT 270 capsule 1    hydrOXYzine HCL (ATARAX) 25 MG tablet TAKE 1 TABLET BY MOUTH THREE TIMES DAILY AS NEEDED FOR ITCHING 90 tablet 1    insulin detemir U-100, Levemir, (LEVEMIR FLEXPEN) 100 unit/mL (3 mL) InPn pen Inject 70 Units into the skin every evening. 15 mL 0    lisinopriL (PRINIVIL,ZESTRIL) 5 MG tablet Take 1 tablet by mouth once daily 90 tablet 1    meloxicam (MOBIC) 15 MG tablet Take 15 mg by mouth.      metoprolol tartrate (LOPRESSOR) 25 MG tablet Take 1 tablet by mouth twice daily 180 tablet 0    ondansetron (ZOFRAN-ODT) 4 MG TbDL Take 1 tablet (4 mg total) by mouth every 6 (six) hours as needed (nausea). 20 tablet 0    ONETOUCH DELICA PLUS LANCET 33 gauge Misc USE   TO CHECK GLUCOSE TWICE DAILY 100 each 3    ONETOUCH ULTRA TEST Strp USE  STRIP TO CHECK GLUCOSE TWICE DAILY 100 each 3    pen needle, diabetic 30 gauge x 1/3" Ndle 1 pen by Misc.(Non-Drug; Combo Route) route once daily. To use with levimir flex pen 100 each 3    rosuvastatin (CRESTOR) 20 MG tablet Take 1 tablet by mouth once daily 90 tablet 0    semaglutide (OZEMPIC) 1 mg/dose (4 mg/3 mL) Inject 1 mg into the skin every 7 days. 3 mL 11    sertraline (ZOLOFT) 50 MG tablet Take 1 tablet by mouth once daily 30 tablet 11    aspirin (ECOTRIN) 81 MG EC tablet Take 1 tablet (81 mg total) by mouth once daily. 100 tablet 3    calcium carbonate (OS-GEORGINA) 500 mg calcium (1,250 mg) tablet Take 1 tablet (500 mg total) by mouth 2 (two) times daily. 200 tablet 3    glipizide-metformin (METAGLIP) " 5-500 mg per tablet Take 1 tablet by mouth 2 (two) times daily before meals. Take with breakfast and dinner 180 tablet 1     No current facility-administered medications for this visit.     Facility-Administered Medications Ordered in Other Visits   Medication Dose Route Frequency Provider Last Rate Last Admin    0.9%  NaCl infusion   Intravenous Continuous Juancho White MD   Stopped at 12/16/20 1500       Review of Systems   Constitutional:  Negative for activity change, chills, fatigue, fever and unexpected weight change.   HENT:  Negative for hearing loss and trouble swallowing.    Eyes:  Negative for photophobia and visual disturbance.   Respiratory:  Negative for cough, shortness of breath and wheezing.    Cardiovascular:  Negative for chest pain, palpitations and leg swelling.   Gastrointestinal:  Negative for abdominal pain, diarrhea, nausea and vomiting.   Genitourinary:  Negative for dysuria and frequency.   Musculoskeletal:  Negative for arthralgias, back pain, gait problem, joint swelling, myalgias and neck pain.   Skin:  Negative for color change, pallor, rash and wound.   Neurological:  Negative for dizziness, tremors, seizures, weakness, numbness and headaches.   Hematological:  Does not bruise/bleed easily.         Objective:        Physical Exam:   Foot Exam    General  General Appearance: appears stated age and healthy   Orientation: alert and oriented to person, place, and time   Affect: appropriate       Left Foot/Ankle      Inspection and Palpation  Ecchymosis: none  Tenderness: none   Swelling: none   Skin Exam: callus, drainage (serosanginous) and ulcer; skin not intact, no cellulitis and no erythema   Neurovascular  Dorsalis pedis: absent  Posterior tibial: absent  Capillary refill: 4+  Varicose veins: present  Saphenous nerve sensation: absent  Tibial nerve sensation: absent  Superficial peroneal nerve sensation: absent  Deep peroneal nerve sensation: absent  Sural nerve sensation:  absent    Muscle Strength  Ankle dorsiflexion: 5  Ankle plantar flexion: 5  Ankle inversion: 5  Ankle eversion: 5  Great toe extension: 5  Great toe flexion: 5    Range of Motion    Passive  Ankle dorsiflexion: 0  Ankle eversion: 10  Ankle inversion: 20  1st MTP extension: 35      Comments  Full thickness ulceration plantar sub 1st MTPJ   0.1 x 0.1 x 0.1  Hyperkeratotic periwound   Serosanguinous drainage   No erythema, No warmth, No edema, No Pain, No fluctuance or crepitation, No Malodor      Imaging: None         Assessment:       1. Diabetic ulcer of left foot associated with type 2 diabetes mellitus, with fat layer exposed, unspecified part of foot    2. Type 2 diabetes mellitus with foot ulcer, with long-term current use of insulin    3. Type 2 diabetes mellitus with diabetic polyneuropathy, with long-term current use of insulin    4. Equinus deformity of foot        Plan:   Diabetic ulcer of left foot associated with type 2 diabetes mellitus, with fat layer exposed, unspecified part of foot  -     Debridement    Other acute osteomyelitis, unspecified ankle and foot    Type 2 diabetes mellitus with foot ulcer, with long-term current use of insulin    Type 2 diabetes mellitus with diabetic polyneuropathy, with long-term current use of insulin      I provided patient education verbally regarding: Patient diagnosis, treatment options, as well as alternatives, risks, and benefits.     With informed consent debridement preformed left plantar foot sub 1st met head, tolerated well, dressed 2 layer 1/4 inch offloading felt pads, with rupali, hydrafera blue ready, and DSD foot ball.     Keep dressing clean, dry, and intact until next follow up     No signs of infection, (+) Probe to bone on initial exam, history of chronic OM 5th met, compliance with studies ordered questionable, prophylactic doxycycline coverage. She was already taking doxycycline she had left over from prior wound with osteo Appears to be healing  "well.    Patient will perform tendo-Achilles complex directed stretches demonstrated in clinic to address ankle equinus by reducing forefoot loading pressure in the gait cycle     Counseled patient on the aspects of diabetes and how it pertains to the feet.  I explained the importance of proper diabetic foot care and how it is essential for the health of their feet. I discussed the importance of knowing their HGA1c and that the level needs to be as close to 6 as possible.  I discussed the increase complications of high blood sugar including stroke, blindness, heart attack, kidney failure and loss of limb secondary to neuropathy and PVD. Patient  was made aware of inspecting their feet.  Patient was told to be aware of any breaks in the skin or redness.  With neuropathy, these areas are not recognized early due to the numbness.  I discussed different treatments available to control the symptoms but whcih may not cure the problem.Shoe inspection. Patient instructed on proper foot hygeine. We discussed wearing proper shoe gear, daily foot inspections, never walking without protective shoe gear, never putting sharp instruments to feet.    Follow up 2 weeks     This note was created using Dragon voice recognition software that occasionally misinterpreted phrases or words.     Debridement    Date/Time: 1/9/2025 3:20 PM    Performed by: Rey Bob DPM  Authorized by: Rey Bob DPM    Time out: Immediately prior to procedure a "time out" was called to verify the correct patient, procedure, equipment, support staff and site/side marked as required.    Consent Done?:  Yes (Written)    Preparation: Patient was prepped and draped in usual sterile fashion and Patient was prepped and draped with aseptic technique      Wound Details:    Location:  Left foot    Type of Debridement:  Excisional       Length (cm):  0.1       Width (cm):  0.1       Depth (cm):  0.1       Area (sq cm):  0.01       Percent Debrided (%):  " 100       Total Area Debrided (sq cm):  0.01    Depth of debridement:  Subcutaneous tissue    Tissue debrided:  Epidermis, Dermis, Subcutaneous, Muscle and Ligament    Devitalized tissue debrided:  Biofilm and Callus    Instruments:  Blade, Curette and Nippers  Bleeding:  Minimal  Patient tolerance:  Patient tolerated the procedure well with no immediate complications          Rey Bob DPM  Podiatry / Foot and Ankle Surgery   Secure chat preferred

## 2025-01-14 ENCOUNTER — PATIENT MESSAGE (OUTPATIENT)
Dept: ADMINISTRATIVE | Facility: HOSPITAL | Age: 75
End: 2025-01-14
Payer: MEDICARE

## 2025-01-24 ENCOUNTER — OFFICE VISIT (OUTPATIENT)
Dept: PODIATRY | Facility: CLINIC | Age: 75
End: 2025-01-24
Payer: MEDICARE

## 2025-01-24 VITALS — BODY MASS INDEX: 29.08 KG/M2 | WEIGHT: 148.13 LBS | HEIGHT: 60 IN

## 2025-01-24 DIAGNOSIS — E11.42 TYPE 2 DIABETES MELLITUS WITH DIABETIC POLYNEUROPATHY, WITH LONG-TERM CURRENT USE OF INSULIN: ICD-10-CM

## 2025-01-24 DIAGNOSIS — M86.172 OTHER ACUTE OSTEOMYELITIS OF LEFT FOOT: ICD-10-CM

## 2025-01-24 DIAGNOSIS — E11.621 DIABETIC ULCER OF LEFT FOOT ASSOCIATED WITH TYPE 2 DIABETES MELLITUS, WITH FAT LAYER EXPOSED, UNSPECIFIED PART OF FOOT: ICD-10-CM

## 2025-01-24 DIAGNOSIS — Z79.4 TYPE 2 DIABETES MELLITUS WITH DIABETIC POLYNEUROPATHY, WITH LONG-TERM CURRENT USE OF INSULIN: ICD-10-CM

## 2025-01-24 DIAGNOSIS — L97.522 DIABETIC ULCER OF LEFT FOOT ASSOCIATED WITH TYPE 2 DIABETES MELLITUS, WITH FAT LAYER EXPOSED, UNSPECIFIED PART OF FOOT: ICD-10-CM

## 2025-01-24 DIAGNOSIS — Z87.828 HEALED WOUND: Primary | ICD-10-CM

## 2025-01-24 PROCEDURE — 3288F FALL RISK ASSESSMENT DOCD: CPT | Mod: CPTII,S$GLB,,

## 2025-01-24 PROCEDURE — 1159F MED LIST DOCD IN RCRD: CPT | Mod: CPTII,S$GLB,,

## 2025-01-24 PROCEDURE — 1126F AMNT PAIN NOTED NONE PRSNT: CPT | Mod: CPTII,S$GLB,,

## 2025-01-24 PROCEDURE — 3008F BODY MASS INDEX DOCD: CPT | Mod: CPTII,S$GLB,,

## 2025-01-24 PROCEDURE — 99213 OFFICE O/P EST LOW 20 MIN: CPT | Mod: S$GLB,,,

## 2025-01-24 PROCEDURE — 1160F RVW MEDS BY RX/DR IN RCRD: CPT | Mod: CPTII,S$GLB,,

## 2025-01-24 PROCEDURE — 99999 PR PBB SHADOW E&M-EST. PATIENT-LVL III: CPT | Mod: PBBFAC,,,

## 2025-01-24 PROCEDURE — 1101F PT FALLS ASSESS-DOCD LE1/YR: CPT | Mod: CPTII,S$GLB,,

## 2025-01-24 NOTE — PROGRESS NOTES
[address] [facility] [address]  [place] [address]  Phone: [phone]   [phone]    Patient's PCP:Carlos Pope MD  Referring Provider: No ref. provider found    Subjective:      Chief Complaint:: DM Left foot neuropathic ulceration     HPI 01/24/2025  Hoa Wilde is a 74 y.o. female who presents today with a complaint of Left foot wound check. No new complaints. Denies any recent f/c/n/v.     Systemic Doctor:   Date Last Seen: 3/27/24  Blood Sugar: not taken   Hemoglobin A1c: 11.9    Vitals:    01/24/25 1040   Weight: 67.2 kg (148 lb 2.4 oz)   Height: 5' (1.524 m)   PainSc: 0-No pain        Past Surgical History:   Procedure Laterality Date    ANGIOGRAPHY OF LOWER EXTREMITY Left 12/16/2020    Procedure: ANGIOGRAM, LOWER EXTREMITY;  Surgeon: Juancho White MD;  Location: [facility] CATH/EP LAB;  Service: General;  Laterality: Left;    CHOLECYSTECTOMY      CORONARY ARTERY BYPASS GRAFT      FOOT SURGERY Left     HYSTERECTOMY      PERIPHERALLY INSERTED CENTRAL CATHETER INSERTION N/A 3/13/2024    Procedure: INSERTION, PICC;  Surgeon: PICC, [facility];  Location: [facility] ENDO;  Service: Cardiology;  Laterality: N/A;  Dr Mednez     Past Medical History:   Diagnosis Date    Coronary artery disease     Diabetes mellitus, type 2     Hyperlipidemia     Hypertension     Hypothyroidism     Neuropathy      Family History   Problem Relation Name Age of Onset    Hyperlipidemia Mother      Heart disease Mother          Social History:   Marital Status:   Alcohol History:  reports no history of alcohol use.  Tobacco History:  reports that she has never smoked. She has never used smokeless tobacco.  Drug History:  reports no history of drug use.    Review of patient's allergies indicates:  No Known Allergies    Current Outpatient Medications   Medication Sig Dispense Refill    alendronate (FOSAMAX) 70 MG tablet Take 1 tablet by mouth once a week 12 tablet 1    ammonium lactate (LAC-HYDRIN) 12 % lotion Apply  "topically 2 (two) times daily. 396 g 6    betamethasone valerate (LUXIQ) 0.12 % foam Apply topically 2 (two) times daily. 100 g 0    blood-glucose meter kit To check BG 2 times daily, to use with insurance preferred meter 1 each 0    EUTHYROX 50 mcg tablet TAKE 1 TABLET BY MOUTH BEFORE BREAKFAST 90 tablet 3    famotidine (PEPCID) 20 MG tablet Take 1 tablet by mouth once daily 90 tablet 0    fluconazole (DIFLUCAN) 150 MG Tab TAKE 1 TABLET BY MOUTH ONCE DAILY FOR 1 DAY 1 tablet 0    gabapentin (NEURONTIN) 300 MG capsule TAKE 1 CAPSULE BY MOUTH ONCE DAILY IN THE MORNING AND 2 ONCE DAILY AT NIGHT 270 capsule 1    hydrOXYzine HCL (ATARAX) 25 MG tablet TAKE 1 TABLET BY MOUTH THREE TIMES DAILY AS NEEDED FOR ITCHING 90 tablet 1    insulin detemir U-100, Levemir, (LEVEMIR FLEXPEN) 100 unit/mL (3 mL) InPn pen Inject 70 Units into the skin every evening. 15 mL 0    lisinopriL (PRINIVIL,ZESTRIL) 5 MG tablet Take 1 tablet by mouth once daily 90 tablet 1    meloxicam (MOBIC) 15 MG tablet Take 15 mg by mouth.      metoprolol tartrate (LOPRESSOR) 25 MG tablet Take 1 tablet by mouth twice daily 180 tablet 0    ondansetron (ZOFRAN-ODT) 4 MG TbDL Take 1 tablet (4 mg total) by mouth every 6 (six) hours as needed (nausea). 20 tablet 0    ONETOUCH DELICA PLUS LANCET 33 gauge Misc USE   TO CHECK GLUCOSE TWICE DAILY 100 each 3    ONETOUCH ULTRA TEST Strp USE  STRIP TO CHECK GLUCOSE TWICE DAILY 100 each 3    pen needle, diabetic 30 gauge x 1/3" Ndle 1 pen by Misc.(Non-Drug; Combo Route) route once daily. To use with levimir flex pen 100 each 3    rosuvastatin (CRESTOR) 20 MG tablet Take 1 tablet by mouth once daily 90 tablet 0    semaglutide (OZEMPIC) 1 mg/dose (4 mg/3 mL) Inject 1 mg into the skin every 7 days. 3 mL 11    sertraline (ZOLOFT) 50 MG tablet Take 1 tablet by mouth once daily 30 tablet 11    aspirin (ECOTRIN) 81 MG EC tablet Take 1 tablet (81 mg total) by mouth once daily. 100 tablet 3    calcium carbonate (OS-GEORGINA) 500 mg " calcium (1,250 mg) tablet Take 1 tablet (500 mg total) by mouth 2 (two) times daily. 200 tablet 3    glipizide-metformin (METAGLIP) 5-500 mg per tablet Take 1 tablet by mouth 2 (two) times daily before meals. Take with breakfast and dinner 180 tablet 1     No current facility-administered medications for this visit.     Facility-Administered Medications Ordered in Other Visits   Medication Dose Route Frequency Provider Last Rate Last Admin    0.9%  NaCl infusion   Intravenous Continuous Juancho White MD   Stopped at 12/16/20 1500       Review of Systems   Constitutional:  Negative for activity change, chills, fatigue, fever and unexpected weight change.   HENT:  Negative for hearing loss and trouble swallowing.    Eyes:  Negative for photophobia and visual disturbance.   Respiratory:  Negative for cough, shortness of breath and wheezing.    Cardiovascular:  Negative for chest pain, palpitations and leg swelling.   Gastrointestinal:  Negative for abdominal pain, diarrhea, nausea and vomiting.   Genitourinary:  Negative for dysuria and frequency.   Musculoskeletal:  Negative for arthralgias, back pain, gait problem, joint swelling, myalgias and neck pain.   Skin:  Negative for color change, pallor, rash and wound.   Neurological:  Negative for dizziness, tremors, seizures, weakness, numbness and headaches.   Hematological:  Does not bruise/bleed easily.         Objective:        Physical Exam:   Foot Exam    General  General Appearance: appears stated age and healthy   Orientation: alert and oriented to person, place, and time   Affect: appropriate       Left Foot/Ankle      Inspection and Palpation  Ecchymosis: none  Tenderness: none   Swelling: none   Skin Exam: dry skin; no callus, no drainage, no cellulitis, no ulcer and no erythema   Neurovascular  Dorsalis pedis: absent  Posterior tibial: absent  Capillary refill: 4+  Varicose veins: present  Saphenous nerve sensation: absent  Tibial nerve sensation:  absent  Superficial peroneal nerve sensation: absent  Deep peroneal nerve sensation: absent  Sural nerve sensation: absent    Muscle Strength  Ankle dorsiflexion: 5  Ankle plantar flexion: 5  Ankle inversion: 5  Ankle eversion: 5  Great toe extension: 5  Great toe flexion: 5    Range of Motion    Passive  Ankle dorsiflexion: 0  Ankle eversion: 10  Ankle inversion: 20  1st MTP extension: 35          Imaging: None         Assessment:       1. Healed wound    2. Diabetic ulcer of left foot associated with type 2 diabetes mellitus, with fat layer exposed, unspecified part of foot    3. Type 2 diabetes mellitus with diabetic polyneuropathy, with long-term current use of insulin    4. Other acute osteomyelitis of left foot    5. Left foot pain          Plan:   Diabetic ulcer of left foot associated with type 2 diabetes mellitus, with fat layer exposed, unspecified part of foot    Other acute osteomyelitis, unspecified ankle and foot    Type 2 diabetes mellitus with foot ulcer, with long-term current use of insulin    Type 2 diabetes mellitus with diabetic polyneuropathy, with long-term current use of insulin    I provided patient education verbally regarding: Patient diagnosis, treatment options, as well as alternatives, risks, and benefits.     Ulceration 100% epithelized, no signs of infection     Completed extended course abx therapy     Moisturize daily, avoid leaving moisture in interdigital spaces     Provided offloading felt pad for shoe insole     Patient will perform tendo-Achilles complex directed stretches demonstrated in clinic to address ankle equinus by reducing forefoot loading pressure in the gait cycle     Counseled patient on the aspects of diabetes and how it pertains to the feet.    I explained the importance of proper diabetic foot care and how it is essential for the health of their feet.    We discussed the importance of knowing their HGA1c and that the level needs to be as close to 6 as possible.       I discussed the increase complications of high blood sugar including stroke, blindness, heart attack, kidney failure and loss of limb secondary to neuropathy and PVD.    Patient will inspect twice daily for any breaks in the skin or redness. In the setting of neuropathy, if present, these areas are not recognized early due to the numbness.    I discussed different treatments available to control the symptoms but whcih may not cure the problem.    Shoe inspection. Patient instructed on proper foot hygeine. We discussed wearing proper shoe gear, daily foot inspections, never walking without protective shoe gear, never putting sharp instruments to feet.     Follow up 3 months or prn sooner    This note was created using Dragon voice recognition software that occasionally misinterpreted phrases or words.           Rey Bob DPM  Podiatry / Foot and Ankle Surgery   Secure chat preferred

## 2025-01-30 ENCOUNTER — PATIENT OUTREACH (OUTPATIENT)
Dept: ADMINISTRATIVE | Facility: HOSPITAL | Age: 75
End: 2025-01-30
Payer: MEDICARE

## 2025-01-30 NOTE — PROGRESS NOTES
Care Coordination Encounter Details:       MyChart Portal Status:         []  Reviewed MyChart Portal Status offered / enrolled if applicable        Additional Notes:     MyChart Outcomes: Pt is enrolled , but does not read pt portal messages.         Updates Requested / Reviewed:        Updated Care Coordination Note, Care Everywhere, , External Sources: [facility], [facility], [facility], and [facility], Removed  or Duplicate Orders, and Immunizations Reconciliation Completed or Queried: [place]         Health Maintenance Screening(s) Due:      Health Maintenance Topics Overdue:      VBHM Score: 7     Colon Cancer Screening  Osteoporosis Screening  Urine Screening  Eye Exam  Hemoglobin A1c  Lipid Panel  Mammogram    Influenza Vaccine  Shingles/Zoster Vaccine  RSV Vaccine                  Health Maintenance Topic(s) Outreach Outcomes & Actions Taken:    Breast Cancer Screening - Outreach Outcomes & Actions Taken  : Remider letter mailed.    Colorectal Cancer Screening - Outreach Outcomes & Actions Taken  : Reminder letter mailed.    Eye Exam - Outreach Outcomes & Actions Taken  : Reminder letter mailed.    Lab(s) - Outreach Outcomes & Actions Taken  : Reminder letter mailed.    Osteoporosis Screening - Outreach Outcomes & Actions Taken  : Reminder letter mailed.    Primary Care Appt - Outreach Outcomes & Actions Taken  : Reminder letter mailed.           Additional Notes:  Called regarding overdue health maintenance and value base resources. Pt does not read portal messages, reminder letter mailed.           Chronic Disease Management:     Diabetes Measures        Lab Results   Component Value Date    HGBA1C 11.9 (H) 2024           [x]  Reviewed chart for active Diabetes diagnosis     []  Scheduled necessary follow up appointments if needed         Additional Notes:             Hypertension Measures        BP Readings from Last 1 Encounters:   24 128/74           [x]  Reviewed chart for  active Hypertension diagnosis     []  Reviewed & documented Home BP Cuff     []  Documented a Remote BP if needed & applicable     []  Scheduled necessary follow up appointments with Primary Care if needed         Additional Notes:             Provider Team Continuity:     Last PCP Visit Date: 3/27/2024          [x]  Reviewed Primary Care Provider Visits, Annual Wellness Visit, and Future          Appointments to ensure appointments have been scheduled and/or           completed        Additional Notes:             Social Determinants of Health          []  Reviewed, completed, and/or updated the following sections:                  Food Insecurity, Transportation Needs, Financial Resource Strain,                 Tobacco Use        Additional Notes:  Letter mailed.           Care Management, Digital Medicine, and/or Education Referrals    OPCM Risk Score: 28.4         Next Steps - Referral Actions: Digital Medicine Outcomes and Actions Taken: Pt Declined or Not Eligible        Additional Notes:

## 2025-01-30 NOTE — LETTER
January 30, 2025    Hoa Wilde  [address] [facility] [place] [address]             [facility]  [address]  [place] [address]  Phone: [phone]        Dear Hoa [facility] is committed to your overall health. Periodically we review the health information in your chart to make sure you are up to date on all of your recommended tests and/or procedures.       Our review of your chart shows that you may be due for       [facility] Score: 7     Colon Cancer Screening  Osteoporosis Screening  Urine Screening  Eye Exam  Hemoglobin A1c  Lipid Panel  Mammogram    Influenza Vaccine  Shingles/Zoster Vaccine  RSV Vaccine  Primary Care Visit                If you have had any of the above done at another facility, please let us know and we will request a copy of the report to update your [facility] record.       At your convenience I would like to speak to you to help get these items scheduled (if needed) and also see if there is anything else we can do to help you. Please send me a message via your patient portal or give me a call at [phone].  I am looking forward to speaking with you soon.     Sincerely,      Kathya Baker LPN Care Coordinator  Carlos Pope MD and your [facility] Primary Care Team

## 2025-02-17 ENCOUNTER — PATIENT MESSAGE (OUTPATIENT)
Dept: ADMINISTRATIVE | Facility: HOSPITAL | Age: 75
End: 2025-02-17
Payer: MEDICARE

## 2025-02-25 ENCOUNTER — PATIENT MESSAGE (OUTPATIENT)
Dept: ADMINISTRATIVE | Facility: HOSPITAL | Age: 75
End: 2025-02-25
Payer: MEDICARE

## 2025-02-26 DIAGNOSIS — E11.9 TYPE 2 DIABETES MELLITUS WITHOUT COMPLICATION: ICD-10-CM

## 2025-03-03 ENCOUNTER — PATIENT OUTREACH (OUTPATIENT)
Dept: ADMINISTRATIVE | Facility: HOSPITAL | Age: 75
End: 2025-03-03
Payer: MEDICARE

## 2025-03-03 DIAGNOSIS — E11.42 TYPE 2 DIABETES MELLITUS WITH DIABETIC POLYNEUROPATHY, WITH LONG-TERM CURRENT USE OF INSULIN: Primary | ICD-10-CM

## 2025-03-03 DIAGNOSIS — Z79.4 TYPE 2 DIABETES MELLITUS WITH DIABETIC POLYNEUROPATHY, WITH LONG-TERM CURRENT USE OF INSULIN: Primary | ICD-10-CM

## 2025-03-03 DIAGNOSIS — E78.2 MIXED HYPERLIPIDEMIA: ICD-10-CM

## 2025-03-03 NOTE — PROGRESS NOTES
Population Health Chart Review & Patient Outreach Details      Additional [facility] Health Notes:               Updates Requested / Reviewed:      Updated Care Coordination Note, Care Everywhere, , External Sources: [facility] and [facility], and Immunizations Reconciliation Completed or Queried: [facility] Topics Overdue:      [facility] Score: 7     Colon Cancer Screening  Osteoporosis Screening  Urine Screening  Eye Exam  Hemoglobin A1c  Lipid Panel  Mammogram    Influenza Vaccine  Shingles/Zoster Vaccine  RSV Vaccine                  Health Maintenance Topic(s) Outreach Outcomes & Actions Taken:    Lab(s) - Outreach Outcomes & Actions Taken  : Overdue Lab(s) Ordered

## 2025-03-10 ENCOUNTER — PATIENT MESSAGE (OUTPATIENT)
Dept: ADMINISTRATIVE | Facility: HOSPITAL | Age: 75
End: 2025-03-10
Payer: MEDICARE

## 2025-03-12 ENCOUNTER — OFFICE VISIT (OUTPATIENT)
Dept: PODIATRY | Facility: CLINIC | Age: 75
End: 2025-03-12
Payer: MEDICARE

## 2025-03-12 VITALS — OXYGEN SATURATION: 97 % | HEIGHT: 60 IN | WEIGHT: 148.13 LBS | BODY MASS INDEX: 29.08 KG/M2 | HEART RATE: 76 BPM

## 2025-03-12 DIAGNOSIS — Z79.4 TYPE 2 DIABETES MELLITUS WITH DIABETIC POLYNEUROPATHY, WITH LONG-TERM CURRENT USE OF INSULIN: ICD-10-CM

## 2025-03-12 DIAGNOSIS — E11.621 TYPE 2 DIABETES MELLITUS WITH FOOT ULCER, WITH LONG-TERM CURRENT USE OF INSULIN: ICD-10-CM

## 2025-03-12 DIAGNOSIS — Z79.4 TYPE 2 DIABETES MELLITUS WITH FOOT ULCER, WITH LONG-TERM CURRENT USE OF INSULIN: ICD-10-CM

## 2025-03-12 DIAGNOSIS — E11.621 DIABETIC ULCER OF LEFT FOOT ASSOCIATED WITH TYPE 2 DIABETES MELLITUS, WITH FAT LAYER EXPOSED, UNSPECIFIED PART OF FOOT: Primary | ICD-10-CM

## 2025-03-12 DIAGNOSIS — E11.42 TYPE 2 DIABETES MELLITUS WITH DIABETIC POLYNEUROPATHY, WITH LONG-TERM CURRENT USE OF INSULIN: ICD-10-CM

## 2025-03-12 DIAGNOSIS — L97.509 TYPE 2 DIABETES MELLITUS WITH FOOT ULCER, WITH LONG-TERM CURRENT USE OF INSULIN: ICD-10-CM

## 2025-03-12 DIAGNOSIS — L97.522 DIABETIC ULCER OF LEFT FOOT ASSOCIATED WITH TYPE 2 DIABETES MELLITUS, WITH FAT LAYER EXPOSED, UNSPECIFIED PART OF FOOT: Primary | ICD-10-CM

## 2025-03-12 DIAGNOSIS — M21.6X9 EQUINUS DEFORMITY OF FOOT: ICD-10-CM

## 2025-03-12 PROCEDURE — 1101F PT FALLS ASSESS-DOCD LE1/YR: CPT | Mod: CPTII,S$GLB,,

## 2025-03-12 PROCEDURE — 3008F BODY MASS INDEX DOCD: CPT | Mod: CPTII,S$GLB,,

## 2025-03-12 PROCEDURE — 1159F MED LIST DOCD IN RCRD: CPT | Mod: CPTII,S$GLB,,

## 2025-03-12 PROCEDURE — 1160F RVW MEDS BY RX/DR IN RCRD: CPT | Mod: CPTII,S$GLB,,

## 2025-03-12 PROCEDURE — 99213 OFFICE O/P EST LOW 20 MIN: CPT | Mod: S$GLB,,,

## 2025-03-12 PROCEDURE — 3288F FALL RISK ASSESSMENT DOCD: CPT | Mod: CPTII,S$GLB,,

## 2025-03-12 PROCEDURE — 1126F AMNT PAIN NOTED NONE PRSNT: CPT | Mod: CPTII,S$GLB,,

## 2025-03-12 PROCEDURE — 99999 PR PBB SHADOW E&M-EST. PATIENT-LVL IV: CPT | Mod: PBBFAC,,,

## 2025-03-12 NOTE — PROGRESS NOTES
[address]  [place] [address]  Phone: [phone]   [phone]    Patient's PCP:Carlos Pope MD  Referring Provider: No ref. provider found    Subjective:      Chief Complaint:: DM Left foot neuropathic ulceration     Hoa Wilde is a 75 y.o. female who presents today for a wound check bilaiterally. Pt has a discolored spot under the left foot. She has been working to lower her A1c. Significant other has been checking bilateral feel twice daily. Concern arose when he notes discoloration at the left sub 1st MTPJ ulceration that we had healed about 1-1/5 months ago. She continues wearing accmodative shoes with offloading felt on the insoles to help offload the left 1st plantar aspect sub 1st MTPJ. Denies any recent f/c/n/v.     Systemic Doctor:   Date Last Seen: 3/27/24  Blood Sugar: not taken   Hemoglobin A1c: 11.9    Vitals:    03/12/25 1539   Pulse: 76   SpO2: 97%   Weight: 67.2 kg (148 lb 2.4 oz)   Height: 5' (1.524 m)   PainSc: 0-No pain        Past Surgical History:   Procedure Laterality Date    ANGIOGRAPHY OF LOWER EXTREMITY Left 12/16/2020    Procedure: ANGIOGRAM, LOWER EXTREMITY;  Surgeon: Juancho White MD;  [facility] CATH/EP LAB;  Service: General;  Laterality: Left;    CHOLECYSTECTOMY      CORONARY ARTERY BYPASS GRAFT      FOOT SURGERY Left     HYSTERECTOMY      PERIPHERALLY INSERTED CENTRAL CATHETER INSERTION N/A 3/13/2024    Procedure: INSERTION, PICC;  Surgeon: PICC, STPH;  Location: [facility] ENDO;  Service: Cardiology;  Laterality: N/A;  Dr Mendez     Past Medical History:   Diagnosis Date    Coronary artery disease     Diabetes mellitus, type 2     Hyperlipidemia     Hypertension     Hypothyroidism     Neuropathy      Family History   Problem Relation Name Age of Onset    Hyperlipidemia Mother      Heart disease Mother          Social History:   Marital Status:   Alcohol History:  reports no history of alcohol use.  Tobacco History:  reports that  "she has never smoked. She has never used smokeless tobacco.  Drug History:  reports no history of drug use.    Review of patient's allergies indicates:  No Known Allergies    Current Outpatient Medications   Medication Sig Dispense Refill    alendronate (FOSAMAX) 70 MG tablet Take 1 tablet by mouth once a week 12 tablet 1    ammonium lactate (LAC-HYDRIN) 12 % lotion Apply topically 2 (two) times daily. 396 g 6    betamethasone valerate (LUXIQ) 0.12 % foam Apply topically 2 (two) times daily. 100 g 0    blood-glucose meter kit To check BG 2 times daily, to use with insurance preferred meter 1 each 0    EUTHYROX 50 mcg tablet TAKE 1 TABLET BY MOUTH BEFORE BREAKFAST 90 tablet 3    famotidine (PEPCID) 20 MG tablet Take 1 tablet by mouth once daily 90 tablet 0    fluconazole (DIFLUCAN) 150 MG Tab TAKE 1 TABLET BY MOUTH ONCE DAILY FOR 1 DAY 1 tablet 0    gabapentin (NEURONTIN) 300 MG capsule TAKE 1 CAPSULE BY MOUTH ONCE DAILY IN THE MORNING AND 2 ONCE DAILY AT NIGHT 270 capsule 1    hydrOXYzine HCL (ATARAX) 25 MG tablet TAKE 1 TABLET BY MOUTH THREE TIMES DAILY AS NEEDED FOR ITCHING 90 tablet 1    insulin detemir U-100, Levemir, (LEVEMIR FLEXPEN) 100 unit/mL (3 mL) InPn pen Inject 70 Units into the skin every evening. 15 mL 0    lisinopriL (PRINIVIL,ZESTRIL) 5 MG tablet Take 1 tablet by mouth once daily 90 tablet 1    meloxicam (MOBIC) 15 MG tablet Take 15 mg by mouth.      metoprolol tartrate (LOPRESSOR) 25 MG tablet Take 1 tablet by mouth twice daily 180 tablet 0    ondansetron (ZOFRAN-ODT) 4 MG TbDL Take 1 tablet (4 mg total) by mouth every 6 (six) hours as needed (nausea). 20 tablet 0    ONETOUCH DELICA PLUS LANCET 33 gauge Misc USE   TO CHECK GLUCOSE TWICE DAILY 100 each 3    ONETOUCH ULTRA TEST Strp USE  STRIP TO CHECK GLUCOSE TWICE DAILY 100 each 3    pen needle, diabetic 30 gauge x 1/3" Ndle 1 pen by Misc.(Non-Drug; Combo Route) route once daily. To use with levimir flex pen 100 each 3    rosuvastatin (CRESTOR) 20 " MG tablet Take 1 tablet by mouth once daily 90 tablet 0    sertraline (ZOLOFT) 50 MG tablet Take 1 tablet by mouth once daily 30 tablet 11    aspirin (ECOTRIN) 81 MG EC tablet Take 1 tablet (81 mg total) by mouth once daily. 100 tablet 3    calcium carbonate (OS-GEORGINA) 500 mg calcium (1,250 mg) tablet Take 1 tablet (500 mg total) by mouth 2 (two) times daily. 200 tablet 3    glipizide-metformin (METAGLIP) 5-500 mg per tablet Take 1 tablet by mouth 2 (two) times daily before meals. Take with breakfast and dinner 180 tablet 1     No current facility-administered medications for this visit.     Facility-Administered Medications Ordered in Other Visits   Medication Dose Route Frequency Provider Last Rate Last Admin    0.9%  NaCl infusion   Intravenous Continuous Juancho White MD   Stopped at 12/16/20 1500       Review of Systems   Constitutional:  Negative for activity change, chills, fatigue, fever and unexpected weight change.   HENT:  Negative for hearing loss and trouble swallowing.    Eyes:  Negative for photophobia and visual disturbance.   Respiratory:  Negative for cough, shortness of breath and wheezing.    Cardiovascular:  Negative for chest pain, palpitations and leg swelling.   Gastrointestinal:  Negative for abdominal pain, diarrhea, nausea and vomiting.   Genitourinary:  Negative for dysuria and frequency.   Musculoskeletal:  Negative for arthralgias, back pain, gait problem, joint swelling, myalgias and neck pain.   Skin:  Positive for wound. Negative for color change, pallor and rash.   Neurological:  Positive for numbness. Negative for dizziness, tremors, seizures, weakness and headaches.   Hematological:  Does not bruise/bleed easily.         Objective:        Physical Exam:   Foot Exam    General  General Appearance: appears stated age and healthy   Orientation: alert and oriented to person, place, and time   Affect: appropriate       Left Foot/Ankle      Inspection and Palpation  Ecchymosis:  none  Tenderness: none   Swelling: none   Skin Exam: dry skin and ulcer; no callus, no drainage, no cellulitis and no erythema   Neurovascular  Dorsalis pedis: absent  Posterior tibial: absent  Capillary refill: 4+  Varicose veins: present  Saphenous nerve sensation: absent  Tibial nerve sensation: absent  Superficial peroneal nerve sensation: absent  Deep peroneal nerve sensation: absent  Sural nerve sensation: absent    Muscle Strength  Ankle dorsiflexion: 5  Ankle plantar flexion: 5  Ankle inversion: 5  Ankle eversion: 5  Great toe extension: 5  Great toe flexion: 5    Range of Motion    Passive  Ankle dorsiflexion: 0  Ankle eversion: 10  Ankle inversion: 20  1st MTP extension: 35      Comments  Pre-ulcerative lesion sub 1st MTPJ with underlying full thickness ulceration ulceration measuring 1 cm x 1 cm x 0.2 cm.     Imaging: None         Assessment:       1. Diabetic ulcer of left foot associated with type 2 diabetes mellitus, with fat layer exposed, unspecified part of foot    2. Type 2 diabetes mellitus with foot ulcer, with long-term current use of insulin    3. Type 2 diabetes mellitus with diabetic polyneuropathy, with long-term current use of insulin    4. Equinus deformity of foot      Plan:   Diabetic ulcer of left foot associated with type 2 diabetes mellitus, with fat layer exposed, unspecified part of foot    Other acute osteomyelitis, unspecified ankle and foot    Type 2 diabetes mellitus with foot ulcer, with long-term current use of insulin    Type 2 diabetes mellitus with diabetic polyneuropathy, with long-term current use of insulin    I provided patient education verbally regarding: Patient diagnosis, treatment options, as well as alternatives, risks, and benefits.     No signs of infection     With informed consent debridement preformed. Dressed with rupali AG, HFBR, and [employer] football, then placed in post op shoe.     Moisturize daily, avoid leaving moisture in interdigital spaces to right and  "begin left once wound healed     Provided offloading felt pad for shoe insole     Patient will perform tendo-Achilles complex directed stretches demonstrated in clinic to address ankle equinus by reducing forefoot loading pressure in the gait cycle     Counseled patient on the aspects of diabetes and how it pertains to the feet.    I explained the importance of proper diabetic foot care and how it is essential for the health of their feet.    We discussed the importance of knowing their HGA1c and that the level needs to be as close to 6 as possible.      I discussed the increase complications of high blood sugar including stroke, blindness, heart attack, kidney failure and loss of limb secondary to neuropathy and PVD.    Patient will inspect twice daily for any breaks in the skin or redness. In the setting of neuropathy, if present, these areas are not recognized early due to the numbness.    I discussed different treatments available to control the symptoms but whcih may not cure the problem.    Shoe inspection. Patient instructed on proper foot hygeine. We discussed wearing proper shoe gear, daily foot inspections, never walking without protective shoe gear, never putting sharp instruments to feet.     Follow up in 1 week    This note was created using Dragon voice recognition software that occasionally misinterpreted phrases or words.         Debridement    Date/Time: 4/1/2025 1:05 PM    Performed by: Rey Bob DPM  Authorized by: Rey Bob DPM    Time out: Immediately prior to procedure a "time out" was called to verify the correct patient, procedure, equipment, support staff and site/side marked as required.    Consent Done?:  Yes (Written) and Yes (Verbal)       Length (cm):  1       Width (cm):  1       Depth (cm):  0.2       Area (sq cm):  0.79       Percent Debrided (%):  100       Total Area Debrided (sq cm):  0.79    Depth of debridement:  Subcutaneous tissue    Tissue debrided:  Dermis, " Epidermis and Subcutaneous    Devitalized tissue debrided:  Callus, Exudate, Slough and Fibrin    Instruments:  Blade and Curette  Bleeding:  Minimal  Hemostasis Achieved: Yes  Method Used:  Pressure  Patient tolerance:  Patient tolerated the procedure well with no immediate complications      Rey Bob DPM  Podiatry / Foot and Ankle Surgery   Secure chat preferred

## 2025-03-14 NOTE — PATIENT INSTRUCTIONS
Dr. Turner's Wound Healing Tips    How Does Good Nutrition Help with Wound Healing?  Eating well during wound healing can help your body heal faster and fight infection.  To heal, you need more calories and more nutrients like protein, fluids, vitamin A, vitamin C, and Zinc.  Wounds heal faster when you get enough of the right foods.    Prioritize protein! Protein provides the building blocks for muscle and skin repair; and you need more protein for wound healing.  It also helps to boot immunity!    In ADDITION to below, I recommend supplementing with 2 protein drinks each day while healing.  I recommend finding a protein drink with around 30g or protein per bottle.  A good example is Premier Protein drinks, which can be purchased at [employer], [employer], or online.  They are a great, affordable option with low carbohydrates (low sugars!) and high protein.    Good sources include:  Lean animal meat such as chicken, turkey, fish  Beans, peas, lentils or tofu  Nuts, peanut butter, or seeds  Cheese, yogurt, cottage cheese or eggs  Milk or a milk alternative like fortified soy    Vitamins and Minerals  Vitamin A, Vitamin C, and Zinc help your body to repair tissue damage, fight infections and keep your skin healthy.    Collagen! Collagen plays a KEY role in ALL stages of wound healing  I recommend supplementing with Collagen DAILY.  There are several collagen supplements on the market, in both powder and capsule form.  A brand I regularly recommend is: Collagen Peptides by [employer] - 2 scoops per day in coffee/tea/water/etc. (sold at [employer], [employer], several drug stores, etc.)  Collagen has several other health benefits related to skin, joint, bone, muscle and even heart health!    Diabetes and Wound Healing  Good blood sugar control is very important during wound healing.  This helps you heal faster and reduces risk of infection.  Chronically high blood sugars can lead to poor blood circulation, thus increasing wound  healing time.  Please ask your dietician for tips on managing blood sugars.    What Else Can I Do To Help My Wound Heal?  If you use tobacco, quit.  Nicotine can reduce blood and oxygen flow to your tissues which can increase healing time.    Cast Bag (Can be purchased on [employer])  Re-use same cast bag for each shower or bath.  Recommend hanging bag to dry in between each shower or bath.  Follow directions on cast bag for proper application and use.    (Keep dressing clean, dry, and intact between dressing changes!)

## 2025-03-20 ENCOUNTER — OFFICE VISIT (OUTPATIENT)
Dept: PODIATRY | Facility: CLINIC | Age: 75
End: 2025-03-20
Payer: MEDICARE

## 2025-03-20 VITALS — WEIGHT: 148.13 LBS | HEIGHT: 60 IN | RESPIRATION RATE: 18 BRPM | BODY MASS INDEX: 29.08 KG/M2

## 2025-03-20 DIAGNOSIS — E11.621 DIABETIC ULCER OF LEFT FOOT ASSOCIATED WITH TYPE 2 DIABETES MELLITUS, WITH FAT LAYER EXPOSED, UNSPECIFIED PART OF FOOT: Primary | ICD-10-CM

## 2025-03-20 DIAGNOSIS — E11.42 TYPE 2 DIABETES MELLITUS WITH DIABETIC POLYNEUROPATHY, WITH LONG-TERM CURRENT USE OF INSULIN: ICD-10-CM

## 2025-03-20 DIAGNOSIS — I73.9 PAD (PERIPHERAL ARTERY DISEASE): ICD-10-CM

## 2025-03-20 DIAGNOSIS — Z79.4 TYPE 2 DIABETES MELLITUS WITH DIABETIC POLYNEUROPATHY, WITH LONG-TERM CURRENT USE OF INSULIN: ICD-10-CM

## 2025-03-20 DIAGNOSIS — Z79.4 TYPE 2 DIABETES MELLITUS WITH FOOT ULCER, WITH LONG-TERM CURRENT USE OF INSULIN: ICD-10-CM

## 2025-03-20 DIAGNOSIS — L97.509 TYPE 2 DIABETES MELLITUS WITH FOOT ULCER, WITH LONG-TERM CURRENT USE OF INSULIN: ICD-10-CM

## 2025-03-20 DIAGNOSIS — M21.6X9 EQUINUS DEFORMITY OF FOOT: ICD-10-CM

## 2025-03-20 DIAGNOSIS — E11.621 TYPE 2 DIABETES MELLITUS WITH FOOT ULCER, WITH LONG-TERM CURRENT USE OF INSULIN: ICD-10-CM

## 2025-03-20 DIAGNOSIS — L97.522 ULCER OF LEFT FOOT WITH FAT LAYER EXPOSED: ICD-10-CM

## 2025-03-20 DIAGNOSIS — L97.522 DIABETIC ULCER OF LEFT FOOT ASSOCIATED WITH TYPE 2 DIABETES MELLITUS, WITH FAT LAYER EXPOSED, UNSPECIFIED PART OF FOOT: Primary | ICD-10-CM

## 2025-03-20 PROCEDURE — 3008F BODY MASS INDEX DOCD: CPT | Mod: CPTII,S$GLB,,

## 2025-03-20 PROCEDURE — 3288F FALL RISK ASSESSMENT DOCD: CPT | Mod: CPTII,S$GLB,,

## 2025-03-20 PROCEDURE — 99999 PR PBB SHADOW E&M-EST. PATIENT-LVL IV: CPT | Mod: PBBFAC,,,

## 2025-03-20 PROCEDURE — 1101F PT FALLS ASSESS-DOCD LE1/YR: CPT | Mod: CPTII,S$GLB,,

## 2025-03-20 PROCEDURE — 99213 OFFICE O/P EST LOW 20 MIN: CPT | Mod: 25,S$GLB,,

## 2025-03-20 PROCEDURE — 1159F MED LIST DOCD IN RCRD: CPT | Mod: CPTII,S$GLB,,

## 2025-03-20 PROCEDURE — 11042 DBRDMT SUBQ TIS 1ST 20SQCM/<: CPT | Mod: S$GLB,,,

## 2025-03-20 PROCEDURE — 1126F AMNT PAIN NOTED NONE PRSNT: CPT | Mod: CPTII,S$GLB,,

## 2025-03-20 PROCEDURE — 1160F RVW MEDS BY RX/DR IN RCRD: CPT | Mod: CPTII,S$GLB,,

## 2025-03-20 PROCEDURE — 3046F HEMOGLOBIN A1C LEVEL >9.0%: CPT | Mod: CPTII,S$GLB,,

## 2025-03-20 NOTE — PROGRESS NOTES
[address] [facility] [address]  [place] [address]  Phone: [phone]   [phone]    Patient's PCP:Carlos Pope MD  Referring Provider: No ref. provider found    Subjective:      Chief Complaint:: DM Left foot neuropathic ulceration     Hoa Wilde is a 75 y.o. female who presents today for follow-up complaint of left foot ulcer. Presents weightbearing in diabetic shoes with accomadated horse shoe padding and krysta wrap. Dressings clean, dry, and intact. No recent fevers, chills, nausea, or vomiting endorsed.     Systemic Doctor:   Date Last Seen: 3/27/24  Blood Sugar: not taken   Hemoglobin A1c: 11.9    Vitals:    03/20/25 1042   Resp: 18   Weight: 67.2 kg (148 lb 2.4 oz)   Height: 5' (1.524 m)   PainSc: 0-No pain      Past Surgical History:   Procedure Laterality Date    ANGIOGRAPHY OF LOWER EXTREMITY Left 12/16/2020    Procedure: ANGIOGRAM, LOWER EXTREMITY;  Surgeon: Juancho White MD;  Location: [facility] CATH/EP LAB;  Service: General;  Laterality: Left;    ARTERIOGRAPHY OF AORTIC ROOT N/A 5/2/2025    Procedure: ARTERIOGRAM, AORTIC ROOT;  Surgeon: Isabel Diallo MD;  Location: [facility] CATH/EP LAB;  Service: Cardiology;  Laterality: N/A;    CHOLECYSTECTOMY      CORONARY ARTERY BYPASS GRAFT      FOOT SURGERY Left     HYSTERECTOMY      LEFT HEART CATHETERIZATION Left 5/2/2025    Procedure: Left heart cath;  Surgeon: Isabel Diallo MD;  Location: [facility] CATH/EP LAB;  Service: Cardiology;  Laterality: Left;    PERIPHERALLY INSERTED CENTRAL CATHETER INSERTION N/A 3/13/2024    Procedure: INSERTION, PICC;  Surgeon: PICC, [facility];  Location: [facility] ENDO;  Service: Cardiology;  Laterality: N/A;  Dr Mendez     Past Medical History:   Diagnosis Date    Coronary artery disease     Diabetes mellitus, type 2     Hyperlipidemia     Hypertension     Hypothyroidism     Neuropathy      Family History   Problem Relation Name Age of Onset    Hyperlipidemia Mother      Heart disease Mother          Social History:  "  Marital Status:   Alcohol History:  reports no history of alcohol use.  Tobacco History:  reports that she has never smoked. She has never used smokeless tobacco.  Drug History:  reports no history of drug use.    Review of patient's allergies indicates:  No Known Allergies    Current Outpatient Medications   Medication Sig Dispense Refill    alendronate (FOSAMAX) 70 MG tablet Take 1 tablet (70 mg total) by mouth every 7 days. 12 tablet 1    aspirin (ECOTRIN) 81 MG EC tablet Take 1 tablet (81 mg total) by mouth once daily. 30 tablet 0    blood-glucose meter kit To check BG 2 times daily, to use with insurance preferred meter 1 each 0    furosemide (LASIX) 20 MG tablet Take 1 tablet (20 mg total) by mouth once daily. 30 tablet 0    gabapentin (NEURONTIN) 300 MG capsule Take 2 capsules (600 mg total) by mouth every evening. 60 capsule 0    insulin glargine U-100, Lantus, 100 unit/mL (3 mL) SubQ InPn pen Inject 20 Units into the skin every evening. 6 mL 0    levothyroxine (EUTHYROX) 50 MCG tablet Take 1 tablet (50 mcg total) by mouth before breakfast. 30 tablet 0    metoprolol tartrate (LOPRESSOR) 25 MG tablet Take 0.5 tablets (12.5 mg total) by mouth 2 (two) times daily. 90 tablet 3    ONETOUCH DELICA PLUS LANCET 33 gauge Misc USE   TO CHECK GLUCOSE TWICE DAILY 100 each 3    ONETOUCH ULTRA TEST Strp USE  STRIP TO CHECK GLUCOSE TWICE DAILY 100 each 3    pantoprazole (PROTONIX) 40 MG tablet Take 1 tablet (40 mg total) by mouth once daily. 30 tablet 0    pen needle, diabetic 30 gauge x 1/3" Ndle 1 pen by Misc.(Non-Drug; Combo Route) route once daily. To use with levimir flex pen 100 each 3    rosuvastatin (CRESTOR) 20 MG tablet Take 1 tablet (20 mg total) by mouth once daily. 30 tablet 0     No current facility-administered medications for this visit.     Facility-Administered Medications Ordered in Other Visits   Medication Dose Route Frequency Provider Last Rate Last Admin    0.9%  NaCl infusion   Intravenous " Continuous Juancho White MD   Stopped at 12/16/20 1500       Review of Systems   Constitutional:  Negative for activity change, chills, fatigue, fever and unexpected weight change.   HENT:  Negative for hearing loss and trouble swallowing.    Eyes:  Negative for photophobia and visual disturbance.   Respiratory:  Negative for cough, shortness of breath and wheezing.    Cardiovascular:  Negative for chest pain, palpitations and leg swelling.   Gastrointestinal:  Negative for abdominal pain, diarrhea, nausea and vomiting.   Genitourinary:  Negative for dysuria and frequency.   Musculoskeletal:  Negative for arthralgias, back pain, gait problem, joint swelling, myalgias and neck pain.   Skin:  Positive for wound. Negative for color change, pallor and rash.   Neurological:  Positive for numbness. Negative for dizziness, tremors, seizures, weakness and headaches.   Hematological:  Does not bruise/bleed easily.         Objective:        Physical Exam:   Foot Exam    General  General Appearance: appears stated age and healthy   Orientation: alert and oriented to person, place, and time   Affect: appropriate       Left Foot/Ankle      Inspection and Palpation  Ecchymosis: none  Tenderness: none   Swelling: none   Skin Exam: dry skin and ulcer; no callus, no drainage, no cellulitis and no erythema   Neurovascular  Dorsalis pedis: absent  Posterior tibial: absent  Capillary refill: 4+  Varicose veins: present  Saphenous nerve sensation: absent  Tibial nerve sensation: absent  Superficial peroneal nerve sensation: absent  Deep peroneal nerve sensation: absent  Sural nerve sensation: absent    Muscle Strength  Ankle dorsiflexion: 5  Ankle plantar flexion: 5  Ankle inversion: 5  Ankle eversion: 5  Great toe extension: 5  Great toe flexion: 5    Range of Motion    Passive  Ankle dorsiflexion: 0  Ankle eversion: 10  Ankle inversion: 20  1st MTP extension: 35      Comments  1st MTPJ with underlying full thickness ulceration  ulceration measuring 0.25 cm x 0.25 cm x 0.1 cm.   No erythema, No warmth, No edema, No Pain, No drainage, No fluctuance or crepitation, No Malodor      Imaging: None         Assessment:       1. Diabetic ulcer of left foot associated with type 2 diabetes mellitus, with fat layer exposed, unspecified part of foot    2. Type 2 diabetes mellitus with foot ulcer, with long-term current use of insulin    3. Type 2 diabetes mellitus with diabetic polyneuropathy, with long-term current use of insulin    4. Equinus deformity of foot    5. PAD (peripheral artery disease)    6. Ulcer of left foot with fat layer exposed      Plan:   Diabetic ulcer of left foot associated with type 2 diabetes mellitus, with fat layer exposed, unspecified part of foot    Other acute osteomyelitis, unspecified ankle and foot    Type 2 diabetes mellitus with foot ulcer, with long-term current use of insulin    Type 2 diabetes mellitus with diabetic polyneuropathy, with long-term current use of insulin    I provided patient education verbally regarding: Patient diagnosis, treatment options, as well as alternatives, risks, and benefits.   No signs of infection   With informed consent debridement preformed. Dressed with rupali AG, HFBR, and DSD football, then placed in post op shoe.   Moisturize daily, avoid leaving moisture in interdigital spaces to right and begin left once wound healed   Provided offloading felt pad for shoe insole   Patient will perform tendo-Achilles complex directed stretches demonstrated in clinic to address ankle equinus by reducing forefoot loading pressure in the   gait cycle     Counseled patient on the aspects of diabetes and how it pertains to the feet.  I explained the importance of proper diabetic foot care and how it is essential for the health of their feet.  We discussed the importance of knowing their HGA1c and that the level needs to be as close to 6 as possible.    I discussed the increase complications of high  "blood sugar including stroke, blindness, heart attack, kidney failure and loss of limb secondary to neuropathy and PVD.  Patient will inspect twice daily for any breaks in the skin or redness. In the setting of neuropathy, if present, these areas are not recognized early due to the numbness.  I discussed different treatments available to control the symptoms but whcih may not cure the problem.  Shoe inspection. Patient instructed on proper foot hygeine. We discussed wearing proper shoe gear, daily foot inspections, never walking without protective shoe gear, never putting sharp instruments to feet.   Follow up in 1 week  This note was created using Dragon voice recognition software that occasionally misinterpreted phrases or words.       Debridement    Date/Time: 3/20/2025 11:00 AM    Performed by: Rey Bob DPM  Authorized by: Rey Bob DPM    Time out: Immediately prior to procedure a "time out" was called to verify the correct patient, procedure, equipment, support staff and site/side marked as required.    Consent Done?:  Yes (Written) and Yes (Verbal)    Wound Details:    Location:  Left foot    Location:  Left Plantar    Type of Debridement:  Excisional       Length (cm):  0.3       Width (cm):  0.3       Depth (cm):  0.1       Area (sq cm):  0.07       Percent Debrided (%):  100       Total Area Debrided (sq cm):  0.07    Depth of debridement:  Subcutaneous tissue    Tissue debrided:  Dermis, Epidermis and Subcutaneous    Devitalized tissue debrided:  Callus, Exudate, Slough and Fibrin    Instruments:  Blade and Curette  Bleeding:  Minimal  Patient tolerance:  Patient tolerated the procedure well with no immediate complications      Rey Bob DPM  Podiatry / Foot and Ankle Surgery   Secure chat preferred   "

## 2025-03-24 ENCOUNTER — PATIENT MESSAGE (OUTPATIENT)
Dept: FAMILY MEDICINE | Facility: CLINIC | Age: 75
End: 2025-03-24
Payer: MEDICARE

## 2025-03-24 DIAGNOSIS — Z00.00 ENCOUNTER FOR MEDICARE ANNUAL WELLNESS EXAM: ICD-10-CM

## 2025-04-02 ENCOUNTER — OFFICE VISIT (OUTPATIENT)
Dept: HOME HEALTH SERVICES | Facility: CLINIC | Age: 75
End: 2025-04-02
Payer: MEDICARE

## 2025-04-02 ENCOUNTER — TELEPHONE (OUTPATIENT)
Dept: PODIATRY | Facility: CLINIC | Age: 75
End: 2025-04-02
Payer: MEDICARE

## 2025-04-02 ENCOUNTER — TELEPHONE (OUTPATIENT)
Dept: HOME HEALTH SERVICES | Facility: CLINIC | Age: 75
End: 2025-04-02

## 2025-04-02 VITALS
SYSTOLIC BLOOD PRESSURE: 145 MMHG | HEIGHT: 60 IN | HEART RATE: 90 BPM | OXYGEN SATURATION: 92 % | BODY MASS INDEX: 29.06 KG/M2 | WEIGHT: 148 LBS | DIASTOLIC BLOOD PRESSURE: 81 MMHG

## 2025-04-02 DIAGNOSIS — Z91.199 MEDICALLY NONCOMPLIANT: ICD-10-CM

## 2025-04-02 DIAGNOSIS — Z79.4 TYPE 2 DIABETES MELLITUS WITH DIABETIC POLYNEUROPATHY, WITH LONG-TERM CURRENT USE OF INSULIN: ICD-10-CM

## 2025-04-02 DIAGNOSIS — E11.69 HYPERLIPIDEMIA ASSOCIATED WITH TYPE 2 DIABETES MELLITUS: ICD-10-CM

## 2025-04-02 DIAGNOSIS — Z00.00 ENCOUNTER FOR MEDICARE ANNUAL WELLNESS EXAM: Primary | ICD-10-CM

## 2025-04-02 DIAGNOSIS — I15.2 HYPERTENSION ASSOCIATED WITH DIABETES: ICD-10-CM

## 2025-04-02 DIAGNOSIS — E13.40 NEUROPATHY DUE TO SECONDARY DIABETES: ICD-10-CM

## 2025-04-02 DIAGNOSIS — Z79.4 TYPE 2 DIABETES MELLITUS WITH HYPERGLYCEMIA, WITH LONG-TERM CURRENT USE OF INSULIN: ICD-10-CM

## 2025-04-02 DIAGNOSIS — E11.65 TYPE 2 DIABETES MELLITUS WITH HYPERGLYCEMIA, WITH LONG-TERM CURRENT USE OF INSULIN: ICD-10-CM

## 2025-04-02 DIAGNOSIS — F32.1 MAJOR DEPRESSIVE DISORDER, SINGLE EPISODE, MODERATE: ICD-10-CM

## 2025-04-02 DIAGNOSIS — E03.9 HYPOTHYROIDISM, UNSPECIFIED TYPE: Chronic | ICD-10-CM

## 2025-04-02 DIAGNOSIS — Z74.09 OTHER REDUCED MOBILITY: ICD-10-CM

## 2025-04-02 DIAGNOSIS — E11.42 TYPE 2 DIABETES MELLITUS WITH DIABETIC POLYNEUROPATHY, WITH LONG-TERM CURRENT USE OF INSULIN: ICD-10-CM

## 2025-04-02 DIAGNOSIS — E78.5 HYPERLIPIDEMIA ASSOCIATED WITH TYPE 2 DIABETES MELLITUS: ICD-10-CM

## 2025-04-02 DIAGNOSIS — E11.65 UNCONTROLLED TYPE 2 DIABETES MELLITUS WITH HYPERGLYCEMIA: ICD-10-CM

## 2025-04-02 DIAGNOSIS — H43.11 VITREOUS HEMORRHAGE, RIGHT EYE: ICD-10-CM

## 2025-04-02 DIAGNOSIS — I25.10 CORONARY ARTERY DISEASE, UNSPECIFIED VESSEL OR LESION TYPE, UNSPECIFIED WHETHER ANGINA PRESENT, UNSPECIFIED WHETHER NATIVE OR TRANSPLANTED HEART: Chronic | ICD-10-CM

## 2025-04-02 DIAGNOSIS — I70.222 ATHEROSCLEROSIS OF NATIVE ARTERY OF LEFT LOWER EXTREMITY WITH REST PAIN: ICD-10-CM

## 2025-04-02 DIAGNOSIS — E11.59 HYPERTENSION ASSOCIATED WITH DIABETES: ICD-10-CM

## 2025-04-02 PROCEDURE — 1159F MED LIST DOCD IN RCRD: CPT | Mod: CPTII,S$GLB,, | Performed by: NURSE PRACTITIONER

## 2025-04-02 PROCEDURE — 1100F PTFALLS ASSESS-DOCD GE2>/YR: CPT | Mod: CPTII,S$GLB,, | Performed by: NURSE PRACTITIONER

## 2025-04-02 PROCEDURE — 3288F FALL RISK ASSESSMENT DOCD: CPT | Mod: CPTII,S$GLB,, | Performed by: NURSE PRACTITIONER

## 2025-04-02 PROCEDURE — G0439 PPPS, SUBSEQ VISIT: HCPCS | Mod: S$GLB,,, | Performed by: NURSE PRACTITIONER

## 2025-04-02 PROCEDURE — 1125F AMNT PAIN NOTED PAIN PRSNT: CPT | Mod: CPTII,S$GLB,, | Performed by: NURSE PRACTITIONER

## 2025-04-02 PROCEDURE — 3077F SYST BP >= 140 MM HG: CPT | Mod: CPTII,S$GLB,, | Performed by: NURSE PRACTITIONER

## 2025-04-02 PROCEDURE — 1160F RVW MEDS BY RX/DR IN RCRD: CPT | Mod: CPTII,S$GLB,, | Performed by: NURSE PRACTITIONER

## 2025-04-02 PROCEDURE — 3079F DIAST BP 80-89 MM HG: CPT | Mod: CPTII,S$GLB,, | Performed by: NURSE PRACTITIONER

## 2025-04-02 NOTE — TELEPHONE ENCOUNTER
----- Message from Bertha Kelley sent at 4/2/2025  9:46 AM CDT -----  Regarding: Rx  Patient is scheduled for the 9th but will need antibiotics called in now since its a week out. Please advise

## 2025-04-02 NOTE — TELEPHONE ENCOUNTER
Patient has stopped taking meds for the last 2 weeks. /81. ,    She only has lisinopril, pepcid and thyroid med bottle from 2022.  She said she has insulin, but did not show this to me.      She states that she just does not want to take her medications.    She states that she is depressed and it is related to her . I asked if he was causing harm to her and she reported no.  She also reported no homicidal or suicidal ideations.   I urged her to call you for an appt.  She has many things that she is behind on with her preventative care.   Just wanted to let you know.    amy

## 2025-04-07 PROBLEM — I15.2 HYPERTENSION ASSOCIATED WITH DIABETES: Status: ACTIVE | Noted: 2018-12-03

## 2025-04-07 PROBLEM — F32.1 MAJOR DEPRESSIVE DISORDER, SINGLE EPISODE, MODERATE: Status: ACTIVE | Noted: 2025-04-07

## 2025-04-07 PROBLEM — E11.69 HYPERLIPIDEMIA ASSOCIATED WITH TYPE 2 DIABETES MELLITUS: Status: ACTIVE | Noted: 2018-12-03

## 2025-04-07 PROBLEM — E11.59 HYPERTENSION ASSOCIATED WITH DIABETES: Status: ACTIVE | Noted: 2018-12-03

## 2025-04-07 PROBLEM — I70.222: Status: ACTIVE | Noted: 2024-02-16

## 2025-04-07 PROBLEM — Z91.199 MEDICALLY NONCOMPLIANT: Status: ACTIVE | Noted: 2025-04-07

## 2025-04-07 NOTE — PROGRESS NOTES
Hoa Wilde presented for an initial Medicare AWV today. The following components were reviewed and updated:    Medical history  Family History  Social history  Allergies and Current Medications  Health Risk Assessment  Health Maintenance  Care Team    **See Completed Assessments for Annual Wellness visit with in the encounter summary    The following assessments were completed:  Depression Screening  Cognitive function Screening  Timed Get Up Test  Whisper Test      Opioid documentation:      Patient does not have a current opioid prescription.          Vitals:    04/02/25 0944   BP: (!) 145/81   Pulse: 90   SpO2: (!) 92%   Weight: 67.1 kg (148 lb)   Height: 5' (1.524 m)     Body mass index is 28.9 kg/m².       Physical Exam  Constitutional:       Appearance: Normal appearance.   HENT:      Head: Normocephalic and atraumatic.      Nose: Nose normal.      Mouth/Throat:      Mouth: Mucous membranes are moist.   Eyes:      Extraocular Movements: Extraocular movements intact.      Pupils: Pupils are equal, round, and reactive to light.   Cardiovascular:      Rate and Rhythm: Normal rate.   Pulmonary:      Effort: Pulmonary effort is normal.      Breath sounds: Normal breath sounds.   Abdominal:      General: Bowel sounds are normal.      Palpations: Abdomen is soft.   Musculoskeletal:      Cervical back: Normal range of motion and neck supple.   Skin:     General: Skin is warm.   Neurological:      Mental Status: She is alert.   Psychiatric:         Mood and Affect: Mood normal.           Diagnoses and health risks identified today and associated recommendations/orders:  1. Encounter for Medicare annual wellness exam  - Referral to Enhanced Annual Wellness Visit (eAWV) W+1    2. Major depressive disorder, single episode, moderate  Patient depressed- states regarding her  - she states that she is not in any danger in her home      3. Atherosclerosis of native artery of left lower extremity with rest  pain  Noncompliant with meds -   Not taking any meds    4. Vitreous hemorrhage, right eye  Not compliant with eye exam      5. Neuropathy due to secondary diabetes  Not taking any meds - not monitoring her BS - took her BS at the house - was Greater than 300    6. Hyperlipidemia associated with type 2 diabetes mellitus  Not taking any statin       7. Hypertension associated with diabetes  Not taking her BP meds      8. Coronary artery disease, unspecified vessel or lesion type, unspecified whether angina present, unspecified whether native or transplanted heart  No chest pain - but not taking any meds for DM or HTN      9. Hypothyroidism, unspecified type  Has thyroid meds - not taking meds      10. Type 2 diabetes mellitus with diabetic polyneuropathy, with long-term current use of insulin  Not taking any meds       11. Uncontrolled type 2 diabetes mellitus with hyperglycemia  Uncontrolled DM for >2 yrs.  She is not interested in taking her medications      12. Type 2 diabetes mellitus with hyperglycemia, with long-term current use of insulin  Uncontrolled for >2 yrs      13. Medically noncompliant  Discussed why she was not taking meds- states that she quit taking meds >2 weeks ago - depressed  Sent urgent message to PCP      Provided Hoa with a 5-10 year written screening schedule and personal prevention plan. Recommendations were developed using the USPSTF age appropriate recommendations. Education, counseling, and referrals were provided as needed.  After Visit Summary printed and given to patient which includes a list of additional screenings\tests needed.    Fu in 1 yr for alissa Kumar, DEMETRIO, APRN

## 2025-04-07 NOTE — PATIENT INSTRUCTIONS
Counseling and Referral of Other Preventative  (Italic type indicates deductible and co-insurance are waived)    Patient Name: Hoa Wilde  Today's Date: 4/7/2025    Health Maintenance       Date Due Completion Date    Diabetes Urine Screening Never done ---    Mammogram Never done ---    Colorectal Cancer Screening Never done ---    Shingles Vaccine (1 of 2) Never done ---    RSV Vaccine (Age 60+ and Pregnant patients) (1 - Risk 60-74 years 1-dose series) Never done ---    Diabetic Eye Exam 02/06/2021 2/6/2020    Override on 2/6/2020: Done    Lipid Panel 05/05/2021 5/5/2020    DEXA Scan 06/04/2023 6/4/2020    Hemoglobin A1c 06/08/2024 3/8/2024    Influenza Vaccine (1) 09/01/2024 3/17/2023 (Declined)    Override on 3/17/2023: Declined (pt declined vaccine)    COVID-19 Vaccine (4 - 2024-25 season) 09/01/2024 1/28/2022    Foot Exam 06/13/2025 6/13/2024    Override on 6/21/2021: Done    High Dose Statin 04/01/2026 4/1/2025    TETANUS VACCINE 01/12/2032 1/12/2022        No orders of the defined types were placed in this encounter.    The following information is provided to all patients.  This information is to help you find resources for any of the problems found today that may be affecting your health:                  Living healthy guide: www.[facility].[place].gov      Understanding Diabetes: www.diabetes.org      Eating healthy: www.cdc.gov/healthyweight      CDC home safety checklist: www.cdc.gov/steadi/patient.html      Agency on Aging: www.goea.[place].[facility]      Alcoholics anonymous (AA): www.aa.org      Physical Activity: www.ana.nih.gov/kb6xika      Tobacco use: www.quitwithusla.org

## 2025-04-07 NOTE — PROGRESS NOTES
I offered to discuss advanced care planning, including how to pick a person who would make decisions for you if you were unable to make them for yourself, called a health care power of , and what kind of decisions you might make such as use of life sustaining treatments such as ventilators and tube feeding when faced with a life limiting illness recorded on a living will that they will need to know. (How you want to be cared for as you near the end of your natural life)     X  Patient is unable to engage in a discussion regarding advanced directives due to severe physical and/or cognitive impairment.

## 2025-04-09 ENCOUNTER — OFFICE VISIT (OUTPATIENT)
Dept: PODIATRY | Facility: CLINIC | Age: 75
End: 2025-04-09
Payer: MEDICARE

## 2025-04-09 VITALS — WEIGHT: 147.94 LBS | BODY MASS INDEX: 28.89 KG/M2

## 2025-04-09 DIAGNOSIS — E11.621 TYPE 2 DIABETES MELLITUS WITH FOOT ULCER, WITH LONG-TERM CURRENT USE OF INSULIN: ICD-10-CM

## 2025-04-09 DIAGNOSIS — L97.522 DIABETIC ULCER OF LEFT FOOT ASSOCIATED WITH TYPE 2 DIABETES MELLITUS, WITH FAT LAYER EXPOSED, UNSPECIFIED PART OF FOOT: ICD-10-CM

## 2025-04-09 DIAGNOSIS — Z79.4 TYPE 2 DIABETES MELLITUS WITH DIABETIC POLYNEUROPATHY, WITH LONG-TERM CURRENT USE OF INSULIN: ICD-10-CM

## 2025-04-09 DIAGNOSIS — M21.6X9 EQUINUS DEFORMITY OF FOOT: ICD-10-CM

## 2025-04-09 DIAGNOSIS — Z87.828 HEALED WOUND: Primary | ICD-10-CM

## 2025-04-09 DIAGNOSIS — E11.621 DIABETIC ULCER OF LEFT FOOT ASSOCIATED WITH TYPE 2 DIABETES MELLITUS, WITH FAT LAYER EXPOSED, UNSPECIFIED PART OF FOOT: ICD-10-CM

## 2025-04-09 DIAGNOSIS — Z79.4 TYPE 2 DIABETES MELLITUS WITH FOOT ULCER, WITH LONG-TERM CURRENT USE OF INSULIN: ICD-10-CM

## 2025-04-09 DIAGNOSIS — I73.9 PAD (PERIPHERAL ARTERY DISEASE): ICD-10-CM

## 2025-04-09 DIAGNOSIS — L97.509 TYPE 2 DIABETES MELLITUS WITH FOOT ULCER, WITH LONG-TERM CURRENT USE OF INSULIN: ICD-10-CM

## 2025-04-09 DIAGNOSIS — L97.522 ULCER OF LEFT FOOT WITH FAT LAYER EXPOSED: ICD-10-CM

## 2025-04-09 DIAGNOSIS — E11.42 TYPE 2 DIABETES MELLITUS WITH DIABETIC POLYNEUROPATHY, WITH LONG-TERM CURRENT USE OF INSULIN: ICD-10-CM

## 2025-04-09 DIAGNOSIS — I87.2 VENOUS INSUFFICIENCY OF LOWER EXTREMITY: ICD-10-CM

## 2025-04-09 PROCEDURE — 99999 PR PBB SHADOW E&M-EST. PATIENT-LVL III: CPT | Mod: PBBFAC,,,

## 2025-04-09 PROCEDURE — 1159F MED LIST DOCD IN RCRD: CPT | Mod: CPTII,S$GLB,,

## 2025-04-09 PROCEDURE — 1125F AMNT PAIN NOTED PAIN PRSNT: CPT | Mod: CPTII,S$GLB,,

## 2025-04-09 PROCEDURE — 1160F RVW MEDS BY RX/DR IN RCRD: CPT | Mod: CPTII,S$GLB,,

## 2025-04-09 PROCEDURE — 3008F BODY MASS INDEX DOCD: CPT | Mod: CPTII,S$GLB,,

## 2025-04-09 PROCEDURE — 99213 OFFICE O/P EST LOW 20 MIN: CPT | Mod: 25,S$GLB,,

## 2025-04-09 PROCEDURE — 3288F FALL RISK ASSESSMENT DOCD: CPT | Mod: CPTII,S$GLB,,

## 2025-04-09 PROCEDURE — 97597 DBRDMT OPN WND 1ST 20 CM/<: CPT | Mod: S$GLB,,,

## 2025-04-09 PROCEDURE — 1101F PT FALLS ASSESS-DOCD LE1/YR: CPT | Mod: CPTII,S$GLB,,

## 2025-04-09 PROCEDURE — 3046F HEMOGLOBIN A1C LEVEL >9.0%: CPT | Mod: CPTII,S$GLB,,

## 2025-04-09 NOTE — PROGRESS NOTES
[address] [facility] [address]  [place] [address]  Phone: [phone]   [phone]    Patient's PCP:Carlos Pope MD  Referring Provider: No ref. provider found    Subjective:      Chief Complaint: DM Left foot neuropathic ulceration     Hoa Wilde is a 75 y.o. female who presents today for follow up of Diabetic ulcer of left foot. PT states she is having some new swelling and throbbing in left foot. PT presents in normal shoes with offloading pad over ulcer site. No recent fevers, chills, nausea, or vomiting endorsed.     Systemic Doctor:   Date Last Seen: 3/27/24  Blood Sugar: not taken   Hemoglobin A1c: 11.9    Vitals:    04/09/25 1450   Weight: 67.1 kg (147 lb 14.9 oz)   PainSc:   5      Past Surgical History:   Procedure Laterality Date    ANGIOGRAPHY OF LOWER EXTREMITY Left 12/16/2020    Procedure: ANGIOGRAM, LOWER EXTREMITY;  Surgeon: Juancho White MD;  Location: [facility] CATH/EP LAB;  Service: General;  Laterality: Left;    ARTERIOGRAPHY OF AORTIC ROOT N/A 5/2/2025    Procedure: ARTERIOGRAM, AORTIC ROOT;  Surgeon: Isabel Diallo MD;  Location: [facility] CATH/EP LAB;  Service: Cardiology;  Laterality: N/A;    CHOLECYSTECTOMY      CORONARY ARTERY BYPASS GRAFT      FOOT SURGERY Left     HYSTERECTOMY      LEFT HEART CATHETERIZATION Left 5/2/2025    Procedure: Left heart cath;  Surgeon: Isabel Diallo MD;  Location: [facility] CATH/EP LAB;  Service: Cardiology;  Laterality: Left;    PERIPHERALLY INSERTED CENTRAL CATHETER INSERTION N/A 3/13/2024    Procedure: INSERTION, PICC;  Surgeon: PICC, [facility];  Location: [facility] ENDO;  Service: Cardiology;  Laterality: N/A;  Dr Mendez     Past Medical History:   Diagnosis Date    Coronary artery disease     Diabetes mellitus, type 2     Hyperlipidemia     Hypertension     Hypothyroidism     Neuropathy      Family History   Problem Relation Name Age of Onset    Hyperlipidemia Mother      Heart disease Mother          Social History:   Marital Status:  "  Alcohol History:  reports no history of alcohol use.  Tobacco History:  reports that she has never smoked. She has never used smokeless tobacco.  Drug History:  reports no history of drug use.    Review of patient's allergies indicates:  No Known Allergies    Current Outpatient Medications   Medication Sig Dispense Refill    alendronate (FOSAMAX) 70 MG tablet Take 1 tablet (70 mg total) by mouth every 7 days. 12 tablet 1    aspirin (ECOTRIN) 81 MG EC tablet Take 1 tablet (81 mg total) by mouth once daily. 30 tablet 0    blood-glucose meter kit To check BG 2 times daily, to use with insurance preferred meter 1 each 0    furosemide (LASIX) 20 MG tablet Take 1 tablet (20 mg total) by mouth once daily. 30 tablet 0    gabapentin (NEURONTIN) 300 MG capsule Take 2 capsules (600 mg total) by mouth every evening. 60 capsule 0    insulin glargine U-100, Lantus, 100 unit/mL (3 mL) SubQ InPn pen Inject 20 Units into the skin every evening. 6 mL 0    levothyroxine (EUTHYROX) 50 MCG tablet Take 1 tablet (50 mcg total) by mouth before breakfast. 30 tablet 0    metoprolol tartrate (LOPRESSOR) 25 MG tablet Take 0.5 tablets (12.5 mg total) by mouth 2 (two) times daily. 90 tablet 3    ONETOUCH DELICA PLUS LANCET 33 gauge Misc USE   TO CHECK GLUCOSE TWICE DAILY 100 each 3    ONETOUCH ULTRA TEST Strp USE  STRIP TO CHECK GLUCOSE TWICE DAILY 100 each 3    pantoprazole (PROTONIX) 40 MG tablet Take 1 tablet (40 mg total) by mouth once daily. 30 tablet 0    pen needle, diabetic 30 gauge x 1/3" Ndle 1 pen by Misc.(Non-Drug; Combo Route) route once daily. To use with levimir flex pen 100 each 3    rosuvastatin (CRESTOR) 20 MG tablet Take 1 tablet (20 mg total) by mouth once daily. 30 tablet 0     Current Facility-Administered Medications   Medication Dose Route Frequency Provider Last Rate Last Admin    sodium chloride 0.9% flush 10 mL  10 mL Intravenous PRN Heather Boykin NP         Facility-Administered Medications Ordered in " Other Visits   Medication Dose Route Frequency Provider Last Rate Last Admin    0.9%  NaCl infusion   Intravenous Continuous Juancho White MD   Stopped at 12/16/20 1500       Review of Systems   Constitutional:  Negative for activity change, chills, fatigue, fever and unexpected weight change.   HENT:  Negative for hearing loss and trouble swallowing.    Eyes:  Negative for photophobia and visual disturbance.   Respiratory:  Negative for cough, shortness of breath and wheezing.    Cardiovascular:  Negative for chest pain, palpitations and leg swelling.   Gastrointestinal:  Negative for abdominal pain, diarrhea, nausea and vomiting.   Genitourinary:  Negative for dysuria and frequency.   Musculoskeletal:  Negative for arthralgias, back pain, gait problem, joint swelling, myalgias and neck pain.   Skin:  Negative for color change, pallor, rash and wound.   Neurological:  Negative for dizziness, tremors, seizures, weakness, numbness and headaches.   Hematological:  Does not bruise/bleed easily.   Psychiatric/Behavioral:  Negative for hallucinations.          Objective:        Physical Exam:   Foot Exam    General  General Appearance: appears stated age and healthy   Orientation: alert and oriented to person, place, and time   Affect: appropriate       Left Foot/Ankle      Inspection and Palpation  Ecchymosis: none  Tenderness: none   Swelling: none   Skin Exam: dry skin and ulcer; no callus, no drainage, no cellulitis and no erythema   Neurovascular  Dorsalis pedis: absent  Posterior tibial: absent  Capillary refill: 4+  Varicose veins: present  Saphenous nerve sensation: absent  Tibial nerve sensation: absent  Superficial peroneal nerve sensation: absent  Deep peroneal nerve sensation: absent  Sural nerve sensation: absent    Muscle Strength  Ankle dorsiflexion: 5  Ankle plantar flexion: 5  Ankle inversion: 5  Ankle eversion: 5  Great toe extension: 5  Great toe flexion: 5    Range of Motion    Passive  Ankle  dorsiflexion: 0  Ankle eversion: 10  Ankle inversion: 20  1st MTP extension: 35      Tests  Iliana's sign: negative  Comments  1st MTPJ with no underlying ulceration ulceration    No erythema, No warmth, No edema, No Pain, No drainage, No fluctuance or crepitation, No Malodor      Imaging: None         Assessment:       1. Healed wound    2. Diabetic ulcer of left foot associated with type 2 diabetes mellitus, with fat layer exposed, unspecified part of foot    3. Type 2 diabetes mellitus with foot ulcer, with long-term current use of insulin    4. Type 2 diabetes mellitus with diabetic polyneuropathy, with long-term current use of insulin    5. Equinus deformity of foot    6. PAD (peripheral artery disease)    7. Ulcer of left foot with fat layer exposed    8. Venous insufficiency of lower extremity      Plan:   Diabetic ulcer of left foot associated with type 2 diabetes mellitus, with fat layer exposed, unspecified part of foot    Other acute osteomyelitis, unspecified ankle and foot    Type 2 diabetes mellitus with foot ulcer, with long-term current use of insulin    Type 2 diabetes mellitus with diabetic polyneuropathy, with long-term current use of insulin    Swelling and throbbing sensation likely neuropathy/venous insufficiency, patient would not like to increase dosage at this time. Recommend compression 15-20 mmHg.     I provided patient education verbally regarding: Patient diagnosis, treatment options, as well as alternatives, risks, and benefits.   No signs of infection   Moisturize daily, avoid leaving moisture in interdigital spaces   Provided offloading felt pad for shoe insole   Patient will perform tendo-Achilles complex directed stretches demonstrated in clinic to address ankle equinus by reducing forefoot loading pressure in the   gait cycle     Counseled patient on the aspects of diabetes and how it pertains to the feet.  I explained the importance of proper diabetic foot care and how it is  "essential for the health of their feet.  We discussed the importance of knowing their HGA1c and that the level needs to be as close to 6 as possible.    I discussed the increase complications of high blood sugar including stroke, blindness, heart attack, kidney failure and loss of limb secondary to neuropathy and PVD.  Patient will inspect twice daily for any breaks in the skin or redness. In the setting of neuropathy, if present, these areas are not recognized early due to the numbness.  I discussed different treatments available to control the symptoms but whcih may not cure the problem.  Shoe inspection. Patient instructed on proper foot hygeine. We discussed wearing proper shoe gear, daily foot inspections, never walking without protective shoe gear, never putting sharp instruments to feet.   Follow up 12 weeks   This note was created using Dragon voice recognition software that occasionally misinterpreted phrases or words.       Debridement    Date/Time: 4/9/2025 3:00 PM    Performed by: Rey Bob DPM  Authorized by: Rey Bob DPM    Time out: Immediately prior to procedure a "time out" was called to verify the correct patient, procedure, equipment, support staff and site/side marked as required.    Consent Done?:  Yes (Written) and Yes (Verbal)    Wound Details:    Location:  Left foot    Type of Debridement:  Excisional       Length (cm):  0.3       Width (cm):  0.3       Depth (cm):  0.1       Area (sq cm):  0.07       Percent Debrided (%):  100       Total Area Debrided (sq cm):  0.07    Depth of debridement:  Epidermis/Dermis    Tissue debrided:  Dermis and Epidermis    Devitalized tissue debrided:  Callus    Instruments:  Blade  Bleeding:  None  Patient tolerance:  Patient tolerated the procedure well with no immediate complications     No wound underlying hyperkeratotic pre-ulcerative lesion.       Rey Bob DPM  Podiatry / Foot and Ankle Surgery   Secure chat preferred   "

## 2025-04-16 ENCOUNTER — TELEPHONE (OUTPATIENT)
Dept: PODIATRY | Facility: CLINIC | Age: 75
End: 2025-04-16
Payer: MEDICARE

## 2025-04-16 DIAGNOSIS — E11.621 TYPE 2 DIABETES MELLITUS WITH FOOT ULCER, WITH LONG-TERM CURRENT USE OF INSULIN: Primary | ICD-10-CM

## 2025-04-16 DIAGNOSIS — Z79.4 TYPE 2 DIABETES MELLITUS WITH DIABETIC POLYNEUROPATHY, WITH LONG-TERM CURRENT USE OF INSULIN: ICD-10-CM

## 2025-04-16 DIAGNOSIS — Z79.4 TYPE 2 DIABETES MELLITUS WITH FOOT ULCER, WITH LONG-TERM CURRENT USE OF INSULIN: Primary | ICD-10-CM

## 2025-04-16 DIAGNOSIS — E11.42 TYPE 2 DIABETES MELLITUS WITH DIABETIC POLYNEUROPATHY, WITH LONG-TERM CURRENT USE OF INSULIN: ICD-10-CM

## 2025-04-16 DIAGNOSIS — I73.9 PAD (PERIPHERAL ARTERY DISEASE): ICD-10-CM

## 2025-04-16 DIAGNOSIS — L97.509 TYPE 2 DIABETES MELLITUS WITH FOOT ULCER, WITH LONG-TERM CURRENT USE OF INSULIN: Primary | ICD-10-CM

## 2025-04-16 NOTE — TELEPHONE ENCOUNTER
----- Message from Raad sent at 4/16/2025 12:04 PM CDT -----  Type:  Needs Medical AdviceWho Called: pt Symptoms (please be specific): swollen feet with constant throbbing pain  How long has patient had these symptoms:  04/11Pharmacy name and phone #:  [id number] - [place], [place] - [address] [place] [address]Jeeje: [phone] Fax: [phone] the patient rather a call back or a response via [facility]? Call back Best Call Back Number: [phone] Additional Information: pt would like to know if something can be sent into pharmacy ,  please call to advise, Thank You.

## 2025-04-17 DIAGNOSIS — M79.89 LEG SWELLING: Primary | ICD-10-CM

## 2025-04-17 DIAGNOSIS — M79.605 PAIN OF LEFT LOWER EXTREMITY: ICD-10-CM

## 2025-04-17 DIAGNOSIS — M10.9 GOUT, UNSPECIFIED CAUSE, UNSPECIFIED CHRONICITY, UNSPECIFIED SITE: ICD-10-CM

## 2025-04-17 NOTE — TELEPHONE ENCOUNTER
Spoke with patient and informed her of lab work BUN C-reactive protein CBC auto differential Comprehensive Metabolic Panel Sedimentation rate Uric acid and US Lower Extremity Veins Bilateral ordered by Dr. Bob to rule out any additional underlying cause for increased pain and swelling. Additionally recommended if symptoms worsen or has increased concerns while the office is closed during the holiday to go to urgent care or the ER. Patient expressed understanding

## 2025-04-17 NOTE — TELEPHONE ENCOUNTER
Spoke with patient who confirmed increased swelling to left foot , with constant throbbing pain for abut a week. No current wound or discoloration noted. States she had increased her seafood and crawfish intake with the past week and a half. No history of gout or previous uric acid lab work noted in chart.

## 2025-04-21 ENCOUNTER — LAB VISIT (OUTPATIENT)
Dept: LAB | Facility: HOSPITAL | Age: 75
End: 2025-04-21
Payer: MEDICARE

## 2025-04-21 DIAGNOSIS — M79.89 LEG SWELLING: ICD-10-CM

## 2025-04-21 DIAGNOSIS — M10.9 GOUT, UNSPECIFIED CAUSE, UNSPECIFIED CHRONICITY, UNSPECIFIED SITE: ICD-10-CM

## 2025-04-21 LAB
ABSOLUTE EOSINOPHIL (SMH): 0.25 K/UL
ABSOLUTE MONOCYTE (SMH): 0.38 K/UL (ref 0.3–1)
ABSOLUTE NEUTROPHIL COUNT (SMH): 4.6 K/UL (ref 1.8–7.7)
ALBUMIN SERPL-MCNC: 4.1 G/DL (ref 3.5–5.2)
ALP SERPL-CCNC: 77 UNIT/L (ref 55–135)
ALT SERPL-CCNC: 22 UNIT/L (ref 10–44)
ANION GAP (SMH): 11 MMOL/L (ref 8–16)
AST SERPL-CCNC: 18 UNIT/L (ref 10–40)
BASOPHILS # BLD AUTO: 0.04 K/UL
BASOPHILS NFR BLD AUTO: 0.5 %
BILIRUB SERPL-MCNC: 0.6 MG/DL (ref 0.1–1)
BUN SERPL-MCNC: 12 MG/DL (ref 8–23)
C-REACTIVE PROTEIN (SMH): 0.1 MG/DL
CALCIUM SERPL-MCNC: 8.7 MG/DL (ref 8.7–10.5)
CHLORIDE SERPL-SCNC: 105 MMOL/L (ref 95–110)
CO2 SERPL-SCNC: 23 MMOL/L (ref 23–29)
CREAT SERPL-MCNC: 0.6 MG/DL (ref 0.5–1.4)
ERYTHROCYTE [DISTWIDTH] IN BLOOD BY AUTOMATED COUNT: 13.8 % (ref 11.5–14.5)
ERYTHROCYTE [SEDIMENTATION RATE] IN BLOOD: 9 MM/HR (ref 0–20)
GFR SERPLBLD CREATININE-BSD FMLA CKD-EPI: >60 ML/MIN/1.73/M2
GLUCOSE SERPL-MCNC: 111 MG/DL (ref 70–110)
HCT VFR BLD AUTO: 35.2 % (ref 37–48.5)
HGB BLD-MCNC: 11.4 GM/DL (ref 12–16)
IMM GRANULOCYTES # BLD AUTO: 0.02 K/UL (ref 0–0.04)
IMM GRANULOCYTES NFR BLD AUTO: 0.2 % (ref 0–0.5)
LYMPHOCYTES # BLD AUTO: 3.06 K/UL (ref 1–4.8)
MCH RBC QN AUTO: 29.6 PG (ref 27–31)
MCHC RBC AUTO-ENTMCNC: 32.4 G/DL (ref 32–36)
MCV RBC AUTO: 91 FL (ref 82–98)
NUCLEATED RBC (/100WBC) (SMH): 0 /100 WBC
PLATELET # BLD AUTO: 292 K/UL (ref 150–450)
PMV BLD AUTO: 9.4 FL (ref 9.2–12.9)
POTASSIUM SERPL-SCNC: 4 MMOL/L (ref 3.5–5.1)
PROT SERPL-MCNC: 6.8 GM/DL (ref 6–8.4)
RBC # BLD AUTO: 3.85 M/UL (ref 4–5.4)
RELATIVE EOSINOPHIL (SMH): 3 % (ref 0–8)
RELATIVE LYMPHOCYTE (SMH): 36.6 % (ref 18–48)
RELATIVE MONOCYTE (SMH): 4.5 % (ref 4–15)
RELATIVE NEUTROPHIL (SMH): 55.2 % (ref 38–73)
SODIUM SERPL-SCNC: 139 MMOL/L (ref 136–145)
URATE SERPL-MCNC: 5 MG/DL (ref 2.4–5.7)
WBC # BLD AUTO: 8.37 K/UL (ref 3.9–12.7)

## 2025-04-21 PROCEDURE — 85025 COMPLETE CBC W/AUTO DIFF WBC: CPT

## 2025-04-21 PROCEDURE — 85651 RBC SED RATE NONAUTOMATED: CPT

## 2025-04-21 PROCEDURE — 36415 COLL VENOUS BLD VENIPUNCTURE: CPT

## 2025-04-21 PROCEDURE — 84155 ASSAY OF PROTEIN SERUM: CPT

## 2025-04-21 PROCEDURE — 84550 ASSAY OF BLOOD/URIC ACID: CPT

## 2025-04-21 PROCEDURE — 86140 C-REACTIVE PROTEIN: CPT

## 2025-04-23 ENCOUNTER — TELEPHONE (OUTPATIENT)
Dept: PODIATRY | Facility: CLINIC | Age: 75
End: 2025-04-23
Payer: MEDICARE

## 2025-04-23 NOTE — TELEPHONE ENCOUNTER
----- Message from Nurse Ayala sent at 4/23/2025 11:47 AM CDT -----  Regarding: FW: Pt question  Pt wants results over the phone  ----- Message -----  From: Nedra Daniel  Sent: 4/23/2025  11:30 AM CDT  To: Lisbeth Le Staff  Subject: Pt question                                      I called patient to schedule her for a test results appointment but she doesn't want to come in, she wants a nurse to tell her the results over the phone. I explained to her that we do not usually do that, but she wants to speak with a nurse about it now. Her number is [phone].Thank you,Nedra

## 2025-04-23 NOTE — TELEPHONE ENCOUNTER
----- Message from Roxana sent at 4/23/2025 10:45 AM CDT -----  Regarding: Lab Results  Patient would like someone to give her a call to discuss her blood work results and whether the results have come back from the labs she had done on Monday. Thank you!

## 2025-04-25 ENCOUNTER — HOSPITAL ENCOUNTER (OUTPATIENT)
Dept: RADIOLOGY | Facility: HOSPITAL | Age: 75
Discharge: HOME | End: 2025-04-25
Payer: MEDICARE

## 2025-04-25 DIAGNOSIS — M86.179 OTHER ACUTE OSTEOMYELITIS, UNSPECIFIED ANKLE AND FOOT: ICD-10-CM

## 2025-04-28 ENCOUNTER — HOSPITAL ENCOUNTER (INPATIENT)
Facility: HOSPITAL | Age: 75
LOS: 3 days | Discharge: HOME HEALTH | DRG: 287 | End: 2025-05-02
Attending: EMERGENCY MEDICINE | Admitting: STUDENT IN AN ORGANIZED HEALTH CARE EDUCATION/TRAINING PROGRAM
Payer: MEDICARE

## 2025-04-28 ENCOUNTER — PATIENT MESSAGE (OUTPATIENT)
Dept: ADMINISTRATIVE | Facility: HOSPITAL | Age: 75
End: 2025-04-28
Payer: MEDICARE

## 2025-04-28 ENCOUNTER — RESULTS FOLLOW-UP (OUTPATIENT)
Dept: PODIATRY | Facility: CLINIC | Age: 75
End: 2025-04-28

## 2025-04-28 ENCOUNTER — OFFICE VISIT (OUTPATIENT)
Dept: FAMILY MEDICINE | Facility: CLINIC | Age: 75
End: 2025-04-28
Payer: MEDICARE

## 2025-04-28 VITALS
SYSTOLIC BLOOD PRESSURE: 166 MMHG | BODY MASS INDEX: 31.77 KG/M2 | WEIGHT: 161.81 LBS | OXYGEN SATURATION: 92 % | HEART RATE: 95 BPM | HEIGHT: 60 IN | TEMPERATURE: 98 F | DIASTOLIC BLOOD PRESSURE: 84 MMHG

## 2025-04-28 DIAGNOSIS — I35.0 SEVERE AORTIC STENOSIS: ICD-10-CM

## 2025-04-28 DIAGNOSIS — R60.9 EDEMA, UNSPECIFIED TYPE: ICD-10-CM

## 2025-04-28 DIAGNOSIS — E11.59 HYPERTENSION ASSOCIATED WITH DIABETES: ICD-10-CM

## 2025-04-28 DIAGNOSIS — R10.13 EPIGASTRIC PAIN: ICD-10-CM

## 2025-04-28 DIAGNOSIS — R06.02 SHORTNESS OF BREATH: ICD-10-CM

## 2025-04-28 DIAGNOSIS — L97.429 DIABETIC ULCER OF LEFT MIDFOOT ASSOCIATED WITH TYPE 2 DIABETES MELLITUS, UNSPECIFIED ULCER STAGE: ICD-10-CM

## 2025-04-28 DIAGNOSIS — E03.9 HYPOTHYROIDISM, UNSPECIFIED TYPE: ICD-10-CM

## 2025-04-28 DIAGNOSIS — R07.9 CHEST PAIN: ICD-10-CM

## 2025-04-28 DIAGNOSIS — E11.621 DIABETIC ULCER OF LEFT MIDFOOT ASSOCIATED WITH TYPE 2 DIABETES MELLITUS, UNSPECIFIED ULCER STAGE: ICD-10-CM

## 2025-04-28 DIAGNOSIS — R06.02 SOB (SHORTNESS OF BREATH): Primary | ICD-10-CM

## 2025-04-28 DIAGNOSIS — Z79.4 TYPE 2 DIABETES MELLITUS WITH DIABETIC POLYNEUROPATHY, WITH LONG-TERM CURRENT USE OF INSULIN: ICD-10-CM

## 2025-04-28 DIAGNOSIS — Z79.4 TYPE 2 DIABETES MELLITUS WITH HYPERGLYCEMIA, WITH LONG-TERM CURRENT USE OF INSULIN: ICD-10-CM

## 2025-04-28 DIAGNOSIS — J81.0 ACUTE PULMONARY EDEMA: ICD-10-CM

## 2025-04-28 DIAGNOSIS — E11.65 TYPE 2 DIABETES MELLITUS WITH HYPERGLYCEMIA, WITH LONG-TERM CURRENT USE OF INSULIN: ICD-10-CM

## 2025-04-28 DIAGNOSIS — I25.10 CORONARY ARTERY DISEASE, UNSPECIFIED VESSEL OR LESION TYPE, UNSPECIFIED WHETHER ANGINA PRESENT, UNSPECIFIED WHETHER NATIVE OR TRANSPLANTED HEART: Chronic | ICD-10-CM

## 2025-04-28 DIAGNOSIS — I10 ESSENTIAL HYPERTENSION: ICD-10-CM

## 2025-04-28 DIAGNOSIS — R06.00 DYSPNEA, UNSPECIFIED TYPE: Primary | ICD-10-CM

## 2025-04-28 DIAGNOSIS — I15.2 HYPERTENSION ASSOCIATED WITH DIABETES: ICD-10-CM

## 2025-04-28 DIAGNOSIS — E11.42 TYPE 2 DIABETES MELLITUS WITH DIABETIC POLYNEUROPATHY, WITH LONG-TERM CURRENT USE OF INSULIN: ICD-10-CM

## 2025-04-28 DIAGNOSIS — I25.10 CAD (CORONARY ARTERY DISEASE): ICD-10-CM

## 2025-04-28 DIAGNOSIS — E78.2 MIXED HYPERLIPIDEMIA: ICD-10-CM

## 2025-04-28 PROBLEM — J90 BILATERAL PLEURAL EFFUSION: Status: ACTIVE | Noted: 2025-04-28

## 2025-04-28 PROBLEM — J81.1 PULMONARY EDEMA: Status: ACTIVE | Noted: 2025-04-28

## 2025-04-28 LAB
ABSOLUTE EOSINOPHIL (SMH): 0.25 K/UL
ABSOLUTE MONOCYTE (SMH): 0.34 K/UL (ref 0.3–1)
ABSOLUTE NEUTROPHIL COUNT (SMH): 4.6 K/UL (ref 1.8–7.7)
ALBUMIN SERPL-MCNC: 4.4 G/DL (ref 3.5–5.2)
ALP SERPL-CCNC: 82 UNIT/L (ref 55–135)
ALT SERPL-CCNC: 24 UNIT/L (ref 10–44)
ANION GAP (SMH): 8 MMOL/L (ref 8–16)
AST SERPL-CCNC: 13 UNIT/L (ref 10–40)
BASOPHILS # BLD AUTO: 0.05 K/UL
BASOPHILS NFR BLD AUTO: 0.6 %
BILIRUB SERPL-MCNC: 0.8 MG/DL (ref 0.1–1)
BNP SERPL-MCNC: 612 PG/ML
BUN SERPL-MCNC: 12 MG/DL (ref 8–23)
CALCIUM SERPL-MCNC: 9 MG/DL (ref 8.7–10.5)
CHLORIDE SERPL-SCNC: 103 MMOL/L (ref 95–110)
CHOLEST SERPL-MCNC: 188 MG/DL (ref 120–199)
CHOLEST/HDLC SERPL: 3.9 {RATIO} (ref 2–5)
CO2 SERPL-SCNC: 25 MMOL/L (ref 23–29)
CREAT SERPL-MCNC: 0.5 MG/DL (ref 0.5–1.4)
ERYTHROCYTE [DISTWIDTH] IN BLOOD BY AUTOMATED COUNT: 13.5 % (ref 11.5–14.5)
GFR SERPLBLD CREATININE-BSD FMLA CKD-EPI: >60 ML/MIN/1.73/M2
GLUCOSE SERPL-MCNC: 222 MG/DL (ref 70–110)
HCT VFR BLD AUTO: 38.3 % (ref 37–48.5)
HCV AB SERPL QL IA: NORMAL
HDLC SERPL-MCNC: 48 MG/DL (ref 40–75)
HDLC SERPL: 25.5 % (ref 20–50)
HGB BLD-MCNC: 12.4 GM/DL (ref 12–16)
HIV 1+2 AB+HIV1 P24 AG SERPL QL IA: NORMAL
IMM GRANULOCYTES # BLD AUTO: 0.02 K/UL (ref 0–0.04)
IMM GRANULOCYTES NFR BLD AUTO: 0.3 % (ref 0–0.5)
INR PPP: 1 (ref 0.8–1.2)
LDLC SERPL CALC-MCNC: 121.2 MG/DL (ref 63–159)
LYMPHOCYTES # BLD AUTO: 2.41 K/UL (ref 1–4.8)
MAGNESIUM SERPL-MCNC: 1.6 MG/DL (ref 1.6–2.6)
MCH RBC QN AUTO: 28.7 PG (ref 27–31)
MCHC RBC AUTO-ENTMCNC: 32.4 G/DL (ref 32–36)
MCV RBC AUTO: 89 FL (ref 82–98)
NONHDLC SERPL-MCNC: 140 MG/DL
NUCLEATED RBC (/100WBC) (SMH): 0 /100 WBC
PLATELET # BLD AUTO: 321 K/UL (ref 150–450)
PMV BLD AUTO: 9.3 FL (ref 9.2–12.9)
POCT GLUCOSE: 173 MG/DL (ref 70–110)
POCT GLUCOSE: 202 MG/DL (ref 70–110)
POTASSIUM SERPL-SCNC: 4.1 MMOL/L (ref 3.5–5.1)
PROT SERPL-MCNC: 7.2 GM/DL (ref 6–8.4)
PROTHROMBIN TIME: 11.4 SECONDS (ref 9–12.5)
RBC # BLD AUTO: 4.32 M/UL (ref 4–5.4)
RELATIVE EOSINOPHIL (SMH): 3.2 % (ref 0–8)
RELATIVE LYMPHOCYTE (SMH): 31.3 % (ref 18–48)
RELATIVE MONOCYTE (SMH): 4.4 % (ref 4–15)
RELATIVE NEUTROPHIL (SMH): 60.2 % (ref 38–73)
SODIUM SERPL-SCNC: 136 MMOL/L (ref 136–145)
TRIGL SERPL-MCNC: 94 MG/DL (ref 30–150)
TROPONIN HIGH SENSITIVE (SMH): 6.4 PG/ML
TROPONIN HIGH SENSITIVE (SMH): 7.2 PG/ML
WBC # BLD AUTO: 7.71 K/UL (ref 3.9–12.7)

## 2025-04-28 PROCEDURE — 84484 ASSAY OF TROPONIN QUANT: CPT | Mod: 91 | Performed by: EMERGENCY MEDICINE

## 2025-04-28 PROCEDURE — 93010 ELECTROCARDIOGRAM REPORT: CPT | Mod: ,,, | Performed by: GENERAL PRACTICE

## 2025-04-28 PROCEDURE — 94799 UNLISTED PULMONARY SVC/PX: CPT

## 2025-04-28 PROCEDURE — 25000003 PHARM REV CODE 250

## 2025-04-28 PROCEDURE — G0378 HOSPITAL OBSERVATION PER HR: HCPCS

## 2025-04-28 PROCEDURE — 3288F FALL RISK ASSESSMENT DOCD: CPT | Mod: CPTII,S$GLB,, | Performed by: NURSE PRACTITIONER

## 2025-04-28 PROCEDURE — 99214 OFFICE O/P EST MOD 30 MIN: CPT | Mod: S$GLB,,, | Performed by: NURSE PRACTITIONER

## 2025-04-28 PROCEDURE — 99406 BEHAV CHNG SMOKING 3-10 MIN: CPT

## 2025-04-28 PROCEDURE — 36415 COLL VENOUS BLD VENIPUNCTURE: CPT | Performed by: NURSE PRACTITIONER

## 2025-04-28 PROCEDURE — 1101F PT FALLS ASSESS-DOCD LE1/YR: CPT | Mod: CPTII,S$GLB,, | Performed by: NURSE PRACTITIONER

## 2025-04-28 PROCEDURE — 3079F DIAST BP 80-89 MM HG: CPT | Mod: CPTII,S$GLB,, | Performed by: NURSE PRACTITIONER

## 2025-04-28 PROCEDURE — 83880 ASSAY OF NATRIURETIC PEPTIDE: CPT | Performed by: EMERGENCY MEDICINE

## 2025-04-28 PROCEDURE — 1126F AMNT PAIN NOTED NONE PRSNT: CPT | Mod: CPTII,S$GLB,, | Performed by: NURSE PRACTITIONER

## 2025-04-28 PROCEDURE — 85610 PROTHROMBIN TIME: CPT | Performed by: EMERGENCY MEDICINE

## 2025-04-28 PROCEDURE — 3077F SYST BP >= 140 MM HG: CPT | Mod: CPTII,S$GLB,, | Performed by: NURSE PRACTITIONER

## 2025-04-28 PROCEDURE — 3008F BODY MASS INDEX DOCD: CPT | Mod: CPTII,S$GLB,, | Performed by: NURSE PRACTITIONER

## 2025-04-28 PROCEDURE — 83735 ASSAY OF MAGNESIUM: CPT | Performed by: EMERGENCY MEDICINE

## 2025-04-28 PROCEDURE — 80053 COMPREHEN METABOLIC PANEL: CPT | Performed by: EMERGENCY MEDICINE

## 2025-04-28 PROCEDURE — 86803 HEPATITIS C AB TEST: CPT | Performed by: EMERGENCY MEDICINE

## 2025-04-28 PROCEDURE — 85025 COMPLETE CBC W/AUTO DIFF WBC: CPT | Performed by: EMERGENCY MEDICINE

## 2025-04-28 PROCEDURE — 82962 GLUCOSE BLOOD TEST: CPT

## 2025-04-28 PROCEDURE — 96375 TX/PRO/DX INJ NEW DRUG ADDON: CPT

## 2025-04-28 PROCEDURE — 80061 LIPID PANEL: CPT

## 2025-04-28 PROCEDURE — 93005 ELECTROCARDIOGRAM TRACING: CPT | Performed by: GENERAL PRACTICE

## 2025-04-28 PROCEDURE — 87389 HIV-1 AG W/HIV-1&-2 AB AG IA: CPT | Performed by: EMERGENCY MEDICINE

## 2025-04-28 PROCEDURE — 99900035 HC TECH TIME PER 15 MIN (STAT)

## 2025-04-28 PROCEDURE — 99999 PR PBB SHADOW E&M-EST. PATIENT-LVL III: CPT | Mod: PBBFAC,,, | Performed by: NURSE PRACTITIONER

## 2025-04-28 PROCEDURE — 1159F MED LIST DOCD IN RCRD: CPT | Mod: CPTII,S$GLB,, | Performed by: NURSE PRACTITIONER

## 2025-04-28 PROCEDURE — 83036 HEMOGLOBIN GLYCOSYLATED A1C: CPT

## 2025-04-28 PROCEDURE — 63600175 PHARM REV CODE 636 W HCPCS: Performed by: EMERGENCY MEDICINE

## 2025-04-28 PROCEDURE — 25000003 PHARM REV CODE 250: Performed by: EMERGENCY MEDICINE

## 2025-04-28 PROCEDURE — 99285 EMERGENCY DEPT VISIT HI MDM: CPT | Mod: 25

## 2025-04-28 RX ORDER — SODIUM,POTASSIUM PHOSPHATES 280-250MG
2 POWDER IN PACKET (EA) ORAL
Status: DISCONTINUED | OUTPATIENT
Start: 2025-04-28 | End: 2025-05-02 | Stop reason: HOSPADM

## 2025-04-28 RX ORDER — LEVOTHYROXINE SODIUM 25 UG/1
50 TABLET ORAL
Status: DISCONTINUED | OUTPATIENT
Start: 2025-04-29 | End: 2025-05-02 | Stop reason: HOSPADM

## 2025-04-28 RX ORDER — FUROSEMIDE 10 MG/ML
40 INJECTION INTRAMUSCULAR; INTRAVENOUS
Status: COMPLETED | OUTPATIENT
Start: 2025-04-28 | End: 2025-04-28

## 2025-04-28 RX ORDER — GLUCAGON 1 MG
1 KIT INJECTION
Status: DISCONTINUED | OUTPATIENT
Start: 2025-04-28 | End: 2025-05-02 | Stop reason: HOSPADM

## 2025-04-28 RX ORDER — LANOLIN ALCOHOL/MO/W.PET/CERES
800 CREAM (GRAM) TOPICAL
Status: DISCONTINUED | OUTPATIENT
Start: 2025-04-28 | End: 2025-05-02 | Stop reason: HOSPADM

## 2025-04-28 RX ORDER — ATORVASTATIN CALCIUM 40 MG/1
80 TABLET, FILM COATED ORAL DAILY
Status: DISCONTINUED | OUTPATIENT
Start: 2025-04-29 | End: 2025-05-02 | Stop reason: HOSPADM

## 2025-04-28 RX ORDER — ASPIRIN 81 MG/1
81 TABLET ORAL DAILY
Status: DISCONTINUED | OUTPATIENT
Start: 2025-04-29 | End: 2025-05-02 | Stop reason: HOSPADM

## 2025-04-28 RX ORDER — GABAPENTIN 300 MG/1
600 CAPSULE ORAL NIGHTLY
Status: DISCONTINUED | OUTPATIENT
Start: 2025-04-28 | End: 2025-05-02 | Stop reason: HOSPADM

## 2025-04-28 RX ORDER — AMOXICILLIN 250 MG
1 CAPSULE ORAL 2 TIMES DAILY
Status: DISCONTINUED | OUTPATIENT
Start: 2025-04-28 | End: 2025-05-02 | Stop reason: HOSPADM

## 2025-04-28 RX ORDER — ACETAMINOPHEN 325 MG/1
650 TABLET ORAL EVERY 8 HOURS PRN
Status: DISCONTINUED | OUTPATIENT
Start: 2025-04-28 | End: 2025-05-02 | Stop reason: HOSPADM

## 2025-04-28 RX ORDER — IBUPROFEN 200 MG
24 TABLET ORAL
Status: DISCONTINUED | OUTPATIENT
Start: 2025-04-28 | End: 2025-05-02 | Stop reason: HOSPADM

## 2025-04-28 RX ORDER — LIDOCAINE HYDROCHLORIDE 20 MG/ML
15 SOLUTION OROPHARYNGEAL ONCE
Status: COMPLETED | OUTPATIENT
Start: 2025-04-28 | End: 2025-04-28

## 2025-04-28 RX ORDER — ALUMINUM HYDROXIDE, MAGNESIUM HYDROXIDE, AND SIMETHICONE 1200; 120; 1200 MG/30ML; MG/30ML; MG/30ML
30 SUSPENSION ORAL ONCE
Status: COMPLETED | OUTPATIENT
Start: 2025-04-28 | End: 2025-04-28

## 2025-04-28 RX ORDER — TALC
6 POWDER (GRAM) TOPICAL NIGHTLY PRN
Status: DISCONTINUED | OUTPATIENT
Start: 2025-04-28 | End: 2025-05-02 | Stop reason: HOSPADM

## 2025-04-28 RX ORDER — PANTOPRAZOLE SODIUM 40 MG/1
40 TABLET, DELAYED RELEASE ORAL DAILY
Status: DISCONTINUED | OUTPATIENT
Start: 2025-04-29 | End: 2025-05-02 | Stop reason: HOSPADM

## 2025-04-28 RX ORDER — FUROSEMIDE 10 MG/ML
40 INJECTION INTRAMUSCULAR; INTRAVENOUS EVERY 12 HOURS
Status: DISCONTINUED | OUTPATIENT
Start: 2025-04-29 | End: 2025-04-30

## 2025-04-28 RX ORDER — LISINOPRIL 5 MG/1
5 TABLET ORAL DAILY
Status: DISCONTINUED | OUTPATIENT
Start: 2025-04-29 | End: 2025-04-30

## 2025-04-28 RX ORDER — METOPROLOL TARTRATE 25 MG/1
25 TABLET, FILM COATED ORAL 2 TIMES DAILY
Status: DISCONTINUED | OUTPATIENT
Start: 2025-04-28 | End: 2025-04-30

## 2025-04-28 RX ORDER — GABAPENTIN 300 MG/1
300 CAPSULE ORAL EVERY MORNING
Status: DISCONTINUED | OUTPATIENT
Start: 2025-04-29 | End: 2025-05-02 | Stop reason: HOSPADM

## 2025-04-28 RX ORDER — ONDANSETRON HYDROCHLORIDE 2 MG/ML
4 INJECTION, SOLUTION INTRAVENOUS EVERY 12 HOURS PRN
Status: DISCONTINUED | OUTPATIENT
Start: 2025-04-28 | End: 2025-05-02 | Stop reason: HOSPADM

## 2025-04-28 RX ORDER — IBUPROFEN 200 MG
16 TABLET ORAL
Status: DISCONTINUED | OUTPATIENT
Start: 2025-04-28 | End: 2025-05-02 | Stop reason: HOSPADM

## 2025-04-28 RX ORDER — GABAPENTIN 300 MG/1
600 CAPSULE ORAL NIGHTLY
Status: ON HOLD | COMMUNITY
End: 2025-05-02

## 2025-04-28 RX ORDER — INSULIN GLARGINE 100 [IU]/ML
35 INJECTION, SOLUTION SUBCUTANEOUS NIGHTLY
Status: DISCONTINUED | OUTPATIENT
Start: 2025-04-28 | End: 2025-04-30

## 2025-04-28 RX ORDER — INSULIN ASPART 100 [IU]/ML
0-10 INJECTION, SOLUTION INTRAVENOUS; SUBCUTANEOUS
Status: DISCONTINUED | OUTPATIENT
Start: 2025-04-28 | End: 2025-05-02 | Stop reason: HOSPADM

## 2025-04-28 RX ORDER — ENOXAPARIN SODIUM 100 MG/ML
40 INJECTION SUBCUTANEOUS
Status: DISCONTINUED | OUTPATIENT
Start: 2025-04-28 | End: 2025-04-28

## 2025-04-28 RX ADMIN — FUROSEMIDE 40 MG: 10 INJECTION, SOLUTION INTRAMUSCULAR; INTRAVENOUS at 04:04

## 2025-04-28 RX ADMIN — LIDOCAINE HYDROCHLORIDE 15 ML: 20 SOLUTION ORAL at 04:04

## 2025-04-28 RX ADMIN — METOPROLOL TARTRATE 25 MG: 25 TABLET, FILM COATED ORAL at 10:04

## 2025-04-28 RX ADMIN — GABAPENTIN 600 MG: 300 CAPSULE ORAL at 10:04

## 2025-04-28 RX ADMIN — ALUMINUM HYDROXIDE, MAGNESIUM HYDROXIDE, AND SIMETHICONE 30 ML: 200; 200; 20 SUSPENSION ORAL at 04:04

## 2025-04-28 NOTE — Clinical Note
The catheter was repositioned into the distal   circumflex ostial SVG graft. An angiography was performed of the left coronary arteries and graft. Multiple views were taken. The angiography was performed via power injection. The injected amount was 6 mL contrast at 3 mL/s.

## 2025-04-28 NOTE — HPI
Hoa Wilde is a 75-year-old female with a past medical history of diabetes type 2 with peripheral neuropathy, hypertension, hyperlipidemia, and coronary artery disease with CABG x4 in 2016, gout, hypothyroidism, and depression who was seen and evaluated in the ER for complaints of worsening shortness of breath and for the last few days.  She reports increased dyspnea on exertion over the last 2-3 days.  She endorses chronic bilateral lower extremity swelling, and that she is unable to lie flat and has slough with 3 pillows for last 1-2 years.  ED workup was positive for bilateral pleural effusions, pulmonary edema, elevated BNP of 612, and hyperglycemia with a glucose of 222.  History was obtained from patient and spouse.  Hospital admission was consulted and patient agreeable for admission.

## 2025-04-28 NOTE — Clinical Note
Called and conveyed results to patient. He is aware and understands, orders have been placed for ASAP.      He is also wondering about a refill on the amlodipine. He is almost out and is wondering if he can get a refill of this, he stated his Bp is down. Please advise    He uses [facility] on [address].   The catheter was repositioned into the ostial SVG graft mid   first diagonal artery. An angiography was performed of the left coronary arteries and graft. Multiple views were taken. The angiography was performed via power injection. The injected amount was 6 mL contrast at 3 mL/s.

## 2025-04-28 NOTE — ASSESSMENT & PLAN NOTE
"Chronic, stable.  On Crestor at home.     - Atorvastatin 80 mg daily      Lab Results   Component Value Date    CHOL 188 04/28/2025    CHOL 138 05/05/2020    CHOL 238 (H) 09/17/2019      Lab Results   Component Value Date    HDL 48 04/28/2025    HDL 40 05/05/2020    HDL 35 (L) 09/17/2019      Lab Results   Component Value Date    .2 04/28/2025      Lab Results   Component Value Date    TRIG 94 04/28/2025    TRIG 254 (H) 05/05/2020    TRIG 409 (H) 09/17/2019      No results found for: "DLDL"       Lab Results   Component Value Date    CHOLHDL 29.0 05/05/2020    CHOLHDL 14.7 (L) 09/17/2019          "

## 2025-04-28 NOTE — Clinical Note
The catheter was repositioned into the distal   right coronary artery ostial SVG graft. An angiography was performed of the right coronary arteries. Multiple views were taken. The angiography was performed via power injection. The injected amount was 6 mL contrast at 3 mL/s.

## 2025-04-28 NOTE — ASSESSMENT & PLAN NOTE
Patient with known CAD s/p CABG, which is controlled Will continue ASA and Statin and monitor for S/Sx of angina/ACS. Continue to monitor on telemetry.       BP Readings from Last 3 Encounters:   04/28/25 (!) 143/68   04/28/25 (!) 166/84   04/02/25 (!) 145/81     Pulse Readings from Last 3 Encounters:   04/28/25 97   04/28/25 95   04/02/25 90

## 2025-04-28 NOTE — SUBJECTIVE & OBJECTIVE
Past Medical History:   Diagnosis Date    Coronary artery disease     Diabetes mellitus, type 2     Hyperlipidemia     Hypertension     Hypothyroidism     Neuropathy        Past Surgical History:   Procedure Laterality Date    ANGIOGRAPHY OF LOWER EXTREMITY Left 12/16/2020    Procedure: ANGIOGRAM, LOWER EXTREMITY;  Surgeon: Juancho White MD;  Location: [facility] CATH/EP LAB;  Service: General;  Laterality: Left;    CHOLECYSTECTOMY      CORONARY ARTERY BYPASS GRAFT      FOOT SURGERY Left     HYSTERECTOMY      PERIPHERALLY INSERTED CENTRAL CATHETER INSERTION N/A 3/13/2024    Procedure: INSERTION, PICC;  Surgeon: PICC, STPH;  Location: [facility] ENDO;  Service: Cardiology;  Laterality: N/A;  Dr Mendez       Review of patient's allergies indicates:  No Known Allergies    Current Facility-Administered Medications on File Prior to Encounter   Medication    0.9%  NaCl infusion     Current Outpatient Medications on File Prior to Encounter   Medication Sig    alendronate (FOSAMAX) 70 MG tablet Take 1 tablet by mouth once a week    aspirin (ECOTRIN) 81 MG EC tablet Take 1 tablet (81 mg total) by mouth once daily.    EUTHYROX 50 mcg tablet TAKE 1 TABLET BY MOUTH BEFORE BREAKFAST (Patient taking differently: Take 50 mcg by mouth before breakfast.)    gabapentin (NEURONTIN) 300 MG capsule TAKE 1 CAPSULE BY MOUTH ONCE DAILY IN THE MORNING AND 2 ONCE DAILY AT NIGHT (Patient taking differently: Take 300 mg by mouth every morning.)    gabapentin (NEURONTIN) 300 MG capsule Take 600 mg by mouth every evening.    glipizide-metformin (METAGLIP) 5-500 mg per tablet Take 1 tablet by mouth 2 (two) times daily before meals. Take with breakfast and dinner    insulin detemir U-100, Levemir, (LEVEMIR FLEXPEN) 100 unit/mL (3 mL) InPn pen Inject 70 Units into the skin every evening.    lisinopriL (PRINIVIL,ZESTRIL) 5 MG tablet Take 1 tablet by mouth once daily    meloxicam (MOBIC) 15 MG tablet Take 15 mg by mouth.    rosuvastatin (CRESTOR) 20 MG  "tablet Take 1 tablet by mouth once daily    ammonium lactate (LAC-HYDRIN) 12 % lotion Apply topically 2 (two) times daily.    blood-glucose meter kit To check BG 2 times daily, to use with insurance preferred meter    metoprolol tartrate (LOPRESSOR) 25 MG tablet Take 1 tablet by mouth twice daily    ONETOUCH DELICA PLUS LANCET 33 gauge Misc USE   TO CHECK GLUCOSE TWICE DAILY    ONETOUCH ULTRA TEST Strp USE  STRIP TO CHECK GLUCOSE TWICE DAILY    pen needle, diabetic 30 gauge x 1/3" Ndle 1 pen by Misc.(Non-Drug; Combo Route) route once daily. To use with levimir flex pen    [DISCONTINUED] betamethasone valerate (LUXIQ) 0.12 % foam Apply topically 2 (two) times daily.    [DISCONTINUED] calcium carbonate (OS-GEORGINA) 500 mg calcium (1,250 mg) tablet Take 1 tablet (500 mg total) by mouth 2 (two) times daily.    [DISCONTINUED] famotidine (PEPCID) 20 MG tablet Take 1 tablet by mouth once daily    [DISCONTINUED] fluconazole (DIFLUCAN) 150 MG Tab TAKE 1 TABLET BY MOUTH ONCE DAILY FOR 1 DAY    [DISCONTINUED] hydrOXYzine HCL (ATARAX) 25 MG tablet TAKE 1 TABLET BY MOUTH THREE TIMES DAILY AS NEEDED FOR ITCHING    [DISCONTINUED] ondansetron (ZOFRAN-ODT) 4 MG TbDL Take 1 tablet (4 mg total) by mouth every 6 (six) hours as needed (nausea).    [DISCONTINUED] sertraline (ZOLOFT) 50 MG tablet Take 1 tablet by mouth once daily     Family History       Problem Relation (Age of Onset)    Heart disease Mother    Hyperlipidemia Mother          Tobacco Use    Smoking status: Never    Smokeless tobacco: Never   Substance and Sexual Activity    Alcohol use: No    Drug use: No    Sexual activity: Not on file     Review of Systems   Constitutional:  Positive for activity change and fatigue. Negative for diaphoresis and fever.   HENT:  Negative for congestion, rhinorrhea and sinus pressure.    Respiratory:  Positive for chest tightness and shortness of breath. Negative for cough and wheezing.    Cardiovascular:  Positive for leg swelling. Negative " for chest pain and palpitations.   Gastrointestinal:  Negative for abdominal distention, abdominal pain, constipation, diarrhea, nausea and vomiting.   Endocrine: Negative for cold intolerance and heat intolerance.   Genitourinary:  Negative for difficulty urinating and dysuria.   Musculoskeletal:  Negative for arthralgias, gait problem, joint swelling and myalgias.   Neurological:  Negative for dizziness, tremors, syncope, speech difficulty, weakness, light-headedness, numbness and headaches.   Hematological:  Negative for adenopathy.     Objective:     Vital Signs (Most Recent):  Temp: 97.6 °F (36.4 °C) (04/28/25 1221)  Pulse: 97 (04/28/25 1701)  Resp: (!) 23 (04/28/25 1530)  BP: (!) 143/68 (04/28/25 1701)  SpO2: 96 % (04/28/25 1701) Vital Signs (24h Range):  Temp:  [97.6 °F (36.4 °C)] 97.6 °F (36.4 °C)  Pulse:  [] 97  Resp:  [16-26] 23  SpO2:  [92 %-99 %] 96 %  BP: (143-188)/(61-90) 143/68     Weight: 72.6 kg (160 lb)  Body mass index is 31.25 kg/m².     Physical Exam  Vitals and nursing note reviewed.   Constitutional:       General: She is not in acute distress.     Appearance: She is ill-appearing.   HENT:      Head: Normocephalic and atraumatic.   Eyes:      Extraocular Movements: Extraocular movements intact.      Conjunctiva/sclera: Conjunctivae normal.   Cardiovascular:      Rate and Rhythm: Regular rhythm. Tachycardia present. Occasional Extrasystoles are present.     Pulses: Normal pulses.           Radial pulses are 2+ on the right side and 2+ on the left side.        Dorsalis pedis pulses are 2+ on the right side and 2+ on the left side.      Heart sounds: Normal heart sounds.   Pulmonary:      Effort: Tachypnea and accessory muscle usage present.      Breath sounds: Decreased air movement present.   Abdominal:      General: Bowel sounds are normal. There is no distension.      Palpations: Abdomen is soft.      Tenderness: There is no abdominal tenderness.   Musculoskeletal:         General:  Normal range of motion.      Cervical back: Normal range of motion. No rigidity or tenderness.      Right lower le+ Edema present.      Left lower le+ Edema present.   Skin:     General: Skin is warm and dry.      Capillary Refill: Capillary refill takes less than 2 seconds.   Neurological:      General: No focal deficit present.      Mental Status: She is alert and oriented to person, place, and time. Mental status is at baseline.   Psychiatric:         Mood and Affect: Mood normal.                Significant Labs: All pertinent labs within the past 24 hours have been reviewed.  Recent Lab Results         25  1741   25  1624   25  1243        Lipids, HDL Cholesterol   48  Comment: The National Cholesterol Education Program (NCEP) has set the  following guidelines (reference values) for HDL Cholesterol:  Low...............<40 mg/dL  Optimal...........>60 mg/dL         Albumin     4.4       ALP     82       ALT     24       Anion Gap     8       AST     13       Baso #     0.05       Basophil %     0.6       BILIRUBIN TOTAL     0.8  Comment: For infants and newborns, interpretation of results should be based   on gestational age, weight and in agreement with clinical   observations.    Premature Infant recommended reference ranges:   0-24 hours:  <8.0 mg/dL   24-48 hours: <12.0 mg/dL   3-5 days:    <15.0 mg/dL   6-29 days:   <15.0 mg/dL       BNP     612  Comment: Values of less than 100 pg/ml are consistent with non-CHF populations.       BUN     12       Calcium     9.0       Chloride     103       Cholesterol Total   188  Comment: The National Cholesterol Education Program (NCEP) has set the  following guidelines (reference ranges) for Cholesterol:  Optimal.....................<200 mg/dL  Borderline High.............200-239 mg/dL  High........................> or = 240 mg/dL         CO2     25       Creatinine     0.5       eGFR     >60       Eos #     0.25       Eos %     3.2        Glucose     222       HDL/Cholesterol Ratio   25.5         Hematocrit     38.3       Hemoglobin     12.4       Immature Grans (Abs)     0.02  Comment: Mild elevation in immature granulocytes is non specific and can be seen in a variety of conditions including stress response, acute inflammation, trauma and pregnancy. Correlation with other laboratory and clinical findings is essential.       Immature Granulocytes     0.3       INR     1.0  Comment: Coumadin Therapy:    2.0 - 3.0 for INR for all indicators except mechanical heart valves    and antiphospholipid syndromes which should use 2.5 - 3.5.       LDL Cholesterol   121.2  Comment: The National Cholesterol Education Program (NCEP) has set the  following guidelines (reference values) for LDL Cholesterol:  Optimal.......................<130 mg/dL  Borderline High...............130-159 mg/dL  High..........................160-189 mg/dL  Very High.....................>190 mg/dL           Lymph #     2.41       LYMPH %     31.3       Magnesium      1.6       MCH     28.7       MCHC     32.4       MCV     89       Mono #     0.34       Mono %     4.4       MPV     9.3       Neut #     4.6       Neut %     60.2       Non-HDL Cholesterol   140  Comment: Risk category and Non-HDL cholesterol goals:  Coronary heart disease (CHD)or equivalent (10-year risk of CHD >20%):  Non-HDL cholesterol goal     <130 mg/dL  Two or more CHD risk factors and 10-year risk of CHD <= 20%:  Non-HDL cholesterol goal     <160 mg/dL  0 to 1 CHD risk factor:  Non-HDL cholesterol goal     <190 mg/dL         nRBC     0       Platelet Count     321       POCT Glucose 202           Potassium     4.1       PROTEIN TOTAL     7.2       PT     11.4       RBC     4.32       RDW     13.5       Sodium     136       Total Cholesterol/HDL Ratio   3.9         Triglycerides   94  Comment: The National Cholesterol Education Program (NCEP) has set the  following guidelines (reference values) for  triglycerides:  Normal......................<150 mg/dL  Borderline High.............150-199 mg/dL  High........................200-499 mg/dL           Troponin I High Sensitivity   7.2  Comment: Troponin results differ between methods. Do not use   results between Troponin methods interchangeably.    Alkaline Phospatase levels above 400 U/L may   cause false positive results.    Access hsTnI should not be used for patients taking   Asfotase valeria (Strensiq).   6.4  Comment: Troponin results differ between methods. Do not use   results between Troponin methods interchangeably.    Alkaline Phospatase levels above 400 U/L may   cause false positive results.    Access hsTnI should not be used for patients taking   Asfotase valeria (Strensiq).       WBC     7.71               Significant Imaging: I have reviewed and interpreted all pertinent imaging results/findings within the past 24 hours.

## 2025-04-28 NOTE — ASSESSMENT & PLAN NOTE
Patient's FSGs are uncontrolled due to hyperglycemia on current medication regimen.  Last A1c reviewed-   Lab Results   Component Value Date    HGBA1C 11.9 (H) 03/08/2024     Most recent fingerstick glucose reviewed-   Recent Labs   Lab 04/28/25  1741   POCTGLUCOSE 202*     Current correctional scale  Medium  Maintain anti-hyperglycemic dose as follows-   Antihyperglycemics (From admission, onward)      Start     Stop Route Frequency Ordered    04/28/25 2100  insulin glargine U-100 (Lantus) pen 35 Units         -- SubQ Nightly 04/28/25 1647    04/28/25 1748  insulin aspart U-100 pen 0-10 Units         -- SubQ Before meals & nightly PRN 04/28/25 1649          - Hold Oral hypoglycemics while patient is in the hospital.  - Carbohydrate steady diet  - Accu-Cheks 4 times daily before meals and before bed

## 2025-04-28 NOTE — ASSESSMENT & PLAN NOTE
Patient found to have small bilateral pleural effusions on imaging, left greater than right. I have personally reviewed and interpreted the following imaging: Xray. A thoracentesis was deferred. Most likely etiology includes Congestive Heart Failure. Management to include Diuresis    - See pulmonary edema

## 2025-04-28 NOTE — H&P
[facility] - Emergency Dept  Hospital Medicine  History & Physical    Patient Name: Hoa Wilde  MRN: [id number]  Patient Class: OP- Observation  Admission Date: 4/28/2025  Attending Physician: Huy Rowe MD   Primary Care Provider: Carlos Pope MD         Patient information was obtained from patient, spouse/SO, past medical records, and ER records.     Subjective:     Principal Problem:Pulmonary edema    Chief Complaint:   Chief Complaint   Patient presents with    Shortness of Breath     X 1 week        HPI: Hoa Wilde is a 75-year-old female with a past medical history of diabetes type 2 with peripheral neuropathy, hypertension, hyperlipidemia, and coronary artery disease with CABG x4 in 2016, gout, hypothyroidism, and depression who was seen and evaluated in the ER for complaints of worsening shortness of breath and for the last few days.  She reports increased dyspnea on exertion over the last 2-3 days.  She endorses chronic bilateral lower extremity swelling, and that she is unable to lie flat and has slough with 3 pillows for last 1-2 years.  ED workup was positive for bilateral pleural effusions, pulmonary edema, elevated BNP of 612, and hyperglycemia with a glucose of 222.  History was obtained from patient and spouse.  Hospital admission was consulted and patient agreeable for admission.     Past Medical History:   Diagnosis Date    Coronary artery disease     Diabetes mellitus, type 2     Hyperlipidemia     Hypertension     Hypothyroidism     Neuropathy        Past Surgical History:   Procedure Laterality Date    ANGIOGRAPHY OF LOWER EXTREMITY Left 12/16/2020    Procedure: ANGIOGRAM, LOWER EXTREMITY;  Surgeon: Juancho White MD;  Location: [facility] CATH/EP LAB;  Service: General;  Laterality: Left;    CHOLECYSTECTOMY      CORONARY ARTERY BYPASS GRAFT      FOOT SURGERY Left     HYSTERECTOMY      PERIPHERALLY INSERTED CENTRAL CATHETER INSERTION N/A 3/13/2024    Procedure: INSERTION, PICC;   "Surgeon: PICC, [facility];  Location: [facility];  Service: Cardiology;  Laterality: N/A;  Dr Mendez       Review of patient's allergies indicates:  No Known Allergies    Current Facility-Administered Medications on File Prior to Encounter   Medication    0.9%  NaCl infusion     Current Outpatient Medications on File Prior to Encounter   Medication Sig    alendronate (FOSAMAX) 70 MG tablet Take 1 tablet by mouth once a week    aspirin (ECOTRIN) 81 MG EC tablet Take 1 tablet (81 mg total) by mouth once daily.    EUTHYROX 50 mcg tablet TAKE 1 TABLET BY MOUTH BEFORE BREAKFAST (Patient taking differently: Take 50 mcg by mouth before breakfast.)    gabapentin (NEURONTIN) 300 MG capsule TAKE 1 CAPSULE BY MOUTH ONCE DAILY IN THE MORNING AND 2 ONCE DAILY AT NIGHT (Patient taking differently: Take 300 mg by mouth every morning.)    gabapentin (NEURONTIN) 300 MG capsule Take 600 mg by mouth every evening.    glipizide-metformin (METAGLIP) 5-500 mg per tablet Take 1 tablet by mouth 2 (two) times daily before meals. Take with breakfast and dinner    insulin detemir U-100, Levemir, (LEVEMIR FLEXPEN) 100 unit/mL (3 mL) InPn pen Inject 70 Units into the skin every evening.    lisinopriL (PRINIVIL,ZESTRIL) 5 MG tablet Take 1 tablet by mouth once daily    meloxicam (MOBIC) 15 MG tablet Take 15 mg by mouth.    rosuvastatin (CRESTOR) 20 MG tablet Take 1 tablet by mouth once daily    ammonium lactate (LAC-HYDRIN) 12 % lotion Apply topically 2 (two) times daily.    blood-glucose meter kit To check BG 2 times daily, to use with insurance preferred meter    metoprolol tartrate (LOPRESSOR) 25 MG tablet Take 1 tablet by mouth twice daily    ONETOUCH DELICA PLUS LANCET 33 gauge Misc USE   TO CHECK GLUCOSE TWICE DAILY    ONETOUCH ULTRA TEST Strp USE  STRIP TO CHECK GLUCOSE TWICE DAILY    pen needle, diabetic 30 gauge x 1/3" Ndle 1 pen by Misc.(Non-Drug; Combo Route) route once daily. To use with levimir flex pen    [DISCONTINUED] betamethasone " valerate (LUXIQ) 0.12 % foam Apply topically 2 (two) times daily.    [DISCONTINUED] calcium carbonate (OS-GEORGINA) 500 mg calcium (1,250 mg) tablet Take 1 tablet (500 mg total) by mouth 2 (two) times daily.    [DISCONTINUED] famotidine (PEPCID) 20 MG tablet Take 1 tablet by mouth once daily    [DISCONTINUED] fluconazole (DIFLUCAN) 150 MG Tab TAKE 1 TABLET BY MOUTH ONCE DAILY FOR 1 DAY    [DISCONTINUED] hydrOXYzine HCL (ATARAX) 25 MG tablet TAKE 1 TABLET BY MOUTH THREE TIMES DAILY AS NEEDED FOR ITCHING    [DISCONTINUED] ondansetron (ZOFRAN-ODT) 4 MG TbDL Take 1 tablet (4 mg total) by mouth every 6 (six) hours as needed (nausea).    [DISCONTINUED] sertraline (ZOLOFT) 50 MG tablet Take 1 tablet by mouth once daily     Family History       Problem Relation (Age of Onset)    Heart disease Mother    Hyperlipidemia Mother          Tobacco Use    Smoking status: Never    Smokeless tobacco: Never   Substance and Sexual Activity    Alcohol use: No    Drug use: No    Sexual activity: Not on file     Review of Systems   Constitutional:  Positive for activity change and fatigue. Negative for diaphoresis and fever.   HENT:  Negative for congestion, rhinorrhea and sinus pressure.    Respiratory:  Positive for chest tightness and shortness of breath. Negative for cough and wheezing.    Cardiovascular:  Positive for leg swelling. Negative for chest pain and palpitations.   Gastrointestinal:  Negative for abdominal distention, abdominal pain, constipation, diarrhea, nausea and vomiting.   Endocrine: Negative for cold intolerance and heat intolerance.   Genitourinary:  Negative for difficulty urinating and dysuria.   Musculoskeletal:  Negative for arthralgias, gait problem, joint swelling and myalgias.   Neurological:  Negative for dizziness, tremors, syncope, speech difficulty, weakness, light-headedness, numbness and headaches.   Hematological:  Negative for adenopathy.     Objective:     Vital Signs (Most Recent):  Temp: 97.6 °F (36.4  °C) (25 1221)  Pulse: 97 (25 1701)  Resp: (!) 23 (25 1530)  BP: (!) 143/68 (25 1701)  SpO2: 96 % (25 1701) Vital Signs (24h Range):  Temp:  [97.6 °F (36.4 °C)] 97.6 °F (36.4 °C)  Pulse:  [] 97  Resp:  [16-26] 23  SpO2:  [92 %-99 %] 96 %  BP: (143-188)/(61-90) 143/68     Weight: 72.6 kg (160 lb)  Body mass index is 31.25 kg/m².     Physical Exam  Vitals and nursing note reviewed.   Constitutional:       General: She is not in acute distress.     Appearance: She is ill-appearing.   HENT:      Head: Normocephalic and atraumatic.   Eyes:      Extraocular Movements: Extraocular movements intact.      Conjunctiva/sclera: Conjunctivae normal.   Cardiovascular:      Rate and Rhythm: Regular rhythm. Tachycardia present. Occasional Extrasystoles are present.     Pulses: Normal pulses.           Radial pulses are 2+ on the right side and 2+ on the left side.        Dorsalis pedis pulses are 2+ on the right side and 2+ on the left side.      Heart sounds: Normal heart sounds.   Pulmonary:      Effort: Tachypnea and accessory muscle usage present.      Breath sounds: Decreased air movement present.   Abdominal:      General: Bowel sounds are normal. There is no distension.      Palpations: Abdomen is soft.      Tenderness: There is no abdominal tenderness.   Musculoskeletal:         General: Normal range of motion.      Cervical back: Normal range of motion. No rigidity or tenderness.      Right lower le+ Edema present.      Left lower le+ Edema present.   Skin:     General: Skin is warm and dry.      Capillary Refill: Capillary refill takes less than 2 seconds.   Neurological:      General: No focal deficit present.      Mental Status: She is alert and oriented to person, place, and time. Mental status is at baseline.   Psychiatric:         Mood and Affect: Mood normal.                Significant Labs: All pertinent labs within the past 24 hours have been reviewed.  Recent Lab Results          04/28/25  1741   04/28/25  1624   04/28/25  1243        Lipids, HDL Cholesterol   48  Comment: The National Cholesterol Education Program (NCEP) has set the  following guidelines (reference values) for HDL Cholesterol:  Low...............<40 mg/dL  Optimal...........>60 mg/dL         Albumin     4.4       ALP     82       ALT     24       Anion Gap     8       AST     13       Baso #     0.05       Basophil %     0.6       BILIRUBIN TOTAL     0.8  Comment: For infants and newborns, interpretation of results should be based   on gestational age, weight and in agreement with clinical   observations.    Premature Infant recommended reference ranges:   0-24 hours:  <8.0 mg/dL   24-48 hours: <12.0 mg/dL   3-5 days:    <15.0 mg/dL   6-29 days:   <15.0 mg/dL       BNP     612  Comment: Values of less than 100 pg/ml are consistent with non-CHF populations.       BUN     12       Calcium     9.0       Chloride     103       Cholesterol Total   188  Comment: The National Cholesterol Education Program (NCEP) has set the  following guidelines (reference ranges) for Cholesterol:  Optimal.....................<200 mg/dL  Borderline High.............200-239 mg/dL  High........................> or = 240 mg/dL         CO2     25       Creatinine     0.5       eGFR     >60       Eos #     0.25       Eos %     3.2       Glucose     222       HDL/Cholesterol Ratio   25.5         Hematocrit     38.3       Hemoglobin     12.4       Immature Grans (Abs)     0.02  Comment: Mild elevation in immature granulocytes is non specific and can be seen in a variety of conditions including stress response, acute inflammation, trauma and pregnancy. Correlation with other laboratory and clinical findings is essential.       Immature Granulocytes     0.3       INR     1.0  Comment: Coumadin Therapy:    2.0 - 3.0 for INR for all indicators except mechanical heart valves    and antiphospholipid syndromes which should use 2.5 - 3.5.       LDL  Cholesterol   121.2  Comment: The National Cholesterol Education Program (NCEP) has set the  following guidelines (reference values) for LDL Cholesterol:  Optimal.......................<130 mg/dL  Borderline High...............130-159 mg/dL  High..........................160-189 mg/dL  Very High.....................>190 mg/dL           Lymph #     2.41       LYMPH %     31.3       Magnesium      1.6       MCH     28.7       MCHC     32.4       MCV     89       Mono #     0.34       Mono %     4.4       MPV     9.3       Neut #     4.6       Neut %     60.2       Non-HDL Cholesterol   140  Comment: Risk category and Non-HDL cholesterol goals:  Coronary heart disease (CHD)or equivalent (10-year risk of CHD >20%):  Non-HDL cholesterol goal     <130 mg/dL  Two or more CHD risk factors and 10-year risk of CHD <= 20%:  Non-HDL cholesterol goal     <160 mg/dL  0 to 1 CHD risk factor:  Non-HDL cholesterol goal     <190 mg/dL         nRBC     0       Platelet Count     321       POCT Glucose 202           Potassium     4.1       PROTEIN TOTAL     7.2       PT     11.4       RBC     4.32       RDW     13.5       Sodium     136       Total Cholesterol/HDL Ratio   3.9         Triglycerides   94  Comment: The National Cholesterol Education Program (NCEP) has set the  following guidelines (reference values) for triglycerides:  Normal......................<150 mg/dL  Borderline High.............150-199 mg/dL  High........................200-499 mg/dL           Troponin I High Sensitivity   7.2  Comment: Troponin results differ between methods. Do not use   results between Troponin methods interchangeably.    Alkaline Phospatase levels above 400 U/L may   cause false positive results.    Access hsTnI should not be used for patients taking   Asfotase valeria (Strensiq).   6.4  Comment: Troponin results differ between methods. Do not use   results between Troponin methods interchangeably.    Alkaline Phospatase levels above 400 U/L may    cause false positive results.    Access hsTnI should not be used for patients taking   Asfotase valeria (Strensiq).       WBC     7.71               Significant Imaging: I have reviewed and interpreted all pertinent imaging results/findings within the past 24 hours.  Assessment/Plan:     Assessment & Plan  Pulmonary edema  Orthopnea with bilateral lower extremity pitting edema.  Does not wear oxygen.  Pulmonary edema on CXR with bilateral pleural effusions.  - Lasix 40 given in ED  - Start furosemide 40 b.i.d.  - Monitor renal function daily  - Closely monitor respiratory status  - Continuous pulse oximetry  - Strict I&Os  - Echo pending  - Supplemental oxygen as needed (94% on room air at rest)  - Cardiology consulted    X-Ray Chest AP Portable  Result Date: 4/28/2025  1.  Cardiomegaly and findings suggestive of mild interstitial pulmonary edema. 2.  Small left greater than right pleural effusion and adjacent atelectasis. Electronically signed by: Tal Fontaine Date:    04/28/2025 Time:    13:36    Lab Results   Component Value Date    CREATININE 0.5 04/28/2025     Lab Results   Component Value Date    BUN 12 04/28/2025         Bilateral pleural effusion  Patient found to have small bilateral pleural effusions on imaging, left greater than right. I have personally reviewed and interpreted the following imaging: Xray. A thoracentesis was deferred. Most likely etiology includes Congestive Heart Failure. Management to include Diuresis    - See pulmonary edema    Hypertension associated with diabetes  Patients blood pressure range in the last 24 hours was: BP  Min: 143/68  Max: 188/90.The patient's inpatient anti-hypertensive regimen is listed below:  Current Antihypertensives  lisinopriL tablet 5 mg, Daily, Oral  metoprolol tartrate (LOPRESSOR) tablet 25 mg, 2 times daily, Oral  furosemide injection 40 mg, Every 12 hours, Intravenous    Plan  - BP is uncontrolled, will adjust as follows:    - Start furosemide 40 mg  "b.i.d.      Hyperlipidemia associated with type 2 diabetes mellitus  Chronic, stable.  On Crestor at home.     - Atorvastatin 80 mg daily      Lab Results   Component Value Date    CHOL 188 04/28/2025    CHOL 138 05/05/2020    CHOL 238 (H) 09/17/2019      Lab Results   Component Value Date    HDL 48 04/28/2025    HDL 40 05/05/2020    HDL 35 (L) 09/17/2019      Lab Results   Component Value Date    .2 04/28/2025      Lab Results   Component Value Date    TRIG 94 04/28/2025    TRIG 254 (H) 05/05/2020    TRIG 409 (H) 09/17/2019      No results found for: "DLDL"       Lab Results   Component Value Date    CHOLHDL 29.0 05/05/2020    CHOLHDL 14.7 (L) 09/17/2019          Type 2 diabetes mellitus with diabetic polyneuropathy, with long-term current use of insulin  Patient's FSGs are uncontrolled due to hyperglycemia on current medication regimen.  Last A1c reviewed-   Lab Results   Component Value Date    HGBA1C 11.9 (H) 03/08/2024     Most recent fingerstick glucose reviewed-   Recent Labs   Lab 04/28/25  1741   POCTGLUCOSE 202*     Current correctional scale  Medium  Maintain anti-hyperglycemic dose as follows-   Antihyperglycemics (From admission, onward)      Start     Stop Route Frequency Ordered    04/28/25 2100  insulin glargine U-100 (Lantus) pen 35 Units         -- SubQ Nightly 04/28/25 1647    04/28/25 1748  insulin aspart U-100 pen 0-10 Units         -- SubQ Before meals & nightly PRN 04/28/25 1649          - Hold Oral hypoglycemics while patient is in the hospital.  - Carbohydrate steady diet  - Accu-Cheks 4 times daily before meals and before bed      Coronary artery disease s/p CABG  Patient with known CAD s/p CABG, which is controlled Will continue ASA and Statin and monitor for S/Sx of angina/ACS. Continue to monitor on telemetry.       BP Readings from Last 3 Encounters:   04/28/25 (!) 143/68   04/28/25 (!) 166/84   04/02/25 (!) 145/81     Pulse Readings from Last 3 Encounters:   04/28/25 97 "   04/28/25 95   04/02/25 90       Hypothyroidism  Chronic, stable.    - Continue levothyroxine 50 mcg daily before breakfast    Lab Results   Component Value Date    TSH 1.750 01/20/2023    TSH 1.750 01/20/2023         VTE Risk Mitigation (From admission, onward)           Ordered     IP VTE HIGH RISK PATIENT  Once         04/28/25 1648     Place sequential compression device  Until discontinued         04/28/25 1648                         On 04/28/2025, patient should be placed in hospital observation services under my care in collaboration with Dr. Rowe.           Vamsi Hernández, [facility]  Department of Hospital Medicine  Critical access hospital - Emergency Dept

## 2025-04-28 NOTE — PROGRESS NOTES
This dictation has been generated using Modal Fluency Dictation some phonetic errors may occur. Please contact author for clarification if needed.     1. Dyspnea, unspecified type    2. Edema, unspecified type    3. Coronary artery disease, unspecified vessel or lesion type, unspecified whether angina present, unspecified whether native or transplanted heart    4. Diabetic ulcer of left midfoot associated with type 2 diabetes mellitus, unspecified ulcer stage    5. Type 2 diabetes mellitus with hyperglycemia, with long-term current use of insulin    6. Hypertension associated with diabetes  Comments:  Uncontrolled. ER referral. Follow up PCP.       Assessment & Plan    IMPRESSION:  - Presenting with shortness of breath for about a week, unable to complete MRI due to breathing difficulties.  - History of heart disease, s/p cabg, diabetes, and childhood asthma increases risk for cardiac-related issues.  - Swelling in legs noted, raising concern for potential cardiovascular complications.  - Recommend immediate evaluation at ER due to:  - Need for stat labs  - Possible CT chest and leg US  - Inability to provide necessary diagnostic workup in clinic setting    DYSPNEA:  - Ordered ambulance transport to Emergency Room for priority care and oxygen support.  - Referred the patient to Emergency Room for immediate evaluation and management of dyspnea.  - Ms. Wilde reports trouble breathing for approximately 1 week and inability to complete MRI due to respiratory difficulties.  - Noted that the patient used her 's albuterol inhaler for symptomatic relief.  - Assessed the patient's dyspnea as potentially serious, considering risk factors.  - Recommend Emergency Room visit for potential respiratory treatments and further evaluation.    LOCALIZED EDEMA:  - Referred the patient to Emergency Room for immediate evaluation and management of pedal edema.  - Acknowledged swelling in the patient's legs during exam.  - Recommend  further evaluation of leg swelling in the Emergency Room setting.    CORONARY ARTERY DISEASE:  - Noted the patient's history of coronary artery disease and previous CABG surgery.  - Considered heart-related risks due to the patient's cardiac history.  - Recommend Emergency Room visit for evaluation by cardiologists.    TYPE 2 DIABETES MELLITUS:  - Confirmed the patient's diagnosis of diabetes mellitus type 2. Uncontrolled last A1c >11.   - Considered diabetes as a risk factor for current symptoms.    CARDIAC MURMUR:  - Noted the patient's report of having a cardiac murmur.    HISTORY OF RESPIRATORY DISEASE:  - Documented the patient's reported history of childhood asthma.         I will review all results and address accordingly.   No follow-ups on file.    ________________________________________________________________  ________________________________________________________________      Chief Complaint   Patient presents with    Shortness of Breath     History of present illness  History of Present Illness    CHIEF COMPLAINT:  Ms. Wilde presents today for difficulty breathing for one week.    HISTORY OF PRESENT ILLNESS:  She reports breathing difficulties that started about a week ago, which prevented completion of an MRI on Friday. She used her 's albuterol inhaler with some relief. She denies chest pain. She also notes mild lower extremity swelling, particularly in the feet. She was out of meds for a month but resumed 4 days ago.    MEDICAL HISTORY:  She has a history of heart disease status post CABG, diabetes, heart murmur, and childhood asthma.      ROS:  Cardiovascular: -chest pain, +lower extremity edema  Respiratory: +difficulty breathing         Past Medical History:   Diagnosis Date    Coronary artery disease     Diabetes mellitus, type 2     Hyperlipidemia     Hypertension     Hypothyroidism     Neuropathy        Past Surgical History:   Procedure Laterality Date    ANGIOGRAPHY OF LOWER  EXTREMITY Left 12/16/2020    Procedure: ANGIOGRAM, LOWER EXTREMITY;  Surgeon: Juancho White MD;  Location: [facility] CATH/EP LAB;  Service: General;  Laterality: Left;    CHOLECYSTECTOMY      CORONARY ARTERY BYPASS GRAFT      FOOT SURGERY Left     HYSTERECTOMY      PERIPHERALLY INSERTED CENTRAL CATHETER INSERTION N/A 3/13/2024    Procedure: INSERTION, PICC;  Surgeon: PICC, STPH;  Location: [facility] ENDO;  Service: Cardiology;  Laterality: N/A;  Dr Mendez       Family History   Problem Relation Name Age of Onset    Hyperlipidemia Mother      Heart disease Mother         Social History     Socioeconomic History    Marital status:    Occupational History    Occupation: retired    Tobacco Use    Smoking status: Never    Smokeless tobacco: Never   Substance and Sexual Activity    Alcohol use: No    Drug use: No     Social Drivers of Health     Financial Resource Strain: Low Risk  (4/2/2025)    Overall Financial Resource Strain (CARDIA)     Difficulty of Paying Living Expenses: Not very hard   Food Insecurity: No Food Insecurity (4/2/2025)    Hunger Vital Sign     Worried About Running Out of Food in the Last Year: Never true     Ran Out of Food in the Last Year: Never true   Transportation Needs: No Transportation Needs (4/2/2025)    PRAPARE - Transportation     Lack of Transportation (Medical): No     Lack of Transportation (Non-Medical): No   Physical Activity: Inactive (4/2/2025)    Exercise Vital Sign     Days of Exercise per Week: 0 days     Minutes of Exercise per Session: 0 min   Stress: No Stress Concern Present (4/2/2025)    [employer] - Occupational Stress Questionnaire     Feeling of Stress : Only a little   Housing Stability: Low Risk  (4/2/2025)    Housing Stability Vital Sign     Unable to Pay for Housing in the Last Year: No     Homeless in the Last Year: No       Current Medications[1]    Review of patient's allergies indicates:  No Known Allergies    Physical  examination  Vitals Reviewed  BP (!) 166/84   Pulse 95   Temp 97.6 °F (36.4 °C) (Oral)   Ht 5' (1.524 m)   Wt 73.4 kg (161 lb 13.1 oz)   SpO2 (!) 92%   BMI 31.60 kg/m²  Body mass index is 31.6 kg/m².     BP Readings from Last 3 Encounters:   04/28/25 (!) 166/84   04/02/25 (!) 145/81   03/27/24 128/74       Wt Readings from Last 3 Encounters:   04/28/25 73.4 kg (161 lb 13.1 oz)   04/09/25 67.1 kg (147 lb 14.9 oz)   04/02/25 67.1 kg (148 lb)       Physical Exam    Extremities: Swelling in legs.         This note was generated with the assistance of ambient listening technology. Verbal consent was obtained by the patient and accompanying visitor(s) for the recording of patient appointment to facilitate this note. I attest to having reviewed and edited the generated note for accuracy, though some syntax or spelling errors may persist. Please contact the author of this note for any clarification.      Call or return to clinic prn if these symptoms worsen or fail to improve as anticipated.           [1]   Current Outpatient Medications   Medication Sig Dispense Refill    alendronate (FOSAMAX) 70 MG tablet Take 1 tablet by mouth once a week 12 tablet 1    ammonium lactate (LAC-HYDRIN) 12 % lotion Apply topically 2 (two) times daily. 396 g 6    aspirin (ECOTRIN) 81 MG EC tablet Take 1 tablet (81 mg total) by mouth once daily. 100 tablet 3    betamethasone valerate (LUXIQ) 0.12 % foam Apply topically 2 (two) times daily. 100 g 0    blood-glucose meter kit To check BG 2 times daily, to use with insurance preferred meter 1 each 0    calcium carbonate (OS-GEORGINA) 500 mg calcium (1,250 mg) tablet Take 1 tablet (500 mg total) by mouth 2 (two) times daily. 200 tablet 3    EUTHYROX 50 mcg tablet TAKE 1 TABLET BY MOUTH BEFORE BREAKFAST 90 tablet 3    famotidine (PEPCID) 20 MG tablet Take 1 tablet by mouth once daily 90 tablet 0    gabapentin (NEURONTIN) 300 MG capsule TAKE 1 CAPSULE BY MOUTH ONCE DAILY IN THE MORNING AND 2 ONCE  "DAILY AT NIGHT 270 capsule 1    glipizide-metformin (METAGLIP) 5-500 mg per tablet Take 1 tablet by mouth 2 (two) times daily before meals. Take with breakfast and dinner 180 tablet 1    hydrOXYzine HCL (ATARAX) 25 MG tablet TAKE 1 TABLET BY MOUTH THREE TIMES DAILY AS NEEDED FOR ITCHING 90 tablet 1    insulin detemir U-100, Levemir, (LEVEMIR FLEXPEN) 100 unit/mL (3 mL) InPn pen Inject 70 Units into the skin every evening. 15 mL 0    lisinopriL (PRINIVIL,ZESTRIL) 5 MG tablet Take 1 tablet by mouth once daily 90 tablet 1    meloxicam (MOBIC) 15 MG tablet Take 15 mg by mouth.      metoprolol tartrate (LOPRESSOR) 25 MG tablet Take 1 tablet by mouth twice daily 180 tablet 0    ONETOUCH DELICA PLUS LANCET 33 gauge Misc USE   TO CHECK GLUCOSE TWICE DAILY 100 each 3    ONETOUCH ULTRA TEST Strp USE  STRIP TO CHECK GLUCOSE TWICE DAILY 100 each 3    pen needle, diabetic 30 gauge x 1/3" Ndle 1 pen by Misc.(Non-Drug; Combo Route) route once daily. To use with levimir flex pen 100 each 3    rosuvastatin (CRESTOR) 20 MG tablet Take 1 tablet by mouth once daily 90 tablet 0    sertraline (ZOLOFT) 50 MG tablet Take 1 tablet by mouth once daily 30 tablet 11    fluconazole (DIFLUCAN) 150 MG Tab TAKE 1 TABLET BY MOUTH ONCE DAILY FOR 1 DAY 1 tablet 0    ondansetron (ZOFRAN-ODT) 4 MG TbDL Take 1 tablet (4 mg total) by mouth every 6 (six) hours as needed (nausea). 20 tablet 0     No current facility-administered medications for this visit.     Facility-Administered Medications Ordered in Other Visits   Medication Dose Route Frequency Provider Last Rate Last Admin    0.9%  NaCl infusion   Intravenous Continuous Juancho White MD   Stopped at 12/16/20 1500     "

## 2025-04-28 NOTE — Clinical Note
The catheter was inserted into the mid   left anterior descending ostial LIMA graft. An angiography was performed of the left coronary arteries and graft. Multiple views were taken. The angiography was performed via power injection. The injected amount was 8 mL contrast at 4 mL/s.

## 2025-04-28 NOTE — ASSESSMENT & PLAN NOTE
Chronic, stable.    - Continue levothyroxine 50 mcg daily before breakfast    Lab Results   Component Value Date    TSH 1.750 01/20/2023    TSH 1.750 01/20/2023

## 2025-04-28 NOTE — Clinical Note
The catheter was repositioned into the. Hemodynamics were performed.  and Pullback was recorded.  An angiography was performed of the ROOT. Multiple views were taken. The angiography was performed via power injection. The injected amount was 20 mL contrast at 10 mL/s.

## 2025-04-28 NOTE — ASSESSMENT & PLAN NOTE
Patients blood pressure range in the last 24 hours was: BP  Min: 143/68  Max: 188/90.The patient's inpatient anti-hypertensive regimen is listed below:  Current Antihypertensives  lisinopriL tablet 5 mg, Daily, Oral  metoprolol tartrate (LOPRESSOR) tablet 25 mg, 2 times daily, Oral  furosemide injection 40 mg, Every 12 hours, Intravenous    Plan  - BP is uncontrolled, will adjust as follows:    - Start furosemide 40 mg b.i.d.

## 2025-04-28 NOTE — ED PROVIDER NOTES
Encounter Date: 4/28/2025       History     Chief Complaint   Patient presents with    Shortness of Breath     X 1 week     HPI    Seen and evaluated.  Presents with 1 week of progressive worsening dyspnea.  She reports persistent shortness of breath that has been continuing to worsened over the last week.  She notes lower extremity edema.  She denies any alleviating factors.  She reports history of CAD, CABG, diabetes, hyperlipidemia hypertension hypothyroid.  She denies any alleviating or exacerbating factors and states the symptoms have been slowly worsening over the last week.  This is an acute episodic process.  It has been slowly worsening, and she denies any similar previous presentations.  She does not currently take any diuretics.  She currently denies any active chest pain.    Review of patient's allergies indicates:  No Known Allergies  Past Medical History:   Diagnosis Date    Coronary artery disease     Diabetes mellitus, type 2     Hyperlipidemia     Hypertension     Hypothyroidism     Neuropathy      Past Surgical History:   Procedure Laterality Date    ANGIOGRAPHY OF LOWER EXTREMITY Left 12/16/2020    Procedure: ANGIOGRAM, LOWER EXTREMITY;  Surgeon: Juancho White MD;  Location: [facility] CATH/EP LAB;  Service: General;  Laterality: Left;    CHOLECYSTECTOMY      CORONARY ARTERY BYPASS GRAFT      FOOT SURGERY Left     HYSTERECTOMY      PERIPHERALLY INSERTED CENTRAL CATHETER INSERTION N/A 3/13/2024    Procedure: INSERTION, PICC;  Surgeon: PICC, STPH;  Location: [facility];  Service: Cardiology;  Laterality: N/A;  Dr Mendez     Family History   Problem Relation Name Age of Onset    Hyperlipidemia Mother      Heart disease Mother       Social History[1]  Review of Systems   Constitutional:  Negative for fever.   Respiratory:  Positive for shortness of breath.         Worsening dyspnea on exertion   Cardiovascular:  Negative for chest pain.   Gastrointestinal:  Negative for nausea.   Genitourinary:   Negative for dysuria.   Skin:  Negative for rash.   Neurological:  Negative for weakness.       Physical Exam     Initial Vitals [04/28/25 1221]   BP Pulse Resp Temp SpO2   (!) 188/90 (!) 111 (!) 24 97.6 °F (36.4 °C) (!) 94 %      MAP       --         Physical Exam    Nursing note and vitals reviewed.  Constitutional: She appears well-developed and well-nourished.   HENT:   Head: Normocephalic and atraumatic.   Eyes: Conjunctivae are normal.   Neck: Neck supple.   Cardiovascular:  Normal rate and regular rhythm.           Abdominal: Abdomen is soft.   Musculoskeletal:         General: Edema present. Normal range of motion.      Cervical back: Neck supple.     Neurological: She is alert and oriented to person, place, and time.   Skin: Skin is warm and dry.   Psychiatric: She has a normal mood and affect. Her speech is normal.         ED Course   Procedures  Labs Reviewed   COMPREHENSIVE METABOLIC PANEL - Abnormal       Result Value    Sodium 136      Potassium 4.1      Chloride 103      CO2 25      Glucose 222 (*)     BUN 12      Creatinine 0.5      Calcium 9.0      Protein Total 7.2      Albumin 4.4      Bilirubin Total 0.8      ALP 82      AST 13      ALT 24      Anion Gap 8      eGFR >60     B-TYPE NATRIURETIC PEPTIDE - Abnormal     (*)    MAGNESIUM - Normal    Magnesium 1.6     TROPONIN I HIGH SENSITIVITY - Normal    Troponin High Sensitive 6.4     PROTIME-INR - Normal    PT 11.4      INR 1.0     CBC WITH DIFFERENTIAL - Normal    WBC 7.71      RBC 4.32      Hgb 12.4      Hct 38.3      MCV 89      MCH 28.7      MCHC 32.4      RDW 13.5      Platelet Count 321      MPV 9.3      Nucleated RBC 0      Neut % 60.2      Lymph % 31.3      Mono % 4.4      Eos % 3.2      Basophil % 0.6      Imm Grans % 0.3      Neut # 4.6      Lymph # 2.41      Mono # 0.34      Eos # 0.25      Baso # 0.05      Imm Grans # 0.02     CBC W/ AUTO DIFFERENTIAL    Narrative:     The following orders were created for panel order CBC auto  differential.  Procedure                               Abnormality         Status                     ---------                               -----------         ------                     CBC with Differential[[id number]]       Normal              Final result                 Please view results for these tests on the individual orders.   HEPATITIS C ANTIBODY   HIV 1 / 2 ANTIBODY   TROPONIN I HIGH SENSITIVITY   EXTRA TUBES    Narrative:     The following orders were created for panel order EXTRA TUBES.  Procedure                               Abnormality         Status                     ---------                               -----------         ------                     Gold Top Hold[[id number]]                                                                Please view results for these tests on the individual orders.   GOLD TOP HOLD     EKG Readings: (Independently Interpreted)   Sinus tachycardia 102 beats per minute no ST elevation or depression.  Normal intervals     ECG Results              EKG 12-lead (In process)        Collection Time Result Time QRS Duration OHS QTC Calculation    04/28/25 12:30:37 04/28/25 13:03:28 84 474                     In process by Interface, Lab In [facility] (04/28/25 13:03:37)                   Narrative:    Test Reason : R07.9,    Vent. Rate : 102 BPM     Atrial Rate : 102 BPM     P-R Int : 170 ms          QRS Dur :  84 ms      QT Int : 364 ms       P-R-T Axes :  14 145   2 degrees    QTcB Int : 474 ms    Sinus tachycardia with Premature supraventricular complexes  Left posterior fascicular block  Anterior infarct (cited on or before 09-Dec-2020)  Abnormal ECG  When compared with ECG of 03-Aug-2023 16:09,  Premature supraventricular complexes are now Present  Left posterior fascicular block is now Present  Questionable change in initial forces of Lateral leads  Nonspecific T wave abnormality now evident in Inferior leads  T wave inversion no longer evident in Lateral  leads    Referred By:            Confirmed By:                                   Imaging Results              X-Ray Chest AP Portable (Final result)  Result time 04/28/25 13:36:57      Final result by Tal Fontaine MD (04/28/25 13:36:57)                   Impression:      1.  Cardiomegaly and findings suggestive of mild interstitial pulmonary edema.    2.  Small left greater than right pleural effusion and adjacent atelectasis.      Electronically signed by: Tal Fontaine  Date:    04/28/2025  Time:    13:36               Narrative:    CLINICAL HISTORY:  ([id number])76 y/o  (1950) F    shortness of breath;    TECHNIQUE:  (A#[id number], exam time 4/28/2025 13:31)    XR CHEST AP PORTABLE [id number]    COMPARISON:  Radiograph from 03/13/2024    FINDINGS:  Mildly increased central hilar interstitial opacities are seen bilaterally suggestive of either mild edema  atypical infection/pneumonia or bronchitis in the appropriate clinical setting. No consolidative pneumonia is seen. There is blunting of both costophrenic angles consistent with small left greater than right pleural effusions and adjacent atelectasis. No pneumothorax is identified. The heart is mildly enlarged. Median sternotomy wires are unchanged. Atheromatous calcifications are seen at the aortic arch. Osseous structures show degenerative disc disease and degenerative changes in the shoulders. The visualized upper abdomen is unremarkable.                                       Medications   furosemide injection 40 mg (40 mg Intravenous Given 4/28/25 1621)   aluminum-magnesium hydroxide-simethicone 200-200-20 mg/5 mL suspension 30 mL (30 mLs Oral Given 4/28/25 1619)     And   LIDOcaine viscous HCl 2% oral solution 15 mL (15 mLs Oral Given 4/28/25 1619)     Medical Decision Making  BP (!) 179/78   Pulse 93   Temp 97.6 °F (36.4 °C)   Resp (!) 23   Ht 5' (1.524 m)   Wt 72.6 kg (160 lb)   SpO2 99%   BMI 31.25 kg/m²     Seen and evaluated.  Presents  with a chief complaint of shortness of breath and exertional dyspnea.  She reports 1 week of progressive worsening dyspnea.  Differential diagnosis includes pneumonia, ACS, CHF.  Laboratory evaluation completed.  Troponin, CBC, CMP stable.  BNP elevated to 600.  This combined with physical exam findings of lower extremity edema raise suspicion of heart failure.  No significant history of heart failure.  Never been on diuretics.  Chest x-ray shows edema.  Treated with 40 mg of Lasix IV.  Will admit to Hospital Medicine in guarded condition    Risk  OTC drugs.  Prescription drug management.  Decision regarding hospitalization.                                      Clinical Impression:  Final diagnoses:  [R07.9] Chest pain  [R06.02] SOB (shortness of breath) (Primary)  [R10.13] Epigastric pain          ED Disposition Condition    Admit                   [1]   Social History  Tobacco Use    Smoking status: Never    Smokeless tobacco: Never   Substance Use Topics    Alcohol use: No    Drug use: No        Atilio Arambula Jr., MD  04/28/25 [id number]

## 2025-04-28 NOTE — ASSESSMENT & PLAN NOTE
Orthopnea with bilateral lower extremity pitting edema.  Does not wear oxygen.  Pulmonary edema on CXR with bilateral pleural effusions.  - Lasix 40 given in ED  - Start furosemide 40 b.i.d.  - Monitor renal function daily  - Closely monitor respiratory status  - Continuous pulse oximetry  - Strict I&Os  - Echo pending  - Supplemental oxygen as needed (94% on room air at rest)  - Cardiology consulted    X-Ray Chest AP Portable  Result Date: 4/28/2025  1.  Cardiomegaly and findings suggestive of mild interstitial pulmonary edema. 2.  Small left greater than right pleural effusion and adjacent atelectasis. Electronically signed by: Tal Fontaine Date:    04/28/2025 Time:    13:36    Lab Results   Component Value Date    CREATININE 0.5 04/28/2025     Lab Results   Component Value Date    BUN 12 04/28/2025

## 2025-04-29 ENCOUNTER — CLINICAL SUPPORT (OUTPATIENT)
Dept: CARDIOLOGY | Facility: HOSPITAL | Age: 75
End: 2025-04-29
Attending: EMERGENCY MEDICINE
Payer: MEDICARE

## 2025-04-29 LAB
ALBUMIN SERPL-MCNC: 3.6 G/DL (ref 3.5–5.2)
ALP SERPL-CCNC: 70 UNIT/L (ref 55–135)
ALT SERPL-CCNC: 18 UNIT/L (ref 10–44)
ANION GAP (SMH): 7 MMOL/L (ref 8–16)
AST SERPL-CCNC: 11 UNIT/L (ref 10–40)
BILIRUB SERPL-MCNC: 0.8 MG/DL (ref 0.1–1)
BUN SERPL-MCNC: 12 MG/DL (ref 8–23)
CALCIUM SERPL-MCNC: 8.4 MG/DL (ref 8.7–10.5)
CHLORIDE SERPL-SCNC: 104 MMOL/L (ref 95–110)
CO2 SERPL-SCNC: 29 MMOL/L (ref 23–29)
CREAT SERPL-MCNC: 0.7 MG/DL (ref 0.5–1.4)
EAG (SMH): 237 MG/DL (ref 68–131)
ERYTHROCYTE [DISTWIDTH] IN BLOOD BY AUTOMATED COUNT: 13.5 % (ref 11.5–14.5)
GFR SERPLBLD CREATININE-BSD FMLA CKD-EPI: >60 ML/MIN/1.73/M2
GLUCOSE SERPL-MCNC: 77 MG/DL (ref 70–110)
HBA1C MFR BLD: 9.9 % (ref 4.5–6.2)
HCT VFR BLD AUTO: 34.1 % (ref 37–48.5)
HGB BLD-MCNC: 11.2 GM/DL (ref 12–16)
MAGNESIUM SERPL-MCNC: 1.5 MG/DL (ref 1.6–2.6)
MCH RBC QN AUTO: 29.2 PG (ref 27–31)
MCHC RBC AUTO-ENTMCNC: 32.8 G/DL (ref 32–36)
MCV RBC AUTO: 89 FL (ref 82–98)
PLATELET # BLD AUTO: 304 K/UL (ref 150–450)
PMV BLD AUTO: 9.3 FL (ref 9.2–12.9)
POTASSIUM SERPL-SCNC: 3.1 MMOL/L (ref 3.5–5.1)
PROT SERPL-MCNC: 6 GM/DL (ref 6–8.4)
RBC # BLD AUTO: 3.83 M/UL (ref 4–5.4)
SODIUM SERPL-SCNC: 140 MMOL/L (ref 136–145)
TSH SERPL-ACNC: 3.51 UIU/ML (ref 0.34–5.6)
WBC # BLD AUTO: 7.29 K/UL (ref 3.9–12.7)

## 2025-04-29 PROCEDURE — 83735 ASSAY OF MAGNESIUM: CPT | Performed by: INTERNAL MEDICINE

## 2025-04-29 PROCEDURE — 27000221 HC OXYGEN, UP TO 24 HOURS

## 2025-04-29 PROCEDURE — 93306 TTE W/DOPPLER COMPLETE: CPT

## 2025-04-29 PROCEDURE — 97535 SELF CARE MNGMENT TRAINING: CPT

## 2025-04-29 PROCEDURE — 96365 THER/PROPH/DIAG IV INF INIT: CPT

## 2025-04-29 PROCEDURE — 99900035 HC TECH TIME PER 15 MIN (STAT)

## 2025-04-29 PROCEDURE — 84443 ASSAY THYROID STIM HORMONE: CPT | Performed by: STUDENT IN AN ORGANIZED HEALTH CARE EDUCATION/TRAINING PROGRAM

## 2025-04-29 PROCEDURE — 94761 N-INVAS EAR/PLS OXIMETRY MLT: CPT

## 2025-04-29 PROCEDURE — 99900031 HC PATIENT EDUCATION (STAT)

## 2025-04-29 PROCEDURE — 80053 COMPREHEN METABOLIC PANEL: CPT | Performed by: INTERNAL MEDICINE

## 2025-04-29 PROCEDURE — 93306 TTE W/DOPPLER COMPLETE: CPT | Mod: 26,,, | Performed by: INTERNAL MEDICINE

## 2025-04-29 PROCEDURE — 63600175 PHARM REV CODE 636 W HCPCS

## 2025-04-29 PROCEDURE — 25000003 PHARM REV CODE 250: Performed by: INTERNAL MEDICINE

## 2025-04-29 PROCEDURE — 63600175 PHARM REV CODE 636 W HCPCS: Performed by: STUDENT IN AN ORGANIZED HEALTH CARE EDUCATION/TRAINING PROGRAM

## 2025-04-29 PROCEDURE — 97165 OT EVAL LOW COMPLEX 30 MIN: CPT

## 2025-04-29 PROCEDURE — 97162 PT EVAL MOD COMPLEX 30 MIN: CPT

## 2025-04-29 PROCEDURE — 96372 THER/PROPH/DIAG INJ SC/IM: CPT

## 2025-04-29 PROCEDURE — 85027 COMPLETE CBC AUTOMATED: CPT | Performed by: INTERNAL MEDICINE

## 2025-04-29 PROCEDURE — 97116 GAIT TRAINING THERAPY: CPT

## 2025-04-29 PROCEDURE — 96376 TX/PRO/DX INJ SAME DRUG ADON: CPT

## 2025-04-29 PROCEDURE — 25000003 PHARM REV CODE 250

## 2025-04-29 PROCEDURE — 96366 THER/PROPH/DIAG IV INF ADDON: CPT

## 2025-04-29 PROCEDURE — 12000002 HC ACUTE/MED SURGE SEMI-PRIVATE ROOM

## 2025-04-29 PROCEDURE — 36415 COLL VENOUS BLD VENIPUNCTURE: CPT | Performed by: INTERNAL MEDICINE

## 2025-04-29 RX ORDER — MAGNESIUM SULFATE HEPTAHYDRATE 40 MG/ML
2 INJECTION, SOLUTION INTRAVENOUS ONCE
Status: COMPLETED | OUTPATIENT
Start: 2025-04-29 | End: 2025-04-29

## 2025-04-29 RX ADMIN — FUROSEMIDE 40 MG: 10 INJECTION, SOLUTION INTRAMUSCULAR; INTRAVENOUS at 04:04

## 2025-04-29 RX ADMIN — LISINOPRIL 5 MG: 5 TABLET ORAL at 08:04

## 2025-04-29 RX ADMIN — METOPROLOL TARTRATE 25 MG: 25 TABLET, FILM COATED ORAL at 08:04

## 2025-04-29 RX ADMIN — SENNOSIDES, DOCUSATE SODIUM 1 TABLET: 50; 8.6 TABLET, FILM COATED ORAL at 08:04

## 2025-04-29 RX ADMIN — INSULIN GLARGINE 35 UNITS: 100 INJECTION, SOLUTION SUBCUTANEOUS at 08:04

## 2025-04-29 RX ADMIN — GABAPENTIN 300 MG: 300 CAPSULE ORAL at 08:04

## 2025-04-29 RX ADMIN — LEVOTHYROXINE SODIUM 50 MCG: 0.03 TABLET ORAL at 06:04

## 2025-04-29 RX ADMIN — INSULIN GLARGINE 35 UNITS: 100 INJECTION, SOLUTION SUBCUTANEOUS at 12:04

## 2025-04-29 RX ADMIN — ASPIRIN 81 MG: 81 TABLET, COATED ORAL at 08:04

## 2025-04-29 RX ADMIN — ATORVASTATIN CALCIUM 80 MG: 40 TABLET, FILM COATED ORAL at 08:04

## 2025-04-29 RX ADMIN — PANTOPRAZOLE SODIUM 40 MG: 40 TABLET, DELAYED RELEASE ORAL at 06:04

## 2025-04-29 RX ADMIN — MAGNESIUM SULFATE HEPTAHYDRATE 2 G: 40 INJECTION, SOLUTION INTRAVENOUS at 08:04

## 2025-04-29 RX ADMIN — POTASSIUM BICARBONATE 35 MEQ: 391 TABLET, EFFERVESCENT ORAL at 08:04

## 2025-04-29 RX ADMIN — GABAPENTIN 600 MG: 300 CAPSULE ORAL at 08:04

## 2025-04-29 NOTE — HOSPITAL COURSE
Patient is a 75 year old female with type 2 diabetes, neuropathy, hypertension, CAD status post CABG, hypothyroidism presented with complaints of shortness for breath and lower extremity edema.  Started on IV Lasix with improvement.  Echocardiogram showed ejection fraction 49%, grade 2 diastolic dysfunction.  Severe  aortic stenosis. Cardio consulted consulted.  Chest x-ray showed pulmonary edema.  No prior history of CHF. Cardiology planning for angiogram 5/2 and outpatient TAVR. Pending angiogram and cardiology clearance patient can be discharged. Patient seen and examined. No chest pain or SOB. Clinically euvolemic., low dose lasix on discharge.

## 2025-04-29 NOTE — PLAN OF CARE
ANNA signed and scanned into [employer].      04/29/25 1015   ANNA Message   Medicare Outpatient and Observation Notification regarding financial responsibility Explained to patient/caregiver;Signed/date by patient/caregiver   Date ANNA was signed 04/29/25   Time ANNA was signed 1002

## 2025-04-29 NOTE — PROGRESS NOTES
[facility] Medicine  Progress Note    Patient Name: Hoa Wilde  MRN: [id number]  Patient Class: OP- Observation   Admission Date: 4/28/2025  Length of Stay: 0 days  Attending Physician: Kapil Wolfe MD  Primary Care Provider: Carlos Pope MD        Subjective     Principal Problem:Pulmonary edema        HPI:  Hoa Wilde is a 75-year-old female with a past medical history of diabetes type 2 with peripheral neuropathy, hypertension, hyperlipidemia, and coronary artery disease with CABG x4 in 2016, gout, hypothyroidism, and depression who was seen and evaluated in the ER for complaints of worsening shortness of breath and for the last few days.  She reports increased dyspnea on exertion over the last 2-3 days.  She endorses chronic bilateral lower extremity swelling, and that she is unable to lie flat and has slough with 3 pillows for last 1-2 years.  ED workup was positive for bilateral pleural effusions, pulmonary edema, elevated BNP of 612, and hyperglycemia with a glucose of 222.  History was obtained from patient and spouse.  Hospital admission was consulted and patient agreeable for admission.     Overview/Hospital Course:  Patient with type 2 diabetes, neuropathy, hypertension, CAD status post CABG, hypothyroidism presented with complaints of shortness for breath and lower extremity edema.  Started on IV Lasix with improvement.  Echocardiogram pending.  Chest x-ray showed pulmonary edema.  No prior history of CHF.    Interval History:  Patient seen and examined this morning, reports improvement in shortness of breath.  Echocardiogram pending.    Review of Systems   Constitutional:  Negative for appetite change.   Respiratory:  Negative for shortness of breath.    Cardiovascular:  Positive for leg swelling. Negative for chest pain.   Gastrointestinal:  Negative for abdominal pain.     Objective:     Vital Signs (Most Recent):  Temp: 97.4 °F (36.3 °C) (04/29/25 0703)  Pulse: 70 (04/29/25  0715)  Resp: 16 (04/29/25 0703)  BP: (!) 93/56 (04/29/25 0703)  SpO2: 98 % (04/29/25 0703) Vital Signs (24h Range):  Temp:  [97.4 °F (36.3 °C)-98.6 °F (37 °C)] 97.4 °F (36.3 °C)  Pulse:  [] 70  Resp:  [14-26] 16  SpO2:  [92 %-99 %] 98 %  BP: ()/(56-93) 93/56     Weight: 69.9 kg (154 lb 1.6 oz)  Body mass index is 30.1 kg/m².    Intake/Output Summary (Last 24 hours) at 4/29/2025 0918  Last data filed at 4/28/2025 2242  Gross per 24 hour   Intake 100 ml   Output --   Net 100 ml         Physical Exam  Constitutional:       General: She is not in acute distress.  Cardiovascular:      Rate and Rhythm: Normal rate and regular rhythm.   Pulmonary:      Effort: No respiratory distress.      Breath sounds: No wheezing.   Abdominal:      Palpations: Abdomen is soft.   Musculoskeletal:      Right lower leg: Edema present.      Left lower leg: Edema present.   Skin:     General: Skin is dry.   Neurological:      General: No focal deficit present.      Mental Status: She is alert and oriented to person, place, and time.               Significant Labs: All pertinent labs within the past 24 hours have been reviewed.  CBC:   Recent Labs   Lab 04/28/25  1243 04/29/25  0509   WBC 7.71 7.29   HGB 12.4 11.2*   HCT 38.3 34.1*    304     CMP:   Recent Labs   Lab 04/28/25  1243 04/29/25  0509    140   K 4.1 3.1*   CO2 25 29   BUN 12 12   CREATININE 0.5 0.7   CALCIUM 9.0 8.4*   ALBUMIN 4.4 3.6   BILITOT 0.8 0.8   ALKPHOS 82 70   AST 13 11   ALT 24 18       Significant Imaging: I have reviewed all pertinent imaging results/findings within the past 24 hours.      Assessment & Plan  Pulmonary edema  Orthopnea with bilateral lower extremity pitting edema.  Does not wear oxygen.  Pulmonary edema on CXR with bilateral pleural effusions.  Continue IV Lasix  - Monitor renal function daily  - Closely monitor respiratory status  - Continuous pulse oximetry  - Strict I&Os  - Echo pending  - Supplemental oxygen as needed (94%  on room air at rest)    X-Ray Chest AP Portable  Result Date: 4/28/2025  1.  Cardiomegaly and findings suggestive of mild interstitial pulmonary edema. 2.  Small left greater than right pleural effusion and adjacent atelectasis. Electronically signed by: Tal Fontaine Date:    04/28/2025 Time:    13:36    Lab Results   Component Value Date    CREATININE 0.7 04/29/2025     Lab Results   Component Value Date    BUN 12 04/29/2025         Bilateral pleural effusion  Patient found to have small bilateral pleural effusions on imaging, left greater than right. I have personally reviewed and interpreted the following imaging: Xray. A thoracentesis was deferred. Most likely etiology includes Congestive Heart Failure. Management to include Diuresis    - See pulmonary edema    Hypertension associated with diabetes  Patients blood pressure range in the last 24 hours was: BP  Min: 93/56  Max: 188/90.The patient's inpatient anti-hypertensive regimen is listed below:  Current Antihypertensives  lisinopriL tablet 5 mg, Daily, Oral  metoprolol tartrate (LOPRESSOR) tablet 25 mg, 2 times daily, Oral  furosemide injection 40 mg, Every 12 hours, Intravenous    Plan  Continue home blood pressure medications    Hyperlipidemia associated with type 2 diabetes mellitus  Continue statin    Type 2 diabetes mellitus with diabetic polyneuropathy, with long-term current use of insulin  Patient's FSGs are uncontrolled due to hyperglycemia on current medication regimen.  Last A1c reviewed-   Lab Results   Component Value Date    HGBA1C 11.9 (H) 03/08/2024     Most recent fingerstick glucose reviewed-   Recent Labs   Lab 04/28/25  1741 04/28/25  2212   POCTGLUCOSE 202* 173*     Current correctional scale  Medium  Maintain anti-hyperglycemic dose as follows-   Antihyperglycemics (From admission, onward)      Start     Stop Route Frequency Ordered    04/28/25 2100  insulin glargine U-100 (Lantus) pen 35 Units         -- SubQ Nightly 04/28/25 1647     04/28/25 1748  insulin aspart U-100 pen 0-10 Units         -- SubQ Before meals & nightly PRN 04/28/25 1649          - Hold Oral hypoglycemics while patient is in the hospital.  - Carbohydrate steady diet  - Accu-Cheks 4 times daily before meals and before bed      Coronary artery disease s/p CABG  Patient with known CAD s/p CABG, which is controlled Will continue ASA and Statin and monitor for S/Sx of angina/ACS. Continue to monitor on telemetry.       BP Readings from Last 3 Encounters:   04/29/25 (!) 93/56   04/28/25 (!) 166/84   04/02/25 (!) 145/81     Pulse Readings from Last 3 Encounters:   04/29/25 70   04/28/25 95   04/02/25 90       Hypothyroidism  Chronic, stable.    - Continue levothyroxine 50 mcg daily before breakfast    Lab Results   Component Value Date    TSH 1.750 01/20/2023    TSH 1.750 01/20/2023         VTE Risk Mitigation (From admission, onward)           Ordered     IP VTE HIGH RISK PATIENT  Once         04/28/25 1648     Place sequential compression device  Until discontinued         04/28/25 1648                    Discharge Planning   CAR: 5/2/2025     Code Status: Full Code   Medical Readiness for Discharge Date:                    Please place Justification for DME        Kapil Wolfe MD  Department of Hospital Medicine   [facility]

## 2025-04-29 NOTE — PT/OT/SLP EVAL
"Physical Therapy Evaluation    Patient Name:  Hoa Wilde   MRN:  [id number]    Recommendations:     Discharge Recommendations: Low Intensity Therapy   Discharge Equipment Recommendations: none   Barriers to discharge:  medical status, requires assist for safe mobility    Assessment:     Hoa Wilde is a 75 y.o. female admitted with a medical diagnosis of Pulmonary edema.  She presents with the following impairments/functional limitations: weakness, impaired endurance, impaired self care skills, impaired functional mobility, gait instability, impaired balance, edema, impaired cardiopulmonary response to activity.    Pt found in bed with HOB elevated and  at bedside.  She is agreeable to PT,  voiced concerns regarding complete diagnosis prior to DC in order to facilitate best health outcome.  Pt requires Jonathan intermittently with bed mobility, transfers, and gait as well as use of RW.  Pt distance limited by endurance, weakness and balance.  Would benefit from low intensity therapy following discharge from acute hospital in order to maximize functional improvement and decrease fall risk at home.     Rehab Prognosis: Fair; patient would benefit from acute skilled PT services to address these deficits and reach maximum level of function.    Recent Surgery: * No surgery found *      Plan:     During this hospitalization, patient to be seen 5 x/week to address the identified rehab impairments via gait training, therapeutic activities, therapeutic exercises and progress toward the following goals:    Plan of Care Expires:       Subjective     Chief Complaint: discomfort from being in bed  Patient/Family Comments/goals: get up out of bed.  states "I'm not in a hurry this time, I dont want to take her home until we figure out what's going on."  Pain/Comfort:  Pain Rating 1: 0/10    Patients cultural, spiritual, Quaker conflicts given the current situation:      Living Environment:  Pt lives in [facility] with " "no steps to enter with spouse, granddaughters visit often and assist as needed  Prior to admission, patients level of function was required assist for ADLs, ambulation household distances with RW, uses scooter for locomotion community distances.  Equipment used at home: walker, rolling, wheelchair, rollator, shower chair.  DME owned (not currently used): none.  Upon discharge, patient will have assistance from family.    Objective:     Communicated with RNSkyla prior to session, ok to proceede.  Patient found HOB elevated, spouse at bedside with telemetry, peripheral IV, PureWick, oxygen, bed alarm  upon PT entry to room. Pt eating "hot and spicy" McChicken her  brought in.    General Precautions: Standard, fall  Orthopedic Precautions:    Braces:    Respiratory Status: Nasal cannula, flow 2 L/min    Exams:  Cognitive Exam:  Patient is oriented to Person and Place  Postural Exam:  Patient presented with the following abnormalities:    -       Rounded shoulders  -       Forward head  -       Kyphosis  Skin Integrity/Edema:      -       Edema: Mild bilat LE  RLE Strength: 3/5  LLE Strength: 3/5    Functional Mobility:  Bed Mobility:     Supine to Sit: contact guard assistance  Sit to Supine: minimum assistance  Transfers:     Sit to Stand:  minimum assistance with rolling walker  Gait: 2x10, x8 w/RW and slow pace, requires multiple standing rest breaks and Jonathan for management of RW, tactile postural cues, assist for balance and to maintain COG within EDEL of RW.      AM-PAC 6 CLICK MOBILITY  Total Score:16       Treatment & Education:  Pt was educated on the following: call light use, importance of OOB activity and functional mobility to negate the negative effects of prolonged bed rest during this hospitalization, safe transfers/ambulation and discharge planning recommendations/options.     Patient left HOB elevated with all lines intact, call button in reach, bed alarm on, RN notified, and spouse " present.    GOALS:   Multidisciplinary Problems       Physical Therapy Goals          Problem: Physical Therapy    Goal Priority Disciplines Outcome Interventions   Physical Therapy Goal     PT, PT/OT Progressing    Description: 1. Supine to sit with Stand-by Assistance  2. Sit to stand transfer with Stand-by Assistance  3.. Bed to chair transfer with Supervision using Rolling Walker  4. Gait  x 100 feet with Stand By Assistance using Rolling Walker.                         DME Justifications:  No DME recommended requiring DME justifications    History:     Past Medical History:   Diagnosis Date    Coronary artery disease     Diabetes mellitus, type 2     Hyperlipidemia     Hypertension     Hypothyroidism     Neuropathy        Past Surgical History:   Procedure Laterality Date    ANGIOGRAPHY OF LOWER EXTREMITY Left 12/16/2020    Procedure: ANGIOGRAM, LOWER EXTREMITY;  Surgeon: Juancho White MD;  Location: [facility] CATH/EP LAB;  Service: General;  Laterality: Left;    CHOLECYSTECTOMY      CORONARY ARTERY BYPASS GRAFT      FOOT SURGERY Left     HYSTERECTOMY      PERIPHERALLY INSERTED CENTRAL CATHETER INSERTION N/A 3/13/2024    Procedure: INSERTION, PICC;  Surgeon: PICC, NIKKO;  Location: [facility];  Service: Cardiology;  Laterality: N/A;  Dr Mendez       Time Tracking:     PT Received On: 04/29/25  PT Start Time: 1341     PT Stop Time: 1405  PT Total Time (min): 24 min     Billable Minutes: Evaluation 10 and Gait Training 14      04/29/2025

## 2025-04-29 NOTE — PLAN OF CARE
[facility]  Initial Discharge Assessment       Primary Care Provider: Carlos Pope MD    Admission Diagnosis: SOB (shortness of breath) [R06.02]    Admission Date: 4/28/2025  Expected Discharge Date: 5/2/2025    SW met with patient bedside. SW verified demographics, insurance, supports, and PCP.  Patient reported living in the home with spouse and spouse brings her to appointments. SW assessed patient's needs. Patient is unable to complete ADLs independently. Patient reported either using equipment or spouse assisting with ADLs. Patient verified at home DME: rollator.  Patient verified No HH, No Dialysis, No Blood Thinners, and No Oxygen.     Patient verified pharmacy of choice: [facility] on [address]  Patient confirmed spouse will be source of transportation at the time of discharge.     Transition of Care Barriers: None    Payor: [employer] [place] / Plan: [employer] CHOICES / Product Type: Medicare Advantage /     Extended Emergency Contact Information  Primary Emergency Contact: Edmond Wilde  Address: [address], [place] [id number] [place] [address]  Home Phone: [phone]  Mobile Phone: [phone]  Relation: Spouse  Preferred language: English   needed? No  Secondary Emergency Contact: Radha Lechuga  Mobile Phone: [phone]  Relation: Grandchild  Preferred language: English   needed? No    Discharge Plan A: Home Health  Discharge Plan B: Home Health      [facility] - [place], [place] - [address]  [address]  [place] [address]  Phone: [phone] Fax: [phone]    [facility]  [address]  [place] [address]  Phone: [phone] Fax: [phone]      Initial Assessment (most recent)       Adult Discharge Assessment - 04/29/25 1011          Discharge Assessment    Assessment Type Discharge Planning Assessment     Confirmed/corrected address, phone number and  insurance Yes     Confirmed Demographics Correct on Facesheet     Source of Information patient     When was your last doctors appointment? --   year ago    Does patient/caregiver understand observation status Yes     Communicated CAR with patient/caregiver Date not available/Unable to determine     Reason For Admission Pulmonary edema     People in Home spouse     Do you expect to return to your current living situation? Yes     Do you have help at home or someone to help you manage your care at home? No     Prior to hospitilization cognitive status: Unable to Assess     Current cognitive status: Alert/Oriented     Walking or Climbing Stairs Difficulty yes     Walking or Climbing Stairs ambulation difficulty, requires equipment     Mobility Management rollator     Dressing/Bathing Difficulty yes     Dressing/Bathing bathing difficulty, assistance 1 person;dressing difficulty, assistance 1 person     Home Accessibility wheelchair accessible     Home Layout Able to live on 1st floor     Equipment Currently Used at Home rollator     Readmission within 30 days? No     Patient currently being followed by outpatient case management? No     Do you currently have service(s) that help you manage your care at home? No     Do you take prescription medications? Yes     Do you have prescription coverage? Yes     Coverage [facility]     Do you have any problems affording any of your prescribed medications? No     Is the patient taking medications as prescribed? yes     Who is going to help you get home at discharge? spouse     How do you get to doctors appointments? family or friend will provide     Are you on dialysis? No     Do you take coumadin? No     Discharge Plan A Home Health     Discharge Plan B Home Health     DME Needed Upon Discharge  none     Discharge Plan discussed with: Patient     Transition of Care Barriers None

## 2025-04-29 NOTE — PLAN OF CARE
Problem: Diabetes Comorbidity  Goal: Blood Glucose Level Within Targeted Range  Outcome: Ongoing     Problem: Skin Injury Risk Increased  Goal: Skin Health and Integrity  Outcome: Ongoing

## 2025-04-29 NOTE — PT/OT/SLP EVAL
Occupational Therapy   Evaluation    Name: Hoa Wilde  MRN: [id number]  Admitting Diagnosis: Pulmonary edema  Recent Surgery: * No surgery found *      Recommendations:     Discharge Recommendations: Low Intensity Therapy  Discharge Equipment Recommendations:  none  Barriers to discharge:  None    Assessment:     Hoa Wilde is a 75 y.o. female with a medical diagnosis of Pulmonary edema.  She presents fatigue but agreeable for OT eval. Performance deficits affecting function: weakness, impaired endurance, impaired self care skills, impaired balance, impaired cardiopulmonary response to activity.      Rehab Prognosis: Good; patient would benefit from acute skilled OT services to address these deficits and reach maximum level of function.       Plan:     Patient to be seen 3 x/week to address the above listed problems via self-care/home management, therapeutic activities, therapeutic exercises  Plan of Care Expires: 05/29/25  Plan of Care Reviewed with: patient    Subjective     Chief Complaint: fatigue  Patient/Family Comments/goals: get home and better    Occupational Profile:  Living Environment: pt lives with  in a [facility] with no SHAW. Pt has a tub/shower combo with SC. Pt has two grand daughter around who visit frequently.   Previous level of function: pt requires supervision for ADLs dt lack of balance, she uses rollator for functional mobility within the house and scooter for store runs.  drives pt to Drs appointments.   Roles and Routines: wife, grand mother and limited home manger  Equipment Used at Home: walker, rolling, rollator, other (see comments), shower chair (scooter)  Assistance upon Discharge:     Pain/Comfort:   None reported    Patients cultural, spiritual, Buddhism conflicts given the current situation: no    Objective:     Communicated with: nurse prior to session.  Patient found supine with telemetry, peripheral IV, PureWick, oxygen upon OT entry to room.    General  Precautions: Standard, fall  Orthopedic Precautions: N/A  Braces: N/A  Respiratory Status: Nasal cannula, flow 2 L/min    Occupational Performance:    Bed Mobility:    Patient completed Rolling/Turning to Right with contact guard assistance  Patient completed Scooting/Bridging with contact guard assistance  Patient completed Supine to Sit with contact guard assistance  Patient completed Sit to Supine with contact guard assistance    Functional Mobility/Transfers:  Patient completed Sit <> Stand Transfer with stand by assistance  with  rolling walker   Functional Mobility: pt took 3 side steps with RW and CGA    Activities of Daily Living:  Grooming: contact guard assistance for face grooming  Lower Body Dressing: maximal assistance to don socks    Cognitive/Visual Perceptual:  Cognitive/Psychosocial Skills:     -       Oriented to: Person, Place, Time, and Situation   -       Follows Commands/attention:Follows multistep  commands  -       Communication: clear/fluent  -       Safety awareness/insight to disability: intact   -       Mood/Affect/Coping skills/emotional control: Appropriate to situation and Cooperative    Physical Exam:  Balance:    -       good unsupported sitting balance and standing balance with RW  Upper Extremity Range of Motion:     -       Right Upper Extremity: WFL  -       Left Upper Extremity: WFL  Upper Extremity Strength:    -       Right Upper Extremity: WFL  -       Left Upper Extremity: WFL   Strength:    -       Right Upper Extremity: WFL  -       Left Upper Extremity: WFL  Gross motor coordination:   WFL    AMPAC 6 Click ADL:  AMPAC Total Score: 16    Treatment & Education:  Pt educated on role of OT/POC, importance of OOB/EOB activity, use of call bell, and safety during ADLs, transfers, and functional mobility.     Patient left supine with all lines intact, call button in reach, bed alarm on, and case management present    GOALS:   Multidisciplinary Problems       Occupational  Therapy Goals          Problem: Occupational Therapy    Goal Priority Disciplines Outcome Interventions   Occupational Therapy Goal     OT, PT/OT     Description: Goals to be met by: 5/29/2025     Patient will increase functional independence with ADLs by performing:    UE Dressing with Supervision.  LE Dressing with Moderate Assistance.  Grooming while seated at sink with Supervision.  Toileting from toilet with Supervision for hygiene and clothing management.                          DME Justifications:  No DME recommended requiring DME justifications    History:     Past Medical History:   Diagnosis Date    Coronary artery disease     Diabetes mellitus, type 2     Hyperlipidemia     Hypertension     Hypothyroidism     Neuropathy          Past Surgical History:   Procedure Laterality Date    ANGIOGRAPHY OF LOWER EXTREMITY Left 12/16/2020    Procedure: ANGIOGRAM, LOWER EXTREMITY;  Surgeon: Juancho White MD;  Location: [facility] CATH/EP LAB;  Service: General;  Laterality: Left;    CHOLECYSTECTOMY      CORONARY ARTERY BYPASS GRAFT      FOOT SURGERY Left     HYSTERECTOMY      PERIPHERALLY INSERTED CENTRAL CATHETER INSERTION N/A 3/13/2024    Procedure: INSERTION, PICC;  Surgeon: PICC, NIKKO;  Location: [facility];  Service: Cardiology;  Laterality: N/A;  Dr Mendez       Time Tracking:     OT Date of Treatment: 04/29/25  OT Start Time: 0941  OT Stop Time: 1000  OT Total Time (min): 19 min    Billable Minutes:Evaluation 9  Self Care/Home Management 10    4/29/2025

## 2025-04-29 NOTE — PLAN OF CARE
Met with Hoa Wilde, patient, at bedside to review discharge recommendation of HH and they are agreeable to plan.    Patient/family provided with a list of facilities in-network with patient's payor plan. Providers that are owned, operated, or affiliated with [facility] are included on the list.     Notified that referrals will be sent to the below listed facilities from in-network list based on proximity to home/family support:   [facility]    Patient/family instructed to identify preference.    Preferred Facility: (if more than 1, listed in order of descending preference)  [facility]    If an additional preferred facility not listed above is identified, additional referral to be sent. If above facilities unable to accept, will send additional referrals to in-network providers.      04/29/25 1047   Post-Acute Status   Post-Acute Authorization Home Health   Home Health Status Referrals Sent   Coverage [employer]   Patient choice form signed by patient/caregiver List with quality metrics by geographic area provided   Discharge Delays None known at this time   Discharge Plan   Discharge Plan A Home Health   Discharge Plan B Home Health

## 2025-04-29 NOTE — PLAN OF CARE
Problem: Physical Therapy  Goal: Physical Therapy Goal  Description: 1. Supine to sit with Stand-by Assistance  2. Sit to stand transfer with Stand-by Assistance  3.. Bed to chair transfer with Supervision using Rolling Walker  4. Gait  x 100 feet with Stand By Assistance using Rolling Walker.    Outcome: Progressing

## 2025-04-29 NOTE — ASSESSMENT & PLAN NOTE
Patient with known CAD s/p CABG, which is controlled Will continue ASA and Statin and monitor for S/Sx of angina/ACS. Continue to monitor on telemetry.       BP Readings from Last 3 Encounters:   04/29/25 (!) 93/56   04/28/25 (!) 166/84   04/02/25 (!) 145/81     Pulse Readings from Last 3 Encounters:   04/29/25 70   04/28/25 95   04/02/25 90

## 2025-04-29 NOTE — PLAN OF CARE
Inpatient Upgrade Note     Hoa Wilde has warranted treatment spanning two or more midnights of hospital level care for the management of  Pulmonary edema and Bilateral pleural effusion . She continues to require IV diuresis, daily labs, and monitoring of vital signs. Her condition is also complicated by the following comorbidities: Coronary Artery Disease, Hypertension, and Diabetes.

## 2025-04-29 NOTE — ASSESSMENT & PLAN NOTE
Patients blood pressure range in the last 24 hours was: BP  Min: 93/56  Max: 188/90.The patient's inpatient anti-hypertensive regimen is listed below:  Current Antihypertensives  lisinopriL tablet 5 mg, Daily, Oral  metoprolol tartrate (LOPRESSOR) tablet 25 mg, 2 times daily, Oral  furosemide injection 40 mg, Every 12 hours, Intravenous    Plan  Continue home blood pressure medications

## 2025-04-29 NOTE — ASSESSMENT & PLAN NOTE
Patient's FSGs are uncontrolled due to hyperglycemia on current medication regimen.  Last A1c reviewed-   Lab Results   Component Value Date    HGBA1C 11.9 (H) 03/08/2024     Most recent fingerstick glucose reviewed-   Recent Labs   Lab 04/28/25  1741 04/28/25  2212   POCTGLUCOSE 202* 173*     Current correctional scale  Medium  Maintain anti-hyperglycemic dose as follows-   Antihyperglycemics (From admission, onward)      Start     Stop Route Frequency Ordered    04/28/25 2100  insulin glargine U-100 (Lantus) pen 35 Units         -- SubQ Nightly 04/28/25 1647    04/28/25 1748  insulin aspart U-100 pen 0-10 Units         -- SubQ Before meals & nightly PRN 04/28/25 1649          - Hold Oral hypoglycemics while patient is in the hospital.  - Carbohydrate steady diet  - Accu-Cheks 4 times daily before meals and before bed

## 2025-04-29 NOTE — SUBJECTIVE & OBJECTIVE
Interval History:  Patient seen and examined this morning, reports improvement in shortness of breath.  Echocardiogram pending.    Review of Systems   Constitutional:  Negative for appetite change.   Respiratory:  Negative for shortness of breath.    Cardiovascular:  Positive for leg swelling. Negative for chest pain.   Gastrointestinal:  Negative for abdominal pain.     Objective:     Vital Signs (Most Recent):  Temp: 97.4 °F (36.3 °C) (04/29/25 0703)  Pulse: 70 (04/29/25 0715)  Resp: 16 (04/29/25 0703)  BP: (!) 93/56 (04/29/25 0703)  SpO2: 98 % (04/29/25 0703) Vital Signs (24h Range):  Temp:  [97.4 °F (36.3 °C)-98.6 °F (37 °C)] 97.4 °F (36.3 °C)  Pulse:  [] 70  Resp:  [14-26] 16  SpO2:  [92 %-99 %] 98 %  BP: ()/(56-93) 93/56     Weight: 69.9 kg (154 lb 1.6 oz)  Body mass index is 30.1 kg/m².    Intake/Output Summary (Last 24 hours) at 4/29/2025 0918  Last data filed at 4/28/2025 2242  Gross per 24 hour   Intake 100 ml   Output --   Net 100 ml         Physical Exam  Constitutional:       General: She is not in acute distress.  Cardiovascular:      Rate and Rhythm: Normal rate and regular rhythm.   Pulmonary:      Effort: No respiratory distress.      Breath sounds: No wheezing.   Abdominal:      Palpations: Abdomen is soft.   Musculoskeletal:      Right lower leg: Edema present.      Left lower leg: Edema present.   Skin:     General: Skin is dry.   Neurological:      General: No focal deficit present.      Mental Status: She is alert and oriented to person, place, and time.               Significant Labs: All pertinent labs within the past 24 hours have been reviewed.  CBC:   Recent Labs   Lab 04/28/25  1243 04/29/25  0509   WBC 7.71 7.29   HGB 12.4 11.2*   HCT 38.3 34.1*    304     CMP:   Recent Labs   Lab 04/28/25  1243 04/29/25  0509    140   K 4.1 3.1*   CO2 25 29   BUN 12 12   CREATININE 0.5 0.7   CALCIUM 9.0 8.4*   ALBUMIN 4.4 3.6   BILITOT 0.8 0.8   ALKPHOS 82 70   AST 13 11   ALT  24 18       Significant Imaging: I have reviewed all pertinent imaging results/findings within the past 24 hours.

## 2025-04-29 NOTE — CONSULTS
[facility]  Adult Nutrition  Education Short Note      Nutrition Education    Previous education: no    Diet at home: Regular    Written information provided and explained on Carbohydrate Counting For People With Diabetes  Diabetes Label Reading Tips  Type 2 Diabetes Nutrition Therapy  Using Nutrition Labels: Carbohydrate diabetic diet: 1800 calorie diet.     Comments:     Discussed with: patient    Educational Need? yes    Barriers: none identified    Interventions: Carbohydrate modified diet    Patient and/or family comprehend instructions: yes    Outcome: Verbalizes understanding     Thanks for the consult!    Maria Del Rosario Boo RD 04/29/2025 3:20 PM

## 2025-04-29 NOTE — CARE UPDATE
04/29/25 0644   PRE-TX-O2   Device (Oxygen Therapy) nasal cannula   $ Is the patient on Low Flow Oxygen? Yes   Flow (L/min) (Oxygen Therapy) 2   SpO2 98 %   Pulse Oximetry Type Intermittent   $ Pulse Oximetry - Multiple Charge Pulse Oximetry - Multiple   Pulse 68   Resp 15   Respiratory Evaluation   $ Care Plan Tech Time 15 min

## 2025-04-29 NOTE — ASSESSMENT & PLAN NOTE
Orthopnea with bilateral lower extremity pitting edema.  Does not wear oxygen.  Pulmonary edema on CXR with bilateral pleural effusions.  Continue IV Lasix  - Monitor renal function daily  - Closely monitor respiratory status  - Continuous pulse oximetry  - Strict I&Os  - Echo pending  - Supplemental oxygen as needed (94% on room air at rest)    X-Ray Chest AP Portable  Result Date: 4/28/2025  1.  Cardiomegaly and findings suggestive of mild interstitial pulmonary edema. 2.  Small left greater than right pleural effusion and adjacent atelectasis. Electronically signed by: Tal Fontaine Date:    04/28/2025 Time:    13:36    Lab Results   Component Value Date    CREATININE 0.7 04/29/2025     Lab Results   Component Value Date    BUN 12 04/29/2025

## 2025-04-30 PROBLEM — I50.41 ACUTE COMBINED SYSTOLIC AND DIASTOLIC HEART FAILURE: Status: ACTIVE | Noted: 2025-04-28

## 2025-04-30 LAB
ALBUMIN SERPL-MCNC: 3.7 G/DL (ref 3.5–5.2)
ALP SERPL-CCNC: 72 UNIT/L (ref 55–135)
ALT SERPL-CCNC: 16 UNIT/L (ref 10–44)
ANION GAP (SMH): 8 MMOL/L (ref 8–16)
AORTIC ROOT ANNULUS: 4 CM
AORTIC VALVE CUSP SEPERATION: 1 CM
APICAL FOUR CHAMBER EJECTION FRACTION: 54 %
APICAL TWO CHAMBER EJECTION FRACTION: 48 %
AST SERPL-CCNC: 10 UNIT/L (ref 10–40)
AV INDEX (PROSTH): 0.19
AV MEAN GRADIENT: 38 MMHG
AV PEAK GRADIENT: 61 MMHG
AV REGURGITATION PRESSURE HALF TIME: 312 MS
AV VALVE AREA BY VELOCITY RATIO: 0.6 CM²
AV VALVE AREA: 0.6 CM²
AV VELOCITY RATIO: 0.21
BILIRUB SERPL-MCNC: 0.5 MG/DL (ref 0.1–1)
BSA FOR ECHO PROCEDURE: 1.72 M2
BUN SERPL-MCNC: 18 MG/DL (ref 8–23)
CALCIUM SERPL-MCNC: 8.6 MG/DL (ref 8.7–10.5)
CHLORIDE SERPL-SCNC: 103 MMOL/L (ref 95–110)
CO2 SERPL-SCNC: 29 MMOL/L (ref 23–29)
CREAT SERPL-MCNC: 0.7 MG/DL (ref 0.5–1.4)
CV ECHO LV RWT: 0.46 CM
DOP CALC AO PEAK VEL: 3.9 M/S
DOP CALC AO VTI: 94.3 CM
DOP CALC LVOT AREA: 3.1 CM2
DOP CALC LVOT DIAMETER: 2 CM
DOP CALC LVOT PEAK VEL: 0.8 M/S
DOP CALC LVOT STROKE VOLUME: 56.2 CM3
DOP CALC MV VTI: 32.6 CM
DOP CALCLVOT PEAK VEL VTI: 17.9 CM
E WAVE DECELERATION TIME: 201 MSEC
E/A RATIO: 1.7
E/E' RATIO: 18 M/S
ECHO LV POSTERIOR WALL: 0.9 CM (ref 0.6–1.1)
ERYTHROCYTE [DISTWIDTH] IN BLOOD BY AUTOMATED COUNT: 13.5 % (ref 11.5–14.5)
FRACTIONAL SHORTENING: 23.1 % (ref 28–44)
GFR SERPLBLD CREATININE-BSD FMLA CKD-EPI: >60 ML/MIN/1.73/M2
GLUCOSE SERPL-MCNC: 34 MG/DL (ref 70–110)
HCT VFR BLD AUTO: 37.2 % (ref 37–48.5)
HGB BLD-MCNC: 12 GM/DL (ref 12–16)
INTERVENTRICULAR SEPTUM: 1.1 CM (ref 0.6–1.1)
IVC DIAMETER: 1.95 CM
LEFT ATRIUM AREA SYSTOLIC (APICAL 2 CHAMBER): 20.5 CM2
LEFT ATRIUM AREA SYSTOLIC (APICAL 4 CHAMBER): 19.5 CM2
LEFT ATRIUM SIZE: 3.6 CM
LEFT ATRIUM VOLUME INDEX MOD: 37 ML/M2
LEFT ATRIUM VOLUME MOD: 62 ML
LEFT INTERNAL DIMENSION IN SYSTOLE: 3 CM (ref 2.1–4)
LEFT VENTRICLE DIASTOLIC VOLUME INDEX: 38.32 ML/M2
LEFT VENTRICLE DIASTOLIC VOLUME: 64 ML
LEFT VENTRICLE END DIASTOLIC VOLUME APICAL 2 CHAMBER: 62.7 ML
LEFT VENTRICLE END DIASTOLIC VOLUME APICAL 4 CHAMBER: 57.2 ML
LEFT VENTRICLE END SYSTOLIC VOLUME APICAL 2 CHAMBER: 65.1 ML
LEFT VENTRICLE END SYSTOLIC VOLUME APICAL 4 CHAMBER: 59 ML
LEFT VENTRICLE MASS INDEX: 73.1 G/M2
LEFT VENTRICLE SYSTOLIC VOLUME INDEX: 21 ML/M2
LEFT VENTRICLE SYSTOLIC VOLUME: 35 ML
LEFT VENTRICULAR INTERNAL DIMENSION IN DIASTOLE: 3.9 CM (ref 3.5–6)
LEFT VENTRICULAR MASS: 122.1 G
LV LATERAL E/E' RATIO: 14.3 M/S
LV SEPTAL E/E' RATIO: 22.8 M/S
LVED V (TEICH): 64.3 ML
LVES V (TEICH): 35 ML
LVOT MG: 1 MMHG
LVOT MV: 0.56 CM/S
MAGNESIUM SERPL-MCNC: 2.1 MG/DL (ref 1.6–2.6)
MCH RBC QN AUTO: 29.1 PG (ref 27–31)
MCHC RBC AUTO-ENTMCNC: 32.3 G/DL (ref 32–36)
MCV RBC AUTO: 90 FL (ref 82–98)
MR PISA EROA: 0.09 CM2
MV MEAN GRADIENT: 3 MMHG
MV PEAK A VEL: 0.67 M/S
MV PEAK E VEL: 1.14 M/S
MV PEAK GRADIENT: 6 MMHG
MV STENOSIS PRESSURE HALF TIME: 58 MS
MV VALVE AREA BY CONTINUITY EQUATION: 1.72 CM2
MV VALVE AREA P 1/2 METHOD: 3.79 CM2
OHS LV EJECTION FRACTION SIMPSONS BIPLANE MOD: 49 %
OHS QRS DURATION: 84 MS
OHS QTC CALCULATION: 474 MS
PISA AR MAX VEL: 4.03 M/S
PISA MRMAX VEL: 5.24 M/S
PISA RADIUS: 0.5 CM
PISA TR MAX VEL: 2.5 M/S
PISA VN NYQUIST MS: 0.31 M/S
PISA VN NYQUIST: 0.31 M/S
PLATELET # BLD AUTO: 361 K/UL (ref 150–450)
PMV BLD AUTO: 9.3 FL (ref 9.2–12.9)
POCT GLUCOSE: 104 MG/DL (ref 70–110)
POCT GLUCOSE: 118 MG/DL (ref 70–110)
POCT GLUCOSE: 122 MG/DL (ref 70–110)
POCT GLUCOSE: 122 MG/DL (ref 70–110)
POCT GLUCOSE: 137 MG/DL (ref 70–110)
POCT GLUCOSE: 197 MG/DL (ref 70–110)
POCT GLUCOSE: 199 MG/DL (ref 70–110)
POTASSIUM SERPL-SCNC: 3.2 MMOL/L (ref 3.5–5.1)
PROT SERPL-MCNC: 6.5 GM/DL (ref 6–8.4)
PV MV: 0.59 M/S
PV PEAK GRADIENT: 3 MMHG
PV PEAK VELOCITY: 0.87 M/S
RA PRESSURE ESTIMATED: 3 MMHG
RBC # BLD AUTO: 4.12 M/UL (ref 4–5.4)
RIGHT ATRIUM VOLUME AREA LENGTH APICAL 4 CHAMBER: 23 ML
RV TB RVSP: 6 MMHG
RV TISSUE DOPPLER FREE WALL SYSTOLIC VELOCITY 1 (APICAL 4 CHAMBER VIEW): 6.74 CM/S
SODIUM SERPL-SCNC: 140 MMOL/L (ref 136–145)
TDI LATERAL: 0.08 M/S
TDI SEPTAL: 0.05 M/S
TDI: 0.07 M/S
TR MAX PG: 25 MMHG
TRICUSPID ANNULAR PLANE SYSTOLIC EXCURSION: 1 CM
TV REST PULMONARY ARTERY PRESSURE: 28 MMHG
WBC # BLD AUTO: 10.47 K/UL (ref 3.9–12.7)
Z-SCORE OF LEFT VENTRICULAR DIMENSION IN END DIASTOLE: -1.8
Z-SCORE OF LEFT VENTRICULAR DIMENSION IN END SYSTOLE: 0.29

## 2025-04-30 PROCEDURE — 85027 COMPLETE CBC AUTOMATED: CPT | Performed by: INTERNAL MEDICINE

## 2025-04-30 PROCEDURE — 94761 N-INVAS EAR/PLS OXIMETRY MLT: CPT

## 2025-04-30 PROCEDURE — 25000003 PHARM REV CODE 250

## 2025-04-30 PROCEDURE — 94618 PULMONARY STRESS TESTING: CPT

## 2025-04-30 PROCEDURE — 25000003 PHARM REV CODE 250: Performed by: STUDENT IN AN ORGANIZED HEALTH CARE EDUCATION/TRAINING PROGRAM

## 2025-04-30 PROCEDURE — 97535 SELF CARE MNGMENT TRAINING: CPT

## 2025-04-30 PROCEDURE — 63600175 PHARM REV CODE 636 W HCPCS

## 2025-04-30 PROCEDURE — 36415 COLL VENOUS BLD VENIPUNCTURE: CPT | Performed by: INTERNAL MEDICINE

## 2025-04-30 PROCEDURE — 83735 ASSAY OF MAGNESIUM: CPT | Performed by: INTERNAL MEDICINE

## 2025-04-30 PROCEDURE — 97530 THERAPEUTIC ACTIVITIES: CPT | Mod: CQ

## 2025-04-30 PROCEDURE — 12000002 HC ACUTE/MED SURGE SEMI-PRIVATE ROOM

## 2025-04-30 PROCEDURE — 25000003 PHARM REV CODE 250: Performed by: INTERNAL MEDICINE

## 2025-04-30 PROCEDURE — 27000221 HC OXYGEN, UP TO 24 HOURS

## 2025-04-30 PROCEDURE — 82247 BILIRUBIN TOTAL: CPT | Performed by: INTERNAL MEDICINE

## 2025-04-30 RX ORDER — LIDOCAINE 50 MG/G
1 PATCH TOPICAL
Status: DISCONTINUED | OUTPATIENT
Start: 2025-04-30 | End: 2025-05-02 | Stop reason: HOSPADM

## 2025-04-30 RX ORDER — DEXTROSE MONOHYDRATE 50 MG/ML
INJECTION, SOLUTION INTRAVENOUS CONTINUOUS
Status: DISCONTINUED | OUTPATIENT
Start: 2025-04-30 | End: 2025-04-30

## 2025-04-30 RX ORDER — FUROSEMIDE 40 MG/1
40 TABLET ORAL DAILY
Status: DISCONTINUED | OUTPATIENT
Start: 2025-04-30 | End: 2025-05-01

## 2025-04-30 RX ORDER — INSULIN GLARGINE 100 [IU]/ML
20 INJECTION, SOLUTION SUBCUTANEOUS NIGHTLY
Status: DISCONTINUED | OUTPATIENT
Start: 2025-04-30 | End: 2025-05-02 | Stop reason: HOSPADM

## 2025-04-30 RX ADMIN — LEVOTHYROXINE SODIUM 50 MCG: 0.03 TABLET ORAL at 05:04

## 2025-04-30 RX ADMIN — ACETAMINOPHEN 650 MG: 325 TABLET ORAL at 01:04

## 2025-04-30 RX ADMIN — DEXTROSE MONOHYDRATE: 50 INJECTION, SOLUTION INTRAVENOUS at 07:04

## 2025-04-30 RX ADMIN — INSULIN GLARGINE 20 UNITS: 100 INJECTION, SOLUTION SUBCUTANEOUS at 08:04

## 2025-04-30 RX ADMIN — GABAPENTIN 300 MG: 300 CAPSULE ORAL at 07:04

## 2025-04-30 RX ADMIN — DEXTROSE MONOHYDRATE 25 G: 25 INJECTION, SOLUTION INTRAVENOUS at 06:04

## 2025-04-30 RX ADMIN — PANTOPRAZOLE SODIUM 40 MG: 40 TABLET, DELAYED RELEASE ORAL at 05:04

## 2025-04-30 RX ADMIN — ASPIRIN 81 MG: 81 TABLET, COATED ORAL at 07:04

## 2025-04-30 RX ADMIN — ACETAMINOPHEN 650 MG: 325 TABLET ORAL at 08:04

## 2025-04-30 RX ADMIN — ATORVASTATIN CALCIUM 80 MG: 40 TABLET, FILM COATED ORAL at 07:04

## 2025-04-30 RX ADMIN — FUROSEMIDE 40 MG: 40 TABLET ORAL at 01:04

## 2025-04-30 RX ADMIN — LIDOCAINE 1 PATCH: 50 PATCH CUTANEOUS at 11:04

## 2025-04-30 RX ADMIN — SENNOSIDES, DOCUSATE SODIUM 1 TABLET: 50; 8.6 TABLET, FILM COATED ORAL at 08:04

## 2025-04-30 RX ADMIN — GABAPENTIN 600 MG: 300 CAPSULE ORAL at 08:04

## 2025-04-30 NOTE — CARE UPDATE
04/29/25 2000   Patient Assessment/Suction   Level of Consciousness (AVPU) alert   Respiratory Effort Normal;Unlabored   Expansion/Accessory Muscles/Retractions no use of accessory muscles   All Lung Fields Breath Sounds equal bilaterally;clear   Rhythm/Pattern, Respiratory unlabored   Cough Frequency no cough   Skin Integrity   $ Wound Care Tech Time 15 min   Area Observed Left;Right;Behind ear   Skin Appearance without discoloration   PRE-TX-O2   Device (Oxygen Therapy) nasal cannula   Flow (L/min) (Oxygen Therapy) 2   SpO2 98 %   Pulse Oximetry Type Intermittent   $ Pulse Oximetry - Multiple Charge Pulse Oximetry - Multiple   Pulse 86   Resp 18   Education   $ Education Oxygen;15 min   Respiratory Evaluation   $ Care Plan Tech Time 15 min

## 2025-04-30 NOTE — ASSESSMENT & PLAN NOTE
Continue statin     Correct Patient   Patient identified comparing name and verifying name and either birth date or medical record/trama/disaster number with the face sheet/order: :Yes    Correct Procedure  Procedure verified with patient, parent/guardian/healthcare power of /surogate:Yes    Correct Site/Side  Site / Side marked by patient in conjunction with healthcare provider:  Yes    Marked site / side visualized and verified with consent:  Yes    Marked surgical site is visible when draped:  Yes

## 2025-04-30 NOTE — ASSESSMENT & PLAN NOTE
Patient's FSGs are uncontrolled due to hyperglycemia on current medication regimen.  Last A1c reviewed-   Lab Results   Component Value Date    HGBA1C 9.9 (H) 04/28/2025     Most recent fingerstick glucose reviewed-   Recent Labs   Lab 04/29/25 2013 04/30/25  0622 04/30/25  0704 04/30/25  1051   POCTGLUCOSE 104 199* 118* 137*     Current correctional scale  Medium  Maintain anti-hyperglycemic dose as follows-   Antihyperglycemics (From admission, onward)      Start     Stop Route Frequency Ordered    04/30/25 2100  insulin glargine U-100 (Lantus) pen 20 Units         -- SubQ Nightly 04/30/25 1334    04/28/25 1748  insulin aspart U-100 pen 0-10 Units         -- SubQ Before meals & nightly PRN 04/28/25 1649          Lowered dose of insulin, patient reports taking Lantus 70 units at home.  Had episode hypoglycemia this morning so Lantus decreased to 20 units.

## 2025-04-30 NOTE — ASSESSMENT & PLAN NOTE
Patient found to have small bilateral pleural effusions on imaging, left greater than right. I have personally reviewed and interpreted the following imaging: Xray. A thoracentesis was deferred. Most likely etiology includes Congestive Heart Failure. Management to include Diuresis

## 2025-04-30 NOTE — CARE UPDATE
04/30/25 1439   Home Oxygen Qualification   $ Home O2 Qualification Pulmonary Stress Test/6 min walk   Room Air SpO2 At Rest 94 %   Room Air SpO2 During Ambulation 93 %   Heart Rate on O2 80 bpm   SpO2 Post Ambulation 94 %   Post Ambulation Heart Rate 78 bpm   Home O2 Eval Comments   (pt does not need home 02)

## 2025-04-30 NOTE — PLAN OF CARE
Problem: Diabetes Comorbidity  Goal: Blood Glucose Level Within Targeted Range  Outcome: Ongoing     Problem: Skin Injury Risk Increased  Goal: Skin Health and Integrity  Outcome: Ongoing   Patine bp 84/54 map 58, spoke with Dr Pascual about holding Lasix. Doctor ok'd lasix being held. Will make day shift nurse aware to attempt to give if bp is stable when she awakes

## 2025-04-30 NOTE — ASSESSMENT & PLAN NOTE
Orthopnea with bilateral lower extremity pitting edema.  Does not wear oxygen.  Pulmonary edema on CXR with bilateral pleural effusions.  Change IV Lasix to p.o.  - Monitor renal function daily  - Closely monitor respiratory status  - Continuous pulse oximetry  - Strict I&Os  Echocardiogram showed EF 49%, diastolic heart failure.  Severe aortic stenosis.    Cardiology consulted

## 2025-04-30 NOTE — PLAN OF CARE
04/30/25 1125   Medicare Message   Important Message from Medicare regarding Discharge Appeal Rights Given to patient/caregiver;Explained to patient/caregiver;Signed/date by patient/caregiver   Date IMM was signed 04/30/25   Time IMM was signed 1104

## 2025-04-30 NOTE — RESPIRATORY THERAPY
04/30/25 0655   Patient Assessment/Suction   Level of Consciousness (AVPU) alert   Respiratory Effort Unlabored;Shallow   Expansion/Accessory Muscles/Retractions no use of accessory muscles;no retractions;expansion symmetric   Rhythm/Pattern, Respiratory shallow;unlabored;pattern regular;no shortness of breath reported   Cough Frequency no cough   PRE-TX-O2   Device (Oxygen Therapy) nasal cannula   $ Is the patient on Low Flow Oxygen? Yes   Flow (L/min) (Oxygen Therapy) 2   SpO2 (!) 92 %   Pulse Oximetry Type Intermittent   $ Pulse Oximetry - Multiple Charge Pulse Oximetry - Multiple   Pulse 67   Resp 16

## 2025-04-30 NOTE — ASSESSMENT & PLAN NOTE
Patient with known CAD s/p CABG, which is controlled Will continue ASA and Statin and monitor for S/Sx of angina/ACS. Continue to monitor on telemetry.       BP Readings from Last 3 Encounters:   04/30/25 128/75   04/28/25 (!) 166/84   04/02/25 (!) 145/81     Pulse Readings from Last 3 Encounters:   04/30/25 78   04/28/25 95   04/02/25 90

## 2025-04-30 NOTE — ASSESSMENT & PLAN NOTE
Patients blood pressure range in the last 24 hours was: BP  Min: 84/45  Max: 188/90.The patient's inpatient anti-hypertensive regimen is listed below:  Current Antihypertensives  furosemide tablet 40 mg, Daily, Oral    Plan  Hold blood pressure medications given episode of hypotension

## 2025-04-30 NOTE — PLAN OF CARE
Patient accepted via EPIC but request home health orders upon discharge.     04/30/25 1123   Post-Acute Status   Post-Acute Authorization Home Health   Home Health Status Set-up Complete/Auth obtained   Discharge Delays Orders Needed

## 2025-04-30 NOTE — SUBJECTIVE & OBJECTIVE
Interval History:  Patient seen and examined this morning, had episode of hypoglycemia this morning lethargic.  Improved with supplementation.  Mentation improving this morning.  Shortness for breath improving, so as lower extremity edema also improving.  Echo showed severe aortic stenosis, diastolic heart failure.    Review of Systems   Constitutional:  Positive for fatigue.   Respiratory:  Negative for shortness of breath.    Cardiovascular:  Negative for chest pain and leg swelling.   Gastrointestinal:  Negative for abdominal pain.     Objective:     Vital Signs (Most Recent):  Temp: 96.1 °F (35.6 °C) (04/30/25 0705)  Pulse: 78 (04/30/25 1041)  Resp: 17 (04/30/25 0800)  BP: 128/75 (04/30/25 0852)  SpO2: 98 % (04/30/25 0800) Vital Signs (24h Range):  Temp:  [96.1 °F (35.6 °C)-98.3 °F (36.8 °C)] 96.1 °F (35.6 °C)  Pulse:  [64-86] 78  Resp:  [15-18] 17  SpO2:  [92 %-100 %] 98 %  BP: ()/(45-75) 128/75     Weight: 72.3 kg (159 lb 6.3 oz)  Body mass index is 31.13 kg/m².    Intake/Output Summary (Last 24 hours) at 4/30/2025 1336  Last data filed at 4/30/2025 1007  Gross per 24 hour   Intake 490 ml   Output 800 ml   Net -310 ml         Physical Exam  Constitutional:       General: She is not in acute distress.  Cardiovascular:      Rate and Rhythm: Normal rate and regular rhythm.   Pulmonary:      Effort: No respiratory distress.      Breath sounds: No wheezing.   Abdominal:      Palpations: Abdomen is soft.   Musculoskeletal:      Right lower leg: Edema present.      Left lower leg: Edema present.   Skin:     General: Skin is dry.   Neurological:      General: No focal deficit present.      Mental Status: She is alert and oriented to person, place, and time.               Significant Labs: All pertinent labs within the past 24 hours have been reviewed.  CBC:   Recent Labs   Lab 04/29/25  0509 04/30/25  0451   WBC 7.29 10.47   HGB 11.2* 12.0   HCT 34.1* 37.2    361     CMP:   Recent Labs   Lab  04/29/25  0509 04/30/25  0451    140   K 3.1* 3.2*    103   CO2 29 29   GLU 77 34*   BUN 12 18   CREATININE 0.7 0.7   CALCIUM 8.4* 8.6*   PROT 6.0 6.5   ALBUMIN 3.6 3.7   BILITOT 0.8 0.5   ALKPHOS 70 72   AST 11 10   ALT 18 16   ANIONGAP 7* 8       Significant Imaging: I have reviewed all pertinent imaging results/findings within the past 24 hours.

## 2025-04-30 NOTE — ASSESSMENT & PLAN NOTE
Chronic, stable.    - Continue levothyroxine 50 mcg daily before breakfast    Lab Results   Component Value Date    TSH 3.512 04/29/2025

## 2025-04-30 NOTE — NURSING
Lab called critical bg of 34, administered dextrose 25 mg and bg came up to 199, patient also drank orange juice when she became more arousable.

## 2025-04-30 NOTE — PT/OT/SLP PROGRESS
"Physical Therapy Treatment    Patient Name:  Hoa Wilde   MRN:  [id number]    Recommendations:     Discharge Recommendations: Low Intensity Therapy  Discharge Equipment Recommendations: none  Barriers to discharge:  continued medical management     Assessment:     Hoa Wilde is a 75 y.o. female admitted with a medical diagnosis of Acute combined systolic and diastolic heart failure.  She presents with the following impairments/functional limitations: weakness, impaired endurance, impaired self care skills, impaired functional mobility, gait instability.    Pt presents with hip pain from lying in bed that was nearly resolved by end of session after activity. Ambulated 80' x 2 trials, with RW, CGA, and seated rest between. Very good effort and participation. Remained up in chair. Therapist encouraged pt to forego purewick during day and transfer to/from toilet, but after conversation with nurse and pt re: Lasix, decision was made to return purewick for today.    Rehab Prognosis: Good and Fair; patient would benefit from acute skilled PT services to address these deficits and reach maximum level of function.    Recent Surgery: * No surgery found *      Plan:     During this hospitalization, patient to be seen 5 x/week to address the identified rehab impairments via gait training, therapeutic activities, therapeutic exercises and progress toward the following goals:    Plan of Care Expires:       Subjective     Chief Complaint: "My hip is hurting from laying in this bed" (pain resolved by end of session)  Patient/Family Comments/goals: get back home  Pain/Comfort:  Pain Rating 1: other (see comments) (unrated)  Location - Side 1: Left  Location 1: hip  Pain Addressed 1: Nurse notified  Pain Rating Post-Intervention 1: 0/10      Objective:     Communicated with nurse prior to session.  Patient found  slid down low in bed and angled uncomfortably  with telemetry, peripheral IV, PureWick, bed alarm upon PT entry to room. "     General Precautions: Standard, fall  Orthopedic Precautions:    Braces:    Respiratory Status: Room air     Functional Mobility:  Bed Mobility:     Supine to Sit: SBA, but effortful  Transfers:     Sit to Stand:  contact guard assistance with rolling walker  Gait: 80' x 2 with RW, CGA, and seated rest btwn gait trials; VC for proximity to RW, lifted eye gaze, and upright posture      AM-PAC 6 CLICK MOBILITY          Treatment & Education:    -Pt education: benefits of participation with therapy, OOB to chair during the day and for all meals as tolerated, staff assist for mobility, fall prevention, call light, review of discharge recommendation  -Gait with RW      Patient left up in chair with all lines intact, call button in reach, chair alarm on, and nurse Ange notified..    GOALS:   Multidisciplinary Problems       Physical Therapy Goals          Problem: Physical Therapy    Goal Priority Disciplines Outcome Interventions   Physical Therapy Goal     PT, PT/OT Progressing    Description: 1. Supine to sit with Stand-by Assistance  2. Sit to stand transfer with Stand-by Assistance  3.. Bed to chair transfer with Supervision using Rolling Walker  4. Gait  x 100 feet with Stand By Assistance using Rolling Walker.                         Time Tracking:     PT Received On: 04/30/25  PT Start Time: 1314     PT Stop Time: 1337  PT Total Time (min): 23 min     Billable Minutes: Therapeutic Activity 23    Treatment Type: Treatment  PT/PTA: PTA     Number of PTA visits since last PT visit: 1 04/30/2025

## 2025-04-30 NOTE — PT/OT/SLP PROGRESS
"Occupational Therapy   Treatment    Name: Hoa Wilde  MRN: [id number]  Admitting Diagnosis:  Acute combined systolic and diastolic heart failure       Recommendations:     Discharge Recommendations: Low Intensity Therapy  Discharge Equipment Recommendations:  none  Barriers to discharge:  None    Assessment:     Hoa Wilde is a 75 y.o. female with a medical diagnosis of Acute combined systolic and diastolic heart failure.  She presents with fatigue after room transfer from observation unit. Performance deficits affecting function are weakness, impaired endurance, impaired self care skills, impaired balance, impaired cardiopulmonary response to activity.     Rehab Prognosis:  Fair; patient would benefit from acute skilled OT services to address these deficits and reach maximum level of function.       Plan:     Patient to be seen 3 x/week to address the above listed problems via self-care/home management, therapeutic activities, therapeutic exercises  Plan of Care Expires: 05/29/25  Plan of Care Reviewed with: patient    Subjective     Chief Complaint: fatigue, "I wasn't expecting to stay in the hospital" , hip and knee pain  Patient/Family Comments/goals: get better  Pain/Comfort:   None reported    Objective:     Communicated with: nurse prior to session.  Patient found supine with telemetry, peripheral IV, PureWick upon OT entry to room.    General Precautions: Standard, fall    Orthopedic Precautions:N/A  Braces: N/A  Respiratory Status: Room air     Occupational Performance:     Bed Mobility:  required Max verbal encouragement to participated dt fatigue  Patient completed Supine to Sit with contact guard assistance  Patient completed Sit to Supine with contact guard assistance     Functional Mobility/Transfers:  Declined OOB activity    Activities of Daily Living:  Grooming: contact guard assistance and minimum assistance for oral and face hygiene  Lower Body Dressing: maximal assistance        Treatment & " Education:  Pt educated on role of OT/POC, importance of OOB/EOB activity, use of call bell, and safety during ADLs, transfers, and functional mobility.     Patient left supine with all lines intact, call button in reach, and bed alarm on    GOALS:   Multidisciplinary Problems       Occupational Therapy Goals          Problem: Occupational Therapy    Goal Priority Disciplines Outcome Interventions   Occupational Therapy Goal     OT, PT/OT     Description: Goals to be met by: 5/29/2025     Patient will increase functional independence with ADLs by performing:    UE Dressing with Supervision.  LE Dressing with Moderate Assistance.  Grooming while seated at sink with Supervision.  Toileting from toilet with Supervision for hygiene and clothing management.                          DME Justifications:  No DME recommended requiring DME justifications    Time Tracking:     OT Date of Treatment: 04/30/25  OT Start Time: 1125  OT Stop Time: 1148  OT Total Time (min): 23 min    Billable Minutes:Self Care/Home Management 23    OT/NISA: OT          4/30/2025

## 2025-04-30 NOTE — PROGRESS NOTES
[facility] Medicine  Progress Note    Patient Name: Hoa Wilde  MRN: [id number]  Patient Class: IP- Inpatient   Admission Date: 4/28/2025  Length of Stay: 1 days  Attending Physician: Kapil Wolfe MD  Primary Care Provider: Carlos Pope MD        Subjective     Principal Problem:Acute combined systolic and diastolic heart failure        HPI:  Hoa Wilde is a 75-year-old female with a past medical history of diabetes type 2 with peripheral neuropathy, hypertension, hyperlipidemia, and coronary artery disease with CABG x4 in 2016, gout, hypothyroidism, and depression who was seen and evaluated in the ER for complaints of worsening shortness of breath and for the last few days.  She reports increased dyspnea on exertion over the last 2-3 days.  She endorses chronic bilateral lower extremity swelling, and that she is unable to lie flat and has slough with 3 pillows for last 1-2 years.  ED workup was positive for bilateral pleural effusions, pulmonary edema, elevated BNP of 612, and hyperglycemia with a glucose of 222.  History was obtained from patient and spouse.  Hospital admission was consulted and patient agreeable for admission.     Overview/Hospital Course:  Patient with type 2 diabetes, neuropathy, hypertension, CAD status post CABG, hypothyroidism presented with complaints of shortness for breath and lower extremity edema.  Started on IV Lasix with improvement.  Echocardiogram showed ejection fraction 49%, grade 2 diastolic dysfunction.  Severe  aortic stenosis. Cardio consulted consulted.  Chest x-ray showed pulmonary edema.  No prior history of CHF.    Interval History:  Patient seen and examined this morning, had episode of hypoglycemia this morning lethargic.  Improved with supplementation.  Mentation improving this morning.  Shortness for breath improving, so as lower extremity edema also improving.  Echo showed severe aortic stenosis, diastolic heart failure.    Review of Systems    Constitutional:  Positive for fatigue.   Respiratory:  Negative for shortness of breath.    Cardiovascular:  Negative for chest pain and leg swelling.   Gastrointestinal:  Negative for abdominal pain.     Objective:     Vital Signs (Most Recent):  Temp: 96.1 °F (35.6 °C) (04/30/25 0705)  Pulse: 78 (04/30/25 1041)  Resp: 17 (04/30/25 0800)  BP: 128/75 (04/30/25 0852)  SpO2: 98 % (04/30/25 0800) Vital Signs (24h Range):  Temp:  [96.1 °F (35.6 °C)-98.3 °F (36.8 °C)] 96.1 °F (35.6 °C)  Pulse:  [64-86] 78  Resp:  [15-18] 17  SpO2:  [92 %-100 %] 98 %  BP: ()/(45-75) 128/75     Weight: 72.3 kg (159 lb 6.3 oz)  Body mass index is 31.13 kg/m².    Intake/Output Summary (Last 24 hours) at 4/30/2025 1336  Last data filed at 4/30/2025 1007  Gross per 24 hour   Intake 490 ml   Output 800 ml   Net -310 ml         Physical Exam  Constitutional:       General: She is not in acute distress.  Cardiovascular:      Rate and Rhythm: Normal rate and regular rhythm.   Pulmonary:      Effort: No respiratory distress.      Breath sounds: No wheezing.   Abdominal:      Palpations: Abdomen is soft.   Musculoskeletal:      Right lower leg: Edema present.      Left lower leg: Edema present.   Skin:     General: Skin is dry.   Neurological:      General: No focal deficit present.      Mental Status: She is alert and oriented to person, place, and time.               Significant Labs: All pertinent labs within the past 24 hours have been reviewed.  CBC:   Recent Labs   Lab 04/29/25  0509 04/30/25  0451   WBC 7.29 10.47   HGB 11.2* 12.0   HCT 34.1* 37.2    361     CMP:   Recent Labs   Lab 04/29/25  0509 04/30/25  0451    140   K 3.1* 3.2*    103   CO2 29 29   GLU 77 34*   BUN 12 18   CREATININE 0.7 0.7   CALCIUM 8.4* 8.6*   PROT 6.0 6.5   ALBUMIN 3.6 3.7   BILITOT 0.8 0.5   ALKPHOS 70 72   AST 11 10   ALT 18 16   ANIONGAP 7* 8       Significant Imaging: I have reviewed all pertinent imaging results/findings within the  past 24 hours.      Assessment & Plan  Acute combined systolic and diastolic heart failure  Orthopnea with bilateral lower extremity pitting edema.  Does not wear oxygen.  Pulmonary edema on CXR with bilateral pleural effusions.  Change IV Lasix to p.o.  - Monitor renal function daily  - Closely monitor respiratory status  - Continuous pulse oximetry  - Strict I&Os  Echocardiogram showed EF 49%, diastolic heart failure.  Severe aortic stenosis.    Cardiology consulted    Bilateral pleural effusion  Patient found to have small bilateral pleural effusions on imaging, left greater than right. I have personally reviewed and interpreted the following imaging: Xray. A thoracentesis was deferred. Most likely etiology includes Congestive Heart Failure. Management to include Diuresis    Hypertension associated with diabetes  Patients blood pressure range in the last 24 hours was: BP  Min: 84/45  Max: 188/90.The patient's inpatient anti-hypertensive regimen is listed below:  Current Antihypertensives  furosemide tablet 40 mg, Daily, Oral    Plan  Hold blood pressure medications given episode of hypotension    Hyperlipidemia associated with type 2 diabetes mellitus  Continue statin    Type 2 diabetes mellitus with diabetic polyneuropathy, with long-term current use of insulin  Patient's FSGs are uncontrolled due to hyperglycemia on current medication regimen.  Last A1c reviewed-   Lab Results   Component Value Date    HGBA1C 9.9 (H) 04/28/2025     Most recent fingerstick glucose reviewed-   Recent Labs   Lab 04/29/25  2013 04/30/25  0622 04/30/25  0704 04/30/25  1051   POCTGLUCOSE 104 199* 118* 137*     Current correctional scale  Medium  Maintain anti-hyperglycemic dose as follows-   Antihyperglycemics (From admission, onward)      Start     Stop Route Frequency Ordered    04/30/25 2100  insulin glargine U-100 (Lantus) pen 20 Units         -- SubQ Nightly 04/30/25 1334    04/28/25 1748  insulin aspart U-100 pen 0-10 Units          -- SubQ Before meals & nightly PRN 04/28/25 1649          Lowered dose of insulin, patient reports taking Lantus 70 units at home.  Had episode hypoglycemia this morning so Lantus decreased to 20 units.      Coronary artery disease s/p CABG  Patient with known CAD s/p CABG, which is controlled Will continue ASA and Statin and monitor for S/Sx of angina/ACS. Continue to monitor on telemetry.       BP Readings from Last 3 Encounters:   04/30/25 128/75   04/28/25 (!) 166/84   04/02/25 (!) 145/81     Pulse Readings from Last 3 Encounters:   04/30/25 78   04/28/25 95   04/02/25 90       Hypothyroidism  Chronic, stable.    - Continue levothyroxine 50 mcg daily before breakfast    Lab Results   Component Value Date    TSH 3.512 04/29/2025         VTE Risk Mitigation (From admission, onward)           Ordered     IP VTE HIGH RISK PATIENT  Once         04/28/25 1648     Place sequential compression device  Until discontinued         04/28/25 1648                    Discharge Planning   CAR: 5/1/2025     Code Status: Full Code   Medical Readiness for Discharge Date:   Discharge Plan A: Home Health   Discharge Delays: Orders Needed            Please place Justification for DME        Kapil Wolfe MD  Department of Hospital Medicine   [facility]

## 2025-05-01 PROBLEM — I35.0 SEVERE AORTIC STENOSIS: Status: ACTIVE | Noted: 2025-05-01

## 2025-05-01 LAB
ANION GAP (SMH): 9 MMOL/L (ref 8–16)
BUN SERPL-MCNC: 17 MG/DL (ref 8–23)
CALCIUM SERPL-MCNC: 8.5 MG/DL (ref 8.7–10.5)
CHLORIDE SERPL-SCNC: 100 MMOL/L (ref 95–110)
CO2 SERPL-SCNC: 29 MMOL/L (ref 23–29)
CREAT SERPL-MCNC: 0.6 MG/DL (ref 0.5–1.4)
GFR SERPLBLD CREATININE-BSD FMLA CKD-EPI: >60 ML/MIN/1.73/M2
GLUCOSE SERPL-MCNC: 91 MG/DL (ref 70–110)
MAGNESIUM SERPL-MCNC: 2 MG/DL (ref 1.6–2.6)
POCT GLUCOSE: 126 MG/DL (ref 70–110)
POCT GLUCOSE: 131 MG/DL (ref 70–110)
POCT GLUCOSE: 241 MG/DL (ref 70–110)
POCT GLUCOSE: 261 MG/DL (ref 70–110)
POCT GLUCOSE: 93 MG/DL (ref 70–110)
POTASSIUM SERPL-SCNC: 3.8 MMOL/L (ref 3.5–5.1)
SODIUM SERPL-SCNC: 138 MMOL/L (ref 136–145)

## 2025-05-01 PROCEDURE — 83735 ASSAY OF MAGNESIUM: CPT | Performed by: INTERNAL MEDICINE

## 2025-05-01 PROCEDURE — 12000002 HC ACUTE/MED SURGE SEMI-PRIVATE ROOM

## 2025-05-01 PROCEDURE — 25000003 PHARM REV CODE 250

## 2025-05-01 PROCEDURE — 36415 COLL VENOUS BLD VENIPUNCTURE: CPT | Performed by: STUDENT IN AN ORGANIZED HEALTH CARE EDUCATION/TRAINING PROGRAM

## 2025-05-01 PROCEDURE — 63600175 PHARM REV CODE 636 W HCPCS

## 2025-05-01 PROCEDURE — 25000003 PHARM REV CODE 250: Performed by: NURSE PRACTITIONER

## 2025-05-01 PROCEDURE — 97530 THERAPEUTIC ACTIVITIES: CPT | Mod: CQ

## 2025-05-01 PROCEDURE — 25000003 PHARM REV CODE 250: Performed by: INTERNAL MEDICINE

## 2025-05-01 PROCEDURE — 80048 BASIC METABOLIC PNL TOTAL CA: CPT | Performed by: STUDENT IN AN ORGANIZED HEALTH CARE EDUCATION/TRAINING PROGRAM

## 2025-05-01 PROCEDURE — 99223 1ST HOSP IP/OBS HIGH 75: CPT | Mod: ,,, | Performed by: GENERAL PRACTICE

## 2025-05-01 RX ORDER — FUROSEMIDE 20 MG/1
20 TABLET ORAL DAILY
Status: DISCONTINUED | OUTPATIENT
Start: 2025-05-02 | End: 2025-05-02 | Stop reason: HOSPADM

## 2025-05-01 RX ORDER — SODIUM CHLORIDE 9 MG/ML
INJECTION, SOLUTION INTRAVENOUS ONCE
Status: COMPLETED | OUTPATIENT
Start: 2025-05-02 | End: 2025-05-01

## 2025-05-01 RX ORDER — DIPHENHYDRAMINE HCL 25 MG
50 CAPSULE ORAL
Status: DISCONTINUED | OUTPATIENT
Start: 2025-05-01 | End: 2025-05-02 | Stop reason: HOSPADM

## 2025-05-01 RX ADMIN — GABAPENTIN 600 MG: 300 CAPSULE ORAL at 08:05

## 2025-05-01 RX ADMIN — GABAPENTIN 300 MG: 300 CAPSULE ORAL at 05:05

## 2025-05-01 RX ADMIN — SODIUM CHLORIDE: 9 INJECTION, SOLUTION INTRAVENOUS at 11:05

## 2025-05-01 RX ADMIN — LEVOTHYROXINE SODIUM 50 MCG: 0.03 TABLET ORAL at 05:05

## 2025-05-01 RX ADMIN — SENNOSIDES, DOCUSATE SODIUM 1 TABLET: 50; 8.6 TABLET, FILM COATED ORAL at 09:05

## 2025-05-01 RX ADMIN — INSULIN ASPART 2 UNITS: 100 INJECTION, SOLUTION INTRAVENOUS; SUBCUTANEOUS at 08:05

## 2025-05-01 RX ADMIN — SENNOSIDES, DOCUSATE SODIUM 1 TABLET: 50; 8.6 TABLET, FILM COATED ORAL at 08:05

## 2025-05-01 RX ADMIN — ASPIRIN 81 MG: 81 TABLET, COATED ORAL at 09:05

## 2025-05-01 RX ADMIN — INSULIN GLARGINE 20 UNITS: 100 INJECTION, SOLUTION SUBCUTANEOUS at 08:05

## 2025-05-01 RX ADMIN — POTASSIUM BICARBONATE 50 MEQ: 978 TABLET, EFFERVESCENT ORAL at 09:05

## 2025-05-01 RX ADMIN — PANTOPRAZOLE SODIUM 40 MG: 40 TABLET, DELAYED RELEASE ORAL at 05:05

## 2025-05-01 RX ADMIN — ATORVASTATIN CALCIUM 80 MG: 40 TABLET, FILM COATED ORAL at 09:05

## 2025-05-01 NOTE — PLAN OF CARE
SS order for Home Health placed @ this time and MD notified for [facility] HH orders prior to DC .   [facility]  .        05/01/25 1048   Post-Acute Status   Post-Acute Authorization Home Health

## 2025-05-01 NOTE — PT/OT/SLP PROGRESS
"Physical Therapy Treatment    Patient Name:  Hoa Wilde   MRN:  [id number]    Recommendations:     Discharge Recommendations: Low Intensity Therapy  Discharge Equipment Recommendations: none  Barriers to discharge:  continued medical management    Assessment:     Hoa Wilde is a 75 y.o. female admitted with a medical diagnosis of Acute combined systolic and diastolic heart failure.  She presents with the following impairments/functional limitations: weakness, impaired endurance, impaired self care skills, impaired functional mobility, impaired cardiopulmonary response to activity.    Pt ambulated to toilet and then in estrella with RW and Jonathan. Pt able to converse during gait without c/o SOB or chest pain, and requested no seated rest break today with 120' distance. Remained up in chair with all needs at hand.     Rehab Prognosis: Good and Fair; patient would benefit from acute skilled PT services to address these deficits and reach maximum level of function.    Recent Surgery: * No surgery found *      Plan:     During this hospitalization, patient to be seen 5 x/week to address the identified rehab impairments via gait training, therapeutic activities, therapeutic exercises and progress toward the following goals:    Plan of Care Expires:       Subjective     Chief Complaint: "I feel like I'm bothering them when I call for help to go to the bathroom" (pt was reassured that she should continue to use call bell for toileting assist)  Patient/Family Comments/goals: walk more  Pain/Comfort:  Pain Rating 1: 0/10      Objective:     Communicated with nurse prior to session.  Patient found up in chair with telemetry, peripheral IV upon PT entry to room.     General Precautions: Standard, fall  Orthopedic Precautions:    Braces:    Respiratory Status: Room air     Functional Mobility:  Transfers:     Sit to Stand:  minimum assistance with rolling walker  Toilet Transfer: minimum assistance with  rolling walker and grab bars  " using  Step Transfer  Gait: 10' to toilet, 120' with RW and Jonathan; VC for upright posture, lifted eye gaze, and proximity to RW      AM-PAC 6 CLICK MOBILITY          Treatment & Education:    -Pt education: benefits of participation with therapy, OOB to chair during the day and for all meals as tolerated, staff assist for mobility, fall prevention, call light, review of discharge recommendation  -Toilet transfer  -Gait with RW    Patient left up in chair with all lines intact, call button in reach, chair alarm on, and nurse notified..    GOALS:   Multidisciplinary Problems       Physical Therapy Goals          Problem: Physical Therapy    Goal Priority Disciplines Outcome Interventions   Physical Therapy Goal     PT, PT/OT Progressing    Description: 1. Supine to sit with Independent  2. Sit to stand transfer with Supervision  3. Bed to chair transfer with Supervision using Rolling Walker  4. Gait  x 150 feet with Supervision using Rolling Walker.                              Time Tracking:     PT Received On: 05/01/25  PT Start Time: 1143     PT Stop Time: 1159  PT Total Time (min): 16 min     Billable Minutes: Therapeutic Activity 16    Treatment Type: Treatment  PT/PTA: PTA     Number of PTA visits since last PT visit: 2     05/01/2025

## 2025-05-01 NOTE — ASSESSMENT & PLAN NOTE
Patients blood pressure range in the last 24 hours was: BP  Min: 84/45  Max: 188/90.The patient's inpatient anti-hypertensive regimen is listed below:  Current Antihypertensives  furosemide tablet 20 mg, Daily, Oral    Plan  Hold blood pressure medications given episode of hypotension

## 2025-05-01 NOTE — ASSESSMENT & PLAN NOTE
Orthopnea with bilateral lower extremity pitting edema.  Does not wear oxygen.  Pulmonary edema on CXR with bilateral pleural effusions.  Change IV Lasix to p.o. reduced to 20 mg daily   Echocardiogram showed EF 49%, diastolic heart failure.  Severe aortic stenosis.    Cardiology consulted  Strict I/O  Fluid restriction

## 2025-05-01 NOTE — SUBJECTIVE & OBJECTIVE
Interval History: Patient is emotional this morning after talking to cardiology, she has a sick  and wants to go home. Discussed the necessity of angiogram to expedite the process of TVAR. Patient agreeable to stay and get the procedure done tomorrow. She feels better from breathing standpoint and no chest pain.     Review of Systems  Objective:     Vital Signs (Most Recent):  Temp: 98.1 °F (36.7 °C) (05/01/25 1030)  Pulse: 82 (05/01/25 1030)  Resp: 19 (05/01/25 1030)  BP: 101/65 (05/01/25 1030)  SpO2: 97 % (05/01/25 1030) Vital Signs (24h Range):  Temp:  [97.5 °F (36.4 °C)-98.1 °F (36.7 °C)] 98.1 °F (36.7 °C)  Pulse:  [] 82  Resp:  [16-19] 19  SpO2:  [94 %-97 %] 97 %  BP: ()/(50-75) 101/65     Weight: 72.3 kg (159 lb 6.3 oz)  Body mass index is 31.13 kg/m².    Intake/Output Summary (Last 24 hours) at 5/1/2025 1153  Last data filed at 5/1/2025 0901  Gross per 24 hour   Intake 780 ml   Output --   Net 780 ml         Physical Exam  Constitutional:       General: She is not in acute distress.  Cardiovascular:      Rate and Rhythm: Normal rate and regular rhythm. Murmur present  Pulmonary:      Effort: No respiratory distress.      Breath sounds: No wheezing.   Abdominal:      Palpations: Abdomen is soft.   Musculoskeletal:      Right lower leg: Edema improved present.      Left lower leg: Edema improved present.   Skin:     General: Skin is dry.   Neurological:      General: No focal deficit present.      Mental Status: She is alert and oriented to person, place, and time.       Significant Labs: All pertinent labs within the past 24 hours have been reviewed.  CBC:   Recent Labs   Lab 04/30/25  0451   WBC 10.47   HGB 12.0   HCT 37.2        CMP:   Recent Labs   Lab 04/30/25  0451 05/01/25  0523    138   K 3.2* 3.8    100   CO2 29 29   GLU 34* 91   BUN 18 17   CREATININE 0.7 0.6   CALCIUM 8.6* 8.5*   PROT 6.5  --    ALBUMIN 3.7  --    BILITOT 0.5  --    ALKPHOS 72  --    AST 10  --     ALT 16  --    ANIONGAP 8 9       Significant Imaging: I have reviewed all pertinent imaging results/findings within the past 24 hours.

## 2025-05-01 NOTE — CONSULTS
[facility]  Department of Cardiology  Consult Note      PATIENT NAME: Hoa Wilde    MRN: [id number]  TODAY'S DATE: 05/01/2025  ADMIT DATE: 4/28/2025                          CONSULT REQUESTED BY: Girish Colvin MD    SUBJECTIVE     PRINCIPAL PROBLEM: Acute combined systolic and diastolic heart failure    HPI:    Patient is a 75-year-old female who presented to the emergency room with complaints of shortness of breath and lower extremity edema.  Patient is started on IV diuresis.  Noted to have severe aortic stenosis on echocardiogram.  She has a history of CABG in 2016 but states that she has not followed up with anyone in several years.  She recently has been having orthopnea and lower extremity edema.  Noted to have positive bilateral pleural effusions.  Blood pressure has been soft.      REASON FOR CONSULT:  Severe aortic stenosis      Review of patient's allergies indicates:  No Known Allergies    Past Medical History:   Diagnosis Date    Coronary artery disease     Diabetes mellitus, type 2     Hyperlipidemia     Hypertension     Hypothyroidism     Neuropathy      Past Surgical History:   Procedure Laterality Date    ANGIOGRAPHY OF LOWER EXTREMITY Left 12/16/2020    Procedure: ANGIOGRAM, LOWER EXTREMITY;  Surgeon: Juancho White MD;  Location: [facility] CATH/EP LAB;  Service: General;  Laterality: Left;    CHOLECYSTECTOMY      CORONARY ARTERY BYPASS GRAFT      FOOT SURGERY Left     HYSTERECTOMY      PERIPHERALLY INSERTED CENTRAL CATHETER INSERTION N/A 3/13/2024    Procedure: INSERTION, PICC;  Surgeon: PICC, [facility];  Location: [facility];  Service: Cardiology;  Laterality: N/A;  Dr Mendez     Social History[1]     REVIEW OF SYSTEMS  Per HPI    OBJECTIVE     VITAL SIGNS (Most Recent)  Temp: 98.1 °F (36.7 °C) (05/01/25 1030)  Pulse: 82 (05/01/25 1030)  Resp: 19 (05/01/25 1030)  BP: 101/65 (05/01/25 1030)  SpO2: 97 % (05/01/25 1030)    VENTILATION STATUS  Resp: 19 (05/01/25 1030)  SpO2: 97 %  (05/01/25 1030)           I & O (Last 24H):  Intake/Output Summary (Last 24 hours) at 5/1/2025 1220  Last data filed at 5/1/2025 0901  Gross per 24 hour   Intake 540 ml   Output --   Net 540 ml       WEIGHTS  Wt Readings from Last 1 Encounters:   04/30/25 0400 72.3 kg (159 lb 6.3 oz)   04/29/25 0400 69.9 kg (154 lb 1.6 oz)   04/28/25 2200 69.9 kg (154 lb 1.6 oz)   04/28/25 1221 72.6 kg (160 lb)       PHYSICAL EXAM  CONSTITUTIONAL: No fever, no chills  HEENT: Normocephalic, atraumatic,pupils reactive to light                 NECK:  No JVD no carotid bruit  CVS: S1S2+, RRR; + murmur   LUNGS: Clear  ABDOMEN: Soft, NT, BS+  EXTREMITIES: No cyanosis, edema  : No kelly catheter  NEURO: AAO X 3  PSY: Normal affect      HOME MEDICATIONS:Medications Ordered Prior to Encounter[2]    SCHEDULED MEDS:   aspirin  81 mg Oral Daily    atorvastatin  80 mg Oral Daily    [START ON 5/2/2025] furosemide  20 mg Oral Daily    gabapentin  300 mg Oral QAM    gabapentin  600 mg Oral QHS    insulin glargine U-100  20 Units Subcutaneous QHS    levothyroxine  50 mcg Oral Before breakfast    LIDOcaine  1 patch Transdermal Q24H    pantoprazole  40 mg Oral Daily    senna-docusate  1 tablet Oral BID       CONTINUOUS INFUSIONS:    PRN MEDS:  Current Facility-Administered Medications:     acetaminophen, 650 mg, Oral, Q8H PRN    dextrose 50%, 12.5 g, Intravenous, PRN    dextrose 50%, 25 g, Intravenous, PRN    glucagon (human recombinant), 1 mg, Intramuscular, PRN    glucose, 16 g, Oral, PRN    glucose, 24 g, Oral, PRN    insulin aspart U-100, 0-10 Units, Subcutaneous, QID (AC + HS) PRN    magnesium oxide, 800 mg, Oral, PRN    magnesium oxide, 800 mg, Oral, PRN    melatonin, 6 mg, Oral, Nightly PRN    ondansetron, 4 mg, Intravenous, Q12H PRN    potassium bicarbonate, 35 mEq, Oral, PRN    potassium bicarbonate, 50 mEq, Oral, PRN    potassium bicarbonate, 60 mEq, Oral, PRN    potassium, sodium phosphates, 2 packet, Oral, PRN    potassium, sodium  "phosphates, 2 packet, Oral, PRN    potassium, sodium phosphates, 2 packet, Oral, PRN    LABS AND DIAGNOSTICS     CBC LAST 3 DAYS  Recent Labs   Lab 04/28/25  1243 04/29/25  0509 04/30/25  0451   WBC 7.71 7.29 10.47   RBC 4.32 3.83* 4.12   HGB 12.4 11.2* 12.0   HCT 38.3 34.1* 37.2   MCV 89 89 90   MCH 28.7 29.2 29.1   MCHC 32.4 32.8 32.3   RDW 13.5 13.5 13.5    304 361   MPV 9.3 9.3 9.3   NRBC 0  --   --        COAGULATION LAST 3 DAYS  Recent Labs   Lab 04/28/25  1243   INR 1.0       CHEMISTRY LAST 3 DAYS  Recent Labs   Lab 04/28/25  1243 04/29/25  0509 04/30/25  0451 05/01/25  0523    140 140 138   K 4.1 3.1* 3.2* 3.8    104 103 100   CO2 25 29 29 29   ANIONGAP 8 7* 8 9   BUN 12 12 18 17   CREATININE 0.5 0.7 0.7 0.6   * 77 34* 91   CALCIUM 9.0 8.4* 8.6* 8.5*   MG 1.6 1.5* 2.1 2.0   ALBUMIN 4.4 3.6 3.7  --    PROT 7.2 6.0 6.5  --    ALKPHOS 82 70 72  --    ALT 24 18 16  --    AST 13 11 10  --    BILITOT 0.8 0.8 0.5  --        CARDIAC PROFILE LAST 3 DAYS  Recent Labs   Lab 04/28/25  1243   *       ENDOCRINE LAST 3 DAYS  Recent Labs   Lab 04/29/25  0509   TSH 3.512       LAST ARTERIAL BLOOD GAS  ABG  No results for input(s): "PH", "PO2", "PCO2", "HCO3", "BE" in the last 168 hours.    LAST 7 DAYS MICROBIOLOGY   Microbiology Results (last 7 days)       ** No results found for the last 168 hours. **            MOST RECENT IMAGING  Echo    Left Ventricle: The left ventricle is normal in size. There is   concentric remodeling. Mild global hypokinesis present. Quantitated   ejection fraction is 49%. Grade II diastolic dysfunction.    Right Ventricle: The right ventricle is normal in size. Systolic   function is moderately reduced.    Left Atrium: Mildly to moderately dilated    Aortic Valve: Not well visualized due to poor acoustic window. There is   severe aortic valve sclerosis. There is severe stenosis. Aortic valve area   by VTI is 0.6 cm². Aortic valve peak velocity is 3.9 m/s. Mean " gradient is   38 mmHg. The dimensionless index is 0.19. There is mild to moderate aortic   regurgitation.    Mitral Valve: Mildly thickened leaflets. There is mild to moderate   regurgitation.    Tricuspid Valve: There is mild regurgitation.    Pulmonic Valve: There is mild regurgitation.    IVC/SVC: Normal venous pressure at 3 mmHg.      ECHOCARDIOGRAM RESULTS (last 5)  Results for orders placed during the hospital encounter of 04/28/25    Echo    Interpretation Summary    Left Ventricle: The left ventricle is normal in size. There is concentric remodeling. Mild global hypokinesis present. Quantitated ejection fraction is 49%. Grade II diastolic dysfunction.    Right Ventricle: The right ventricle is normal in size. Systolic function is moderately reduced.    Left Atrium: Mildly to moderately dilated    Aortic Valve: Not well visualized due to poor acoustic window. There is severe aortic valve sclerosis. There is severe stenosis. Aortic valve area by VTI is 0.6 cm². Aortic valve peak velocity is 3.9 m/s. Mean gradient is 38 mmHg. The dimensionless index is 0.19. There is mild to moderate aortic regurgitation.    Mitral Valve: Mildly thickened leaflets. There is mild to moderate regurgitation.    Tricuspid Valve: There is mild regurgitation.    Pulmonic Valve: There is mild regurgitation.    IVC/SVC: Normal venous pressure at 3 mmHg.      CURRENT/PREVIOUS VISIT EKG  Results for orders placed or performed during the hospital encounter of 04/28/25   EKG 12-lead    Collection Time: 04/28/25 12:30 PM   Result Value Ref Range    QRS Duration 84 ms    [facility] QTC Calculation 474 ms    Narrative    Test Reason : R07.9,    Vent. Rate : 102 BPM     Atrial Rate : 102 BPM     P-R Int : 170 ms          QRS Dur :  84 ms      QT Int : 364 ms       P-R-T Axes :  14 145   2 degrees    QTcB Int : 474 ms    Sinus tachycardia with Premature supraventricular complexes  Left posterior fascicular block  Anterior infarct (cited on or before  09-Dec-2020)  Abnormal ECG  When compared with ECG of 03-Aug-2023 16:09,  Premature supraventricular complexes are now Present  Left posterior fascicular block is now Present  Questionable change in initial forces of Lateral leads  Nonspecific T wave abnormality now evident in Inferior leads  T wave inversion no longer evident in Lateral leads  Confirmed by Guy Shook ([id number]) on 4/30/2025 10:07:12 PM    Referred By:            Confirmed By: Guy Shook           ASSESSMENT/PLAN:     Active Hospital Problems    Diagnosis    *Acute combined systolic and diastolic heart failure    Severe aortic stenosis    Bilateral pleural effusion    Hypothyroidism    Coronary artery disease s/p CABG    Type 2 diabetes mellitus with diabetic polyneuropathy, with long-term current use of insulin    Hypertension associated with diabetes    Hyperlipidemia associated with type 2 diabetes mellitus       ASSESSMENT & PLAN:     Acute HF with mildly reduced EF  Sever aortic stenosis  CAD with hx of CABG  Pleural effusion       RECOMMENDATIONS:    Hold diuresis for now due to low BP. She does appear improved but still likely has pleural effusion. Consult IR for thoracentesis.   Discussed with patient regarding severe aortic stenosis. Recommend angiogram to evaluate bypass grafts. She will need urgent outpatient referral for TAVR.   Avoid hypotension.   Will follow.         Basilia Kennedy NP  Date of Service: 05/01/2025  12:20 PM    I have personally interviewed and examined the patient, I have reviewed the Nurse Practitioner's history and physical, assessment, and plan. I have personally evaluated the patient at bedside and agree with the findings and made appropriate changes as necessary in recommendations.      PATIENT WAS NEW ONSET SYMPTOMATIC AORTIC STENOSIS AND HEART FAILURE.  SHE HAS A HISTORY OF CORONARY BYPASS SURGERY.  HEART FAILURE APPEARS TO BE CLEARED UP.  THE ULTRASOUND REVEALS SMALL BILATERAL PLEURAL EFFUSION NOT  CLINICALLY SIGNIFICANT    DISCUSSED CARDIAC CATHETERIZATION AND EVENTUALLY EVALUATION FOR TAVR PROCEDURE    Guy Shook MD  Department of Cardiology  [facility]  05/01/2025 12:23 PM         [1]   Social History  Tobacco Use    Smoking status: Never    Smokeless tobacco: Never   Substance Use Topics    Alcohol use: No    Drug use: No   [2]   Current Facility-Administered Medications on File Prior to Encounter   Medication Dose Route Frequency Provider Last Rate Last Admin    0.9%  NaCl infusion   Intravenous Continuous Juancho White MD   Stopped at 12/16/20 1500     Current Outpatient Medications on File Prior to Encounter   Medication Sig Dispense Refill    alendronate (FOSAMAX) 70 MG tablet Take 1 tablet by mouth once a week 12 tablet 1    aspirin (ECOTRIN) 81 MG EC tablet Take 1 tablet (81 mg total) by mouth once daily. 100 tablet 3    EUTHYROX 50 mcg tablet TAKE 1 TABLET BY MOUTH BEFORE BREAKFAST (Patient taking differently: Take 50 mcg by mouth before breakfast.) 90 tablet 3    gabapentin (NEURONTIN) 300 MG capsule TAKE 1 CAPSULE BY MOUTH ONCE DAILY IN THE MORNING AND 2 ONCE DAILY AT NIGHT (Patient taking differently: Take 300 mg by mouth every morning.) 270 capsule 1    gabapentin (NEURONTIN) 300 MG capsule Take 600 mg by mouth every evening.      glipizide-metformin (METAGLIP) 5-500 mg per tablet Take 1 tablet by mouth 2 (two) times daily before meals. Take with breakfast and dinner 180 tablet 1    insulin detemir U-100, Levemir, (LEVEMIR FLEXPEN) 100 unit/mL (3 mL) InPn pen Inject 70 Units into the skin every evening. 15 mL 0    lisinopriL (PRINIVIL,ZESTRIL) 5 MG tablet Take 1 tablet by mouth once daily 90 tablet 1    meloxicam (MOBIC) 15 MG tablet Take 15 mg by mouth.      rosuvastatin (CRESTOR) 20 MG tablet Take 1 tablet by mouth once daily 90 tablet 0    ammonium lactate (LAC-HYDRIN) 12 % lotion Apply topically 2 (two) times daily. 396 g 6    blood-glucose meter kit To check BG 2  "times daily, to use with insurance preferred meter 1 each 0    metoprolol tartrate (LOPRESSOR) 25 MG tablet Take 1 tablet by mouth twice daily 180 tablet 0    ONETOUCH DELICA PLUS LANCET 33 gauge Misc USE   TO CHECK GLUCOSE TWICE DAILY 100 each 3    ONETOUCH ULTRA TEST Strp USE  STRIP TO CHECK GLUCOSE TWICE DAILY 100 each 3    pen needle, diabetic 30 gauge x 1/3" Ndle 1 pen by Misc.(Non-Drug; Combo Route) route once daily. To use with levimir flex pen 100 each 3     "

## 2025-05-01 NOTE — PLAN OF CARE
Step 1: Admit - Current (PATHWAY - IP GENERAL HEART FAILURE - [facility])  Nurse: Patient education provided (located in [employer])  Outcome: Met  Nurse: Standing weight recorded within 1 hour of arrival to floor  Outcome: Met     Problem: Adult Inpatient Plan of Care  Goal: Plan of Care Review  Outcome: Progressing  Goal: Patient-Specific Goal (Individualized)  Outcome: Progressing  Goal: Absence of Hospital-Acquired Illness or Injury  Outcome: Progressing  Goal: Optimal Comfort and Wellbeing  Outcome: Progressing  Goal: Readiness for Transition of Care  Outcome: Progressing     Problem: Diabetes Comorbidity  Goal: Blood Glucose Level Within Targeted Range  Outcome: Progressing     Problem: Skin Injury Risk Increased  Goal: Skin Health and Integrity  Outcome: Progressing

## 2025-05-01 NOTE — ASSESSMENT & PLAN NOTE
Echocardiogram with evidence of aortic stenosis that is severe . The patient's most recent echocardiogram result is listed below.     Echo  Result Date: 4/30/2025    Left Ventricle: The left ventricle is normal in size. There is   concentric remodeling. Mild global hypokinesis present. Quantitated   ejection fraction is 49%. Grade II diastolic dysfunction.    Right Ventricle: The right ventricle is normal in size. Systolic   function is moderately reduced.    Left Atrium: Mildly to moderately dilated    Aortic Valve: Not well visualized due to poor acoustic window. There is   severe aortic valve sclerosis. There is severe stenosis. Aortic valve area   by VTI is 0.6 cm². Aortic valve peak velocity is 3.9 m/s. Mean gradient is   38 mmHg. The dimensionless index is 0.19. There is mild to moderate aortic   regurgitation.    Mitral Valve: Mildly thickened leaflets. There is mild to moderate   regurgitation.    Tricuspid Valve: There is mild regurgitation.    Pulmonic Valve: There is mild regurgitation.    IVC/SVC: Normal venous pressure at 3 mmHg.      Reduce Lasix to 20 mg as patient is borderline hypotensive and pre-load dependent   Discussed with Cardiology, planning for inpatient angiogram on 5/2 to assess grafts from previous CABG  Planning for outpatient TVAR

## 2025-05-01 NOTE — PT/OT/SLP PROGRESS
Occupational Therapy      Patient Name:  Hoa Wilde   MRN:  [id number]    Patient not seen today secondary to Other (Comment) (therapist unavailable). Will follow-up next service date.    5/1/2025

## 2025-05-01 NOTE — ASSESSMENT & PLAN NOTE
Patient's FSGs are uncontrolled due to hyperglycemia on current medication regimen.  Last A1c reviewed-   Lab Results   Component Value Date    HGBA1C 9.9 (H) 04/28/2025     Most recent fingerstick glucose reviewed-   Recent Labs   Lab 04/30/25  1927 05/01/25  0034 05/01/25  0347 05/01/25  1040   POCTGLUCOSE 197* 126* 93 131*     Current correctional scale  Medium  Maintain anti-hyperglycemic dose as follows-   Antihyperglycemics (From admission, onward)      Start     Stop Route Frequency Ordered    04/30/25 2100  insulin glargine U-100 (Lantus) pen 20 Units         -- SubQ Nightly 04/30/25 1334    04/28/25 1748  insulin aspart U-100 pen 0-10 Units         -- SubQ Before meals & nightly PRN 04/28/25 1649          Lowered dose of insulin, patient reports taking Lantus 70 units at home.  Had episode hypoglycemia this morning so Lantus decreased to 20 units.

## 2025-05-01 NOTE — PROGRESS NOTES
Critical access hospital Medicine  Progress Note    Patient Name: Hoa Wilde  MRN: [id number]  Patient Class: IP- Inpatient   Admission Date: 4/28/2025  Length of Stay: 2 days  Attending Physician: Girish Colvin MD  Primary Care Provider: Carlos Pope MD        Subjective     Principal Problem:Acute combined systolic and diastolic heart failure        HPI:  Hoa Wilde is a 75-year-old female with a past medical history of diabetes type 2 with peripheral neuropathy, hypertension, hyperlipidemia, and coronary artery disease with CABG x4 in 2016, gout, hypothyroidism, and depression who was seen and evaluated in the ER for complaints of worsening shortness of breath and for the last few days.  She reports increased dyspnea on exertion over the last 2-3 days.  She endorses chronic bilateral lower extremity swelling, and that she is unable to lie flat and has slough with 3 pillows for last 1-2 years.  ED workup was positive for bilateral pleural effusions, pulmonary edema, elevated BNP of 612, and hyperglycemia with a glucose of 222.  History was obtained from patient and spouse.  Hospital admission was consulted and patient agreeable for admission.     Overview/Hospital Course:  Patient is a 75 year old female with type 2 diabetes, neuropathy, hypertension, CAD status post CABG, hypothyroidism presented with complaints of shortness for breath and lower extremity edema.  Started on IV Lasix with improvement.  Echocardiogram showed ejection fraction 49%, grade 2 diastolic dysfunction.  Severe  aortic stenosis. Cardio consulted consulted.  Chest x-ray showed pulmonary edema.  No prior history of CHF. Cardiology planning for angiogram 5/2 and outpatient TAVR.     Interval History: Patient is emotional this morning after talking to cardiology, she has a sick  and wants to go home. Discussed the necessity of angiogram to expedite the process of TVAR. Patient agreeable to stay and get the procedure  done tomorrow. She feels better from breathing standpoint and no chest pain.     Review of Systems  Objective:     Vital Signs (Most Recent):  Temp: 98.1 °F (36.7 °C) (05/01/25 1030)  Pulse: 82 (05/01/25 1030)  Resp: 19 (05/01/25 1030)  BP: 101/65 (05/01/25 1030)  SpO2: 97 % (05/01/25 1030) Vital Signs (24h Range):  Temp:  [97.5 °F (36.4 °C)-98.1 °F (36.7 °C)] 98.1 °F (36.7 °C)  Pulse:  [] 82  Resp:  [16-19] 19  SpO2:  [94 %-97 %] 97 %  BP: ()/(50-75) 101/65     Weight: 72.3 kg (159 lb 6.3 oz)  Body mass index is 31.13 kg/m².    Intake/Output Summary (Last 24 hours) at 5/1/2025 1153  Last data filed at 5/1/2025 0901  Gross per 24 hour   Intake 780 ml   Output --   Net 780 ml         Physical Exam  Constitutional:       General: She is not in acute distress.  Cardiovascular:      Rate and Rhythm: Normal rate and regular rhythm. Murmur present  Pulmonary:      Effort: No respiratory distress.      Breath sounds: No wheezing.   Abdominal:      Palpations: Abdomen is soft.   Musculoskeletal:      Right lower leg: Edema improved present.      Left lower leg: Edema improved present.   Skin:     General: Skin is dry.   Neurological:      General: No focal deficit present.      Mental Status: She is alert and oriented to person, place, and time.       Significant Labs: All pertinent labs within the past 24 hours have been reviewed.  CBC:   Recent Labs   Lab 04/30/25  0451   WBC 10.47   HGB 12.0   HCT 37.2        CMP:   Recent Labs   Lab 04/30/25  0451 05/01/25  0523    138   K 3.2* 3.8    100   CO2 29 29   GLU 34* 91   BUN 18 17   CREATININE 0.7 0.6   CALCIUM 8.6* 8.5*   PROT 6.5  --    ALBUMIN 3.7  --    BILITOT 0.5  --    ALKPHOS 72  --    AST 10  --    ALT 16  --    ANIONGAP 8 9       Significant Imaging: I have reviewed all pertinent imaging results/findings within the past 24 hours.      Assessment & Plan  Acute combined systolic and diastolic heart failure  Orthopnea with bilateral lower  extremity pitting edema.  Does not wear oxygen.  Pulmonary edema on CXR with bilateral pleural effusions.  Change IV Lasix to p.o. reduced to 20 mg daily   Echocardiogram showed EF 49%, diastolic heart failure.  Severe aortic stenosis.    Cardiology consulted  Strict I/O  Fluid restriction       Bilateral pleural effusion  Patient found to have small bilateral pleural effusions on imaging, left greater than right. I have personally reviewed and interpreted the following imaging: Xray. A thoracentesis was deferred. Most likely etiology includes Congestive Heart Failure. Management to include Diuresis    Hypertension associated with diabetes  Patients blood pressure range in the last 24 hours was: BP  Min: 84/45  Max: 188/90.The patient's inpatient anti-hypertensive regimen is listed below:  Current Antihypertensives  furosemide tablet 20 mg, Daily, Oral    Plan  Hold blood pressure medications given episode of hypotension    Hyperlipidemia associated with type 2 diabetes mellitus  Continue statin    Type 2 diabetes mellitus with diabetic polyneuropathy, with long-term current use of insulin  Patient's FSGs are uncontrolled due to hyperglycemia on current medication regimen.  Last A1c reviewed-   Lab Results   Component Value Date    HGBA1C 9.9 (H) 04/28/2025     Most recent fingerstick glucose reviewed-   Recent Labs   Lab 04/30/25  1927 05/01/25  0034 05/01/25  0347 05/01/25  1040   POCTGLUCOSE 197* 126* 93 131*     Current correctional scale  Medium  Maintain anti-hyperglycemic dose as follows-   Antihyperglycemics (From admission, onward)      Start     Stop Route Frequency Ordered    04/30/25 2100  insulin glargine U-100 (Lantus) pen 20 Units         -- SubQ Nightly 04/30/25 1334    04/28/25 1748  insulin aspart U-100 pen 0-10 Units         -- SubQ Before meals & nightly PRN 04/28/25 1649          Lowered dose of insulin, patient reports taking Lantus 70 units at home.  Had episode hypoglycemia this morning so  Lantus decreased to 20 units.      Coronary artery disease s/p CABG  Patient with known CAD s/p CABG, which is controlled Will continue ASA and Statin and monitor for S/Sx of angina/ACS. Continue to monitor on telemetry.   Rpt angiogram AM per cardiology     Hypothyroidism  Chronic, stable.    - Continue levothyroxine 50 mcg daily before breakfast    Lab Results   Component Value Date    TSH 3.512 04/29/2025         Severe aortic stenosis  Echocardiogram with evidence of aortic stenosis that is severe . The patient's most recent echocardiogram result is listed below.     Echo  Result Date: 4/30/2025    Left Ventricle: The left ventricle is normal in size. There is   concentric remodeling. Mild global hypokinesis present. Quantitated   ejection fraction is 49%. Grade II diastolic dysfunction.    Right Ventricle: The right ventricle is normal in size. Systolic   function is moderately reduced.    Left Atrium: Mildly to moderately dilated    Aortic Valve: Not well visualized due to poor acoustic window. There is   severe aortic valve sclerosis. There is severe stenosis. Aortic valve area   by VTI is 0.6 cm². Aortic valve peak velocity is 3.9 m/s. Mean gradient is   38 mmHg. The dimensionless index is 0.19. There is mild to moderate aortic   regurgitation.    Mitral Valve: Mildly thickened leaflets. There is mild to moderate   regurgitation.    Tricuspid Valve: There is mild regurgitation.    Pulmonic Valve: There is mild regurgitation.    IVC/SVC: Normal venous pressure at 3 mmHg.      Reduce Lasix to 20 mg as patient is borderline hypotensive and pre-load dependent   Discussed with Cardiology, planning for inpatient angiogram on 5/2 to assess grafts from previous CABG  Planning for outpatient TVAR   VTE Risk Mitigation (From admission, onward)           Ordered     IP VTE HIGH RISK PATIENT  Once         04/28/25 3968     Place sequential compression device  Until discontinued         04/28/25 1376                     Discharge Planning   CAR: 5/2/2025     Code Status: Full Code   Medical Readiness for Discharge Date:   Discharge Plan A: Home Health   Discharge Delays: Orders Needed            Please place Justification for DME        Girish Colvin MD  Department of Hospital Medicine   [facility]

## 2025-05-01 NOTE — ASSESSMENT & PLAN NOTE
Patient with known CAD s/p CABG, which is controlled Will continue ASA and Statin and monitor for S/Sx of angina/ACS. Continue to monitor on telemetry.   Rpt angiogram AM per cardiology

## 2025-05-01 NOTE — PLAN OF CARE
Patient will be NPO after Midnight for Angiogram in am. Cardiology following .        05/01/25 1044   Discharge Reassessment   Assessment Type Discharge Planning Reassessment   Discharge Plan A Home Health   Discharge Plan B Home Health

## 2025-05-02 ENCOUNTER — TELEPHONE (OUTPATIENT)
Dept: CARDIOLOGY | Facility: CLINIC | Age: 75
End: 2025-05-02
Payer: MEDICARE

## 2025-05-02 VITALS
WEIGHT: 159.38 LBS | SYSTOLIC BLOOD PRESSURE: 120 MMHG | RESPIRATION RATE: 18 BRPM | DIASTOLIC BLOOD PRESSURE: 74 MMHG | BODY MASS INDEX: 31.29 KG/M2 | OXYGEN SATURATION: 93 % | HEART RATE: 91 BPM | HEIGHT: 60 IN | TEMPERATURE: 98 F

## 2025-05-02 LAB
ANION GAP (SMH): 9 MMOL/L (ref 8–16)
BUN SERPL-MCNC: 12 MG/DL (ref 8–23)
CALCIUM SERPL-MCNC: 8.6 MG/DL (ref 8.7–10.5)
CHLORIDE SERPL-SCNC: 104 MMOL/L (ref 95–110)
CO2 SERPL-SCNC: 26 MMOL/L (ref 23–29)
CREAT SERPL-MCNC: 0.6 MG/DL (ref 0.5–1.4)
GFR SERPLBLD CREATININE-BSD FMLA CKD-EPI: >60 ML/MIN/1.73/M2
GLUCOSE SERPL-MCNC: 173 MG/DL (ref 70–110)
MAGNESIUM SERPL-MCNC: 1.9 MG/DL (ref 1.6–2.6)
POCT GLUCOSE: 140 MG/DL (ref 70–110)
POCT GLUCOSE: 159 MG/DL (ref 70–110)
POCT GLUCOSE: 167 MG/DL (ref 70–110)
POCT GLUCOSE: 194 MG/DL (ref 70–110)
POTASSIUM SERPL-SCNC: 4.1 MMOL/L (ref 3.5–5.1)
SODIUM SERPL-SCNC: 139 MMOL/L (ref 136–145)

## 2025-05-02 PROCEDURE — 25000003 PHARM REV CODE 250

## 2025-05-02 PROCEDURE — B211YZZ FLUOROSCOPY OF MULTIPLE CORONARY ARTERIES USING OTHER CONTRAST: ICD-10-PCS | Performed by: INTERNAL MEDICINE

## 2025-05-02 PROCEDURE — 4A023N7 MEASUREMENT OF CARDIAC SAMPLING AND PRESSURE, LEFT HEART, PERCUTANEOUS APPROACH: ICD-10-PCS | Performed by: INTERNAL MEDICINE

## 2025-05-02 PROCEDURE — 93567 NJX CAR CTH SPRVLV AORTGRPHY: CPT | Performed by: INTERNAL MEDICINE

## 2025-05-02 PROCEDURE — 97530 THERAPEUTIC ACTIVITIES: CPT | Mod: CQ

## 2025-05-02 PROCEDURE — B21FYZZ FLUOROSCOPY OF OTHER BYPASS GRAFT USING OTHER CONTRAST: ICD-10-PCS | Performed by: INTERNAL MEDICINE

## 2025-05-02 PROCEDURE — C1760 CLOSURE DEV, VASC: HCPCS | Performed by: INTERNAL MEDICINE

## 2025-05-02 PROCEDURE — C1894 INTRO/SHEATH, NON-LASER: HCPCS | Performed by: INTERNAL MEDICINE

## 2025-05-02 PROCEDURE — 36415 COLL VENOUS BLD VENIPUNCTURE: CPT | Performed by: STUDENT IN AN ORGANIZED HEALTH CARE EDUCATION/TRAINING PROGRAM

## 2025-05-02 PROCEDURE — C1887 CATHETER, GUIDING: HCPCS | Performed by: INTERNAL MEDICINE

## 2025-05-02 PROCEDURE — 83735 ASSAY OF MAGNESIUM: CPT | Performed by: INTERNAL MEDICINE

## 2025-05-02 PROCEDURE — 93455 CORONARY ART/GRFT ANGIO S&I: CPT | Performed by: INTERNAL MEDICINE

## 2025-05-02 PROCEDURE — 99152 MOD SED SAME PHYS/QHP 5/>YRS: CPT | Performed by: INTERNAL MEDICINE

## 2025-05-02 PROCEDURE — 99233 SBSQ HOSP IP/OBS HIGH 50: CPT | Mod: ,,, | Performed by: GENERAL PRACTICE

## 2025-05-02 PROCEDURE — B218YZZ FLUOROSCOPY OF LEFT INTERNAL MAMMARY BYPASS GRAFT USING OTHER CONTRAST: ICD-10-PCS | Performed by: INTERNAL MEDICINE

## 2025-05-02 PROCEDURE — 25000003 PHARM REV CODE 250: Performed by: INTERNAL MEDICINE

## 2025-05-02 PROCEDURE — 99153 MOD SED SAME PHYS/QHP EA: CPT | Performed by: INTERNAL MEDICINE

## 2025-05-02 PROCEDURE — 80048 BASIC METABOLIC PNL TOTAL CA: CPT | Performed by: STUDENT IN AN ORGANIZED HEALTH CARE EDUCATION/TRAINING PROGRAM

## 2025-05-02 PROCEDURE — C1769 GUIDE WIRE: HCPCS | Performed by: INTERNAL MEDICINE

## 2025-05-02 PROCEDURE — 93005 ELECTROCARDIOGRAM TRACING: CPT | Performed by: GENERAL PRACTICE

## 2025-05-02 PROCEDURE — 63600175 PHARM REV CODE 636 W HCPCS: Performed by: INTERNAL MEDICINE

## 2025-05-02 PROCEDURE — B310YZZ FLUOROSCOPY OF THORACIC AORTA USING OTHER CONTRAST: ICD-10-PCS | Performed by: INTERNAL MEDICINE

## 2025-05-02 PROCEDURE — 25500020 PHARM REV CODE 255: Performed by: INTERNAL MEDICINE

## 2025-05-02 DEVICE — ANGIO-SEAL VIP VASCULAR CLOSURE DEVICE
Type: IMPLANTABLE DEVICE | Site: GROIN | Status: FUNCTIONAL
Brand: ANGIO-SEAL

## 2025-05-02 RX ORDER — MIDAZOLAM HYDROCHLORIDE 1 MG/ML
INJECTION INTRAMUSCULAR; INTRAVENOUS
Status: DISCONTINUED | OUTPATIENT
Start: 2025-05-02 | End: 2025-05-02 | Stop reason: HOSPADM

## 2025-05-02 RX ORDER — PANTOPRAZOLE SODIUM 40 MG/1
40 TABLET, DELAYED RELEASE ORAL DAILY
Qty: 30 TABLET | Refills: 0 | Status: SHIPPED | OUTPATIENT
Start: 2025-05-03 | End: 2025-06-02

## 2025-05-02 RX ORDER — LEVOTHYROXINE SODIUM 50 UG/1
50 TABLET ORAL
Qty: 30 TABLET | Refills: 0 | Status: SHIPPED | OUTPATIENT
Start: 2025-05-02

## 2025-05-02 RX ORDER — GABAPENTIN 300 MG/1
600 CAPSULE ORAL NIGHTLY
Qty: 60 CAPSULE | Refills: 0 | Status: SHIPPED | OUTPATIENT
Start: 2025-05-02 | End: 2025-06-01

## 2025-05-02 RX ORDER — IODIXANOL 320 MG/ML
INJECTION, SOLUTION INTRAVASCULAR
Status: DISCONTINUED | OUTPATIENT
Start: 2025-05-02 | End: 2025-05-02 | Stop reason: HOSPADM

## 2025-05-02 RX ORDER — HEPARIN SODIUM 10000 [USP'U]/ML
INJECTION, SOLUTION INTRAVENOUS; SUBCUTANEOUS
Status: DISCONTINUED | OUTPATIENT
Start: 2025-05-02 | End: 2025-05-02 | Stop reason: HOSPADM

## 2025-05-02 RX ORDER — FUROSEMIDE 20 MG/1
20 TABLET ORAL DAILY
Qty: 30 TABLET | Refills: 0 | Status: SHIPPED | OUTPATIENT
Start: 2025-05-03 | End: 2025-06-02

## 2025-05-02 RX ORDER — ASPIRIN 81 MG/1
81 TABLET ORAL DAILY
Qty: 30 TABLET | Refills: 0 | Status: SHIPPED | OUTPATIENT
Start: 2025-05-02 | End: 2025-06-01

## 2025-05-02 RX ORDER — ROSUVASTATIN CALCIUM 20 MG/1
20 TABLET, COATED ORAL DAILY
Qty: 30 TABLET | Refills: 0 | Status: SHIPPED | OUTPATIENT
Start: 2025-05-02 | End: 2025-06-01

## 2025-05-02 RX ORDER — INSULIN PUMP SYRINGE, 3 ML
EACH MISCELLANEOUS
Qty: 1 EACH | Refills: 0 | Status: SHIPPED | OUTPATIENT
Start: 2025-05-02

## 2025-05-02 RX ORDER — FENTANYL CITRATE 50 UG/ML
INJECTION, SOLUTION INTRAMUSCULAR; INTRAVENOUS
Status: DISCONTINUED | OUTPATIENT
Start: 2025-05-02 | End: 2025-05-02 | Stop reason: HOSPADM

## 2025-05-02 RX ORDER — LIDOCAINE HYDROCHLORIDE 10 MG/ML
INJECTION, SOLUTION EPIDURAL; INFILTRATION; INTRACAUDAL; PERINEURAL
Status: DISCONTINUED | OUTPATIENT
Start: 2025-05-02 | End: 2025-05-02 | Stop reason: HOSPADM

## 2025-05-02 RX ORDER — INSULIN GLARGINE 100 [IU]/ML
20 INJECTION, SOLUTION SUBCUTANEOUS NIGHTLY
Qty: 6 ML | Refills: 0 | Status: SHIPPED | OUTPATIENT
Start: 2025-05-02 | End: 2025-06-01

## 2025-05-02 RX ADMIN — ACETAMINOPHEN 650 MG: 325 TABLET ORAL at 05:05

## 2025-05-02 RX ADMIN — PANTOPRAZOLE SODIUM 40 MG: 40 TABLET, DELAYED RELEASE ORAL at 05:05

## 2025-05-02 RX ADMIN — LEVOTHYROXINE SODIUM 50 MCG: 0.03 TABLET ORAL at 05:05

## 2025-05-02 RX ADMIN — GABAPENTIN 300 MG: 300 CAPSULE ORAL at 06:05

## 2025-05-02 RX ADMIN — ASPIRIN 81 MG: 81 TABLET, COATED ORAL at 08:05

## 2025-05-02 RX ADMIN — ATORVASTATIN CALCIUM 80 MG: 40 TABLET, FILM COATED ORAL at 08:05

## 2025-05-02 NOTE — SEDATION DOCUMENTATION
Pre-sedation Assessment:    1. ASA Score: ASA 3 - Patient with moderate systemic disease with functional limitations  2. Mallampati Class: II (hard and soft palate, upper portion of tonsils anduvula visible)  3. Patient or family history of any reaction to anesthesia or sedation: Yes, No  4. Plan for Sedation: Moderate  5. H&P within 30 days of the procedure and updated within 24 hrs of admission or registration: Yes

## 2025-05-02 NOTE — ASSESSMENT & PLAN NOTE
Patient's FSGs are uncontrolled due to hyperglycemia on current medication regimen.  Last A1c reviewed-   Lab Results   Component Value Date    HGBA1C 9.9 (H) 04/28/2025     Most recent fingerstick glucose reviewed-   Recent Labs   Lab 05/01/25  2322 05/02/25  0316 05/02/25  0740 05/02/25  1148   POCTGLUCOSE 241* 194* 159* 140*     Resume Lantus 20 units on discharge   Patient has been noncompliant at home

## 2025-05-02 NOTE — PT/OT/SLP PROGRESS
Occupational Therapy      Patient Name:  Hoa Wilde   MRN:  [id number]    Patient not seen today secondary to Other (Comment) (refused. pt stated they only want to have procedure done today.). Will follow-up next service date.    5/2/2025

## 2025-05-02 NOTE — PROGRESS NOTES
[facility]  Department of Cardiology  Progress Note      PATIENT NAME: Hoa Wilde    MRN: [id number]  TODAY'S DATE: 05/02/2025  ADMIT DATE: 4/28/2025                          CONSULT REQUESTED BY: Girish Colvin MD    SUBJECTIVE     PRINCIPAL PROBLEM: Acute combined systolic and diastolic heart failure    05/02/2025  Patient seen resting in bed prior to angiogram today.  She has been doing well and has no complaints presently.    HPI:    Patient is a 75-year-old female who presented to the emergency room with complaints of shortness of breath and lower extremity edema.  Patient is started on IV diuresis.  Noted to have severe aortic stenosis on echocardiogram.  She has a history of CABG in 2016 but states that she has not followed up with anyone in several years.  She recently has been having orthopnea and lower extremity edema.  Noted to have positive bilateral pleural effusions.  Blood pressure has been soft.      REASON FOR CONSULT:  Severe aortic stenosis      Review of patient's allergies indicates:  No Known Allergies    Past Medical History:   Diagnosis Date    Coronary artery disease     Diabetes mellitus, type 2     Hyperlipidemia     Hypertension     Hypothyroidism     Neuropathy      Past Surgical History:   Procedure Laterality Date    ANGIOGRAPHY OF LOWER EXTREMITY Left 12/16/2020    Procedure: ANGIOGRAM, LOWER EXTREMITY;  Surgeon: Juancho White MD;  Location: [facility] CATH/EP LAB;  Service: General;  Laterality: Left;    CHOLECYSTECTOMY      CORONARY ARTERY BYPASS GRAFT      FOOT SURGERY Left     HYSTERECTOMY      PERIPHERALLY INSERTED CENTRAL CATHETER INSERTION N/A 3/13/2024    Procedure: INSERTION, PICC;  Surgeon: PICC, NIKKO;  Location: [facility] ENDO;  Service: Cardiology;  Laterality: N/A;  Dr Mendez     Social History[1]     REVIEW OF SYSTEMS  Per HPI    OBJECTIVE     VITAL SIGNS (Most Recent)  Temp: 98.4 °F (36.9 °C) (05/02/25 1145)  Pulse: 84 (05/02/25 1345)  Resp: 18 (05/02/25  1345)  BP: 139/65 (05/02/25 1345)  SpO2: 98 % (05/02/25 1345)    VENTILATION STATUS  Resp: 18 (05/02/25 1345)  SpO2: 98 % (05/02/25 1345)           I & O (Last 24H):  Intake/Output Summary (Last 24 hours) at 5/2/2025 1405  Last data filed at 5/2/2025 0638  Gross per 24 hour   Intake 354.84 ml   Output --   Net 354.84 ml       WEIGHTS  Wt Readings from Last 1 Encounters:   04/30/25 0400 72.3 kg (159 lb 6.3 oz)   04/29/25 0400 69.9 kg (154 lb 1.6 oz)   04/28/25 2200 69.9 kg (154 lb 1.6 oz)   04/28/25 1221 72.6 kg (160 lb)       PHYSICAL EXAM  CONSTITUTIONAL: No fever, no chills  HEENT: Normocephalic, atraumatic,pupils reactive to light                 NECK:  No JVD no carotid bruit  CVS: S1S2+, RRR; + murmur   LUNGS: Clear  ABDOMEN: Soft, NT, BS+  EXTREMITIES: No cyanosis, edema  : No kelly catheter  NEURO: AAO X 3  PSY: Normal affect      HOME MEDICATIONS:Medications Ordered Prior to Encounter[2]    SCHEDULED MEDS:   aspirin  81 mg Oral Daily    atorvastatin  80 mg Oral Daily    furosemide  20 mg Oral Daily    gabapentin  300 mg Oral QAM    gabapentin  600 mg Oral QHS    insulin glargine U-100  20 Units Subcutaneous QHS    levothyroxine  50 mcg Oral Before breakfast    LIDOcaine  1 patch Transdermal Q24H    pantoprazole  40 mg Oral Daily    senna-docusate  1 tablet Oral BID       CONTINUOUS INFUSIONS:    PRN MEDS:  Current Facility-Administered Medications:     acetaminophen, 650 mg, Oral, Q8H PRN    dextrose 50%, 12.5 g, Intravenous, PRN    dextrose 50%, 25 g, Intravenous, PRN    diphenhydrAMINE, 50 mg, Oral, On Call Procedure    fentaNYL, , , PRN    glucagon (human recombinant), 1 mg, Intramuscular, PRN    glucose, 16 g, Oral, PRN    glucose, 24 g, Oral, PRN    heparin (porcine), , , PRN    insulin aspart U-100, 0-10 Units, Subcutaneous, QID (AC + HS) PRN    iodixanoL, , , PRN    LIDOcaine (PF) 10 mg/ml (1%), , , PRN    magnesium oxide, 800 mg, Oral, PRN    magnesium oxide, 800 mg, Oral, PRN    melatonin, 6 mg,  "Oral, Nightly PRN    midazolam, , , PRN    ondansetron, 4 mg, Intravenous, Q12H PRN    potassium bicarbonate, 35 mEq, Oral, PRN    potassium bicarbonate, 50 mEq, Oral, PRN    potassium bicarbonate, 60 mEq, Oral, PRN    potassium, sodium phosphates, 2 packet, Oral, PRN    potassium, sodium phosphates, 2 packet, Oral, PRN    potassium, sodium phosphates, 2 packet, Oral, PRN    LABS AND DIAGNOSTICS     CBC LAST 3 DAYS  Recent Labs   Lab 04/28/25  1243 04/29/25  0509 04/30/25  0451   WBC 7.71 7.29 10.47   RBC 4.32 3.83* 4.12   HGB 12.4 11.2* 12.0   HCT 38.3 34.1* 37.2   MCV 89 89 90   MCH 28.7 29.2 29.1   MCHC 32.4 32.8 32.3   RDW 13.5 13.5 13.5    304 361   MPV 9.3 9.3 9.3   NRBC 0  --   --        COAGULATION LAST 3 DAYS  Recent Labs   Lab 04/28/25  1243   INR 1.0       CHEMISTRY LAST 3 DAYS  Recent Labs   Lab 04/28/25  1243 04/29/25  0509 04/30/25  0451 05/01/25  0523 05/02/25  0528    140 140 138 139   K 4.1 3.1* 3.2* 3.8 4.1    104 103 100 104   CO2 25 29 29 29 26   ANIONGAP 8 7* 8 9 9   BUN 12 12 18 17 12   CREATININE 0.5 0.7 0.7 0.6 0.6   * 77 34* 91 173*   CALCIUM 9.0 8.4* 8.6* 8.5* 8.6*   MG 1.6 1.5* 2.1 2.0 1.9   ALBUMIN 4.4 3.6 3.7  --   --    PROT 7.2 6.0 6.5  --   --    ALKPHOS 82 70 72  --   --    ALT 24 18 16  --   --    AST 13 11 10  --   --    BILITOT 0.8 0.8 0.5  --   --        CARDIAC PROFILE LAST 3 DAYS  Recent Labs   Lab 04/28/25  1243   *       ENDOCRINE LAST 3 DAYS  Recent Labs   Lab 04/29/25  0509   TSH 3.512       LAST ARTERIAL BLOOD GAS  ABG  No results for input(s): "PH", "PO2", "PCO2", "HCO3", "BE" in the last 168 hours.    LAST 7 DAYS MICROBIOLOGY   Microbiology Results (last 7 days)       ** No results found for the last 168 hours. **            MOST RECENT IMAGING  US Chest Mediastinum  Narrative: EXAMINATION:  US CHEST MEDIASTINUM    CLINICAL HISTORY:  pleural effusion;    TECHNIQUE:  Grayscale and color Doppler ultrasound of the chest " mediastinum.    COMPARISON:  Chest x-ray 04/28/2025    FINDINGS:  Small bilateral pleural effusions, left greater than right.  Impression: As above    Electronically signed by: Domingo Rae  Date:    05/01/2025  Time:    12:58      ECHOCARDIOGRAM RESULTS (last 5)  Results for orders placed during the hospital encounter of 04/28/25    Echo    Interpretation Summary    Left Ventricle: The left ventricle is normal in size. There is concentric remodeling. Mild global hypokinesis present. Quantitated ejection fraction is 49%. Grade II diastolic dysfunction.    Right Ventricle: The right ventricle is normal in size. Systolic function is moderately reduced.    Left Atrium: Mildly to moderately dilated    Aortic Valve: Not well visualized due to poor acoustic window. There is severe aortic valve sclerosis. There is severe stenosis. Aortic valve area by VTI is 0.6 cm². Aortic valve peak velocity is 3.9 m/s. Mean gradient is 38 mmHg. The dimensionless index is 0.19. There is mild to moderate aortic regurgitation.    Mitral Valve: Mildly thickened leaflets. There is mild to moderate regurgitation.    Tricuspid Valve: There is mild regurgitation.    Pulmonic Valve: There is mild regurgitation.    IVC/SVC: Normal venous pressure at 3 mmHg.      CURRENT/PREVIOUS VISIT EKG  Results for orders placed or performed during the hospital encounter of 04/28/25   EKG 12-lead    Collection Time: 04/28/25 12:30 PM   Result Value Ref Range    QRS Duration 84 ms    [facility] QTC Calculation 474 ms    Narrative    Test Reason : R07.9,    Vent. Rate : 102 BPM     Atrial Rate : 102 BPM     P-R Int : 170 ms          QRS Dur :  84 ms      QT Int : 364 ms       P-R-T Axes :  14 145   2 degrees    QTcB Int : 474 ms    Sinus tachycardia with Premature supraventricular complexes  Left posterior fascicular block  Anterior infarct (cited on or before 09-Dec-2020)  Abnormal ECG  When compared with ECG of 03-Aug-2023 16:09,  Premature supraventricular  complexes are now Present  Left posterior fascicular block is now Present  Questionable change in initial forces of Lateral leads  Nonspecific T wave abnormality now evident in Inferior leads  T wave inversion no longer evident in Lateral leads  Confirmed by Guy Shook ([id number]) on 4/30/2025 10:07:12 PM    Referred By:            Confirmed By: Guy Shook           ASSESSMENT/PLAN:     Active Hospital Problems    Diagnosis    *Acute combined systolic and diastolic heart failure    Severe aortic stenosis    Bilateral pleural effusion    Hypothyroidism    Coronary artery disease s/p CABG    Type 2 diabetes mellitus with diabetic polyneuropathy, with long-term current use of insulin    Hypertension associated with diabetes    Hyperlipidemia associated with type 2 diabetes mellitus       ASSESSMENT & PLAN:     Acute HF with mildly reduced EF  Sever aortic stenosis  CAD with hx of CABG  Pleural effusion       RECOMMENDATIONS:    Angiogram showed 3 patent grafts.  Unclear of past CABG details.  Recommend outpatient evaluation for TAVR as soon as possible.  Will place an urgent referral for TAVR.  Blood pressure has improved and we can proceed with furosemide 20 mg p.o. daily.  Also add metoprolol tartrate 12.5 mg p.o. b.i.d. at discharge.  Check BMP in 1 week.  DC home soon.      Basilia Kennedy NP  Date of Service: 05/02/2025  12:20 PM    I have personally interviewed and examined the patient, I have reviewed the Nurse Practitioner's history and physical, assessment, and plan. I have personally evaluated the patient at bedside and agree with the findings and made appropriate changes as necessary in recommendations.    HEART CATHETERIZATION REVIEWED.  SHE HAS A LIMA TO OCCLUDED LAD WITH SMALL VESSEL DISEASE IN THE MID DISTAL LAD AND DISTAL LAD WHICH IS TOO SMALL FOR INTERVENTION    SHE HAS A PATENT VEIN GRAFT TO THE CIRC MARGINAL    SHE HAS OCCLUDED VEIN GRAFT TO THE RIGHT CORONARY ARTERY WITH MILD DIFFUSE DISTAL  DISEASE IN THE POSTEROLATERAL BRANCH WHICH IS A SMALL VESSEL TOO SMALL FOR INTERVENTION    RECOMMEND MEDICAL MANAGEMENT    OUTPATIENT EVALUATION FOR TAVR    SHOULD BE OKAY TO DISCHARGE AND FOLLOW-UP AS AN OUTPATIENT  Guy Shook MD  Department of Cardiology  [facility]  05/02/2025 12:23 PM         [1]   Social History  Tobacco Use    Smoking status: Never    Smokeless tobacco: Never   Substance Use Topics    Alcohol use: No    Drug use: No   [2]   Current Facility-Administered Medications on File Prior to Encounter   Medication Dose Route Frequency Provider Last Rate Last Admin    0.9%  NaCl infusion   Intravenous Continuous Juancho White MD   Stopped at 12/16/20 1500     Current Outpatient Medications on File Prior to Encounter   Medication Sig Dispense Refill    alendronate (FOSAMAX) 70 MG tablet Take 1 tablet by mouth once a week 12 tablet 1    [DISCONTINUED] aspirin (ECOTRIN) 81 MG EC tablet Take 1 tablet (81 mg total) by mouth once daily. 100 tablet 3    [DISCONTINUED] EUTHYROX 50 mcg tablet TAKE 1 TABLET BY MOUTH BEFORE BREAKFAST (Patient taking differently: Take 50 mcg by mouth before breakfast.) 90 tablet 3    [DISCONTINUED] gabapentin (NEURONTIN) 300 MG capsule TAKE 1 CAPSULE BY MOUTH ONCE DAILY IN THE MORNING AND 2 ONCE DAILY AT NIGHT (Patient taking differently: Take 300 mg by mouth every morning.) 270 capsule 1    [DISCONTINUED] gabapentin (NEURONTIN) 300 MG capsule Take 600 mg by mouth every evening.      [DISCONTINUED] glipizide-metformin (METAGLIP) 5-500 mg per tablet Take 1 tablet by mouth 2 (two) times daily before meals. Take with breakfast and dinner 180 tablet 1    [DISCONTINUED] insulin detemir U-100, Levemir, (LEVEMIR FLEXPEN) 100 unit/mL (3 mL) InPn pen Inject 70 Units into the skin every evening. 15 mL 0    [DISCONTINUED] lisinopriL (PRINIVIL,ZESTRIL) 5 MG tablet Take 1 tablet by mouth once daily 90 tablet 1    [DISCONTINUED] meloxicam (MOBIC) 15 MG tablet Take 15 mg by  "mouth.      [DISCONTINUED] rosuvastatin (CRESTOR) 20 MG tablet Take 1 tablet by mouth once daily 90 tablet 0    ONETOUCH DELICA PLUS LANCET 33 gauge Misc USE   TO CHECK GLUCOSE TWICE DAILY 100 each 3    ONETOUCH ULTRA TEST Strp USE  STRIP TO CHECK GLUCOSE TWICE DAILY 100 each 3    pen needle, diabetic 30 gauge x 1/3" Ndle 1 pen by Misc.(Non-Drug; Combo Route) route once daily. To use with levimir flex pen 100 each 3    [DISCONTINUED] ammonium lactate (LAC-HYDRIN) 12 % lotion Apply topically 2 (two) times daily. 396 g 6    [DISCONTINUED] blood-glucose meter kit To check BG 2 times daily, to use with insurance preferred meter 1 each 0    [DISCONTINUED] metoprolol tartrate (LOPRESSOR) 25 MG tablet Take 1 tablet by mouth twice daily 180 tablet 0     "

## 2025-05-02 NOTE — DISCHARGE INSTRUCTIONS
Our goal at [facility] is to always give you outstanding care and exceptional service. You may receive a survey from [employer] by mail, text or e-mail in the next 24-48 hours asking about the care you received with us. The survey should only take 5-10 minutes to complete and is very important to us.     Your feedback provides us with a way to recognize our staff who work tirelessly to provide the best care! Also, your responses help us learn how to improve when your experience was below our aspiration of excellence. We are always looking for ways to improve your stay. We WILL use your feedback to continue making improvements to help us provide the highest quality care. We keep your personal information and feedback confidential. We appreciate your time completing this survey and can't wait to hear from you!!!    We look forward to your continued care with us! Thanks so much for choosing [facility] for your healthcare needs!

## 2025-05-02 NOTE — ASSESSMENT & PLAN NOTE
Patients blood pressure range in the last 24 hours was: BP  Min: 84/45  Max: 188/90.The patient's inpatient anti-hypertensive regimen is listed below:  Current Antihypertensives  furosemide tablet 20 mg, Daily, Oral  furosemide (LASIX) tablet, Daily, Oral

## 2025-05-02 NOTE — PLAN OF CARE
In basket request sent to Dr Shook for Cardiology hospital follow up appt . Office staff will contact patient to schedule. Added contact information to AVS .     SC sent to DC clinic to schedule 2 day and 7 day hospital follow up appt .   Appts scheduled and added to AVS          05/02/25 1056   Post-Acute Status   Hospital Resources/Appts/Education Provided Appointments scheduled and added to AVS;Appointment suggestion unavailable

## 2025-05-02 NOTE — TELEPHONE ENCOUNTER
----- Message from  Filomena sent at 5/2/2025 10:55 AM CDT -----  Regarding: hospital follow up appt  Good Morning , Please contact patient to schedule hospital follow up appt within 1 wk. Thanks , Filomena Winkler RN CM

## 2025-05-02 NOTE — PLAN OF CARE
Patient accepted by [facility] home health via [facility] with start of care 05/03/2025.       05/02/25 1051   Post-Acute Status   Post-Acute Authorization Home Health   Home Health Status Set-up Complete/Auth obtained

## 2025-05-02 NOTE — PLAN OF CARE
Patient is NPO with heart cath scheduled at 12 p .        05/02/25 1018   Discharge Reassessment   Assessment Type Discharge Planning Reassessment   Discharge Plan discussed with: Patient

## 2025-05-02 NOTE — ASSESSMENT & PLAN NOTE
Patient with known CAD s/p CABG, which is controlled Will continue ASA and Statin and monitor for S/Sx of angina/ACS. Continue to monitor on telemetry.   Angiogram pending

## 2025-05-02 NOTE — PLAN OF CARE
Problem: Adult Inpatient Plan of Care  Goal: Plan of Care Review  Outcome: Met  Goal: Patient-Specific Goal (Individualized)  Outcome: Met  Goal: Absence of Hospital-Acquired Illness or Injury  Outcome: Met  Goal: Optimal Comfort and Wellbeing  Outcome: Met  Goal: Readiness for Transition of Care  Outcome: Met     Problem: Diabetes Comorbidity  Goal: Blood Glucose Level Within Targeted Range  Outcome: Met     Problem: Skin Injury Risk Increased  Goal: Skin Health and Integrity  Outcome: Met     Problem: Wound  Goal: Optimal Coping  Outcome: Met  Goal: Optimal Functional Ability  Outcome: Met  Goal: Absence of Infection Signs and Symptoms  Outcome: Met  Goal: Improved Oral Intake  Outcome: Met  Goal: Optimal Pain Control and Function  Outcome: Met  Goal: Skin Health and Integrity  Outcome: Met  Goal: Optimal Wound Healing  Outcome: Met

## 2025-05-02 NOTE — PLAN OF CARE
Pt clear for DC from case management standpoint. Discharging to Home with HH .   Spouse will transport patient home from facility .       CLEAR FOR DISCHARGE ONCE DC ORDERS ARE REC'D AND ATTENDING PROVIDER CLEARS .        05/02/25 1302   Final Note   Assessment Type Final Discharge Note   Anticipated Discharge Disposition Home-[facility]

## 2025-05-02 NOTE — PT/OT/SLP PROGRESS
Physical Therapy Treatment    Patient Name:  Hoa Wilde   MRN:  [id number]    Recommendations:     Discharge Recommendations: Low Intensity Therapy  Discharge Equipment Recommendations: none  Barriers to discharge: medical status    Assessment:     Hoa Wilde is a 75 y.o. female admitted with a medical diagnosis of Acute combined systolic and diastolic heart failure.  She presents with the following impairments/functional limitations: weakness, impaired endurance, impaired self care skills, impaired functional mobility, impaired cardiopulmonary response to activity.    Pt standing with PCT with hand hold assist returning to sitting EOB. PCT exited. Pt reports that she is not doing therapy today as she has not eaten and is going for a procedure.    Pt sitting towards foot of bed and agreeable to scoot up towards EOB for improved positioning when she lies down. Contact guard assist to perform 3 scoots towards HOB. Pt declined lying down at this time secondary to L hip pain. Pt wanting to remain EOB.    Bedside table set up in front of pt. Spouse present.    Rehab Prognosis: Fair; patient would benefit from acute skilled PT services to address these deficits and reach maximum level of function.    Recent Surgery: Procedure(s) (LRB):  Left heart cath (Left) * Day of Surgery *    Plan:     During this hospitalization, patient to be seen 5 x/week to address the identified rehab impairments via gait training, therapeutic activities, therapeutic exercises and progress toward the following goals:    Plan of Care Expires:       Subjective     Chief Complaint: pt reports she has not eaten and has a procedure today  Patient/Family Comments/goals: to go home  Pain/Comfort:  Pain Rating 1: other (see comments) (not rated)  Location - Side 1: Left  Location 1: hip  Pain Addressed 1: Distraction  Pain Rating Post-Intervention 1: other (see comments) (not rated)      Objective:     Communicated with nurse prior to session.  Patient  found standing with HHA from PCT with telemetry, peripheral IV upon PT entry to room.     General Precautions: Standard, fall  Orthopedic Precautions: N/A  Braces: N/A  Respiratory Status: Room air     Functional Mobility:  Bed Mobility:     Scooting: contact guard assistance and scooting along EOB towards HOB      AM-PAC 6 CLICK MOBILITY          Treatment & Education:  Pt educated on importance of time OOB, importance of intermittent mobility, safe techniques for transfers/ambulation, discharge recommendations/options, and use of call light for assistance and fall prevention.      Patient left sitting edge of bed with all lines intact, call button in reach, bed alarm on, nurse notified, and spouse present..    GOALS:   Multidisciplinary Problems       Physical Therapy Goals          Problem: Physical Therapy    Goal Priority Disciplines Outcome Interventions   Physical Therapy Goal     PT, PT/OT Progressing    Description: 1. Supine to sit with Independent  2. Sit to stand transfer with Supervision  3. Bed to chair transfer with Supervision using Rolling Walker  4. Gait  x 150 feet with Supervision using Rolling Walker.                              DME Justifications:  No DME recommended requiring DME justifications    Time Tracking:     PT Received On: 05/02/25  PT Start Time: 0947     PT Stop Time: 0955  PT Total Time (min): 8 min     Billable Minutes: Therapeutic Activity 8    Treatment Type: Treatment  PT/PTA: PTA     Number of PTA visits since last PT visit: 3     05/02/2025

## 2025-05-02 NOTE — ASSESSMENT & PLAN NOTE
Orthopnea with bilateral lower extremity pitting edema.  Does not wear oxygen.  Pulmonary edema on CXR with bilateral pleural effusions.  Lasix to p.o. to 20 mg daily   Echocardiogram showed EF 49%, diastolic heart failure.  Severe aortic stenosis.    Strict I/O  Fluid restriction

## 2025-05-02 NOTE — DISCHARGE SUMMARY
Critical access hospital Medicine  Discharge Summary      Patient Name: Hoa Wilde  MRN: [id number]  PRISCILLA: [id number]  Patient Class: IP- Inpatient  Admission Date: 4/28/2025  Hospital Length of Stay: 3 days  Discharge Date and Time: 05/02/2025   Attending Physician: Girish Colvin MD   Discharging Provider: Girish Colvin MD  Primary Care Provider: Carlos Pope MD    Primary Care Team: Networked reference to record PCT     HPI:   Hoa Wilde is a 75-year-old female with a past medical history of diabetes type 2 with peripheral neuropathy, hypertension, hyperlipidemia, and coronary artery disease with CABG x4 in 2016, gout, hypothyroidism, and depression who was seen and evaluated in the ER for complaints of worsening shortness of breath and for the last few days.  She reports increased dyspnea on exertion over the last 2-3 days.  She endorses chronic bilateral lower extremity swelling, and that she is unable to lie flat and has slough with 3 pillows for last 1-2 years.  ED workup was positive for bilateral pleural effusions, pulmonary edema, elevated BNP of 612, and hyperglycemia with a glucose of 222.  History was obtained from patient and spouse.  Hospital admission was consulted and patient agreeable for admission.     Procedure(s) (LRB):  Left heart cath (Left)  ARTERIOGRAM, AORTIC ROOT (N/A)      Hospital Course:   Patient is a 75 year old female with type 2 diabetes, neuropathy, hypertension, CAD status post CABG, hypothyroidism presented with complaints of shortness for breath and lower extremity edema.  Started on IV Lasix with improvement.  Echocardiogram showed ejection fraction 49%, grade 2 diastolic dysfunction.  Severe  aortic stenosis. Cardio consulted consulted.  Chest x-ray showed pulmonary edema.  No prior history of CHF. Cardiology planning for angiogram 5/2 and outpatient TAVR. Pending angiogram and cardiology clearance patient can be discharged. Patient seen and examined.  No chest pain or SOB. Clinically euvolemic., low dose lasix on discharge.      Goals of Care Treatment Preferences:  Code Status: Full Code      SDOH Screening:  The patient declined to be screened for utility difficulties, food insecurity, transport difficulties, housing insecurity, and interpersonal safety, so no concerns could be identified this admission.     Consults:   Consults (From admission, onward)          Status Ordering Provider     Inpatient consult to   Once        Provider:  (Not yet assigned)    Completed LESLYE WHALEN     Inpatient consult to Cardiology  Once        Provider:  Heather Mcneil MD    Completed RICK RAHMAN     Inpatient consult to Midline team  Once        Provider:  (Not yet assigned)    Completed RICK RAHMAN     Inpatient consult to Registered Dietitian/Nutritionist  Once        Provider:  (Not yet assigned)    Completed ABDOULAYE MCCRACKEN     Inpatient consult to Social Work/Case Management  Once        Provider:  (Not yet assigned)    Completed ABDOULAYE MCCRACKEN     Inpatient consult to Registered Dietitian/Nutritionist  Once        Provider:  (Not yet assigned)    Completed ABDOULAYE MCCRACKEN            Assessment & Plan  Acute combined systolic and diastolic heart failure  Orthopnea with bilateral lower extremity pitting edema.  Does not wear oxygen.  Pulmonary edema on CXR with bilateral pleural effusions.  Lasix to p.o. to 20 mg daily   Echocardiogram showed EF 49%, diastolic heart failure.  Severe aortic stenosis.    Strict I/O  Fluid restriction       Bilateral pleural effusion  Patient found to have small bilateral pleural effusions on imaging, left greater than right. I have personally reviewed and interpreted the following imaging: Xray. A thoracentesis was deferred. Most likely etiology includes Congestive Heart Failure. Management to include Diuresis    Hypertension associated with diabetes  Patients blood pressure range in the last 24 hours  was: BP  Min: 84/45  Max: 188/90.The patient's inpatient anti-hypertensive regimen is listed below:  Current Antihypertensives  furosemide tablet 20 mg, Daily, Oral  furosemide (LASIX) tablet, Daily, Oral      Hyperlipidemia associated with type 2 diabetes mellitus  Continue statin    Type 2 diabetes mellitus with diabetic polyneuropathy, with long-term current use of insulin  Patient's FSGs are uncontrolled due to hyperglycemia on current medication regimen.  Last A1c reviewed-   Lab Results   Component Value Date    HGBA1C 9.9 (H) 04/28/2025     Most recent fingerstick glucose reviewed-   Recent Labs   Lab 05/01/25  2322 05/02/25  0316 05/02/25  0740 05/02/25  1148   POCTGLUCOSE 241* 194* 159* 140*     Resume Lantus 20 units on discharge   Patient has been noncompliant at home   Coronary artery disease s/p CABG  Patient with known CAD s/p CABG, which is controlled Will continue ASA and Statin and monitor for S/Sx of angina/ACS. Continue to monitor on telemetry.   Angiogram pending     Hypothyroidism  Chronic, stable.    - Continue levothyroxine 50 mcg daily before breakfast    Lab Results   Component Value Date    TSH 3.512 04/29/2025         Severe aortic stenosis  Echocardiogram with evidence of aortic stenosis that is severe . The patient's most recent echocardiogram result is listed below.     Echo  Result Date: 4/30/2025    Left Ventricle: The left ventricle is normal in size. There is   concentric remodeling. Mild global hypokinesis present. Quantitated   ejection fraction is 49%. Grade II diastolic dysfunction.    Right Ventricle: The right ventricle is normal in size. Systolic   function is moderately reduced.    Left Atrium: Mildly to moderately dilated    Aortic Valve: Not well visualized due to poor acoustic window. There is   severe aortic valve sclerosis. There is severe stenosis. Aortic valve area   by VTI is 0.6 cm². Aortic valve peak velocity is 3.9 m/s. Mean gradient is   38 mmHg. The  dimensionless index is 0.19. There is mild to moderate aortic   regurgitation.    Mitral Valve: Mildly thickened leaflets. There is mild to moderate   regurgitation.    Tricuspid Valve: There is mild regurgitation.    Pulmonic Valve: There is mild regurgitation.    IVC/SVC: Normal venous pressure at 3 mmHg.      Reduce Lasix to 20 mg as patient is borderline hypotensive and pre-load dependent   Discussed with Cardiology, planning for inpatient angiogram on 5/2 to assess grafts from previous CABG  Planning for outpatient TVAR   Final Active Diagnoses:    Diagnosis Date Noted POA    PRINCIPAL PROBLEM:  Acute combined systolic and diastolic heart failure [I50.41] 04/28/2025 Yes    Severe aortic stenosis [I35.0] 05/01/2025 Yes    Bilateral pleural effusion [J90] 04/28/2025 Yes    Hypothyroidism [E03.9] 01/21/2023 Yes    Coronary artery disease s/p CABG [I25.10] 04/30/2020 Yes    Type 2 diabetes mellitus with diabetic polyneuropathy, with long-term current use of insulin [E11.42, Z79.4] 04/30/2020 Not Applicable    Hypertension associated with diabetes [E11.59, I15.2] 12/03/2018 Yes    Hyperlipidemia associated with type 2 diabetes mellitus [E11.69, E78.5] 12/03/2018 Yes      Problems Resolved During this Admission:       Discharged Condition: good    Disposition:     Follow Up:   Contact information for follow-up providers       Guy Shook MD. Call in 1 week(s).    Specialties: Cardiovascular Disease, Cardiology  Why: Office staff will contact patient to schedule hospital follow up appt within 1 wk.  Contact information:  [address] [facility]  [address]  [place] [address]  [phone]               Arnold, Jose Raul C., NP. Go on 5/5/2025.    Specialty: Internal Medicine  Why: 1st Hospital follow up appt scheduled on 5/5 @ 3:30 pm    2nd Hospital follow up appt scheduled on 5/12 @ 3 pm  Contact information:  [address] [facility]   [address]  [place] [address]  [phone]                       Contact information for  "after-discharge care       Home Medical Care       [facility] .    Service: Home Health Services  Contact information:  [address] [facility] [id number] [phone]                                 Patient Instructions:      Ambulatory referral/consult to Home Health   Standing Status: Future   Referral Priority: Routine Referral Type: Home Health   Referral Reason: Specialty Services Required   Requested Specialty: Home Health Services   Number of Visits Requested: 1       Significant Diagnostic Studies: Labs: CMP   Recent Labs   Lab 05/01/25  0523 05/02/25  0528    139   K 3.8 4.1    104   CO2 29 26   GLU 91 173*   BUN 17 12   CREATININE 0.6 0.6   CALCIUM 8.5* 8.6*   ANIONGAP 9 9    and CBC No results for input(s): "WBC", "HGB", "HCT", "PLT" in the last 48 hours.    Pending Diagnostic Studies:       Procedure Component Value Units Date/Time    EXTRA TUBES [[id number]] Collected: 04/28/25 1842    Order Status: Sent Lab Status: In process Updated: 04/28/25 1842    Specimen: Blood, Venous     Narrative:      The following orders were created for panel order EXTRA TUBES.  Procedure                               Abnormality         Status                     ---------                               -----------         ------                     Gold Top Hold[[id number]]                                   In process                   Please view results for these tests on the individual orders.           Medications:  Reconciled Home Medications:      Medication List        START taking these medications      furosemide 20 MG tablet  Commonly known as: LASIX  Take 1 tablet (20 mg total) by mouth once daily.  Start taking on: May 3, 2025     insulin glargine U-100 (Lantus) 100 unit/mL (3 mL) Inpn pen  Inject 20 Units into the skin every evening.  Replaces: LEVEMIR FLEXPEN 100 unit/mL (3 mL) Inpn pen     pantoprazole 40 MG tablet  Commonly known as: PROTONIX  Take 1 tablet " "(40 mg total) by mouth once daily.  Start taking on: May 3, 2025            CHANGE how you take these medications      gabapentin 300 MG capsule  Commonly known as: NEURONTIN  Take 2 capsules (600 mg total) by mouth every evening.  What changed: Another medication with the same name was removed. Continue taking this medication, and follow the directions you see here.     rosuvastatin 20 MG tablet  Commonly known as: CRESTOR  Take 1 tablet (20 mg total) by mouth once daily.  What changed: when to take this            CONTINUE taking these medications      alendronate 70 MG tablet  Commonly known as: FOSAMAX  Take 1 tablet by mouth once a week     aspirin 81 MG EC tablet  Commonly known as: ECOTRIN  Take 1 tablet (81 mg total) by mouth once daily.     blood-glucose meter kit  To check BG 2 times daily, to use with insurance preferred meter     levothyroxine 50 MCG tablet  Commonly known as: EUTHYROX  Take 1 tablet (50 mcg total) by mouth before breakfast.     ONETOUCH DELICA PLUS LANCET 33 gauge Misc  Generic drug: lancets  USE   TO CHECK GLUCOSE TWICE DAILY     ONETOUCH ULTRA TEST Strp  Generic drug: blood sugar diagnostic  USE  STRIP TO CHECK GLUCOSE TWICE DAILY     pen needle, diabetic 30 gauge x 1/3" Ndle  1 pen by Misc.(Non-Drug; Combo Route) route once daily. To use with levimir flex pen            STOP taking these medications      ammonium lactate 12 % lotion  Commonly known as: LAC-HYDRIN     glipizide-metformin 5-500 mg per tablet  Commonly known as: METAGLIP     LEVEMIR FLEXPEN 100 unit/mL (3 mL) Inpn pen  Generic drug: insulin detemir U-100 (Levemir)  Replaced by: insulin glargine U-100 (Lantus) 100 unit/mL (3 mL) Inpn pen     lisinopriL 5 MG tablet  Commonly known as: PRINIVIL,ZESTRIL     meloxicam 15 MG tablet  Commonly known as: MOBIC     metoprolol tartrate 25 MG tablet  Commonly known as: LOPRESSOR              Indwelling Lines/Drains at time of discharge:   Lines/Drains/Airways       None             "       Time spent on the discharge of patient: 40 minutes         Girish Colvin MD  Department of Hospital Medicine  [facility]

## 2025-05-03 NOTE — NURSING
Bedrest complete and pt discharged. Went over discharge paperwork and all questions answered. R groin site CDI. Pt transported to personal vehicle via wheelchair and x1 RN. All personal belongings transported with pt.  at bedside. No distress noted.

## 2025-05-03 NOTE — NURSING
AVS reviewed over the phone with patient in its entirety with emphasis on diet, medications, follow-up appointments and reasons to return to the ED. Patient also encouraged to utilize their patient portal. Ease and convenience of use reiterated. Education complete and patient voiced understanding. All questions answered. Discharge teaching complete.

## 2025-05-05 ENCOUNTER — PATIENT OUTREACH (OUTPATIENT)
Dept: ADMINISTRATIVE | Facility: CLINIC | Age: 75
End: 2025-05-05
Payer: MEDICARE

## 2025-05-05 NOTE — PROGRESS NOTES
C3 nurse spoke with Hoa Wilde for a TCC post hospital discharge follow up call. The patient has a scheduled HOSFU appointment with Ioana Cordoba NP  on 5/7/25 @ 8am.

## 2025-05-07 ENCOUNTER — OFFICE VISIT (OUTPATIENT)
Dept: HOME HEALTH SERVICES | Facility: CLINIC | Age: 75
End: 2025-05-07
Payer: MEDICARE

## 2025-05-07 VITALS
DIASTOLIC BLOOD PRESSURE: 60 MMHG | TEMPERATURE: 99 F | BODY MASS INDEX: 28.71 KG/M2 | HEART RATE: 75 BPM | OXYGEN SATURATION: 100 % | SYSTOLIC BLOOD PRESSURE: 128 MMHG | WEIGHT: 147 LBS | RESPIRATION RATE: 20 BRPM

## 2025-05-07 DIAGNOSIS — I35.0 NONRHEUMATIC AORTIC VALVE STENOSIS: ICD-10-CM

## 2025-05-07 DIAGNOSIS — J90 BILATERAL PLEURAL EFFUSION: Primary | ICD-10-CM

## 2025-05-07 DIAGNOSIS — E11.65 UNCONTROLLED TYPE 2 DIABETES MELLITUS WITH HYPERGLYCEMIA: ICD-10-CM

## 2025-05-07 DIAGNOSIS — I50.41 ACUTE COMBINED SYSTOLIC AND DIASTOLIC HEART FAILURE: ICD-10-CM

## 2025-05-07 DIAGNOSIS — I25.10 CORONARY ARTERY DISEASE INVOLVING NATIVE HEART WITHOUT ANGINA PECTORIS, UNSPECIFIED VESSEL OR LESION TYPE: ICD-10-CM

## 2025-05-07 PROCEDURE — 3046F HEMOGLOBIN A1C LEVEL >9.0%: CPT | Mod: CPTII,S$GLB,, | Performed by: NURSE PRACTITIONER

## 2025-05-07 PROCEDURE — 1126F AMNT PAIN NOTED NONE PRSNT: CPT | Mod: CPTII,S$GLB,, | Performed by: NURSE PRACTITIONER

## 2025-05-07 PROCEDURE — 1101F PT FALLS ASSESS-DOCD LE1/YR: CPT | Mod: CPTII,S$GLB,, | Performed by: NURSE PRACTITIONER

## 2025-05-07 PROCEDURE — 3078F DIAST BP <80 MM HG: CPT | Mod: CPTII,S$GLB,, | Performed by: NURSE PRACTITIONER

## 2025-05-07 PROCEDURE — 1111F DSCHRG MED/CURRENT MED MERGE: CPT | Mod: CPTII,S$GLB,, | Performed by: NURSE PRACTITIONER

## 2025-05-07 PROCEDURE — 3288F FALL RISK ASSESSMENT DOCD: CPT | Mod: CPTII,S$GLB,, | Performed by: NURSE PRACTITIONER

## 2025-05-07 PROCEDURE — 1159F MED LIST DOCD IN RCRD: CPT | Mod: CPTII,S$GLB,, | Performed by: NURSE PRACTITIONER

## 2025-05-07 PROCEDURE — 99495 TRANSJ CARE MGMT MOD F2F 14D: CPT | Mod: S$GLB,,, | Performed by: NURSE PRACTITIONER

## 2025-05-07 PROCEDURE — 3074F SYST BP LT 130 MM HG: CPT | Mod: CPTII,S$GLB,, | Performed by: NURSE PRACTITIONER

## 2025-05-07 PROCEDURE — 1160F RVW MEDS BY RX/DR IN RCRD: CPT | Mod: CPTII,S$GLB,, | Performed by: NURSE PRACTITIONER

## 2025-05-07 NOTE — PATIENT INSTRUCTIONS
Instructions  - Continue all medications, treatments and therapies as ordered.   - Follow all instructions, recommendations as discussed.  - Maintain Safety Precautions at all times.  - Attend all medical appointments as scheduled.  - For worsening symptoms: call Primary Care Physician or Nurse Practitioner.  - For emergencies, call 911 or immediately report to the nearest emergency room.

## 2025-05-07 NOTE — PROGRESS NOTES
[facility]  Transitional Care Management (TCM) Home Visit    Encounter Provider: Ioana Cordoba   PCP: Carlos Pope MD  Consult Requested By: No ref. provider found  Admit Date: 4/28/25   IP Discharge Date: 5/2/25  Hospital Length of Stay:RRHLOS@ 4 days  Days since discharge (from IP or SNF): 5 days   [facility] On [facility] Contact Note: 5/5/25  Hospital Diagnosis: CHF, pleural effusion    HISTORY OF PRESENT ILLNESS      Patient ID: Hoa Wilde is a 75 y.o. female was recently admitted to the hospital, this is their TCM encounter.    Hospital Course Synopsis:  Admit: 4/28/25  Discharge: 5/2/25  HPI:   Hoa Wilde is a 75-year-old female with a past medical history of diabetes type 2 with peripheral neuropathy, hypertension, hyperlipidemia, and coronary artery disease with CABG x4 in 2016, gout, hypothyroidism, and depression who was seen and evaluated in the ER for complaints of worsening shortness of breath and for the last few days.  She reports increased dyspnea on exertion over the last 2-3 days.  She endorses chronic bilateral lower extremity swelling, and that she is unable to lie flat and has slough with 3 pillows for last 1-2 years.  ED workup was positive for bilateral pleural effusions, pulmonary edema, elevated BNP of 612, and hyperglycemia with a glucose of 222.  History was obtained from patient and spouse.  Hospital admission was consulted and patient agreeable for admission.      Procedure(s) (LRB):  Left heart cath (Left)  ARTERIOGRAM, AORTIC ROOT (N/A)       Hospital Course:   Patient is a 75 year old female with type 2 diabetes, neuropathy, hypertension, CAD status post CABG, hypothyroidism presented with complaints of shortness for breath and lower extremity edema.  Started on IV Lasix with improvement.  Echocardiogram showed ejection fraction 49%, grade 2 diastolic dysfunction.  Severe  aortic stenosis. Cardio consulted consulted.  Chest x-ray showed pulmonary edema.  No prior history of CHF.  "Cardiology planning for angiogram 5/2 and outpatient TAVR. Pending angiogram and cardiology clearance patient can be discharged. Patient seen and examined. No chest pain or SOB. Clinically euvolemic., low dose lasix on discharge.      With this [facility] hospital follow up visit patient is found at home sitting at the kitchen table, spouse, Edmond, is also present. Patient states she is feeling "pretty good just still a little weak". She has been weighing her self daily since home from the hospital, indicates she had a 2 lbs weight gain from yesterday. Taking all medications, including diuretic as prescribed. Patient asked this provider to write on lasix medicine bottle "fluid" pill.  Patient is knowledgeable about upcoming Cardiology follow up with Dr. Shook on 5/14/25. She denies any chest pain or shortness of breath. No edema observed or reported.    No distress noted. Program contact information provided to patient and left in home.      DECISION MAKING TODAY       Assessment & Plan:  1. Bilateral pleural effusion  Assessment & Plan:  Found on recent hospitalization.  Currently stable.  Educated on managing CHF symptoms.  Follow up with Cardiology.      2. Acute combined systolic and diastolic heart failure  Overview:  4/28/25 hospitalization  Echocardiogram showed EF 49%, diastolic heart failure.  Severe aortic stenosis.  Pulmonary edema on CXR with bilateral pleural effusions.    Assessment & Plan:  Currently stable.  Trace pedal edema today, no orthopnea reported.   Does not wear oxygen..  Lasix to p.o. to 20 mg daily   Fluid restriction discussed.  Weighing daily.  Educated on low sodium diet, weighing self daily, report 3-5 lb weight gain in 24 hour period.  Follow up with Cardiology.  Continue [facility] for monitoring, education.      3. Coronary artery disease involving native heart without angina pectoris, unspecified vessel or lesion type  Assessment & Plan:  Chronic, stable.  Patient with hx " of CAD s/p CABG, which is controlled. Denies angina.   Continue ASA and Statin.  Follow up with Cardiology.      4. Uncontrolled type 2 diabetes mellitus with hyperglycemia  Assessment & Plan:  Chronic.  Most recent AIC 9.9.  BG this am 155.  Continue lantus as prescribed.  Follow ADA diet.  Follow up with PCP.      5. Nonrheumatic aortic valve stenosis  Overview:  Echo  Result Date: 4/30/2025    Left Ventricle: The left ventricle is normal in size. There is   concentric remodeling. Mild global hypokinesis present. Quantitated   ejection fraction is 49%. Grade II diastolic dysfunction.    Right Ventricle: The right ventricle is normal in size. Systolic   function is moderately reduced.    Left Atrium: Mildly to moderately dilated    Aortic Valve: Not well visualized due to poor acoustic window. There is   severe aortic valve sclerosis. There is severe stenosis. Aortic valve area   by VTI is 0.6 cm². Aortic valve peak velocity is 3.9 m/s. Mean gradient is   38 mmHg. The dimensionless index is 0.19. There is mild to moderate aortic   regurgitation.    Mitral Valve: Mildly thickened leaflets. There is mild to moderate   regurgitation.    Tricuspid Valve: There is mild regurgitation.    Pulmonic Valve: There is mild regurgitation.    IVC/SVC: Normal venous pressure at 3 mmHg.    Assessment & Plan:  Currently stable.  Murmur on exam today.  Continue Lasix to 20 mg as prescribed on hospital discharge.  Planning for outpatient TVAR.  Follow up with Cardiology-Dr. Shook.           Medication List on Discharge:     Medication List            Accurate as of May 7, 2025 11:59 PM. If you have any questions, ask your nurse or doctor.                CONTINUE taking these medications      alendronate 70 MG tablet  Commonly known as: FOSAMAX  Take 1 tablet by mouth once a week     aspirin 81 MG EC tablet  Commonly known as: ECOTRIN  Take 1 tablet (81 mg total) by mouth once daily.     blood-glucose meter kit  To check BG 2 times  "daily, to use with insurance preferred meter     furosemide 20 MG tablet  Commonly known as: LASIX  Take 1 tablet (20 mg total) by mouth once daily.     gabapentin 300 MG capsule  Commonly known as: NEURONTIN  Take 2 capsules (600 mg total) by mouth every evening.     insulin glargine U-100 (Lantus) 100 unit/mL (3 mL) Inpn pen  Inject 20 Units into the skin every evening.     levothyroxine 50 MCG tablet  Commonly known as: EUTHYROX  Take 1 tablet (50 mcg total) by mouth before breakfast.     ONETOUCH DELICA PLUS LANCET 33 gauge Misc  Generic drug: lancets  USE   TO CHECK GLUCOSE TWICE DAILY     ONETOUCH ULTRA TEST Strp  Generic drug: blood sugar diagnostic  USE  STRIP TO CHECK GLUCOSE TWICE DAILY     pantoprazole 40 MG tablet  Commonly known as: PROTONIX  Take 1 tablet (40 mg total) by mouth once daily.     pen needle, diabetic 30 gauge x 1/3" Ndle  1 pen by Misc.(Non-Drug; Combo Route) route once daily. To use with levimir flex pen     rosuvastatin 20 MG tablet  Commonly known as: CRESTOR  Take 1 tablet (20 mg total) by mouth once daily.              Medication Reconciliation:  Were medications changed on discharge? Yes  Were medications in the home? Yes  Is the patient taking the medications as directed? Yes  Does the patient understand the medications and changes? Yes  Does updated med list accurately reflects meds patient is currently taking? Yes    ENVIRONMENT OF CARE      Family and/or Caregiver present at visit?  Yes  Name of Caregiver: spouse, Edmond  History provided by: patient    Advance Care Planning   Advanced Care Planning Status:  Patient has had an ACP conversation  Living Will: No  Power of : No  [place]: No    Does Caregiver have HCPoA: No  Changes today: none  Is patient hospice appropriate: No  (If needed, use PPS <30 or FAST score >7)  Was referral to hospice placed: No       Impression upon entering the home:  Physical Dwelling: single family home   Appearance of home environment: " cleaniness: clean, walking pathways: clear, lighting: adequate, and home structure: sound structure  Functional Status: minimal assistance  Mobility: ambulatory with device  Nutritional access: adequate intake and access  Home Health: Yes,  Agency [facility]   DME/Supplies: rolling walker and glucometer     Diagnostic tests reviewed/disposition: I have reviewed all completed as well as pending diagnostic tests at the time of discharge.  Disease/illness education: Diabetes, CHF, and CAD  Establishment or re-establishment of referral orders for community resources: No other necessary community resources.   Discussion with other health care providers: No discussion with other health care providers necessary.   Does patient have a PCP at [place]? Yes   Repatriation plan with PCP? follow-up with PCP within 30d   Does patient have an ostomy (ileostomy, colostomy, suprapubic catheter, nephrostomy tube, tracheostomy, PEG tube, pleurex catheter, cholecystostomy, etc)? No  Were BPAs reviewed? Yes    Social History     Socioeconomic History    Marital status:    Occupational History    Occupation: retired    Tobacco Use    Smoking status: Never    Smokeless tobacco: Never   Substance and Sexual Activity    Alcohol use: No    Drug use: No     Social Drivers of Health     Financial Resource Strain: Patient Declined (4/30/2025)    Overall Financial Resource Strain (CARDIA)     Difficulty of Paying Living Expenses: Patient declined   Food Insecurity: Patient Declined (4/30/2025)    Hunger Vital Sign     Worried About Running Out of Food in the Last Year: Patient declined     Ran Out of Food in the Last Year: Patient declined   Transportation Needs: Patient Declined (4/29/2025)    PRAPARE - Transportation     Lack of Transportation (Medical): Patient declined     Lack of Transportation (Non-Medical): Patient declined   Physical Activity: Inactive (4/2/2025)    Exercise Vital Sign     Days of Exercise per Week: 0 days     Minutes of  Exercise per Session: 0 min   Stress: Patient Declined (4/30/2025)    [employer] [place] [employer] - Occupational Stress Questionnaire     Feeling of Stress : Patient declined   Housing Stability: Patient Declined (4/30/2025)    Housing Stability Vital Sign     Unable to Pay for Housing in the Last Year: Patient declined     Homeless in the Last Year: Patient declined       OBJECTIVE:     Vital Signs:  Vitals:    05/07/25 1030   BP: 128/60   Pulse: 75   Resp: 20   Temp: 98.7 °F (37.1 °C)       Review of Systems   Constitutional:  Positive for activity change, appetite change (mild decrease), fatigue (mild) and unexpected weight change (2 lb weight gain in 24 hrs).   HENT: Negative.     Eyes: Negative.    Respiratory: Negative.     Cardiovascular:  Positive for leg swelling. Negative for chest pain and palpitations.   Gastrointestinal: Negative.    Endocrine: Negative.         Stable BG, this am 155   Genitourinary: Negative.    Musculoskeletal: Negative.    Skin: Negative.    Neurological:  Positive for weakness.   Hematological:  Bruises/bleeds easily.   Psychiatric/Behavioral: Negative.         Physical Exam:  Physical Exam  Constitutional:       General: She is not in acute distress.     Appearance: She is ill-appearing.      Comments: Overweight, elderly female   HENT:      Head: Normocephalic and atraumatic.      Right Ear: External ear normal.      Left Ear: External ear normal.      Nose: Nose normal.      Mouth/Throat:      Mouth: Mucous membranes are dry.      Pharynx: Oropharynx is clear.   Eyes:      Extraocular Movements: Extraocular movements intact.      Conjunctiva/sclera: Conjunctivae normal.      Pupils: Pupils are equal, round, and reactive to light.   Cardiovascular:      Rate and Rhythm: Normal rate. Rhythm irregular.      Heart sounds: Murmur heard.   Pulmonary:      Effort: Pulmonary effort is normal. No respiratory distress.      Breath sounds: No wheezing, rhonchi or rales.       Comments: Diminished breath sounds in bases  Abdominal:      General: Bowel sounds are normal. There is no distension.      Palpations: Abdomen is soft.      Tenderness: There is no abdominal tenderness.   Musculoskeletal:         General: Normal range of motion.      Cervical back: Normal range of motion and neck supple.      Right lower leg: Edema (trace) present.      Left lower leg: Edema (trace) present.   Skin:     General: Skin is warm and dry.      Capillary Refill: Capillary refill takes 2 to 3 seconds.      Coloration: Skin is pale.   Neurological:      Mental Status: She is alert and oriented to person, place, and time.      Motor: Weakness present.      Gait: Gait abnormal.   Psychiatric:         Mood and Affect: Mood normal.         Behavior: Behavior normal.         Thought Content: Thought content normal.         Judgment: Judgment normal.       INSTRUCTIONS FOR PATIENT:     Scheduled Follow-up, Appts Reviewed with Modifications if Needed: Yes  Future Appointments   Date Time Provider Department Center   5/12/2025  3:00 PM Jose Raul Sommers NP [facility] 90   5/14/2025 11:00 AM Guy Shook MD [facility] [place] [place] CARDIO O at [facility]   6/18/2025  3:30 PM Carlos Pope MD [facility] 901     I spent a total of 40 minutes on the day of the visit.This includes face to face time and non-face to face time preparing to see the patient (eg, review of tests), obtaining and/or reviewing separately obtained history, documenting clinical information in the electronic or other health record, independently interpreting results and communicating results to the patient/family/caregiver, or care coordinator.          Signature: Ioana Cordoba NP    Transition of Care Visit:  I have reviewed and updated the history and problem list.  I have reconciled the medication list.  I have discussed the hospitalization and current medical issues, prognosis and plans with the patient/family.

## 2025-05-10 NOTE — ASSESSMENT & PLAN NOTE
Currently stable.  Murmur on exam today.  Continue Lasix to 20 mg as prescribed on hospital discharge.  Planning for outpatient TVAR.  Follow up with Cardiology-Dr. Shook.

## 2025-05-10 NOTE — ASSESSMENT & PLAN NOTE
Found on recent hospitalization.  Currently stable.  Educated on managing CHF symptoms.  Follow up with Cardiology.

## 2025-05-10 NOTE — ASSESSMENT & PLAN NOTE
Chronic, stable.  Patient with hx of CAD s/p CABG, which is controlled. Denies angina.   Continue ASA and Statin.  Follow up with Cardiology.   Olanzapine Counseling- I discussed with the patient the common side effects of olanzapine including but are not limited to: lack of energy, dry mouth, increased appetite, sleepiness, tremor, constipation, dizziness, changes in behavior, or restlessness.  Explained that teenagers are more likely to experience headaches, abdominal pain, pain in the arms or legs, tiredness, and sleepiness.  Serious side effects include but are not limited: increased risk of death in elderly patients who are confused, have memory loss, or dementia-related psychosis; hyperglycemia; increased cholesterol and triglycerides; and weight gain.

## 2025-05-10 NOTE — ASSESSMENT & PLAN NOTE
Chronic.  Most recent AIC 9.9.  BG this am 155.  Continue lantus as prescribed.  Follow ADA diet.  Follow up with PCP.

## 2025-05-10 NOTE — ASSESSMENT & PLAN NOTE
Currently stable.  Trace pedal edema today, no orthopnea reported.   Does not wear oxygen..  Lasix to p.o. to 20 mg daily   Fluid restriction discussed.  Weighing daily.  Educated on low sodium diet, weighing self daily, report 3-5 lb weight gain in 24 hour period.  Follow up with Cardiology.  Continue HH [facility] for monitoring, education.

## 2025-05-12 ENCOUNTER — OFFICE VISIT (OUTPATIENT)
Dept: FAMILY MEDICINE | Facility: CLINIC | Age: 75
End: 2025-05-12
Payer: MEDICARE

## 2025-05-12 VITALS
HEIGHT: 60 IN | BODY MASS INDEX: 29.95 KG/M2 | SYSTOLIC BLOOD PRESSURE: 121 MMHG | TEMPERATURE: 98 F | DIASTOLIC BLOOD PRESSURE: 75 MMHG | OXYGEN SATURATION: 98 % | WEIGHT: 152.56 LBS | HEART RATE: 90 BPM

## 2025-05-12 DIAGNOSIS — I25.10 CORONARY ARTERY DISEASE, UNSPECIFIED VESSEL OR LESION TYPE, UNSPECIFIED WHETHER ANGINA PRESENT, UNSPECIFIED WHETHER NATIVE OR TRANSPLANTED HEART: ICD-10-CM

## 2025-05-12 DIAGNOSIS — I50.31 ACUTE DIASTOLIC CONGESTIVE HEART FAILURE: ICD-10-CM

## 2025-05-12 DIAGNOSIS — R06.00 DYSPNEA, UNSPECIFIED TYPE: Primary | ICD-10-CM

## 2025-05-12 DIAGNOSIS — M85.89 OSTEOPENIA OF MULTIPLE SITES: ICD-10-CM

## 2025-05-12 PROCEDURE — 99999 PR PBB SHADOW E&M-EST. PATIENT-LVL III: CPT | Mod: PBBFAC,,, | Performed by: NURSE PRACTITIONER

## 2025-05-12 RX ORDER — ALENDRONATE SODIUM 70 MG/1
70 TABLET ORAL
Qty: 12 TABLET | Refills: 1 | Status: SHIPPED | OUTPATIENT
Start: 2025-05-12

## 2025-05-12 RX ORDER — METOPROLOL TARTRATE 25 MG/1
12.5 TABLET, FILM COATED ORAL 2 TIMES DAILY
Qty: 90 TABLET | Refills: 3 | Status: SHIPPED | OUTPATIENT
Start: 2025-05-12 | End: 2026-05-12

## 2025-05-12 NOTE — PROGRESS NOTES
This dictation has been generated using Modal Fluency Dictation some phonetic errors may occur. Please contact author for clarification if needed.     1. Dyspnea, unspecified type  -     Basic Metabolic Panel; Future; Expected date: 05/12/2025  -     BNP; Future; Expected date: 05/12/2025    2. Coronary artery disease, unspecified vessel or lesion type, unspecified whether angina present, unspecified whether native or transplanted heart  -     BNP; Future; Expected date: 05/12/2025    3. Acute diastolic congestive heart failure  -     BNP; Future; Expected date: 05/12/2025    4. Osteopenia of multiple sites  -     alendronate (FOSAMAX) 70 MG tablet; Take 1 tablet (70 mg total) by mouth every 7 days.  Dispense: 12 tablet; Refill: 1    Other orders  -     metoprolol tartrate (LOPRESSOR) 25 MG tablet; Take 0.5 tablets (12.5 mg total) by mouth 2 (two) times daily.  Dispense: 90 tablet; Refill: 3     Dyspnea due to fluid overload from diastolic CHF. S/P discharge. Did not get Metoprolol rx as advised by cardiology. Need to evaluate renal function and check a baseline BNP. Follow up cardiology 5/14    I will review all results and address accordingly.   No follow-ups on file.    ________________________________________________________________  ________________________________________________________________      Chief Complaint   Patient presents with    Follow-up     History of present illness    This 75 y.o. presents today for complaint of   Hospital follow-up for dyspnea.  Patient had new onset CHF due to diastolic heart failure.  She did have too much blood pressure med and was hypotensive.  They had held her medications however cardiology recommended resuming half dose Lopressor.  They requested a follow-up BMP in 1 week. Past medical: diabetes type 2 with peripheral neuropathy, hypertension, hyperlipidemia, and coronary artery disease with CABG x4 in 2016, gout, hypothyroidism, and depression.     Transitional Care  Note    Family and/or Caretaker present at visit?  Yes.  Diagnostic tests reviewed/disposition: No diagnosic tests pending after this hospitalization.  Disease/illness education: Medication resumption and function.   Home health/community services discussion/referrals: Patient has home health established at [facility].   Establishment or re-establishment of referral orders for community resources: No other necessary community resources.   Discussion with other health care providers: No discussion with other health care providers necessary.             Past Medical History:   Diagnosis Date    Coronary artery disease     Diabetes mellitus, type 2     Hyperlipidemia     Hypertension     Hypothyroidism     Neuropathy        Past Surgical History:   Procedure Laterality Date    ANGIOGRAPHY OF LOWER EXTREMITY Left 12/16/2020    Procedure: ANGIOGRAM, LOWER EXTREMITY;  Surgeon: Juancho White MD;  Location: [facility] CATH/EP LAB;  Service: General;  Laterality: Left;    ARTERIOGRAPHY OF AORTIC ROOT N/A 5/2/2025    Procedure: ARTERIOGRAM, AORTIC ROOT;  Surgeon: Isabel Diallo MD;  Location: [facility] CATH/EP LAB;  Service: Cardiology;  Laterality: N/A;    CHOLECYSTECTOMY      CORONARY ARTERY BYPASS GRAFT      FOOT SURGERY Left     HYSTERECTOMY      LEFT HEART CATHETERIZATION Left 5/2/2025    Procedure: Left heart cath;  Surgeon: Isabel Diallo MD;  Location: [facility] CATH/EP LAB;  Service: Cardiology;  Laterality: Left;    PERIPHERALLY INSERTED CENTRAL CATHETER INSERTION N/A 3/13/2024    Procedure: INSERTION, PICC;  Surgeon: NIKKO MIKE;  Location: [facility];  Service: Cardiology;  Laterality: N/A;  Dr Mendez       Family History   Problem Relation Name Age of Onset    Hyperlipidemia Mother      Heart disease Mother         Social History     Socioeconomic History    Marital status:    Occupational History    Occupation: retired    Tobacco Use    Smoking status: Never    Smokeless tobacco: Never   Substance and  Sexual Activity    Alcohol use: No    Drug use: No     Social Drivers of Health     Financial Resource Strain: Patient Declined (4/30/2025)    Overall Financial Resource Strain (CARDIA)     Difficulty of Paying Living Expenses: Patient declined   Food Insecurity: Patient Declined (4/30/2025)    Hunger Vital Sign     Worried About Running Out of Food in the Last Year: Patient declined     Ran Out of Food in the Last Year: Patient declined   Transportation Needs: Patient Declined (4/29/2025)    PRAPARE - Transportation     Lack of Transportation (Medical): Patient declined     Lack of Transportation (Non-Medical): Patient declined   Physical Activity: Inactive (4/2/2025)    Exercise Vital Sign     Days of Exercise per Week: 0 days     Minutes of Exercise per Session: 0 min   Stress: Patient Declined (4/30/2025)    [employer] [place] [employer] - Occupational Stress Questionnaire     Feeling of Stress : Patient declined   Housing Stability: Patient Declined (4/30/2025)    Housing Stability Vital Sign     Unable to Pay for Housing in the Last Year: Patient declined     Homeless in the Last Year: Patient declined       Current Medications[1]    Review of patient's allergies indicates:  No Known Allergies    Physical examination  Vitals Reviewed  /75 (Patient Position: Sitting)   Pulse 90   Temp 98 °F (36.7 °C)   Ht 5' (1.524 m)   Wt 69.2 kg (152 lb 8.9 oz)   SpO2 98%   BMI 29.79 kg/m²  Body mass index is 29.79 kg/m².     BP Readings from Last 3 Encounters:   05/12/25 121/75   05/07/25 128/60   05/02/25 120/74       Wt Readings from Last 3 Encounters:   05/12/25 69.2 kg (152 lb 8.9 oz)   05/07/25 66.7 kg (147 lb)   04/30/25 72.3 kg (159 lb 6.3 oz)     Gen. Well-dressed well-nourished   Skin warm dry and intact.  No rashes noted.  Chest.  Respirations are even unlabored.  Lungs are clear to auscultation.  Cardiac regular rate and rhythm.  No chest wall adenopathy noted.  Neuro. Awake alert oriented  "x4.  Normal judgment and cognition noted.  Extremities no clubbing cyanosis or edema noted.     Call or return to clinic prn if these symptoms worsen or fail to improve as anticipated.           [1]   Current Outpatient Medications   Medication Sig Dispense Refill    aspirin (ECOTRIN) 81 MG EC tablet Take 1 tablet (81 mg total) by mouth once daily. 30 tablet 0    blood-glucose meter kit To check BG 2 times daily, to use with insurance preferred meter 1 each 0    furosemide (LASIX) 20 MG tablet Take 1 tablet (20 mg total) by mouth once daily. 30 tablet 0    gabapentin (NEURONTIN) 300 MG capsule Take 2 capsules (600 mg total) by mouth every evening. 60 capsule 0    insulin glargine U-100, Lantus, 100 unit/mL (3 mL) SubQ InPn pen Inject 20 Units into the skin every evening. 6 mL 0    levothyroxine (EUTHYROX) 50 MCG tablet Take 1 tablet (50 mcg total) by mouth before breakfast. 30 tablet 0    ONETOUCH DELICA PLUS LANCET 33 gauge Misc USE   TO CHECK GLUCOSE TWICE DAILY 100 each 3    ONETOUCH ULTRA TEST Strp USE  STRIP TO CHECK GLUCOSE TWICE DAILY 100 each 3    pantoprazole (PROTONIX) 40 MG tablet Take 1 tablet (40 mg total) by mouth once daily. 30 tablet 0    pen needle, diabetic 30 gauge x 1/3" Ndle 1 pen by Misc.(Non-Drug; Combo Route) route once daily. To use with levimir flex pen 100 each 3    rosuvastatin (CRESTOR) 20 MG tablet Take 1 tablet (20 mg total) by mouth once daily. 30 tablet 0    alendronate (FOSAMAX) 70 MG tablet Take 1 tablet (70 mg total) by mouth every 7 days. 12 tablet 1    metoprolol tartrate (LOPRESSOR) 25 MG tablet Take 0.5 tablets (12.5 mg total) by mouth 2 (two) times daily. 90 tablet 3     No current facility-administered medications for this visit.     Facility-Administered Medications Ordered in Other Visits   Medication Dose Route Frequency Provider Last Rate Last Admin    0.9%  NaCl infusion   Intravenous Continuous Juancho White MD   Stopped at 12/16/20 1500     "

## 2025-05-14 ENCOUNTER — OFFICE VISIT (OUTPATIENT)
Dept: CARDIOLOGY | Facility: CLINIC | Age: 75
End: 2025-05-14
Payer: MEDICARE

## 2025-05-14 ENCOUNTER — TELEPHONE (OUTPATIENT)
Dept: CARDIOLOGY | Facility: CLINIC | Age: 75
End: 2025-05-14

## 2025-05-14 VITALS
SYSTOLIC BLOOD PRESSURE: 127 MMHG | HEIGHT: 60 IN | BODY MASS INDEX: 30.04 KG/M2 | WEIGHT: 153 LBS | DIASTOLIC BLOOD PRESSURE: 69 MMHG | HEART RATE: 86 BPM

## 2025-05-14 DIAGNOSIS — R94.39 ABNORMAL CARDIOVASCULAR STRESS TEST: Primary | ICD-10-CM

## 2025-05-14 DIAGNOSIS — I25.10 CORONARY ARTERY DISEASE INVOLVING NATIVE HEART WITHOUT ANGINA PECTORIS, UNSPECIFIED VESSEL OR LESION TYPE: ICD-10-CM

## 2025-05-14 DIAGNOSIS — E11.69 HYPERLIPIDEMIA ASSOCIATED WITH TYPE 2 DIABETES MELLITUS: ICD-10-CM

## 2025-05-14 DIAGNOSIS — I50.41 ACUTE COMBINED SYSTOLIC AND DIASTOLIC HEART FAILURE: ICD-10-CM

## 2025-05-14 DIAGNOSIS — Z95.1 HX OF CABG: ICD-10-CM

## 2025-05-14 DIAGNOSIS — I70.222: ICD-10-CM

## 2025-05-14 DIAGNOSIS — I15.2 HYPERTENSION ASSOCIATED WITH DIABETES: ICD-10-CM

## 2025-05-14 DIAGNOSIS — E11.59 HYPERTENSION ASSOCIATED WITH DIABETES: ICD-10-CM

## 2025-05-14 DIAGNOSIS — E78.5 HYPERLIPIDEMIA ASSOCIATED WITH TYPE 2 DIABETES MELLITUS: ICD-10-CM

## 2025-05-14 DIAGNOSIS — I35.0 AORTIC VALVE STENOSIS, ETIOLOGY OF CARDIAC VALVE DISEASE UNSPECIFIED: Primary | ICD-10-CM

## 2025-05-14 DIAGNOSIS — I70.222 ATHEROSCLEROSIS OF NATIVE ARTERY OF LEFT LOWER EXTREMITY WITH REST PAIN: ICD-10-CM

## 2025-05-14 DIAGNOSIS — I35.0 NONRHEUMATIC AORTIC VALVE STENOSIS: Primary | ICD-10-CM

## 2025-05-14 DIAGNOSIS — J90 BILATERAL PLEURAL EFFUSION: ICD-10-CM

## 2025-05-14 PROCEDURE — 99999 PR PBB SHADOW E&M-EST. PATIENT-LVL III: CPT | Mod: PBBFAC,,, | Performed by: GENERAL PRACTICE

## 2025-05-14 RX ORDER — SODIUM CHLORIDE 0.9 % (FLUSH) 0.9 %
10 SYRINGE (ML) INJECTION
Status: SHIPPED | OUTPATIENT
Start: 2025-05-14

## 2025-05-14 RX ORDER — SODIUM CHLORIDE 9 MG/ML
INJECTION, SOLUTION INTRAVENOUS ONCE
OUTPATIENT
Start: 2025-05-14 | End: 2025-05-14

## 2025-05-14 NOTE — PROGRESS NOTES
Subjective:    Patient ID:  Hoa Wilde is a 75 y.o. female who presents for follow-up of   Chief Complaint   Patient presents with    Hospital Follow Up    Coronary Artery Disease    Hyperlipidemia    Hypertension       HPI:  She comes today to establish care  She has a history of coronary bypass surgery in  x4 2016, diagnosed[place].  She has diabetes high cholesterol hypertension.  She quit taking all of her medications and ended up in the hospital with heart failure where they found severe aortic stenosis.    Left Ventricle: The left ventricle is normal in size. There is concentric remodeling. Mild global hypokinesis present. Quantitated ejection fraction is 49%. Grade II diastolic dysfunction.    Right Ventricle: The right ventricle is normal in size. Systolic function is moderately reduced.    Left Atrium: Mildly to moderately dilated    Aortic Valve: Not well visualized due to poor acoustic window. There is severe aortic valve sclerosis. There is severe stenosis. Aortic valve area by VTI is 0.6 cm². Aortic valve peak velocity is 3.9 m/s. Mean gradient is 38 mmHg. The dimensionless index is 0.19. There is mild to moderate aortic regurgitation.      Discharge Date and Time: 05/02/2025   Attending Physician: Girish Colvin MD   Discharging Provider: Girish Colvin MD  Primary Care Provider: Carlos Pope MD     Primary Care Team: Networked reference to record PCT      HPI:   Hoa Wilde is a 75-year-old female with a past medical history of diabetes type 2 with peripheral neuropathy, hypertension, hyperlipidemia, and coronary artery disease with CABG x4 in 2016, gout, hypothyroidism, and depression who was seen and evaluated in the ER for complaints of worsening shortness of breath and for the last few days.  She reports increased dyspnea on exertion over the last 2-3 days.  She endorses chronic bilateral lower extremity swelling, and that she is unable to lie flat and has slough with 3 pillows for  last 1-2 years.  ED workup was positive for bilateral pleural effusions, pulmonary edema, elevated BNP of 612, and hyperglycemia with a glucose of 222.  History was obtained from patient and spouse.  Hospital admission was consulted and patient agreeable for admission.      Procedure(s) (LRB):  Left heart cath (Left)  ARTERIOGRAM, AORTIC ROOT (N/A)       Hospital Course:   Patient is a 75 year old female with type 2 diabetes, neuropathy, hypertension, CAD status post CABG, hypothyroidism presented with complaints of shortness for breath and lower extremity edema.  Started on IV Lasix with improvement.  Echocardiogram showed ejection fraction 49%, grade 2 diastolic dysfunction.  Severe  aortic stenosis. Cardio consulted consulted.  Chest x-ray showed pulmonary edema.  No prior history of CHF. Cardiology planning for angiogram 5/2 and outpatient TAVR. Pending angiogram and cardiology clearance patient can be discharged. Patient seen and examined. No chest pain or SOB. Clinically euvolemic., low dose lasix on discharge.       Review of patient's allergies indicates:  No Known Allergies    Past Medical History:   Diagnosis Date    Coronary artery disease     Diabetes mellitus, type 2     Hyperlipidemia     Hypertension     Hypothyroidism     Neuropathy      Past Surgical History:   Procedure Laterality Date    ANGIOGRAPHY OF LOWER EXTREMITY Left 12/16/2020    Procedure: ANGIOGRAM, LOWER EXTREMITY;  Surgeon: Juancho White MD;  Location: [facility] CATH/EP LAB;  Service: General;  Laterality: Left;    ARTERIOGRAPHY OF AORTIC ROOT N/A 5/2/2025    Procedure: ARTERIOGRAM, AORTIC ROOT;  Surgeon: Isabel Diallo MD;  Location: [facility] CATH/EP LAB;  Service: Cardiology;  Laterality: N/A;    CHOLECYSTECTOMY      CORONARY ARTERY BYPASS GRAFT      FOOT SURGERY Left     HYSTERECTOMY      LEFT HEART CATHETERIZATION Left 5/2/2025    Procedure: Left heart cath;  Surgeon: Isabel Diallo MD;  Location: [facility] CATH/EP LAB;  Service: Cardiology;   Laterality: Left;    PERIPHERALLY INSERTED CENTRAL CATHETER INSERTION N/A 3/13/2024    Procedure: INSERTION, PICC;  Surgeon: PICC, [facility];  Location: [facility];  Service: Cardiology;  Laterality: N/A;  Dr Mendez     Social History[1]  Family History   Problem Relation Name Age of Onset    Hyperlipidemia Mother      Heart disease Mother          Review of Systems:   Constitution: Negative for diaphoresis and fever.   HEENT: Negative for nosebleeds.    Cardiovascular: Negative for chest pain       No dyspnea on exertion       No leg swelling        No palpitations  Respiratory: Negative for shortness of breath and wheezing.    Hematologic/Lymphatic: Negative for bleeding problem. Does not bruise/bleed easily.   Skin: Negative for color change and rash.   Musculoskeletal: Negative for falls and myalgias.   Gastrointestinal: Negative for hematemesis and hematochezia.   Genitourinary: Negative for hematuria.   Neurological: Negative for dizziness and light-headedness.   Psychiatric/Behavioral: Negative for altered mental status and memory loss.          Objective:        Vitals:    05/14/25 1059   BP: 127/69   Pulse: 86       Lab Results   Component Value Date    WBC 10.47 04/30/2025    HGB 12.0 04/30/2025    HCT 37.2 04/30/2025     04/30/2025    CHOL 188 04/28/2025    TRIG 94 04/28/2025    HDL 48 04/28/2025    ALT 16 04/30/2025    AST 10 04/30/2025     05/02/2025    K 4.1 05/02/2025     05/02/2025    CREATININE 0.6 05/02/2025    BUN 12 05/02/2025    CO2 26 05/02/2025    TSH 3.512 04/29/2025    INR 1.0 04/28/2025    HGBA1C 9.9 (H) 04/28/2025        ECHOCARDIOGRAM RESULTS  Results for orders placed during the hospital encounter of 04/28/25    Echo    Interpretation Summary    Left Ventricle: The left ventricle is normal in size. There is concentric remodeling. Mild global hypokinesis present. Quantitated ejection fraction is 49%. Grade II diastolic dysfunction.    Right Ventricle: The right ventricle  is normal in size. Systolic function is moderately reduced.    Left Atrium: Mildly to moderately dilated    Aortic Valve: Not well visualized due to poor acoustic window. There is severe aortic valve sclerosis. There is severe stenosis. Aortic valve area by VTI is 0.6 cm². Aortic valve peak velocity is 3.9 m/s. Mean gradient is 38 mmHg. The dimensionless index is 0.19. There is mild to moderate aortic regurgitation.    Mitral Valve: Mildly thickened leaflets. There is mild to moderate regurgitation.    Tricuspid Valve: There is mild regurgitation.    Pulmonic Valve: There is mild regurgitation.    IVC/SVC: Normal venous pressure at 3 mmHg.        CURRENT/PREVIOUS VISIT EKG  Results for orders placed or performed during the hospital encounter of 04/28/25   EKG 12-lead    Collection Time: 04/28/25 12:30 PM   Result Value Ref Range    QRS Duration 84 ms    [facility] QTC Calculation 474 ms    Narrative    Test Reason : R07.9,    Vent. Rate : 102 BPM     Atrial Rate : 102 BPM     P-R Int : 170 ms          QRS Dur :  84 ms      QT Int : 364 ms       P-R-T Axes :  14 145   2 degrees    QTcB Int : 474 ms    Sinus tachycardia with Premature supraventricular complexes  Left posterior fascicular block  Anterior infarct (cited on or before 09-Dec-2020)  Abnormal ECG  When compared with ECG of 03-Aug-2023 16:09,  Premature supraventricular complexes are now Present  Left posterior fascicular block is now Present  Questionable change in initial forces of Lateral leads  Nonspecific T wave abnormality now evident in Inferior leads  T wave inversion no longer evident in Lateral leads  Confirmed by Guy Shook ([id number]) on 4/30/2025 10:07:12 PM    Referred By:            Confirmed By: Guy Shook     No valid procedures specified.   No results found for this or any previous visit.      Physical Exam:  CONSTITUTIONAL: No fever, no chills  HEENT: Normocephalic, atraumatic,pupils reactive to light                 NECK:  No JVD no carotid  "bruit  CVS: S1S2+, RRR, HARSH CRESCENDO DECRESCENDO LATE PEAKING MURMUR UPPER RIGHT AND LEFT STERNAL BORDER TO THE CAROTID LUNGS: Clear  ABDOMEN: Soft, NT, BS+  EXTREMITIES: No cyanosis, edema  : No kelly catheter  NEURO: AAO X 3  PSY: Normal affect      Medication List with Changes/Refills   Current Medications    ALENDRONATE (FOSAMAX) 70 MG TABLET    Take 1 tablet (70 mg total) by mouth every 7 days.    ASPIRIN (ECOTRIN) 81 MG EC TABLET    Take 1 tablet (81 mg total) by mouth once daily.    BLOOD-GLUCOSE METER KIT    To check BG 2 times daily, to use with insurance preferred meter    FUROSEMIDE (LASIX) 20 MG TABLET    Take 1 tablet (20 mg total) by mouth once daily.    GABAPENTIN (NEURONTIN) 300 MG CAPSULE    Take 2 capsules (600 mg total) by mouth every evening.    INSULIN GLARGINE U-100, LANTUS, 100 UNIT/ML (3 ML) SUBQ INPN PEN    Inject 20 Units into the skin every evening.    LEVOTHYROXINE (EUTHYROX) 50 MCG TABLET    Take 1 tablet (50 mcg total) by mouth before breakfast.    METOPROLOL TARTRATE (LOPRESSOR) 25 MG TABLET    Take 0.5 tablets (12.5 mg total) by mouth 2 (two) times daily.    ONETOUCH DELICA PLUS LANCET 33 GAUGE [facility] [place]   TO CHECK GLUCOSE TWICE DAILY    ONETOUCH ULTRA TEST STRP    USE  STRIP TO CHECK GLUCOSE TWICE DAILY    PANTOPRAZOLE (PROTONIX) 40 MG TABLET    Take 1 tablet (40 mg total) by mouth once daily.    PEN NEEDLE, DIABETIC 30 GAUGE X 1/3" NDLE    1 pen by Misc.(Non-Drug; Combo Route) route once daily. To use with levimir flex pen    ROSUVASTATIN (CRESTOR) 20 MG TABLET    Take 1 tablet (20 mg total) by mouth once daily.             Assessment:       1. Aortic valve stenosis, etiology of cardiac valve disease unspecified    2. Acute combined systolic and diastolic heart failure    3. Athscl native arteries of extremities w rest pain, left leg    4. Hyperlipidemia associated with type 2 diabetes mellitus    5. Bilateral pleural effusion    6. Coronary artery disease involving native " heart without angina pectoris, unspecified vessel or lesion type    7. Atherosclerosis of native artery of left lower extremity with rest pain    8. Hypertension associated with diabetes    9. Hx of CABG         Plan:     Problem List Items Addressed This Visit          Pulmonary    Bilateral pleural effusion       Cardiac/Vascular    Hyperlipidemia associated with type 2 diabetes mellitus    Hypertension associated with diabetes    Coronary artery disease s/p CABG    Atherosclerosis of native artery of left lower extremity with rest pain    Athscl native arteries of extremities w rest pain, left leg    Acute combined systolic and diastolic heart failure    Overview   4/28/25 hospitalization  Echocardiogram showed EF 49%, diastolic heart failure.  Severe aortic stenosis.  Pulmonary edema on CXR with bilateral pleural effusions.         Relevant Orders    Ambulatory referral/consult to Interventional Cardiology     Other Visit Diagnoses         Aortic valve stenosis, etiology of cardiac valve disease unspecified    -  Primary    Relevant Orders    Ambulatory referral/consult to Interventional Cardiology      Hx of CABG        Relevant Orders    Ambulatory referral/consult to Interventional Cardiology          Aortic stenosis.  Symptomatic recommend TAVR evaluation  Needs a heart catheterization pre procedure and referral to Interventional Cardiology    Hyperlipidemia of her Crestor was resumed    Hypertension controlled    Diabetes control    Follow up in about 3 months (around 8/14/2025).    The patients questions were answered, they verbalized understanding, and agreed with the treatment plan.     LAURA RUIZ MD  [facility]         [1]   Social History  Tobacco Use    Smoking status: Never    Smokeless tobacco: Never   Substance Use Topics    Alcohol use: No    Drug use: No

## 2025-05-15 ENCOUNTER — TELEPHONE (OUTPATIENT)
Dept: CARDIOLOGY | Facility: CLINIC | Age: 75
End: 2025-05-15
Payer: MEDICARE

## 2025-05-21 ENCOUNTER — EXTERNAL HOME HEALTH (OUTPATIENT)
Dept: HOME HEALTH SERVICES | Facility: HOSPITAL | Age: 75
End: 2025-05-21
Payer: MEDICARE

## 2025-05-23 ENCOUNTER — PATIENT MESSAGE (OUTPATIENT)
Dept: ADMINISTRATIVE | Facility: HOSPITAL | Age: 75
End: 2025-05-23
Payer: MEDICARE

## 2025-05-26 ENCOUNTER — HOSPITAL ENCOUNTER (OUTPATIENT)
Dept: RADIOLOGY | Facility: HOSPITAL | Age: 75
Discharge: HOME | End: 2025-05-26
Attending: NURSE PRACTITIONER
Payer: MEDICARE

## 2025-05-26 ENCOUNTER — HOSPITAL ENCOUNTER (OUTPATIENT)
Dept: PREADMISSION TESTING | Facility: HOSPITAL | Age: 75
Discharge: HOME | End: 2025-05-26
Attending: INTERNAL MEDICINE
Payer: MEDICARE

## 2025-05-26 VITALS
BODY MASS INDEX: 29.06 KG/M2 | HEART RATE: 75 BPM | OXYGEN SATURATION: 98 % | HEIGHT: 60 IN | DIASTOLIC BLOOD PRESSURE: 70 MMHG | WEIGHT: 148 LBS | TEMPERATURE: 98 F | SYSTOLIC BLOOD PRESSURE: 111 MMHG | RESPIRATION RATE: 16 BRPM

## 2025-05-26 DIAGNOSIS — R94.39 ABNORMAL CARDIOVASCULAR STRESS TEST: ICD-10-CM

## 2025-05-26 PROCEDURE — 71046 X-RAY EXAM CHEST 2 VIEWS: CPT | Mod: TC

## 2025-05-26 PROCEDURE — 71046 X-RAY EXAM CHEST 2 VIEWS: CPT | Mod: 26,,, | Performed by: RADIOLOGY

## 2025-05-26 PROCEDURE — 93010 ELECTROCARDIOGRAM REPORT: CPT | Mod: ,,, | Performed by: GENERAL PRACTICE

## 2025-05-26 NOTE — PRE ADMISSION SCREENING
Preadmit assessment complete. Review of patient health history and home medications. Questions answered. Patient/  voiced understanding. Chlorhexidine scrub given to patient for preop shower Preop education per AVS . Pt needs max walking support, used wheelchair

## 2025-05-26 NOTE — DISCHARGE INSTRUCTIONS
Your procedure is scheduled for: Thursday 5/29/25          [facility] thru [facility] at: 0900    Nothing to eat  after midnight the night before your procedure. You may have clear liquids up to 2 hours before coming in for procedure. This includes water, Gatorade, clear juice  Do not take any medications the morning of your procedure  Bring all your medications with you in the original pill bottles from pharmacy. Please bring your ASPIRIN   If you take blood thinners, ask your doctor if you should stop taking them.  Do not take metformin 24 hours prior to your procedure.  Adjust your insulin or other diabetes medications if needed.   Do your chlorhexidine wash the night before and morning of your procedure.  If you use a CPAP or BiPAP at home, please bring it with you the day of your procedure.  Make arrangements for someone you know to drive you home after your procedure. Taxi and Uber are not acceptable.  Come dressed comfortably          Any questions call the [facility] at [phone]

## 2025-05-28 LAB
OHS QRS DURATION: 96 MS
OHS QTC CALCULATION: 492 MS

## 2025-05-29 DIAGNOSIS — I35.0 SEVERE AORTIC STENOSIS: Primary | ICD-10-CM

## 2025-06-03 ENCOUNTER — TELEPHONE (OUTPATIENT)
Dept: FAMILY MEDICINE | Facility: CLINIC | Age: 75
End: 2025-06-03
Payer: MEDICARE

## 2025-06-03 DIAGNOSIS — R60.9 EDEMA, UNSPECIFIED TYPE: Primary | ICD-10-CM

## 2025-06-03 RX ORDER — FUROSEMIDE 20 MG/1
20 TABLET ORAL DAILY
Qty: 90 TABLET | Refills: 1 | Status: SHIPPED | OUTPATIENT
Start: 2025-06-03 | End: 2025-09-01

## 2025-06-09 ENCOUNTER — DOCUMENT SCAN (OUTPATIENT)
Dept: HOME HEALTH SERVICES | Facility: HOSPITAL | Age: 75
End: 2025-06-09
Payer: MEDICARE

## 2025-06-11 DIAGNOSIS — E78.2 MIXED HYPERLIPIDEMIA: ICD-10-CM

## 2025-06-11 DIAGNOSIS — E13.40 NEUROPATHY DUE TO SECONDARY DIABETES: ICD-10-CM

## 2025-06-11 DIAGNOSIS — Z00.00 PREVENTATIVE HEALTH CARE: Primary | ICD-10-CM

## 2025-06-11 DIAGNOSIS — E11.65 TYPE 2 DIABETES MELLITUS WITH HYPERGLYCEMIA, WITH LONG-TERM CURRENT USE OF INSULIN: ICD-10-CM

## 2025-06-11 DIAGNOSIS — Z79.4 TYPE 2 DIABETES MELLITUS WITH HYPERGLYCEMIA, WITH LONG-TERM CURRENT USE OF INSULIN: ICD-10-CM

## 2025-06-11 RX ORDER — PANTOPRAZOLE SODIUM 40 MG/1
40 TABLET, DELAYED RELEASE ORAL DAILY
Qty: 30 TABLET | Refills: 0 | Status: SHIPPED | OUTPATIENT
Start: 2025-06-11 | End: 2025-07-11

## 2025-06-11 RX ORDER — GABAPENTIN 300 MG/1
600 CAPSULE ORAL NIGHTLY
Qty: 60 CAPSULE | Refills: 0 | Status: SHIPPED | OUTPATIENT
Start: 2025-06-11 | End: 2025-07-11

## 2025-06-11 RX ORDER — INSULIN GLARGINE 100 [IU]/ML
20 INJECTION, SOLUTION SUBCUTANEOUS NIGHTLY
Qty: 6 ML | Refills: 0 | Status: SHIPPED | OUTPATIENT
Start: 2025-06-11 | End: 2025-07-11

## 2025-06-11 RX ORDER — ROSUVASTATIN CALCIUM 20 MG/1
20 TABLET, COATED ORAL DAILY
Qty: 90 TABLET | Refills: 0 | Status: SHIPPED | OUTPATIENT
Start: 2025-06-11

## 2025-06-11 NOTE — TELEPHONE ENCOUNTER
No care due was identified.  [facility] Embedded Care Due Messages. Reference number: [id number].   6/11/2025 2:42:40 PM CDT

## 2025-06-12 DIAGNOSIS — E13.40 NEUROPATHY DUE TO SECONDARY DIABETES: ICD-10-CM

## 2025-06-12 DIAGNOSIS — E03.9 HYPOTHYROIDISM, UNSPECIFIED TYPE: ICD-10-CM

## 2025-06-12 DIAGNOSIS — E78.2 MIXED HYPERLIPIDEMIA: ICD-10-CM

## 2025-06-12 DIAGNOSIS — Z00.00 PREVENTATIVE HEALTH CARE: ICD-10-CM

## 2025-06-12 RX ORDER — PANTOPRAZOLE SODIUM 40 MG/1
40 TABLET, DELAYED RELEASE ORAL DAILY
Qty: 30 TABLET | Refills: 0 | OUTPATIENT
Start: 2025-06-12 | End: 2025-07-12

## 2025-06-12 RX ORDER — GABAPENTIN 300 MG/1
600 CAPSULE ORAL NIGHTLY
Qty: 60 CAPSULE | Refills: 0 | OUTPATIENT
Start: 2025-06-12 | End: 2025-07-12

## 2025-06-12 RX ORDER — LEVOTHYROXINE SODIUM 50 UG/1
50 TABLET ORAL
Qty: 30 TABLET | Refills: 0 | OUTPATIENT
Start: 2025-06-12

## 2025-06-12 RX ORDER — ROSUVASTATIN CALCIUM 20 MG/1
20 TABLET, COATED ORAL DAILY
Qty: 90 TABLET | Refills: 0 | OUTPATIENT
Start: 2025-06-12

## 2025-06-12 NOTE — TELEPHONE ENCOUNTER
No care due was identified.  [facility] Embedded Care Due Messages. Reference number: [id number].   6/12/2025 1:12:04 PM CDT

## 2025-06-12 NOTE — TELEPHONE ENCOUNTER
Refill Decision Note   Hoa Wilde  is requesting a refill authorization.  Brief Assessment and Rationale for Refill:  Quick Discontinue     Medication Therapy Plan:        Comments:     Note composed:3:25 PM 06/12/2025

## 2025-06-17 ENCOUNTER — DOCUMENT SCAN (OUTPATIENT)
Dept: HOME HEALTH SERVICES | Facility: HOSPITAL | Age: 75
End: 2025-06-17
Payer: MEDICARE

## 2025-06-18 ENCOUNTER — OFFICE VISIT (OUTPATIENT)
Dept: FAMILY MEDICINE | Facility: CLINIC | Age: 75
End: 2025-06-18
Payer: MEDICARE

## 2025-06-18 VITALS — TEMPERATURE: 98 F | HEART RATE: 81 BPM | DIASTOLIC BLOOD PRESSURE: 50 MMHG | SYSTOLIC BLOOD PRESSURE: 80 MMHG

## 2025-06-18 DIAGNOSIS — S89.91XA INJURY OF RIGHT KNEE, INITIAL ENCOUNTER: ICD-10-CM

## 2025-06-18 DIAGNOSIS — E11.65 UNCONTROLLED TYPE 2 DIABETES MELLITUS WITH HYPERGLYCEMIA: Primary | ICD-10-CM

## 2025-06-18 DIAGNOSIS — I35.0 NONRHEUMATIC AORTIC VALVE STENOSIS: ICD-10-CM

## 2025-06-18 DIAGNOSIS — E11.43 DIABETIC AUTONOMIC NEUROPATHY ASSOCIATED WITH TYPE 2 DIABETES MELLITUS: ICD-10-CM

## 2025-06-18 DIAGNOSIS — E11.69 HYPERLIPIDEMIA ASSOCIATED WITH TYPE 2 DIABETES MELLITUS: ICD-10-CM

## 2025-06-18 DIAGNOSIS — E11.65 TYPE 2 DIABETES MELLITUS WITH HYPERGLYCEMIA, WITH LONG-TERM CURRENT USE OF INSULIN: ICD-10-CM

## 2025-06-18 DIAGNOSIS — Z79.4 TYPE 2 DIABETES MELLITUS WITH HYPERGLYCEMIA, WITH LONG-TERM CURRENT USE OF INSULIN: ICD-10-CM

## 2025-06-18 DIAGNOSIS — D64.9 ANEMIA, UNSPECIFIED TYPE: ICD-10-CM

## 2025-06-18 DIAGNOSIS — E78.5 HYPERLIPIDEMIA ASSOCIATED WITH TYPE 2 DIABETES MELLITUS: ICD-10-CM

## 2025-06-18 PROCEDURE — 1159F MED LIST DOCD IN RCRD: CPT | Mod: CPTII,S$GLB,, | Performed by: FAMILY MEDICINE

## 2025-06-18 PROCEDURE — 99999 PR PBB SHADOW E&M-EST. PATIENT-LVL III: CPT | Mod: PBBFAC,,, | Performed by: FAMILY MEDICINE

## 2025-06-18 PROCEDURE — 1125F AMNT PAIN NOTED PAIN PRSNT: CPT | Mod: CPTII,S$GLB,, | Performed by: FAMILY MEDICINE

## 2025-06-18 PROCEDURE — 1101F PT FALLS ASSESS-DOCD LE1/YR: CPT | Mod: CPTII,S$GLB,, | Performed by: FAMILY MEDICINE

## 2025-06-18 PROCEDURE — 3046F HEMOGLOBIN A1C LEVEL >9.0%: CPT | Mod: CPTII,S$GLB,, | Performed by: FAMILY MEDICINE

## 2025-06-18 PROCEDURE — 99213 OFFICE O/P EST LOW 20 MIN: CPT | Mod: S$GLB,,, | Performed by: FAMILY MEDICINE

## 2025-06-18 PROCEDURE — 3078F DIAST BP <80 MM HG: CPT | Mod: CPTII,S$GLB,, | Performed by: FAMILY MEDICINE

## 2025-06-18 PROCEDURE — 3288F FALL RISK ASSESSMENT DOCD: CPT | Mod: CPTII,S$GLB,, | Performed by: FAMILY MEDICINE

## 2025-06-18 PROCEDURE — 3074F SYST BP LT 130 MM HG: CPT | Mod: CPTII,S$GLB,, | Performed by: FAMILY MEDICINE

## 2025-06-18 NOTE — PROGRESS NOTES
Subjective:       Patient ID: Hoa Wilde is a 75 y.o. female.    Chief Complaint: Knee Injury (Right knee fall 6/18/2025 ) and Medication Refill      History of Present Illness    CHIEF COMPLAINT:  Ms. Wilde presents for a checkup and has fallen in the doorway prior to entering the building.    HPI:  Ms. Wilde has a history of diabetic autonomic neuropathy and polyneuropathy. She fell in the doorway between two doors prior to entering the building for her appointment, landing on her right knee. She denies hitting her head or losing consciousness. She uses a walker. Her blood pressure was found to be low at 80/50, though she does not feel dehydrated. She reports no shortness of breath or coughing. Her blood sugar have been running between 130-140, an improvement from previous levels in the 300-400 range. She is currently on 20 units of insulin. She denies pain anywhere other than her right knee. She mentions a previous vitreous hemorrhage in her eye from a few years ago. She also references an aneurysm that requires attention, for which she had a recent CT angiogram, though she has not received any follow-up information about it.    MEDICATIONS:  Ms. Wilde is on Lasix (Furosemide), 1 tablet daily, for fluid retention and Crestor. She is also on Insulin, 20 units, for diabetes. Lasix (Furosemide) is to be stopped for a few days due to low blood pressure.    MEDICAL HISTORY:  Ms. Wilde has a history of diabetic autonomic neuropathy, polyneuropathy, aortic sclerosis, vitreous hemorrhage, and aneurysm.    TEST RESULTS:  Ms. Wilde's A1C level in April was 9.9.    IMAGING:  She recently completed a CT angiogram, and the results have been sent to the vascular surgeon.      ROS:  Constitutional: +falling  Head: -recent head injury  Respiratory: -shortness of breath, -cough  Musculoskeletal: +joint pain, -back pain, +limb pain  Neurological: -loss of consciousness        Lab Results   Component Value Date    WBC 7.90 05/26/2025     HGB 11.1 (L) 05/26/2025    HCT 33.7 (L) 05/26/2025     05/26/2025    CHOL 188 04/28/2025    TRIG 94 04/28/2025    HDL 48 04/28/2025    ALT 7 (L) 05/26/2025    AST 11 05/26/2025     05/26/2025    K 4.3 05/26/2025     05/26/2025    CREATININE 0.8 05/26/2025    BUN 9 05/26/2025    CO2 28 05/26/2025    TSH 3.512 04/29/2025    INR 1.0 04/28/2025    HGBA1C 9.9 (H) 04/28/2025       Allergies and Medications:   Review of patient's allergies indicates:  No Known Allergies  Current Medications[1]    Family History:   Family History   Problem Relation Name Age of Onset    Hyperlipidemia Mother      Heart disease Mother         Social History:   Social History[2]        Objective:     Vitals:    06/18/25 1553   BP: (!) 80/50   Pulse: 81   Temp: 97.8 °F (36.6 °C)        Physical Exam    Vitals: Blood pressure: 80/50. Hypotensive.  General: No acute distress. Well-developed. Well-nourished.  Eyes: EOMI. Sclerae anicteric.  HENT: Normocephalic. Atraumatic. Nares patent. Moist oral mucosa.  Cardiovascular: Regular rate. Regular rhythm. No murmurs. No rubs. No gallops. Normal S1, S2.  Respiratory: Normal respiratory effort. Clear to auscultation bilaterally. No rales. No rhonchi. No wheezing.  Musculoskeletal: No  obvious deformity.  Extremities: No lower extremity edema.  Neurological: Alert & oriented x3. No slurred speech. Normal gait.  Psychiatric: Normal mood. Normal affect. Good insight. Good judgment.  Skin: Warm. Dry. No rash.  MSK: Knee - Right: Normal range of motion in right knee.            Assessment:       1. Uncontrolled type 2 diabetes mellitus with hyperglycemia    2. Hyperlipidemia associated with type 2 diabetes mellitus    3. Diabetic autonomic neuropathy associated with type 2 diabetes mellitus    4. Type 2 diabetes mellitus with hyperglycemia, with long-term current use of insulin    5. Nonrheumatic aortic valve stenosis    6. Injury of right knee, initial encounter    7. Anemia,  unspecified type        Plan:       Assessment & Plan    E11.43 Type 2 diabetes mellitus with diabetic autonomic (poly)neuropathy  E11.9 Type 2 diabetes mellitus without complications  W18.40XA Slipping, tripping and stumbling without falling, unspecified, initial encounter  I95.9 Hypotension, unspecified  H35.60 Retinal hemorrhage, unspecified eye  I35.8 Other nonrheumatic aortic valve disorders  I72.9 Aneurysm of unspecified site  S80.911A Unspecified superficial injury of right knee, initial encounter  Z99.3 Dependence on wheelchair    TYPE 2 DIABETES MELLITUS WITH DIABETIC NEUROPATHY:  - Ms. Wilde has a history of diabetic autonomic neuropathy and polyneuropathy.  - Blood sugar levels have improved to 130-140 from previous levels of 300-400.  - A1C in April was 9.9, indicating poor control of diabetes.  - Ordered CBC and A1C labs to reassess current diabetes control.    HYPOTENSION:  - Ms. Wilde presented with low BP (80/50) after a fall, raising concerns about potential dehydration and medication effects.  - Despite the low blood pressure, patient is not tachycardic and does not feel dehydrated.  - Discontinued Lasix (furosemide) due to risk of further dehydration, especially in hot weather, until BP normalizes.  - Recommend increasing fluid intake, especially water, and explained the importance of maintaining adequate hydration to stabilize blood pressure.  - Considered ER visit but opted for outpatient management with close follow-up.    KNEE INJURY:  - Ms. Wilde fell in the doorway prior to appointment, injuring her right knee.  - No head injury or loss of consciousness occurred.  - Range of motion appears intact, and patient can stand with a walker.  - Ordered XR Right Knee to rule out fracture.  - Instructed patient to apply ice for the next 2 hours, elevate the injured knee, and limit activity for the next 24 hours.    RETINAL HEMORRHAGE:  - Ms. Wilde had a vitreous hemorrhage in the eye a few years  ago.    AORTIC VALVE DISORDER:  - Ms. Wilde has aortic sclerosis but did not have valve replacement.    ANEURYSM:  - Ms. Wilde has an aneurysm that requires attention.  - Ms. Wilde had a CT angiogram and is awaiting results from the vascular surgeon.    DEPENDENCE ON MOBILITY AIDS:  - Ms. Wilde uses a walker for mobility.    FOLLOW-UP:  - Scheduled follow-up on Monday morning for knee injury and BP check.  - Advised to contact the office if pain increases or new symptoms develop.  - Educated on the need for immediate medical attention if symptoms worsen. Patient is instructed to hold on her Lasix until further notice I will see her on Monday     Hoa was seen today for knee injury and medication refill.    Diagnoses and all orders for this visit:    Uncontrolled type 2 diabetes mellitus with hyperglycemia    Hyperlipidemia associated with type 2 diabetes mellitus    Diabetic autonomic neuropathy associated with type 2 diabetes mellitus  -     Hemoglobin A1C; Future  -     Comprehensive Metabolic Panel; Future    Type 2 diabetes mellitus with hyperglycemia, with long-term current use of insulin    Nonrheumatic aortic valve stenosis    Injury of right knee, initial encounter  -     X-Ray Knee 3 View Right; Future    Anemia, unspecified type  -     CBC Auto Differential; Future         Follow up in about 5 days (around 6/23/2025), or Follow-up knee injury.  This note was generated with the assistance of ambient listening technology. Verbal consent was obtained by the patient and accompanying visitor(s) for the recording of patient appointment to facilitate this note. I attest to having reviewed and edited the generated note for accuracy, though some syntax or spelling errors may persist. Please contact the author of this note for any clarification.            [1]   Current Outpatient Medications   Medication Sig Dispense Refill    alendronate (FOSAMAX) 70 MG tablet Take 1 tablet (70 mg total) by mouth every 7 days. 12  "tablet 1    aspirin (ECOTRIN) 81 MG EC tablet Take 1 tablet (81 mg total) by mouth once daily. 30 tablet 0    blood-glucose meter kit To check BG 2 times daily, to use with insurance preferred meter 1 each 0    furosemide (LASIX) 20 MG tablet Take 1 tablet (20 mg total) by mouth once daily. 90 tablet 1    gabapentin (NEURONTIN) 300 MG capsule Take 2 capsules (600 mg total) by mouth every evening. 60 capsule 0    insulin glargine U-100, Lantus, 100 unit/mL (3 mL) SubQ InPn pen Inject 20 Units into the skin every evening. 6 mL 0    levothyroxine (EUTHYROX) 50 MCG tablet Take 1 tablet (50 mcg total) by mouth before breakfast. 30 tablet 0    metoprolol tartrate (LOPRESSOR) 25 MG tablet Take 0.5 tablets (12.5 mg total) by mouth 2 (two) times daily. 90 tablet 3    ONETOUCH DELICA PLUS LANCET 33 gauge Misc USE   TO CHECK GLUCOSE TWICE DAILY 100 each 3    ONETOUCH ULTRA TEST Strp USE  STRIP TO CHECK GLUCOSE TWICE DAILY 100 each 3    pantoprazole (PROTONIX) 40 MG tablet Take 1 tablet (40 mg total) by mouth once daily. 30 tablet 0    pen needle, diabetic 30 gauge x 1/3" Ndle 1 pen by Misc.(Non-Drug; Combo Route) route once daily. To use with levimir flex pen 100 each 3    rosuvastatin (CRESTOR) 20 MG tablet Take 1 tablet (20 mg total) by mouth once daily. 90 tablet 0     No current facility-administered medications for this visit.   [2]   Social History  Socioeconomic History    Marital status:    Occupational History    Occupation: retired    Tobacco Use    Smoking status: Never     Passive exposure: Current    Smokeless tobacco: Never   Substance and Sexual Activity    Alcohol use: No    Drug use: No     Social Drivers of Health     Financial Resource Strain: Patient Declined (4/30/2025)    Overall Financial Resource Strain (CARDIA)     Difficulty of Paying Living Expenses: Patient declined   Food Insecurity: Patient Declined (4/30/2025)    Hunger Vital Sign     Worried About Running Out of Food in the Last Year: " Patient declined     Ran Out of Food in the Last Year: Patient declined   Transportation Needs: Patient Declined (4/29/2025)    PRAPARE - Transportation     Lack of Transportation (Medical): Patient declined     Lack of Transportation (Non-Medical): Patient declined   Physical Activity: Inactive (4/2/2025)    Exercise Vital Sign     Days of Exercise per Week: 0 days     Minutes of Exercise per Session: 0 min   Stress: Patient Declined (4/30/2025)    [employer] - Occupational Stress Questionnaire     Feeling of Stress : Patient declined   Housing Stability: Patient Declined (4/30/2025)    Housing Stability Vital Sign     Unable to Pay for Housing in the Last Year: Patient declined     Homeless in the Last Year: Patient declined

## 2025-06-20 ENCOUNTER — TELEPHONE (OUTPATIENT)
Dept: FAMILY MEDICINE | Facility: CLINIC | Age: 75
End: 2025-06-20
Payer: MEDICARE

## 2025-06-20 NOTE — TELEPHONE ENCOUNTER
Called patient to see how she was feeling after her fall she stated that her knee is still hurting and she went for her x ray and they told her the machine was broken and she would have to go to the hospital and she refudsed and went home.

## 2025-06-23 ENCOUNTER — OFFICE VISIT (OUTPATIENT)
Dept: FAMILY MEDICINE | Facility: CLINIC | Age: 75
End: 2025-06-23
Payer: MEDICARE

## 2025-06-23 ENCOUNTER — OFFICE VISIT (OUTPATIENT)
Dept: PODIATRY | Facility: CLINIC | Age: 75
End: 2025-06-23
Payer: MEDICARE

## 2025-06-23 VITALS — BODY MASS INDEX: 28.99 KG/M2 | HEIGHT: 60 IN | OXYGEN SATURATION: 99 % | WEIGHT: 147.69 LBS

## 2025-06-23 VITALS
HEART RATE: 96 BPM | SYSTOLIC BLOOD PRESSURE: 110 MMHG | WEIGHT: 148.81 LBS | DIASTOLIC BLOOD PRESSURE: 78 MMHG | TEMPERATURE: 98 F | BODY MASS INDEX: 29.22 KG/M2 | OXYGEN SATURATION: 99 % | HEIGHT: 60 IN

## 2025-06-23 DIAGNOSIS — M25.561 ACUTE PAIN OF RIGHT KNEE: ICD-10-CM

## 2025-06-23 DIAGNOSIS — J90 BILATERAL PLEURAL EFFUSION: ICD-10-CM

## 2025-06-23 DIAGNOSIS — Z87.828 HEALED WOUND: ICD-10-CM

## 2025-06-23 DIAGNOSIS — I87.2 VENOUS INSUFFICIENCY OF LOWER EXTREMITY: ICD-10-CM

## 2025-06-23 DIAGNOSIS — I73.9 PAD (PERIPHERAL ARTERY DISEASE): ICD-10-CM

## 2025-06-23 DIAGNOSIS — E13.40 NEUROPATHY DUE TO SECONDARY DIABETES: ICD-10-CM

## 2025-06-23 DIAGNOSIS — L84 PRE-ULCERATIVE CALLUSES: ICD-10-CM

## 2025-06-23 DIAGNOSIS — E11.42 TYPE 2 DIABETES MELLITUS WITH DIABETIC POLYNEUROPATHY, WITH LONG-TERM CURRENT USE OF INSULIN: Primary | ICD-10-CM

## 2025-06-23 DIAGNOSIS — Z79.4 TYPE 2 DIABETES MELLITUS WITH DIABETIC POLYNEUROPATHY, WITH LONG-TERM CURRENT USE OF INSULIN: Primary | ICD-10-CM

## 2025-06-23 DIAGNOSIS — Z87.39 HISTORY OF OSTEOMYELITIS: ICD-10-CM

## 2025-06-23 DIAGNOSIS — M21.6X9 EQUINUS DEFORMITY OF FOOT: ICD-10-CM

## 2025-06-23 DIAGNOSIS — M25.551 PAIN OF RIGHT HIP JOINT: ICD-10-CM

## 2025-06-23 DIAGNOSIS — Z87.898 HISTORY OF ULCERATION: ICD-10-CM

## 2025-06-23 PROCEDURE — 3046F HEMOGLOBIN A1C LEVEL >9.0%: CPT | Mod: CPTII,S$GLB,, | Performed by: FAMILY MEDICINE

## 2025-06-23 PROCEDURE — 99213 OFFICE O/P EST LOW 20 MIN: CPT | Mod: S$GLB,,, | Performed by: FAMILY MEDICINE

## 2025-06-23 PROCEDURE — 3052F HG A1C>EQUAL 8.0%<EQUAL 9.0%: CPT | Mod: CPTII,S$GLB,,

## 2025-06-23 PROCEDURE — 1160F RVW MEDS BY RX/DR IN RCRD: CPT | Mod: CPTII,S$GLB,,

## 2025-06-23 PROCEDURE — 1126F AMNT PAIN NOTED NONE PRSNT: CPT | Mod: CPTII,S$GLB,,

## 2025-06-23 PROCEDURE — 1159F MED LIST DOCD IN RCRD: CPT | Mod: CPTII,S$GLB,,

## 2025-06-23 PROCEDURE — 99999 PR PBB SHADOW E&M-EST. PATIENT-LVL III: CPT | Mod: PBBFAC,,,

## 2025-06-23 PROCEDURE — 3074F SYST BP LT 130 MM HG: CPT | Mod: CPTII,S$GLB,, | Performed by: FAMILY MEDICINE

## 2025-06-23 PROCEDURE — 1100F PTFALLS ASSESS-DOCD GE2>/YR: CPT | Mod: CPTII,S$GLB,,

## 2025-06-23 PROCEDURE — 3288F FALL RISK ASSESSMENT DOCD: CPT | Mod: CPTII,S$GLB,, | Performed by: FAMILY MEDICINE

## 2025-06-23 PROCEDURE — 1125F AMNT PAIN NOTED PAIN PRSNT: CPT | Mod: CPTII,S$GLB,, | Performed by: FAMILY MEDICINE

## 2025-06-23 PROCEDURE — 3288F FALL RISK ASSESSMENT DOCD: CPT | Mod: CPTII,S$GLB,,

## 2025-06-23 PROCEDURE — 99213 OFFICE O/P EST LOW 20 MIN: CPT | Mod: 25,S$GLB,,

## 2025-06-23 PROCEDURE — 11056 PARNG/CUTG B9 HYPRKR LES 2-4: CPT | Mod: S$GLB,,,

## 2025-06-23 PROCEDURE — 3078F DIAST BP <80 MM HG: CPT | Mod: CPTII,S$GLB,, | Performed by: FAMILY MEDICINE

## 2025-06-23 PROCEDURE — 1159F MED LIST DOCD IN RCRD: CPT | Mod: CPTII,S$GLB,, | Performed by: FAMILY MEDICINE

## 2025-06-23 PROCEDURE — 99999 PR PBB SHADOW E&M-EST. PATIENT-LVL IV: CPT | Mod: PBBFAC,,, | Performed by: FAMILY MEDICINE

## 2025-06-23 PROCEDURE — 3008F BODY MASS INDEX DOCD: CPT | Mod: CPTII,S$GLB,,

## 2025-06-23 PROCEDURE — 3008F BODY MASS INDEX DOCD: CPT | Mod: CPTII,S$GLB,, | Performed by: FAMILY MEDICINE

## 2025-06-23 PROCEDURE — 1100F PTFALLS ASSESS-DOCD GE2>/YR: CPT | Mod: CPTII,S$GLB,, | Performed by: FAMILY MEDICINE

## 2025-06-23 NOTE — PROGRESS NOTES
[address]  [place] [address]  Phone: [phone]   [phone]    Patient's PCP:Carlos Pope MD  Referring Provider: No ref. provider found    Subjective:      Chief Complaint:: Pre-ulcerative callus     Follow-up  Associated symptoms include numbness. Pertinent negatives include no abdominal pain, arthralgias, chest pain, chills, coughing, fatigue, fever, headaches, joint swelling, myalgias, nausea, neck pain, rash or weakness.       Hoa Wilde is a 75 y.o. female who presents today for follow up of Healed wound. PT denies any new complaints at this time. PT presents today in normal shoes using a walker.            Systemic Doctor: Carlos Pope MD  Date Last Seen: 6/23/2025  Last glucose reading: Did not check   Lab Results   Component Value Date    HGBA1C 8.6 (H) 06/27/2025        Vitals:    06/23/25 1432   SpO2: 99%   Weight: 67 kg (147 lb 11.3 oz)   Height: 5' (1.524 m)   PainSc: 0-No pain      Shoe Size:     Past Surgical History:   Procedure Laterality Date    ANGIOGRAPHY OF LOWER EXTREMITY Left 12/16/2020    Procedure: ANGIOGRAM, LOWER EXTREMITY;  Surgeon: Juancho White MD;  Location: [facility] CATH/EP LAB;  Service: General;  Laterality: Left;    ARTERIOGRAPHY OF AORTIC ROOT N/A 5/2/2025    Procedure: ARTERIOGRAM, AORTIC ROOT;  Surgeon: Isabel Diallo MD;  Location: [facility] CATH/EP LAB;  Service: Cardiology;  Laterality: N/A;    CHOLECYSTECTOMY      CORONARY ARTERY BYPASS GRAFT      FOOT SURGERY Left     HYSTERECTOMY      LEFT HEART CATHETERIZATION Left 5/2/2025    Procedure: Left heart cath;  Surgeon: Isabel Diallo MD;  Location: [facility] CATH/EP LAB;  Service: Cardiology;  Laterality: Left;    PERIPHERALLY INSERTED CENTRAL CATHETER INSERTION N/A 3/13/2024    Procedure: INSERTION, PICC;  Surgeon: PICC, [facility];  Location: [facility] ENDO;  Service: Cardiology;  Laterality: N/A;  Dr Mendez     Past Medical History:   Diagnosis Date    Back pain     Coronary artery disease      Diabetes mellitus, type 2     Hyperlipidemia     Hypertension     Hypothyroidism     Neuropathy      Family History   Problem Relation Name Age of Onset    Hyperlipidemia Mother      Heart disease Mother          Social History:   Marital Status:   Alcohol History:  reports no history of alcohol use.  Tobacco History:  reports that she has never smoked. She has been exposed to tobacco smoke. She has never used smokeless tobacco.  Drug History:  reports no history of drug use.    Review of patient's allergies indicates:  No Known Allergies    Current Medications[1]    Review of Systems   Constitutional:  Negative for chills, fatigue, fever and unexpected weight change.   HENT:  Negative for hearing loss and trouble swallowing.    Eyes:  Negative for photophobia and visual disturbance.   Respiratory:  Negative for cough, shortness of breath and wheezing.    Cardiovascular:  Negative for chest pain, palpitations and leg swelling.   Gastrointestinal:  Negative for abdominal pain and nausea.   Genitourinary:  Negative for dysuria and frequency.   Musculoskeletal:  Negative for arthralgias, back pain, gait problem, joint swelling, myalgias and neck pain.   Skin:  Negative for rash and wound.   Neurological:  Positive for numbness. Negative for tremors, seizures, weakness and headaches.   Hematological:  Does not bruise/bleed easily.   Psychiatric/Behavioral:  Negative for hallucinations.          Objective:        Physical Exam:   Foot Exam    General  General Appearance: appears stated age and healthy   Orientation: alert and oriented to person, place, and time   Affect: appropriate   Gait: unimpaired       Right Foot/Ankle     Inspection and Palpation  Ecchymosis: none  Tenderness: none   Swelling: none   Arch: normal  Hallux valgus: no  Skin Exam: callus and dry skin; no drainage, no cellulitis, no ulcer and no erythema   Neurovascular  Dorsalis pedis: doppler  Posterior tibial: doppler  Capillary Refill:  4+  Varicose veins: present  Saphenous nerve sensation: absent  Tibial nerve sensation: absent  Superficial peroneal nerve sensation: absent  Deep peroneal nerve sensation: absent  Sural nerve sensation: absent    Muscle Strength  Ankle dorsiflexion: 5  Ankle plantar flexion: 5  Ankle inversion: 5  Ankle eversion: 5  Great toe extension: 5  Great toe flexion: 5    Range of Motion    Passive  Ankle dorsiflexion: 0  Ankle eversion: 10  Ankle inversion: 20  1st MTP extension: 35      Tests  Anterior drawer: negative   Varus tilt: negative   Talar tilt: negative   PT Tinel's sign: negative    Paresthesia: negative  Comments  Vibratory sensation absent  Sharp dull discrimination absent  [place] Monico monofilament absent      Left Foot/Ankle      Inspection and Palpation  Ecchymosis: none  Tenderness: none   Swelling: none   Arch: normal  Hallux valgus: no  Skin Exam: callus and dry skin; no drainage, no cellulitis, no ulcer and no erythema   Neurovascular  Dorsalis pedis: doppler  Posterior tibial: doppler  Capillary refill: 4+  Varicose veins: present  Saphenous nerve sensation: absent  Tibial nerve sensation: absent  Superficial peroneal nerve sensation: absent  Deep peroneal nerve sensation: absent  Sural nerve sensation: absent    Muscle Strength  Ankle dorsiflexion: 5  Ankle plantar flexion: 5  Ankle inversion: 5  Ankle eversion: 5  Great toe extension: 5  Great toe flexion: 5    Range of Motion    Passive  Ankle dorsiflexion: 0  Ankle eversion: 10  Ankle inversion: 20  1st MTP extension: 35      Tests  Anterior drawer: negative   Varus Tilt: negative   Talar tilt: negative   PT Tinel's sign: negative  Paresthesia: negative  Comments  1st MTPJ with no underlying ulceration ulceration    No erythema, No warmth, No edema, No Pain, No drainage, No fluctuance or crepitation, No Malodor    Vibratory sensation absent  Sharp dull discrimination absent  [place] Monico monofilament absent          Imaging: None          Assessment:       1. Type 2 diabetes mellitus with diabetic polyneuropathy, with long-term current use of insulin    2. PAD (peripheral artery disease)    3. Equinus deformity of foot    4. Venous insufficiency of lower extremity    5. Healed wound    6. History of ulceration    7. History of osteomyelitis    8. Neuropathy due to secondary diabetes    9. Pre-ulcerative calluses      Plan:   Type 2 diabetes mellitus with diabetic polyneuropathy, with long-term current use of insulin    PAD (peripheral artery disease)    Equinus deformity of foot    Venous insufficiency of lower extremity    Healed wound    History of ulceration    History of osteomyelitis    Neuropathy due to secondary diabetes    Pre-ulcerative calluses      I provided patient education verbally regarding: Patient diagnosis, treatment options, as well as alternatives, risks, and benefits.     Moisturize with gold bond foot cream or Aquaphor once daily, do not leave moisture in webspaces.     Urea 40% nightly on calluses     Complex B vitamin     Compression stockings     Patient is due for pair of diabetic shoes     Continue to use 1/4 felt offloading pads on Left foot/in shoe    Pre ulcerative callus Sub 1st left foot MTPJ debrided, Right foot sub 5th MTPJ with no underlying ulceration, used 15 blade, tolerated well, no incident     Comprehensive diabetic foot exam preformed.     ADA Diabetic Foot Risk Category 3 (High) - Presence of diabetes with a previous history of ulcer or lower-extremity amputation; or Chronic venous insufficiency (skin color change or temperature difference), follow up 3 months   Counseled patient on the aspects of diabetes and how it pertains to the feet.  I explained the importance of proper diabetic foot care, proper foot hygiene, proper shoe gear, twice daily foot inspections, knowing HgA1c and striving to keep that value as close to 6 as possible.   Reviewed risks associated with walking without protective shoe gear  "and putting sharp instruments to feet.   I discussed the increased complications of high blood sugar including stroke, blindness, heart attack, kidney failure and loss of limb secondary to neuropathy and PVD.  Patient will inspect twice daily for any breaks in the skin or redness. In the setting of neuropathy, if present, these areas are not recognized early due to the numbness.  I discussed different treatments available to control the symptoms but which may not cure the problem.  Follow up 3 months     This note was created using Dragon voice recognition software that occasionally misinterpreted phrases or words.     Rey Bob DPM  Podiatry / Foot and Ankle Surgery   Secure chat preferred          [1]   Current Outpatient Medications   Medication Sig Dispense Refill    alendronate (FOSAMAX) 70 MG tablet Take 1 tablet (70 mg total) by mouth every 7 days. 12 tablet 1    aspirin (ECOTRIN) 81 MG EC tablet Take 1 tablet (81 mg total) by mouth once daily. 30 tablet 0    blood-glucose meter kit To check BG 2 times daily, to use with insurance preferred meter 1 each 0    furosemide (LASIX) 20 MG tablet Take 1 tablet (20 mg total) by mouth once daily. 90 tablet 1    gabapentin (NEURONTIN) 300 MG capsule TAKE 2 CAPSULES BY MOUTH IN THE EVENING 60 capsule 0    insulin glargine U-100, Lantus, 100 unit/mL (3 mL) SubQ InPn pen Inject 20 Units into the skin every evening. 6 mL 0    levothyroxine (EUTHYROX) 50 MCG tablet Take 1 tablet (50 mcg total) by mouth before breakfast. 30 tablet 0    metoprolol tartrate (LOPRESSOR) 25 MG tablet Take 0.5 tablets (12.5 mg total) by mouth 2 (two) times daily. 90 tablet 3    ONETOUCH DELICA PLUS LANCET 33 gauge Misc USE   TO CHECK GLUCOSE TWICE DAILY 100 each 3    ONETOUCH ULTRA TEST Strp USE  STRIP TO CHECK GLUCOSE TWICE DAILY 100 each 3    pantoprazole (PROTONIX) 40 MG tablet Take 1 tablet (40 mg total) by mouth once daily. 30 tablet 0    pen needle, diabetic 30 gauge x 1/3" Ndle 1 " pen by Misc.(Non-Drug; Combo Route) route once daily. To use with levimir flex pen 100 each 3    rosuvastatin (CRESTOR) 20 MG tablet Take 1 tablet (20 mg total) by mouth once daily. 90 tablet 0     No current facility-administered medications for this visit.

## 2025-06-23 NOTE — PROGRESS NOTES
Subjective:       Patient ID: Hoa Wilde is a 75 y.o. female.    Chief Complaint: Knee Pain (Right knee ) and Follow-up      History of Present Illness    CHIEF COMPLAINT:  Ms. Wilde presents for a follow-up on her knee injury after a fall and for blood pressure management.    HPI:  Ms. Wilde is following up on a knee injury sustained from a recent fall. She has not been able to get X-rays of her knee due to a broken machine. She reports improvement in her knee, but continues to use a walker consistently for mobility and has occasional anxiety about walking without it.    Regarding her blood pressure, her last recorded blood pressure was 80/50, which was considered too low. This low reading occurred when she had fallen and was feeling unwell. As a result, her Lasix medication was stopped. She had been taking half a tablet of Lasix as prescribed on the bottle and has now been off Lasix for 3 days. She reports limited fluid consumption.    She mentions having leg weakness, which appears to be related to hip pain. She is no longer receiving physical therapy, as she was discharged from it. The reason for discontinuation is unclear.    She has a history of fluid in the lung and coronary disease. She is currently receiving home health care services until the 27th of the month. A home health nurse is scheduled to take blood samples for lab work.    Regarding her diabetes management, her last A1C in April was 9.9. Her blood sugar over the weekend have been running between 130 and 140.    She denies current numbness in her feet and toes.    MEDICATIONS:  Ms. Wilde was on Lasix (furosemide) 20 mg, taking half a tablet (10 mg). She discontinued Lasix three days ago due to low blood pressure.    MEDICAL HISTORY:  Ms. Wilde has a history of knee injury, hypertension, coronary artery disease, and diabetes.    TEST RESULTS:  Ms. Wilde's A1C level in April was 9.9.    IMAGING:  A past chest XR revealed fluid in the patient's  lung.      ROS:  Musculoskeletal: +muscle weakness  Neurological: -numbness          Allergies and Medications:   Review of patient's allergies indicates:  No Known Allergies  Current Medications[1]    Family History:   Family History   Problem Relation Name Age of Onset    Hyperlipidemia Mother      Heart disease Mother         Social History:   Social History[2]        Objective:     Vitals:    06/23/25 0919   BP: 110/78   Pulse:    Temp:         Physical Exam    Vitals: Blood pressure: 110/78.  General: No acute distress. Well-developed. Well-nourished.  Eyes: EOMI. Sclerae anicteric.  HENT: Normocephalic. Atraumatic. Nares patent. Moist oral mucosa.  Cardiovascular: Regular rate. Regular rhythm. No murmurs. No rubs. No gallops. Normal S1, S2. PMI in left midclavicular line.  Respiratory: Normal respiratory effort. Clear to auscultation bilaterally. No rales. No rhonchi. No wheezing.  Musculoskeletal: No  obvious deformity. Marked leg weakness.  Extremities: No lower extremity edema.  Neurological: Alert & oriented x3. No slurred speech. Normal gait.  Psychiatric: Normal mood. Normal affect. Good insight. Good judgment.  Skin: Warm. Dry. No rash.  MSK: Knee - Left: Left knee: Minimal swelling. Full passive knee ROM (Left). Full active knee ROM (Left). Left knee: Ecchymosis present.            Assessment:       1. Type 2 diabetes mellitus with diabetic polyneuropathy, with long-term current use of insulin    2. Bilateral pleural effusion    3. Pain of right hip joint    4. Acute pain of right knee        Plan:       Assessment & Plan    S80.02XA Contusion of left knee, initial encounter  E11.9 Type 2 diabetes mellitus without complications  I10 Essential (primary) hypertension  R26.89 Other abnormalities of gait and mobility  M62.81 Muscle weakness (generalized)    LEFT KNEE CONTUSION:  - Observed minimal swelling, ecchymosis, and full range of motion in the left knee.  - Ms. Wilde reports improvement.  - Ordered  knee x-ray for further evaluation.  - Advised to use walker as much as possible and put weight on the side of the injured knee.  - Orthopedic follow-up deemed unnecessary due to improvement.    TYPE 2 DIABETES MELLITUS:  - Monitored patient's A1C from April which was 9.9, indicating need for reassessment.  - Ms. Wilde is checking glucose levels periodically, with readings of 130-140 over the weekend, indicating levels are running high.  - Planned A1C check postponed due to patient consuming cola this morning.  - Instructed to continue checking glucose levels and scheduled follow-up for A1C check when fasting.    HYPERTENSION:  - Blood pressure improved from 80/50 to 110/78 after discontinuing furosemide.  - Continued hold on furosemide; instructed to restart only if experiencing dyspnea or peripheral edema.    GAIT ABNORMALITY AND MUSCLE WEAKNESS:  - Ms. Wilde has marked leg weakness and uses a walker consistently.  - Instructed to continue using walker for mobility support and to avoid falls.    FOLLOW-UP:  - Ordered Chest XR.        Hoa was seen today for knee pain and follow-up.    Diagnoses and all orders for this visit:    Type 2 diabetes mellitus with diabetic polyneuropathy, with long-term current use of insulin  -     Hemoglobin A1C; Future    Bilateral pleural effusion    Pain of right hip joint    Acute pain of right knee         Follow up in about 3 months (around 9/23/2025) for follow up DM, follow up knee injury.  This note was generated with the assistance of ambient listening technology. Verbal consent was obtained by the patient and accompanying visitor(s) for the recording of patient appointment to facilitate this note. I attest to having reviewed and edited the generated note for accuracy, though some syntax or spelling errors may persist. Please contact the author of this note for any clarification.   Subjective:       Patient ID: Hoa Wilde is a 75 y.o. female.    Chief Complaint: Knee Pain  (Right knee ) and Follow-up    BP Readings from Last 3 Encounters:   06/23/25 110/78   06/18/25 (!) 80/50   05/26/25 111/70     Lab Results   Component Value Date    WBC 7.90 05/26/2025    HGB 11.1 (L) 05/26/2025    HCT 33.7 (L) 05/26/2025     05/26/2025    CHOL 188 04/28/2025    TRIG 94 04/28/2025    HDL 48 04/28/2025    ALT 7 (L) 05/26/2025    AST 11 05/26/2025     05/26/2025    K 4.3 05/26/2025     05/26/2025    CREATININE 0.8 05/26/2025    BUN 9 05/26/2025    CO2 28 05/26/2025    TSH 3.512 04/29/2025    INR 1.0 04/28/2025    HGBA1C 9.9 (H) 04/28/2025           HPI    Allergies and Medications:   Review of patient's allergies indicates:  No Known Allergies  Current Medications[3]    Family History:   Family History   Problem Relation Name Age of Onset    Hyperlipidemia Mother      Heart disease Mother         Social History:   Social History[4]    Review of Systems    Objective:     Vitals:    06/23/25 0919   BP: 110/78   Pulse:    Temp:         Physical Exam    Assessment:       1. Type 2 diabetes mellitus with diabetic polyneuropathy, with long-term current use of insulin    2. Bilateral pleural effusion    3. Pain of right hip joint    4. Acute pain of right knee        Plan:       Hoa was seen today for knee pain and follow-up.    Diagnoses and all orders for this visit:    Type 2 diabetes mellitus with diabetic polyneuropathy, with long-term current use of insulin  -     Hemoglobin A1C; Future    Bilateral pleural effusion    Pain of right hip joint    Acute pain of right knee         Follow up in about 3 months (around 9/23/2025) for follow up DM, follow up knee injury.         [1]   Current Outpatient Medications   Medication Sig Dispense Refill    alendronate (FOSAMAX) 70 MG tablet Take 1 tablet (70 mg total) by mouth every 7 days. 12 tablet 1    aspirin (ECOTRIN) 81 MG EC tablet Take 1 tablet (81 mg total) by mouth once daily. 30 tablet 0    blood-glucose meter kit To check BG 2 times  "daily, to use with insurance preferred meter 1 each 0    furosemide (LASIX) 20 MG tablet Take 1 tablet (20 mg total) by mouth once daily. 90 tablet 1    gabapentin (NEURONTIN) 300 MG capsule Take 2 capsules (600 mg total) by mouth every evening. 60 capsule 0    insulin glargine U-100, Lantus, 100 unit/mL (3 mL) SubQ InPn pen Inject 20 Units into the skin every evening. 6 mL 0    levothyroxine (EUTHYROX) 50 MCG tablet Take 1 tablet (50 mcg total) by mouth before breakfast. 30 tablet 0    metoprolol tartrate (LOPRESSOR) 25 MG tablet Take 0.5 tablets (12.5 mg total) by mouth 2 (two) times daily. 90 tablet 3    ONETOUCH DELICA PLUS LANCET 33 gauge Misc USE   TO CHECK GLUCOSE TWICE DAILY 100 each 3    ONETOUCH ULTRA TEST Strp USE  STRIP TO CHECK GLUCOSE TWICE DAILY 100 each 3    pantoprazole (PROTONIX) 40 MG tablet Take 1 tablet (40 mg total) by mouth once daily. 30 tablet 0    pen needle, diabetic 30 gauge x 1/3" Ndle 1 pen by Misc.(Non-Drug; Combo Route) route once daily. To use with levimir flex pen 100 each 3    rosuvastatin (CRESTOR) 20 MG tablet Take 1 tablet (20 mg total) by mouth once daily. 90 tablet 0     No current facility-administered medications for this visit.   [2]   Social History  Socioeconomic History    Marital status:    Occupational History    Occupation: retired    Tobacco Use    Smoking status: Never     Passive exposure: Current    Smokeless tobacco: Never   Substance and Sexual Activity    Alcohol use: No    Drug use: No     Social Drivers of Health     Financial Resource Strain: Patient Declined (4/30/2025)    Overall Financial Resource Strain (CARDIA)     Difficulty of Paying Living Expenses: Patient declined   Food Insecurity: Patient Declined (4/30/2025)    Hunger Vital Sign     Worried About Running Out of Food in the Last Year: Patient declined     Ran Out of Food in the Last Year: Patient declined   Transportation Needs: Patient Declined (4/29/2025)    PRAPARE - Transportation     " "Lack of Transportation (Medical): Patient declined     Lack of Transportation (Non-Medical): Patient declined   Physical Activity: Inactive (4/2/2025)    Exercise Vital Sign     Days of Exercise per Week: 0 days     Minutes of Exercise per Session: 0 min   Stress: Patient Declined (4/30/2025)    [employer] - Occupational Stress Questionnaire     Feeling of Stress : Patient declined   Housing Stability: Patient Declined (4/30/2025)    Housing Stability Vital Sign     Unable to Pay for Housing in the Last Year: Patient declined     Homeless in the Last Year: Patient declined   [3]   Current Outpatient Medications   Medication Sig Dispense Refill    alendronate (FOSAMAX) 70 MG tablet Take 1 tablet (70 mg total) by mouth every 7 days. 12 tablet 1    aspirin (ECOTRIN) 81 MG EC tablet Take 1 tablet (81 mg total) by mouth once daily. 30 tablet 0    blood-glucose meter kit To check BG 2 times daily, to use with insurance preferred meter 1 each 0    furosemide (LASIX) 20 MG tablet Take 1 tablet (20 mg total) by mouth once daily. 90 tablet 1    gabapentin (NEURONTIN) 300 MG capsule Take 2 capsules (600 mg total) by mouth every evening. 60 capsule 0    insulin glargine U-100, Lantus, 100 unit/mL (3 mL) SubQ InPn pen Inject 20 Units into the skin every evening. 6 mL 0    levothyroxine (EUTHYROX) 50 MCG tablet Take 1 tablet (50 mcg total) by mouth before breakfast. 30 tablet 0    metoprolol tartrate (LOPRESSOR) 25 MG tablet Take 0.5 tablets (12.5 mg total) by mouth 2 (two) times daily. 90 tablet 3    ONETOUCH DELICA PLUS LANCET 33 gauge Misc USE   TO CHECK GLUCOSE TWICE DAILY 100 each 3    ONETOUCH ULTRA TEST Strp USE  STRIP TO CHECK GLUCOSE TWICE DAILY 100 each 3    pantoprazole (PROTONIX) 40 MG tablet Take 1 tablet (40 mg total) by mouth once daily. 30 tablet 0    pen needle, diabetic 30 gauge x 1/3" Ndle 1 pen by Misc.(Non-Drug; Combo Route) route once daily. To use with levimir flex pen 100 each " 3    rosuvastatin (CRESTOR) 20 MG tablet Take 1 tablet (20 mg total) by mouth once daily. 90 tablet 0     No current facility-administered medications for this visit.   [4]   Social History  Socioeconomic History    Marital status:    Occupational History    Occupation: retired    Tobacco Use    Smoking status: Never     Passive exposure: Current    Smokeless tobacco: Never   Substance and Sexual Activity    Alcohol use: No    Drug use: No     Social Drivers of Health     Financial Resource Strain: Patient Declined (4/30/2025)    Overall Financial Resource Strain (CARDIA)     Difficulty of Paying Living Expenses: Patient declined   Food Insecurity: Patient Declined (4/30/2025)    Hunger Vital Sign     Worried About Running Out of Food in the Last Year: Patient declined     Ran Out of Food in the Last Year: Patient declined   Transportation Needs: Patient Declined (4/29/2025)    PRAPARE - Transportation     Lack of Transportation (Medical): Patient declined     Lack of Transportation (Non-Medical): Patient declined   Physical Activity: Inactive (4/2/2025)    Exercise Vital Sign     Days of Exercise per Week: 0 days     Minutes of Exercise per Session: 0 min   Stress: Patient Declined (4/30/2025)    [employer] - Occupational Stress Questionnaire     Feeling of Stress : Patient declined   Housing Stability: Patient Declined (4/30/2025)    Housing Stability Vital Sign     Unable to Pay for Housing in the Last Year: Patient declined     Homeless in the Last Year: Patient declined

## 2025-06-27 ENCOUNTER — LAB REQUISITION (OUTPATIENT)
Dept: LAB | Facility: HOSPITAL | Age: 75
End: 2025-06-27
Payer: MEDICARE

## 2025-06-27 DIAGNOSIS — I50.41 ACUTE COMBINED SYSTOLIC (CONGESTIVE) AND DIASTOLIC (CONGESTIVE) HEART FAILURE: ICD-10-CM

## 2025-06-27 DIAGNOSIS — I35.0 NONRHEUMATIC AORTIC (VALVE) STENOSIS: ICD-10-CM

## 2025-06-27 LAB
ABSOLUTE EOSINOPHIL (SMH): 0.13 K/UL
ABSOLUTE MONOCYTE (SMH): 0.37 K/UL (ref 0.3–1)
ABSOLUTE NEUTROPHIL COUNT (SMH): 4.1 K/UL (ref 1.8–7.7)
ALBUMIN SERPL-MCNC: 4 G/DL (ref 3.5–5.2)
ALP SERPL-CCNC: 92 UNIT/L (ref 40–150)
ALT SERPL-CCNC: <8 UNIT/L (ref 10–44)
ANION GAP (SMH): 11 MMOL/L (ref 8–16)
AST SERPL-CCNC: 12 UNIT/L (ref 11–45)
BASOPHILS # BLD AUTO: 0.03 K/UL
BASOPHILS NFR BLD AUTO: 0.4 %
BILIRUB SERPL-MCNC: 0.6 MG/DL (ref 0.1–1)
BUN SERPL-MCNC: 8 MG/DL (ref 8–23)
CALCIUM SERPL-MCNC: 8.3 MG/DL (ref 8.7–10.5)
CHLORIDE SERPL-SCNC: 101 MMOL/L (ref 95–110)
CO2 SERPL-SCNC: 30 MMOL/L (ref 23–29)
CREAT SERPL-MCNC: 0.9 MG/DL (ref 0.5–1.4)
EAG (SMH): 200 MG/DL (ref 68–131)
ERYTHROCYTE [DISTWIDTH] IN BLOOD BY AUTOMATED COUNT: 13 % (ref 11.5–14.5)
GFR SERPLBLD CREATININE-BSD FMLA CKD-EPI: >60 ML/MIN/1.73/M2
GLUCOSE SERPL-MCNC: 310 MG/DL (ref 70–110)
HBA1C MFR BLD: 8.6 % (ref 4–5.6)
HCT VFR BLD AUTO: 35.5 % (ref 37–48.5)
HGB BLD-MCNC: 11.9 GM/DL (ref 12–16)
IMM GRANULOCYTES # BLD AUTO: 0.02 K/UL (ref 0–0.04)
IMM GRANULOCYTES NFR BLD AUTO: 0.3 % (ref 0–0.5)
LYMPHOCYTES # BLD AUTO: 2.61 K/UL (ref 1–4.8)
MCH RBC QN AUTO: 28.3 PG (ref 27–31)
MCHC RBC AUTO-ENTMCNC: 33.5 G/DL (ref 32–36)
MCV RBC AUTO: 85 FL (ref 82–98)
NUCLEATED RBC (/100WBC) (SMH): 0 /100 WBC
PLATELET # BLD AUTO: 238 K/UL (ref 150–450)
PMV BLD AUTO: 10 FL (ref 9.2–12.9)
POTASSIUM SERPL-SCNC: 3.6 MMOL/L (ref 3.5–5.1)
PROT SERPL-MCNC: 7.2 GM/DL (ref 6–8.4)
RBC # BLD AUTO: 4.2 M/UL (ref 4–5.4)
RELATIVE EOSINOPHIL (SMH): 1.8 % (ref 0–8)
RELATIVE LYMPHOCYTE (SMH): 36 % (ref 18–48)
RELATIVE MONOCYTE (SMH): 5.1 % (ref 4–15)
RELATIVE NEUTROPHIL (SMH): 56.4 % (ref 38–73)
SODIUM SERPL-SCNC: 142 MMOL/L (ref 136–145)
WBC # BLD AUTO: 7.24 K/UL (ref 3.9–12.7)

## 2025-06-27 PROCEDURE — 85025 COMPLETE CBC W/AUTO DIFF WBC: CPT | Performed by: FAMILY MEDICINE

## 2025-06-27 PROCEDURE — 82040 ASSAY OF SERUM ALBUMIN: CPT | Performed by: FAMILY MEDICINE

## 2025-06-27 PROCEDURE — 83036 HEMOGLOBIN GLYCOSYLATED A1C: CPT | Performed by: FAMILY MEDICINE

## 2025-07-14 DIAGNOSIS — E13.40 NEUROPATHY DUE TO SECONDARY DIABETES: ICD-10-CM

## 2025-07-14 NOTE — TELEPHONE ENCOUNTER
No care due was identified.  [facility] Embedded Care Due Messages. Reference number: [id number].   7/14/2025 3:43:32 PM CDT

## 2025-07-15 RX ORDER — GABAPENTIN 300 MG/1
600 CAPSULE ORAL NIGHTLY
Qty: 60 CAPSULE | Refills: 0 | Status: SHIPPED | OUTPATIENT
Start: 2025-07-15

## 2025-07-30 ENCOUNTER — PATIENT OUTREACH (OUTPATIENT)
Dept: ADMINISTRATIVE | Facility: HOSPITAL | Age: 75
End: 2025-07-30
Payer: MEDICARE

## 2025-07-30 NOTE — PROGRESS NOTES
Care Coordination Encounter Details:       MyChart Portal Status:         []  Reviewed MyChart Portal Status offered / enrolled if applicable        Additional Notes:     MyChart Outcomes: Has unread portal messages.         Updates Requested / Reviewed:        Updated Care Coordination Note, Care Everywhere, , External Sources: [facility], [facility], [facility], and [facility], Removed  or Duplicate Orders, and Immunizations Reconciliation Completed or Queried: [place]         Health Maintenance Screening(s) Due:      Health Maintenance Topics Overdue:      VBHM Score: 5     Colon Cancer Screening  Osteoporosis Screening  Urine Screening  Eye Exam  Mammogram    Shingles/Zoster Vaccine  RSV Vaccine                  Health Maintenance Topic(s) Outreach Outcomes & Actions Taken:    Breast Cancer Screening - Outreach Outcomes & Actions Taken  : Reminder letter mailed.    Colorectal Cancer Screening - Outreach Outcomes & Actions Taken  : Reminder letter mailed.    Eye Exam - Outreach Outcomes & Actions Taken  : Reminder letter mailed    Lab(s) - Outreach Outcomes & Actions Taken  : Reminder letter mailed.    Osteoporosis Screening - Outreach Outcomes & Actions Taken  : Reminder letter mailed.       Additional Notes:  Second call placed regarding overdue health maintenance, left message.            Chronic Disease Management:     Diabetes Measures        Lab Results   Component Value Date    HGBA1C 8.6 (H) 2025           [x]  Reviewed chart for active Diabetes diagnosis     []  Scheduled necessary follow up appointments if needed         Additional Notes:             Hypertension Measures        BP Readings from Last 1 Encounters:   25 110/78           [x]  Reviewed chart for active Hypertension diagnosis     []  Reviewed & documented Home BP Cuff     []  Documented a Remote BP if needed & applicable     []  Scheduled necessary follow up appointments with Primary Care if needed         Additional  Notes:             Provider Team Continuity:     Last PCP Visit Date: 6/23/2025          [x]  Reviewed Primary Care Provider Visits, Annual Wellness Visit, and Future          Appointments to ensure appointments have been scheduled and/or           completed        Additional Notes:             Social Determinants of Health          [x]  Reviewed, completed, and/or updated the following sections:                  Food Insecurity, Transportation Needs, Financial Resource Strain,                 Tobacco Use        Additional Notes:  Second call placed regarding value base resources, left message.            Care Management, Digital Medicine, and/or Education Referrals    OPCM Risk Score: 34.9         Next Steps - Referral Actions: Digital Medicine Outcomes and Actions Taken: Pt Declined or Not Eligible        Additional Notes:

## 2025-07-30 NOTE — LETTER
July 30, 2025    Hoa Wilde  [address]  [facility] [address]             [place] [facility] Care Coordination  [address]  [place] [address]  Phone: [phone]        Dear Mary, [facility] is committed to your overall health. Periodically we review the health information in your chart to make sure you are up to date on all of your recommended tests and/or procedures.       Our review of your chart shows that you may be due for       [facility] Score: 5     Colon Cancer Screening  Osteoporosis Screening  Urine Screening  Eye Exam  Mammogram    Shingles/Zoster Vaccine  RSV Vaccine            If you have had any of the above done at another facility, please let us know and we will request a copy of the report to update your [facility] record.     At your convenience I would like to speak to you to help get these items scheduled (if needed) and also see if there is anything else we can do to help you. Please send me a message via your patient portal or give me a call at [phone].  I am looking forward to speaking with you soon.       I was also reaching out to offer some resources [facility] has. These resources are for patients that maybe having financial difficulties, like paying for prescriptions  or food difficulties. Also if your having transportation problems , like getting to and from your doctor appointments. If you need assistance , we will put in a referral for you. One of our Community Health Workers will reach out to you.       Thank You,    Kathya WALKER  Clinical Care Coordinator  [facility]  [facility]

## 2025-08-04 PROBLEM — M25.561 ACUTE PAIN OF RIGHT KNEE: Status: RESOLVED | Noted: 2025-06-23 | Resolved: 2025-08-04

## 2025-08-08 ENCOUNTER — TELEPHONE (OUTPATIENT)
Dept: FAMILY MEDICINE | Facility: CLINIC | Age: 75
End: 2025-08-08
Payer: MEDICARE

## 2025-08-08 NOTE — TELEPHONE ENCOUNTER
Attempted to contact pt and informed pt of what Dr. Pope st. Reached out to  and attempted to explain him what Dr. Pope said. He st that he has been giving his wife Pepto-Bismol with no relief. He st that his wife is taking anp and to contact her later so I can speak to her. Since  st that the Pepto-Bismol is not working what do you want to do? Please advise.

## 2025-08-08 NOTE — TELEPHONE ENCOUNTER
----- Message from Becky sent at 8/8/2025  8:34 AM CDT -----  Pt would like a call back    [phone]

## 2025-08-08 NOTE — TELEPHONE ENCOUNTER
"Spoke to pt and she st that she is unable to come into office to be evaluated due to having diarrhea for 3 days. She st it is "like water" she is asking if something can be sent into her pharmacy.  Please advise.   "

## 2025-08-24 DIAGNOSIS — E13.40 NEUROPATHY DUE TO SECONDARY DIABETES: ICD-10-CM

## 2025-08-25 RX ORDER — GABAPENTIN 300 MG/1
600 CAPSULE ORAL NIGHTLY
Qty: 60 CAPSULE | Refills: 5 | Status: SHIPPED | OUTPATIENT
Start: 2025-08-25

## (undated) DEVICE — CATH DXTERITY JL40 100CM 5FR

## (undated) DEVICE — SHEATH INTRODUCER 6FR 11CM

## (undated) DEVICE — CATHETER ANGIO OMNI FLUSH 10732201

## (undated) DEVICE — CATH DXTERITY JR40 100CM 5FR

## (undated) DEVICE — CATH IMA INFINITI 5X100CM

## (undated) DEVICE — SHEATH PINNACLE 5FRX10CM W/GUIDEWIRE

## (undated) DEVICE — GUIDEWIRE INQWIRE SE 3MM JTIP

## (undated) DEVICE — CATH DXTERITY PG 145 110CM 5FR

## (undated) DEVICE — KIT MINI STICK MAX 5F 21GX7CM

## (undated) DEVICE — GUIDEWIRE STIFF ANGLED 035X260 LAUREATE

## (undated) DEVICE — SHEATH INTRODUCER DESTINATION 6FX45